# Patient Record
Sex: FEMALE | Race: WHITE | NOT HISPANIC OR LATINO | Employment: OTHER | URBAN - METROPOLITAN AREA
[De-identification: names, ages, dates, MRNs, and addresses within clinical notes are randomized per-mention and may not be internally consistent; named-entity substitution may affect disease eponyms.]

---

## 2017-01-03 ENCOUNTER — ALLSCRIPTS OFFICE VISIT (OUTPATIENT)
Dept: OTHER | Facility: OTHER | Age: 67
End: 2017-01-03

## 2017-01-03 ENCOUNTER — APPOINTMENT (OUTPATIENT)
Dept: LAB | Facility: HOSPITAL | Age: 67
End: 2017-01-03
Attending: UROLOGY
Payer: COMMERCIAL

## 2017-01-03 DIAGNOSIS — N39.0 URINARY TRACT INFECTION: ICD-10-CM

## 2017-01-03 LAB
BILIRUB UR QL STRIP: NORMAL
CLARITY UR: NORMAL
COLOR UR: YELLOW
GLUCOSE (HISTORICAL): NORMAL
HGB UR QL STRIP.AUTO: NORMAL
KETONES UR STRIP-MCNC: NORMAL MG/DL
LEUKOCYTE ESTERASE UR QL STRIP: NORMAL
NITRITE UR QL STRIP: NORMAL
PH UR STRIP.AUTO: 6 [PH]
PROT UR STRIP-MCNC: NORMAL MG/DL
SP GR UR STRIP.AUTO: 1.03
UROBILINOGEN UR QL STRIP.AUTO: NORMAL

## 2017-01-03 PROCEDURE — 87186 SC STD MICRODIL/AGAR DIL: CPT

## 2017-01-03 PROCEDURE — 87086 URINE CULTURE/COLONY COUNT: CPT

## 2017-01-03 PROCEDURE — 87147 CULTURE TYPE IMMUNOLOGIC: CPT

## 2017-01-06 LAB — BACTERIA UR CULT: NORMAL

## 2017-01-10 ENCOUNTER — ALLSCRIPTS OFFICE VISIT (OUTPATIENT)
Dept: OTHER | Facility: OTHER | Age: 67
End: 2017-01-10

## 2017-01-10 ENCOUNTER — APPOINTMENT (OUTPATIENT)
Dept: LAB | Facility: HOSPITAL | Age: 67
End: 2017-01-10
Attending: UROLOGY
Payer: COMMERCIAL

## 2017-01-10 DIAGNOSIS — N39.0 URINARY TRACT INFECTION: ICD-10-CM

## 2017-01-10 LAB
BACTERIA UR QL AUTO: ABNORMAL /HPF
BILIRUB UR QL STRIP: NEGATIVE
BILIRUB UR QL STRIP: NORMAL
CLARITY UR: ABNORMAL
CLARITY UR: NORMAL
COLOR UR: ABNORMAL
COLOR UR: YELLOW
GLUCOSE (HISTORICAL): NORMAL
GLUCOSE UR STRIP-MCNC: NEGATIVE MG/DL
HGB UR QL STRIP.AUTO: ABNORMAL
HGB UR QL STRIP.AUTO: NORMAL
HYALINE CASTS #/AREA URNS LPF: ABNORMAL /LPF
KETONES UR STRIP-MCNC: NEGATIVE MG/DL
KETONES UR STRIP-MCNC: NORMAL MG/DL
LEUKOCYTE ESTERASE UR QL STRIP: ABNORMAL
LEUKOCYTE ESTERASE UR QL STRIP: NORMAL
NITRITE UR QL STRIP: NORMAL
NITRITE UR QL STRIP: POSITIVE
NON-SQ EPI CELLS URNS QL MICRO: ABNORMAL /HPF
PH UR STRIP.AUTO: 5 [PH]
PH UR STRIP.AUTO: 6.5 [PH] (ref 4.5–8)
PROT UR STRIP-MCNC: ABNORMAL MG/DL
PROT UR STRIP-MCNC: NORMAL MG/DL
RBC #/AREA URNS AUTO: ABNORMAL /HPF
SP GR UR STRIP.AUTO: 1.02
SP GR UR STRIP.AUTO: 1.02 (ref 1–1.03)
UROBILINOGEN UR QL STRIP.AUTO: 0.2 E.U./DL
WBC #/AREA URNS AUTO: ABNORMAL /HPF

## 2017-01-10 PROCEDURE — 87086 URINE CULTURE/COLONY COUNT: CPT

## 2017-01-10 PROCEDURE — 87186 SC STD MICRODIL/AGAR DIL: CPT

## 2017-01-10 PROCEDURE — 87147 CULTURE TYPE IMMUNOLOGIC: CPT

## 2017-01-10 PROCEDURE — 81001 URINALYSIS AUTO W/SCOPE: CPT

## 2017-01-12 LAB — BACTERIA UR CULT: NORMAL

## 2017-01-24 ENCOUNTER — ALLSCRIPTS OFFICE VISIT (OUTPATIENT)
Dept: OTHER | Facility: OTHER | Age: 67
End: 2017-01-24

## 2017-01-24 LAB
BILIRUB UR QL STRIP: NORMAL
CLARITY UR: NORMAL
COLOR UR: YELLOW
GLUCOSE (HISTORICAL): NORMAL
HGB UR QL STRIP.AUTO: NORMAL
KETONES UR STRIP-MCNC: NORMAL MG/DL
LEUKOCYTE ESTERASE UR QL STRIP: NORMAL
NITRITE UR QL STRIP: NORMAL
PH UR STRIP.AUTO: 5 [PH]
PROT UR STRIP-MCNC: NORMAL MG/DL
SP GR UR STRIP.AUTO: 1.03

## 2017-01-31 ENCOUNTER — ALLSCRIPTS OFFICE VISIT (OUTPATIENT)
Dept: OTHER | Facility: OTHER | Age: 67
End: 2017-01-31

## 2017-01-31 LAB
BILIRUB UR QL STRIP: NORMAL
CLARITY UR: NORMAL
COLOR UR: YELLOW
GLUCOSE (HISTORICAL): NORMAL
HGB UR QL STRIP.AUTO: NORMAL
KETONES UR STRIP-MCNC: NORMAL MG/DL
LEUKOCYTE ESTERASE UR QL STRIP: NORMAL
NITRITE UR QL STRIP: NORMAL
PH UR STRIP.AUTO: 6 [PH]
PROT UR STRIP-MCNC: NORMAL MG/DL
SP GR UR STRIP.AUTO: 1.03

## 2017-02-07 ENCOUNTER — ALLSCRIPTS OFFICE VISIT (OUTPATIENT)
Dept: OTHER | Facility: OTHER | Age: 67
End: 2017-02-07

## 2017-02-10 ENCOUNTER — ALLSCRIPTS OFFICE VISIT (OUTPATIENT)
Dept: OTHER | Facility: OTHER | Age: 67
End: 2017-02-10

## 2017-02-14 ENCOUNTER — APPOINTMENT (OUTPATIENT)
Dept: LAB | Facility: HOSPITAL | Age: 67
End: 2017-02-14
Attending: UROLOGY
Payer: COMMERCIAL

## 2017-02-14 DIAGNOSIS — R30.0 DYSURIA: ICD-10-CM

## 2017-02-14 DIAGNOSIS — R69 ILLNESS: ICD-10-CM

## 2017-02-14 DIAGNOSIS — R31.9 HEMATURIA: ICD-10-CM

## 2017-02-14 DIAGNOSIS — N39.0 URINARY TRACT INFECTION: ICD-10-CM

## 2017-02-14 PROCEDURE — 87086 URINE CULTURE/COLONY COUNT: CPT

## 2017-02-15 LAB — BACTERIA UR CULT: NORMAL

## 2017-02-16 ENCOUNTER — OFFICE VISIT (OUTPATIENT)
Dept: URGENT CARE | Facility: MEDICAL CENTER | Age: 67
End: 2017-02-16
Payer: COMMERCIAL

## 2017-02-16 ENCOUNTER — APPOINTMENT (OUTPATIENT)
Dept: LAB | Facility: MEDICAL CENTER | Age: 67
End: 2017-02-16
Attending: PHYSICIAN ASSISTANT
Payer: COMMERCIAL

## 2017-02-16 ENCOUNTER — TRANSCRIBE ORDERS (OUTPATIENT)
Dept: URGENT CARE | Facility: MEDICAL CENTER | Age: 67
End: 2017-02-16

## 2017-02-16 DIAGNOSIS — R69 ILLNESS: ICD-10-CM

## 2017-02-16 DIAGNOSIS — R30.0 DYSURIA: ICD-10-CM

## 2017-02-16 PROCEDURE — 87086 URINE CULTURE/COLONY COUNT: CPT

## 2017-02-16 PROCEDURE — 87798 DETECT AGENT NOS DNA AMP: CPT

## 2017-02-16 PROCEDURE — 99213 OFFICE O/P EST LOW 20 MIN: CPT

## 2017-02-17 LAB
FLUAV AG SPEC QL: NORMAL
FLUBV AG SPEC QL: NORMAL
RSV B RNA SPEC QL NAA+PROBE: NORMAL

## 2017-02-18 ENCOUNTER — GENERIC CONVERSION - ENCOUNTER (OUTPATIENT)
Dept: OTHER | Facility: OTHER | Age: 67
End: 2017-02-18

## 2017-02-18 LAB — BACTERIA UR CULT: NORMAL

## 2017-02-24 ENCOUNTER — ALLSCRIPTS OFFICE VISIT (OUTPATIENT)
Dept: OTHER | Facility: OTHER | Age: 67
End: 2017-02-24

## 2017-02-24 LAB
BILIRUB UR QL STRIP: NORMAL
CLARITY UR: NORMAL
COLOR UR: YELLOW
GLUCOSE (HISTORICAL): NORMAL
HGB UR QL STRIP.AUTO: NORMAL
KETONES UR STRIP-MCNC: NORMAL MG/DL
LEUKOCYTE ESTERASE UR QL STRIP: NORMAL
NITRITE UR QL STRIP: NORMAL
PH UR STRIP.AUTO: 6 [PH]
PROT UR STRIP-MCNC: NORMAL MG/DL
SP GR UR STRIP.AUTO: 1.01

## 2017-03-06 ENCOUNTER — APPOINTMENT (OUTPATIENT)
Dept: LAB | Facility: MEDICAL CENTER | Age: 67
End: 2017-03-06
Payer: COMMERCIAL

## 2017-03-06 DIAGNOSIS — N39.0 URINARY TRACT INFECTION: ICD-10-CM

## 2017-03-06 LAB
BACTERIA UR QL AUTO: ABNORMAL /HPF
BILIRUB UR QL STRIP: NEGATIVE
CLARITY UR: ABNORMAL
COLOR UR: ABNORMAL
GLUCOSE UR STRIP-MCNC: NEGATIVE MG/DL
HGB UR QL STRIP.AUTO: ABNORMAL
KETONES UR STRIP-MCNC: ABNORMAL MG/DL
LEUKOCYTE ESTERASE UR QL STRIP: ABNORMAL
NITRITE UR QL STRIP: NEGATIVE
NON-SQ EPI CELLS URNS QL MICRO: ABNORMAL /HPF
PH UR STRIP.AUTO: 6 [PH] (ref 4.5–8)
PROT UR STRIP-MCNC: ABNORMAL MG/DL
RBC #/AREA URNS AUTO: ABNORMAL /HPF
SP GR UR STRIP.AUTO: 1.03 (ref 1–1.03)
UROBILINOGEN UR QL STRIP.AUTO: 0.2 E.U./DL
WBC #/AREA URNS AUTO: ABNORMAL /HPF

## 2017-03-06 PROCEDURE — 87086 URINE CULTURE/COLONY COUNT: CPT

## 2017-03-06 PROCEDURE — 81001 URINALYSIS AUTO W/SCOPE: CPT

## 2017-03-07 ENCOUNTER — ALLSCRIPTS OFFICE VISIT (OUTPATIENT)
Dept: OTHER | Facility: OTHER | Age: 67
End: 2017-03-07

## 2017-03-07 LAB — BACTERIA UR CULT: NORMAL

## 2017-03-09 ENCOUNTER — ALLSCRIPTS OFFICE VISIT (OUTPATIENT)
Dept: OTHER | Facility: OTHER | Age: 67
End: 2017-03-09

## 2017-03-13 ENCOUNTER — GENERIC CONVERSION - ENCOUNTER (OUTPATIENT)
Dept: OTHER | Facility: OTHER | Age: 67
End: 2017-03-13

## 2017-03-23 ENCOUNTER — APPOINTMENT (OUTPATIENT)
Dept: LAB | Facility: MEDICAL CENTER | Age: 67
End: 2017-03-23
Payer: COMMERCIAL

## 2017-03-23 ENCOUNTER — TRANSCRIBE ORDERS (OUTPATIENT)
Dept: ADMINISTRATIVE | Facility: HOSPITAL | Age: 67
End: 2017-03-23

## 2017-03-23 DIAGNOSIS — R35.0 FREQUENCY OF MICTURITION: ICD-10-CM

## 2017-03-23 DIAGNOSIS — N39.0 URINARY TRACT INFECTION: ICD-10-CM

## 2017-03-23 LAB
BACTERIA UR QL AUTO: ABNORMAL /HPF
BILIRUB UR QL STRIP: NEGATIVE
CLARITY UR: ABNORMAL
COLOR UR: YELLOW
GLUCOSE UR STRIP-MCNC: NEGATIVE MG/DL
HGB UR QL STRIP.AUTO: ABNORMAL
KETONES UR STRIP-MCNC: ABNORMAL MG/DL
LEUKOCYTE ESTERASE UR QL STRIP: ABNORMAL
NITRITE UR QL STRIP: POSITIVE
NON-SQ EPI CELLS URNS QL MICRO: ABNORMAL /HPF
PH UR STRIP.AUTO: 6 [PH] (ref 4.5–8)
PROT UR STRIP-MCNC: ABNORMAL MG/DL
RBC #/AREA URNS AUTO: ABNORMAL /HPF
SP GR UR STRIP.AUTO: 1.02 (ref 1–1.03)
UROBILINOGEN UR QL STRIP.AUTO: 0.2 E.U./DL
WBC #/AREA URNS AUTO: ABNORMAL /HPF

## 2017-03-23 PROCEDURE — 81001 URINALYSIS AUTO W/SCOPE: CPT

## 2017-03-23 PROCEDURE — 87086 URINE CULTURE/COLONY COUNT: CPT

## 2017-03-23 PROCEDURE — 87186 SC STD MICRODIL/AGAR DIL: CPT

## 2017-03-25 LAB — BACTERIA UR CULT: NORMAL

## 2017-04-07 ENCOUNTER — APPOINTMENT (OUTPATIENT)
Dept: LAB | Facility: MEDICAL CENTER | Age: 67
End: 2017-04-07
Payer: COMMERCIAL

## 2017-04-07 DIAGNOSIS — N39.0 URINARY TRACT INFECTION: ICD-10-CM

## 2017-04-07 LAB

## 2017-04-07 PROCEDURE — 81001 URINALYSIS AUTO W/SCOPE: CPT

## 2017-04-07 PROCEDURE — 87086 URINE CULTURE/COLONY COUNT: CPT

## 2017-04-09 LAB — BACTERIA UR CULT: NORMAL

## 2017-04-14 ENCOUNTER — OFFICE VISIT (OUTPATIENT)
Dept: URGENT CARE | Facility: MEDICAL CENTER | Age: 67
End: 2017-04-14
Payer: COMMERCIAL

## 2017-04-14 ENCOUNTER — APPOINTMENT (OUTPATIENT)
Dept: LAB | Facility: HOSPITAL | Age: 67
End: 2017-04-14
Payer: COMMERCIAL

## 2017-04-14 DIAGNOSIS — R35.0 FREQUENCY OF MICTURITION: ICD-10-CM

## 2017-04-14 PROCEDURE — 81002 URINALYSIS NONAUTO W/O SCOPE: CPT

## 2017-04-14 PROCEDURE — S9083 URGENT CARE CENTER GLOBAL: HCPCS

## 2017-04-14 PROCEDURE — 87086 URINE CULTURE/COLONY COUNT: CPT

## 2017-04-14 PROCEDURE — 87147 CULTURE TYPE IMMUNOLOGIC: CPT

## 2017-04-14 PROCEDURE — G0382 LEV 3 HOSP TYPE B ED VISIT: HCPCS

## 2017-04-17 LAB — BACTERIA UR CULT: NORMAL

## 2017-04-18 ENCOUNTER — ALLSCRIPTS OFFICE VISIT (OUTPATIENT)
Dept: OTHER | Facility: OTHER | Age: 67
End: 2017-04-18

## 2017-04-25 ENCOUNTER — ALLSCRIPTS OFFICE VISIT (OUTPATIENT)
Dept: OTHER | Facility: OTHER | Age: 67
End: 2017-04-25

## 2017-04-25 ENCOUNTER — APPOINTMENT (OUTPATIENT)
Dept: LAB | Facility: MEDICAL CENTER | Age: 67
End: 2017-04-25
Payer: COMMERCIAL

## 2017-04-25 DIAGNOSIS — N39.0 URINARY TRACT INFECTION: ICD-10-CM

## 2017-04-25 LAB
BACTERIA UR QL AUTO: ABNORMAL /HPF
BILIRUB UR QL STRIP: NEGATIVE
CLARITY UR: ABNORMAL
COLOR UR: YELLOW
GLUCOSE UR STRIP-MCNC: NEGATIVE MG/DL
HGB UR QL STRIP.AUTO: ABNORMAL
HYALINE CASTS #/AREA URNS LPF: ABNORMAL /LPF
KETONES UR STRIP-MCNC: NEGATIVE MG/DL
LEUKOCYTE ESTERASE UR QL STRIP: ABNORMAL
NITRITE UR QL STRIP: POSITIVE
NON-SQ EPI CELLS URNS QL MICRO: ABNORMAL /HPF
PH UR STRIP.AUTO: 6 [PH] (ref 4.5–8)
PROT UR STRIP-MCNC: ABNORMAL MG/DL
RBC #/AREA URNS AUTO: ABNORMAL /HPF
SP GR UR STRIP.AUTO: 1.02 (ref 1–1.03)
UROBILINOGEN UR QL STRIP.AUTO: 0.2 E.U./DL
WBC #/AREA URNS AUTO: ABNORMAL /HPF

## 2017-04-25 PROCEDURE — 87086 URINE CULTURE/COLONY COUNT: CPT

## 2017-04-25 PROCEDURE — 81001 URINALYSIS AUTO W/SCOPE: CPT

## 2017-04-27 LAB — BACTERIA UR CULT: NORMAL

## 2017-05-09 ENCOUNTER — APPOINTMENT (OUTPATIENT)
Dept: RADIOLOGY | Facility: HOSPITAL | Age: 67
End: 2017-05-09
Payer: COMMERCIAL

## 2017-05-09 ENCOUNTER — HOSPITAL ENCOUNTER (OUTPATIENT)
Facility: HOSPITAL | Age: 67
Setting detail: OUTPATIENT SURGERY
Discharge: HOME | End: 2017-05-09
Attending: UROLOGY | Admitting: UROLOGY
Payer: COMMERCIAL

## 2017-05-09 ENCOUNTER — ANESTHESIA (OUTPATIENT)
Dept: PERIOP | Facility: HOSPITAL | Age: 67
End: 2017-05-09
Payer: COMMERCIAL

## 2017-05-09 ENCOUNTER — ANESTHESIA EVENT (OUTPATIENT)
Dept: PERIOP | Facility: HOSPITAL | Age: 67
End: 2017-05-09
Payer: COMMERCIAL

## 2017-05-09 VITALS
TEMPERATURE: 97.8 F | WEIGHT: 165 LBS | BODY MASS INDEX: 29.23 KG/M2 | DIASTOLIC BLOOD PRESSURE: 86 MMHG | HEIGHT: 63 IN | RESPIRATION RATE: 16 BRPM | SYSTOLIC BLOOD PRESSURE: 170 MMHG | HEART RATE: 73 BPM | OXYGEN SATURATION: 98 %

## 2017-05-09 DIAGNOSIS — C67.2 MALIGNANT NEOPLASM OF LATERAL WALL OF BLADDER (HCC): ICD-10-CM

## 2017-05-09 LAB
BACTERIA UR QL AUTO: ABNORMAL /HPF
BILIRUB UR QL STRIP: NEGATIVE
CLARITY UR: ABNORMAL
COLOR UR: YELLOW
GLUCOSE SERPL-MCNC: 119 MG/DL (ref 65–140)
GLUCOSE SERPL-MCNC: 146 MG/DL (ref 65–140)
GLUCOSE UR STRIP-MCNC: NEGATIVE MG/DL
HGB UR QL STRIP.AUTO: ABNORMAL
KETONES UR STRIP-MCNC: NEGATIVE MG/DL
LEUKOCYTE ESTERASE UR QL STRIP: ABNORMAL
NITRITE UR QL STRIP: NEGATIVE
NON-SQ EPI CELLS URNS QL MICRO: ABNORMAL /HPF
PH UR STRIP.AUTO: 6 [PH] (ref 4.5–8)
PROT UR STRIP-MCNC: ABNORMAL MG/DL
RBC #/AREA URNS AUTO: ABNORMAL /HPF
SP GR UR STRIP.AUTO: 1.01 (ref 1–1.03)
UROBILINOGEN UR QL STRIP.AUTO: 0.2 E.U./DL
WBC #/AREA URNS AUTO: ABNORMAL /HPF

## 2017-05-09 PROCEDURE — 87147 CULTURE TYPE IMMUNOLOGIC: CPT | Performed by: UROLOGY

## 2017-05-09 PROCEDURE — 87077 CULTURE AEROBIC IDENTIFY: CPT | Performed by: UROLOGY

## 2017-05-09 PROCEDURE — 74420 UROGRAPHY RTRGR +-KUB: CPT

## 2017-05-09 PROCEDURE — 88173 CYTOPATH EVAL FNA REPORT: CPT | Performed by: UROLOGY

## 2017-05-09 PROCEDURE — C1769 GUIDE WIRE: HCPCS | Performed by: UROLOGY

## 2017-05-09 PROCEDURE — 87186 SC STD MICRODIL/AGAR DIL: CPT | Performed by: UROLOGY

## 2017-05-09 PROCEDURE — 88112 CYTOPATH CELL ENHANCE TECH: CPT | Performed by: UROLOGY

## 2017-05-09 PROCEDURE — 81001 URINALYSIS AUTO W/SCOPE: CPT | Performed by: UROLOGY

## 2017-05-09 PROCEDURE — 88172 CYTP DX EVAL FNA 1ST EA SITE: CPT | Performed by: UROLOGY

## 2017-05-09 PROCEDURE — C2617 STENT, NON-COR, TEM W/O DEL: HCPCS | Performed by: UROLOGY

## 2017-05-09 PROCEDURE — 82948 REAGENT STRIP/BLOOD GLUCOSE: CPT

## 2017-05-09 PROCEDURE — 87086 URINE CULTURE/COLONY COUNT: CPT | Performed by: UROLOGY

## 2017-05-09 DEVICE — STENT URETERAL 6FR 22CM INLAY OPTIMA W/NITINOL GDWR: Type: IMPLANTABLE DEVICE | Site: KIDNEY | Status: FUNCTIONAL

## 2017-05-09 RX ORDER — SODIUM CHLORIDE, SODIUM LACTATE, POTASSIUM CHLORIDE, CALCIUM CHLORIDE 600; 310; 30; 20 MG/100ML; MG/100ML; MG/100ML; MG/100ML
INJECTION, SOLUTION INTRAVENOUS CONTINUOUS PRN
Status: DISCONTINUED | OUTPATIENT
Start: 2017-05-09 | End: 2017-05-09 | Stop reason: SURG

## 2017-05-09 RX ORDER — LIDOCAINE HYDROCHLORIDE 10 MG/ML
INJECTION, SOLUTION INFILTRATION; PERINEURAL AS NEEDED
Status: DISCONTINUED | OUTPATIENT
Start: 2017-05-09 | End: 2017-05-09 | Stop reason: SURG

## 2017-05-09 RX ORDER — ONDANSETRON 2 MG/ML
INJECTION INTRAMUSCULAR; INTRAVENOUS AS NEEDED
Status: DISCONTINUED | OUTPATIENT
Start: 2017-05-09 | End: 2017-05-09 | Stop reason: SURG

## 2017-05-09 RX ORDER — FENTANYL CITRATE/PF 50 MCG/ML
25 SYRINGE (ML) INJECTION
Status: DISCONTINUED | OUTPATIENT
Start: 2017-05-09 | End: 2017-05-09 | Stop reason: HOSPADM

## 2017-05-09 RX ORDER — PROPOFOL 10 MG/ML
INJECTION, EMULSION INTRAVENOUS AS NEEDED
Status: DISCONTINUED | OUTPATIENT
Start: 2017-05-09 | End: 2017-05-09 | Stop reason: SURG

## 2017-05-09 RX ORDER — FENTANYL CITRATE 50 UG/ML
INJECTION, SOLUTION INTRAMUSCULAR; INTRAVENOUS AS NEEDED
Status: DISCONTINUED | OUTPATIENT
Start: 2017-05-09 | End: 2017-05-09 | Stop reason: SURG

## 2017-05-09 RX ORDER — HYDROCODONE BITARTRATE AND ACETAMINOPHEN 5; 325 MG/1; MG/1
2 TABLET ORAL EVERY 4 HOURS PRN
Status: DISCONTINUED | OUTPATIENT
Start: 2017-05-09 | End: 2017-05-09 | Stop reason: HOSPADM

## 2017-05-09 RX ORDER — SODIUM CHLORIDE, SODIUM LACTATE, POTASSIUM CHLORIDE, CALCIUM CHLORIDE 600; 310; 30; 20 MG/100ML; MG/100ML; MG/100ML; MG/100ML
50 INJECTION, SOLUTION INTRAVENOUS CONTINUOUS
Status: DISCONTINUED | OUTPATIENT
Start: 2017-05-09 | End: 2017-05-09 | Stop reason: HOSPADM

## 2017-05-09 RX ORDER — MAGNESIUM HYDROXIDE 1200 MG/15ML
LIQUID ORAL AS NEEDED
Status: DISCONTINUED | OUTPATIENT
Start: 2017-05-09 | End: 2017-05-09 | Stop reason: HOSPADM

## 2017-05-09 RX ORDER — IBUPROFEN 400 MG/1
400 TABLET ORAL EVERY 6 HOURS PRN
Status: DISCONTINUED | OUTPATIENT
Start: 2017-05-09 | End: 2017-05-09 | Stop reason: HOSPADM

## 2017-05-09 RX ORDER — METOCLOPRAMIDE HYDROCHLORIDE 5 MG/ML
10 INJECTION INTRAMUSCULAR; INTRAVENOUS ONCE
Status: DISCONTINUED | OUTPATIENT
Start: 2017-05-09 | End: 2017-05-09 | Stop reason: HOSPADM

## 2017-05-09 RX ORDER — CIPROFLOXACIN 500 MG/1
500 TABLET, FILM COATED ORAL 2 TIMES DAILY
Qty: 10 TABLET | Refills: 0 | Status: SHIPPED | OUTPATIENT
Start: 2017-05-09 | End: 2017-05-14

## 2017-05-09 RX ORDER — DIPHENHYDRAMINE HYDROCHLORIDE 50 MG/ML
INJECTION INTRAMUSCULAR; INTRAVENOUS AS NEEDED
Status: DISCONTINUED | OUTPATIENT
Start: 2017-05-09 | End: 2017-05-09 | Stop reason: SURG

## 2017-05-09 RX ORDER — HYDROCODONE BITARTRATE AND ACETAMINOPHEN 5; 325 MG/1; MG/1
2 TABLET ORAL EVERY 6 HOURS PRN
Qty: 12 TABLET | Refills: 0 | Status: SHIPPED | OUTPATIENT
Start: 2017-05-09 | End: 2017-05-19

## 2017-05-09 RX ADMIN — FENTANYL CITRATE 25 MCG: 50 INJECTION, SOLUTION INTRAMUSCULAR; INTRAVENOUS at 07:53

## 2017-05-09 RX ADMIN — IBUPROFEN 400 MG: 400 TABLET ORAL at 10:44

## 2017-05-09 RX ADMIN — LIDOCAINE HYDROCHLORIDE 50 MG: 10 INJECTION, SOLUTION INFILTRATION; PERINEURAL at 07:39

## 2017-05-09 RX ADMIN — ONDANSETRON 4 MG: 2 INJECTION INTRAMUSCULAR; INTRAVENOUS at 07:44

## 2017-05-09 RX ADMIN — FENTANYL CITRATE 25 MCG: 50 INJECTION, SOLUTION INTRAMUSCULAR; INTRAVENOUS at 07:48

## 2017-05-09 RX ADMIN — CEFAZOLIN SODIUM 1000 MG: 1 SOLUTION INTRAVENOUS at 07:41

## 2017-05-09 RX ADMIN — SODIUM CHLORIDE, SODIUM LACTATE, POTASSIUM CHLORIDE, AND CALCIUM CHLORIDE: .6; .31; .03; .02 INJECTION, SOLUTION INTRAVENOUS at 07:30

## 2017-05-09 RX ADMIN — FENTANYL CITRATE 25 MCG: 50 INJECTION, SOLUTION INTRAMUSCULAR; INTRAVENOUS at 08:27

## 2017-05-09 RX ADMIN — PROPOFOL 150 MG: 10 INJECTION, EMULSION INTRAVENOUS at 07:39

## 2017-05-09 RX ADMIN — FENTANYL CITRATE 25 MCG: 50 INJECTION, SOLUTION INTRAMUSCULAR; INTRAVENOUS at 08:26

## 2017-05-09 RX ADMIN — DIPHENHYDRAMINE HYDROCHLORIDE 12.5 MG: 50 INJECTION, SOLUTION INTRAMUSCULAR; INTRAVENOUS at 07:45

## 2017-05-12 ENCOUNTER — ALLSCRIPTS OFFICE VISIT (OUTPATIENT)
Dept: OTHER | Facility: OTHER | Age: 67
End: 2017-05-12

## 2017-05-12 LAB
BACTERIA UR CULT: NORMAL
BACTERIA UR CULT: NORMAL

## 2017-06-20 ENCOUNTER — ALLSCRIPTS OFFICE VISIT (OUTPATIENT)
Dept: OTHER | Facility: OTHER | Age: 67
End: 2017-06-20

## 2017-06-20 LAB
BILIRUB UR QL STRIP: NORMAL
CLARITY UR: NORMAL
COLOR UR: YELLOW
GLUCOSE (HISTORICAL): NORMAL
HGB UR QL STRIP.AUTO: NORMAL
KETONES UR STRIP-MCNC: NORMAL MG/DL
LEUKOCYTE ESTERASE UR QL STRIP: NORMAL
NITRITE UR QL STRIP: NORMAL
PH UR STRIP.AUTO: 5 [PH]
PROT UR STRIP-MCNC: NORMAL MG/DL
SP GR UR STRIP.AUTO: 1.02

## 2017-06-27 ENCOUNTER — ALLSCRIPTS OFFICE VISIT (OUTPATIENT)
Dept: OTHER | Facility: OTHER | Age: 67
End: 2017-06-27

## 2017-07-05 ENCOUNTER — ALLSCRIPTS OFFICE VISIT (OUTPATIENT)
Dept: OTHER | Facility: OTHER | Age: 67
End: 2017-07-05

## 2017-07-05 DIAGNOSIS — C67.2 MALIGNANT NEOPLASM OF LATERAL WALL OF BLADDER (HCC): ICD-10-CM

## 2017-08-05 ENCOUNTER — APPOINTMENT (OUTPATIENT)
Dept: LAB | Facility: MEDICAL CENTER | Age: 67
End: 2017-08-05
Payer: COMMERCIAL

## 2017-08-05 DIAGNOSIS — C67.2 MALIGNANT NEOPLASM OF LATERAL WALL OF BLADDER (HCC): ICD-10-CM

## 2017-08-05 PROCEDURE — 88112 CYTOPATH CELL ENHANCE TECH: CPT

## 2017-08-07 ENCOUNTER — APPOINTMENT (OUTPATIENT)
Dept: LAB | Facility: MEDICAL CENTER | Age: 67
End: 2017-08-07
Payer: COMMERCIAL

## 2017-08-07 DIAGNOSIS — N39.0 URINARY TRACT INFECTION: ICD-10-CM

## 2017-08-07 LAB
BACTERIA UR QL AUTO: NORMAL /HPF
BILIRUB UR QL STRIP: NEGATIVE
CLARITY UR: CLEAR
COLOR UR: YELLOW
GLUCOSE UR STRIP-MCNC: NEGATIVE MG/DL
HGB UR QL STRIP.AUTO: ABNORMAL
HYALINE CASTS #/AREA URNS LPF: NORMAL /LPF
KETONES UR STRIP-MCNC: NEGATIVE MG/DL
LEUKOCYTE ESTERASE UR QL STRIP: NEGATIVE
NITRITE UR QL STRIP: NEGATIVE
NON-SQ EPI CELLS URNS QL MICRO: NORMAL /HPF
PH UR STRIP.AUTO: 6.5 [PH] (ref 4.5–8)
PROT UR STRIP-MCNC: NEGATIVE MG/DL
RBC #/AREA URNS AUTO: NORMAL /HPF
SP GR UR STRIP.AUTO: 1.01 (ref 1–1.03)
UROBILINOGEN UR QL STRIP.AUTO: 0.2 E.U./DL
WBC #/AREA URNS AUTO: NORMAL /HPF

## 2017-08-07 PROCEDURE — 87086 URINE CULTURE/COLONY COUNT: CPT

## 2017-08-07 PROCEDURE — 81001 URINALYSIS AUTO W/SCOPE: CPT

## 2017-08-08 ENCOUNTER — ALLSCRIPTS OFFICE VISIT (OUTPATIENT)
Dept: OTHER | Facility: OTHER | Age: 67
End: 2017-08-08

## 2017-08-08 LAB — BACTERIA UR CULT: NORMAL

## 2017-08-15 ENCOUNTER — ALLSCRIPTS OFFICE VISIT (OUTPATIENT)
Dept: OTHER | Facility: OTHER | Age: 67
End: 2017-08-15

## 2017-08-15 LAB
CLARITY UR: NORMAL
COLOR UR: YELLOW
GLUCOSE (HISTORICAL): NORMAL
HGB UR QL STRIP.AUTO: NORMAL
KETONES UR STRIP-MCNC: NORMAL MG/DL
LEUKOCYTE ESTERASE UR QL STRIP: NORMAL
NITRITE UR QL STRIP: NORMAL
PH UR STRIP.AUTO: 6 [PH]
PROT UR STRIP-MCNC: NORMAL MG/DL
SP GR UR STRIP.AUTO: 1.03

## 2017-09-12 DIAGNOSIS — Z00.00 ENCOUNTER FOR GENERAL ADULT MEDICAL EXAMINATION WITHOUT ABNORMAL FINDINGS: ICD-10-CM

## 2017-09-12 DIAGNOSIS — Z78.0 ASYMPTOMATIC MENOPAUSAL STATE: ICD-10-CM

## 2017-09-12 DIAGNOSIS — E11.9 TYPE 2 DIABETES MELLITUS WITHOUT COMPLICATIONS (HCC): ICD-10-CM

## 2017-09-12 DIAGNOSIS — E04.1 NONTOXIC SINGLE THYROID NODULE: ICD-10-CM

## 2017-09-12 DIAGNOSIS — R31.9 HEMATURIA: ICD-10-CM

## 2017-09-12 DIAGNOSIS — M25.562 PAIN IN LEFT KNEE: ICD-10-CM

## 2017-09-12 DIAGNOSIS — R05.9 COUGH: ICD-10-CM

## 2017-09-27 ENCOUNTER — APPOINTMENT (OUTPATIENT)
Dept: LAB | Facility: HOSPITAL | Age: 67
End: 2017-09-27
Payer: COMMERCIAL

## 2017-09-27 ENCOUNTER — ALLSCRIPTS OFFICE VISIT (OUTPATIENT)
Dept: OTHER | Facility: OTHER | Age: 67
End: 2017-09-27

## 2017-09-27 DIAGNOSIS — R31.9 HEMATURIA: ICD-10-CM

## 2017-09-27 LAB
BILIRUB UR QL STRIP: NORMAL
CLARITY UR: NORMAL
COLOR UR: YELLOW
GLUCOSE (HISTORICAL): 250
GLUCOSE SERPL-MCNC: 291 MG/DL
HGB UR QL STRIP.AUTO: NORMAL
KETONES UR STRIP-MCNC: NORMAL MG/DL
LEUKOCYTE ESTERASE UR QL STRIP: NORMAL
NITRITE UR QL STRIP: NORMAL
PH UR STRIP.AUTO: 5 [PH]
PROT UR STRIP-MCNC: NORMAL MG/DL
SP GR UR STRIP.AUTO: 1.03
UROBILINOGEN UR QL STRIP.AUTO: 0.2

## 2017-09-27 PROCEDURE — 87086 URINE CULTURE/COLONY COUNT: CPT

## 2017-09-28 LAB — BACTERIA UR CULT: NORMAL

## 2017-09-29 ENCOUNTER — GENERIC CONVERSION - ENCOUNTER (OUTPATIENT)
Dept: OTHER | Facility: OTHER | Age: 67
End: 2017-09-29

## 2017-10-04 ENCOUNTER — APPOINTMENT (OUTPATIENT)
Dept: LAB | Facility: MEDICAL CENTER | Age: 67
End: 2017-10-04
Payer: COMMERCIAL

## 2017-10-04 DIAGNOSIS — C67.2 MALIGNANT NEOPLASM OF LATERAL WALL OF BLADDER (HCC): ICD-10-CM

## 2017-10-04 PROCEDURE — 88112 CYTOPATH CELL ENHANCE TECH: CPT

## 2017-10-10 ENCOUNTER — ALLSCRIPTS OFFICE VISIT (OUTPATIENT)
Dept: OTHER | Facility: OTHER | Age: 67
End: 2017-10-10

## 2017-10-11 NOTE — PROCEDURES
Assessment  1  Bladder mass (596 89) (N32 89)   2  Malignant neoplasm of lateral wall of urinary bladder (188 2) (C67 2)    Discussion/Summary  Discussion Summary:   My impression is history of urothelial carcinoma the bladder without recurrence  the patient and her  with reassurance that cystoscopy and cytology are within normal limits  I recommend continuing BCG which is scheduled for late November 2017  Cystoscopy will be in 6 months time  Chief Complaint  Chief Complaint Free Text Note Form: Patient presents for cystoscopy-f/u bladder cancer      History of Present Illness  HPI: Alesia Cuadra is a 50-year-old female with recurrent urothelial carcinoma of the bladder and a small frondular tumor adjacent to the right ureteral orifice  She recently completed her first 3 cycles of BCG maintenance in early July 2017  She now returns for surveillance cystoscopy  Her last tumor was in November 2016  This revealed low-grade noninvasive urothelial carcinoma the bladder  She continues to receive BCG  Her last BCG was in July 2017  Her last cystoscopy was in August 2017  The patient wanted to move upper cystoscopy in BCG so that she can travel with her family  Recent cytology is negative for malignant cells  Review of Systems  Complete-Female Urology:   Genitourinary: Patient states that she feels like she empties most of the time-and-stream quality fair, but-no urinary hesitancy,-no feelings of urinary urgency,-no incontinence-and-no hematuria-   The patient presents with complaints of dysuria (c/o vaginal burning with urination)  The patient presents with complaints of 4 episodes of nocturia  Active Problems  1  Abnormal EKG (794 31) (R94 31)   2  Acute knee pain, left (719 46) (M25 562)   3  Acute lower UTI (599 0) (N39 0)   4  Anxiety (300 00) (F41 9)   5  Asthma (493 90) (J45 909)   6  Bladder mass (596 89) (N32 89)   7  Bladder pain (788 99) (R39 89)   8  Bursitis of shoulder (726 10) (M75 50)   9  Chronic cough (786 2) (R05)   10  Depression (311) (F32 9)   11  Fatigue (780 79) (R53 83)   12  Fibromyositis (729 1) (M79 7)   13  Ganglion and cyst of synovium, tendon and bursa (727 49,727 40,727 42)    (M67 49,M67 89,M71 39)   14  Hematuria (599 70) (R31 9)   15  Hematuria (599 70) (R31 9)   16  Hypercholesterolemia (272 0) (E78 00)   17  Hypertension (401 9) (I10)   18  Hyponatremia (276 1) (E87 1)   19  Influenza-like illness (799 89) (R69)   20  Insomnia (780 52) (G47 00)   21  Malignant neoplasm of lateral wall of urinary bladder (188 2) (C67 2)   22  Neoplasm of bladder (239 4) (D49 4)   23  Non-insulin dependent type 2 diabetes mellitus (250 00) (E11 9)   24  Nontoxic single thyroid nodule (241 0) (E04 1)   25  Obesity (278 00) (E66 9)   26  Osteoporosis (733 00) (M81 0)   27  Postmenopausal (V49 81) (Z78 0)   28  Primary localized osteoarthritis of left knee (715 16) (M17 12)   29  Screening mammogram, encounter for (V76 12) (Z12 31)   30  Snoring (786 09) (R06 83)   31  Uncontrolled diabetes mellitus type 2 without complications, unspecified long term    insulin use status (250 02) (E11 65)   32  Urinary frequency (788 41) (R35 0)   33  Urinary hesitancy (788 64) (R39 11)   34  Yeast UTI (112 2) (B37 49)    Past Medical History  1  History of Acute frontal sinusitis (461 1) (J01 10)   2  History of High triglycerides (272 1) (E78 1)   3  History of abnormal electrocardiography (V12 59) (Z86 79)   4  History of acute bronchitis (V12 69) (Z87 09)   5  History of cough   6  History of dysuria (V13 00) (Z87 898)   7  History of gastroesophageal reflux (GERD) (V12 79) (Z87 19)   8  History of heartburn (V12 79) (Z87 898)   9  History of hypertension (V12 59) (Z86 79)   10  History of osteoporosis (V13 59) (Z87 39)   11  History of thyroid disorder (V12 29) (Z86 39)   12  History of urinary frequency (V13 09) (Z87 898)   13  History of urinary frequency (V13 09) (Z87 898)   14   History of Hypercholesterolemia (272 0) (E78 00)   15  History of Tear of medial meniscus of left knee (836 0) (F03 754J)    Surgical History  1  History of  Section   2  History of Cholecystectomy   3  History of Cystoscopy With Removal Of Object   4  History of Cystoscopy With Resection Of Tumor   5  History of Diagnostic Cystoscopy   6  History of Knee Surgery   7  History of Neuroplasty Decompression Median Nerve At Carpal Tunnel   8  History of Tonsillectomy    Family History  Mother    1  Family history of myocardial infarction (V17 3) (Z82 49)  Father    2  Family history of amyotrophic lateral sclerosis (V17 2) (Z82 0)  Paternal Grandmother    3  Family history of myocardial infarction (V17 3) (Z82 49)  Maternal Grandfather    4  Family history of diabetes mellitus (V18 0) (Z83 3)  Paternal Grandfather    5  Family history of lung cancer (V16 1) (Z80 1)    Social History   · Former smoker (V15 82) (I15 375)   · No alcohol use   · No drug use    Current Meds   1  ClonazePAM 0 5 MG Oral Tablet; TAKE 1 TABLET AT BEDTIME; Therapy: 73YLO8181 to (Evaluate:2017); Last Rx:2017 Ordered   2  Edluar 10 MG Sublingual Tablet Sublingual; DISSOLVE 0 5 MG Bedtime; Therapy: 05IDE8092 to Recorded   3  Famotidine 20 MG Oral Tablet; TAKE 1 TABLET EVERY 12 HOURS DAILY; Therapy: 98EUE4874 to (Evaluate:22Qay0904) Recorded   4  Glimepiride 2 MG Oral Tablet; Take 1 tablet twice daily; Last Rx:44Tzk1542 Ordered   5  Glimepiride 4 MG Oral Tablet; TAKE 2 TABLETS DAILY; Therapy: 01JXR5910 to (Evaluate:03Olp4302); Last Rx:07Zkj1086 Ordered   6  Losartan Potassium-HCTZ 50-12 5 MG Oral Tablet; TAKE 1 TABLET DAILY; Therapy: (Recorded:02Sko5560) to Recorded   7  MetFORMIN HCl - 500 MG Oral Tablet; TAKE 2 TABLETS TWICE DAILY; Last   Rx:27Mgb1988 Ordered   8  OneTouch Verio In [employer]; test twice daily; Therapy: (Kell Raymundo) to Recorded   9  Probiotic Oral Capsule; take 1 capsule daily;    Therapy: (Recorded:81Scu1327) to Recorded 10  Simvastatin 40 MG Oral Tablet; TAKE 1 TABLET DAILY; Therapy: (Recorded:49Ova8783) to Recorded   11  Sulfamethoxazole-Trimethoprim 800-160 MG Oral Tablet; TAKE 1 TABLET TWICE DAILY    WITH FOOD; Therapy: 04ISS6017 to (01 585 135)  Requested for: 18UVH0189; Last    Rx:45Ywr9629 Ordered   12  Anselmo BCG 50 MG Intravesical Suspension Reconstituted; Therapy: (Recorded:89Ybb4860) to Recorded   13  Tricor 145 MG Oral Tablet; TAKE 1 TABLET DAILY; Therapy: (Recorded:81Civ5573) to Recorded    Allergies  1  No Known Drug Allergies  2  No Known Environmental Allergies   3  No Known Food Allergies    Vitals  Vital Signs    Recorded: 65QMI0070 01:04PM   Heart Rate 76   Systolic 434   Diastolic 92   Height 5 ft 1 in   Weight 168 lb 2 oz   BMI Calculated 31 77   BSA Calculated 1 75     Procedure    Cystoscopy Procedure note  and benefits of flexible cystoscopy were discussed  Informed consent was obtained  A urine dip is adequate for cystoscopy  The patient was placed into the modified supine position  Her genitalia was prepped and draped in a sterile fashion  Viscous lidocaine was instilled into the urethra  Flexible cystoscopy was then performed  The bladder was thoroughly inspected  Both ureteral orifices were visualized with clear efflux of urine  The bladder mucosa was thoroughly inspected  Prior biopsy scars were identified without evidence of recurrence  Both ureteral orifices were visualized  The right appeared slightly ectopic closer to the bladder neck  Overall there was no evidence of recurrent urothelial carcinoma the bladder  Future Appointments    Date/Time Provider Specialty Site   10/17/2017 08:50 AM DANIELE Ross  Orthopedic Surgery [facility]   10/24/2017 08:50 AM DANIELE Ross   Orthopedic Surgery [place] [address]   11/21/2017 08:00 AM Nurse, Urology Diagnostic  [facility] FOR UROLOGY [place]   11/28/2017 08:00 AM Nurse, Urology Diagnostic   [facility] FOR UROLOGY [place]   12/05/2017 08:00 AM Nurse, Urology Diagnostic  [facility] FOR UROLOGY [place]   04/10/2018 02:00 PM Justin Marinelli MD Urology [address]     Signatures   Electronically signed by : Flaco Beard MD; Oct 10 2017  4:04PM EST                       (Author)

## 2017-10-17 ENCOUNTER — GENERIC CONVERSION - ENCOUNTER (OUTPATIENT)
Dept: OTHER | Facility: OTHER | Age: 67
End: 2017-10-17

## 2017-10-23 ENCOUNTER — GENERIC CONVERSION - ENCOUNTER (OUTPATIENT)
Dept: OTHER | Facility: OTHER | Age: 67
End: 2017-10-23

## 2017-10-23 LAB — LDLC SERPL CALC-MCNC: 96 MG/DL

## 2017-10-24 ENCOUNTER — GENERIC CONVERSION - ENCOUNTER (OUTPATIENT)
Dept: OTHER | Facility: OTHER | Age: 67
End: 2017-10-24

## 2017-10-28 NOTE — PROGRESS NOTES
Assessment  1  Primary localized osteoarthritis of left knee (715 16) (M17 12)    Plan  Primary localized osteoarthritis of left knee    · Euflexxa 20 MG/2ML Intra-articular Solution Prefilled Syringe    Discussion/Summary    Follow-up in 1 week for second injection  Chief Complaint  1  Knee Pain    History of Present Illness  HPI: Follow-up for Euflexxa injection #1 for left knee  Active Problems    1  Abnormal EKG (794 31) (R94 31)   2  Acute knee pain, left (719 46) (M25 562)   3  Acute lower UTI (599 0) (N39 0)   4  Anxiety (300 00) (F41 9)   5  Bladder mass (596 89) (N32 89)   6  Bladder pain (788 99) (R39 89)   7  Chronic cough (786 2) (R05)   8  Depression (311) (F32 9)   9  Fatigue (780 79) (R53 83)   10  Hematuria (599 70) (R31 9)   11  Hematuria (599 70) (R31 9)   12  Hypercholesterolemia (272 0) (E78 00)   13  Hypertension (401 9) (I10)   14  Influenza-like illness (799 89) (R69)   15  Malignant neoplasm of lateral wall of urinary bladder (188 2) (C67 2)   16  Non-insulin dependent type 2 diabetes mellitus (250 00) (E11 9)   17  Nontoxic single thyroid nodule (241 0) (E04 1)   18  Postmenopausal (V49 81) (Z78 0)   19  Primary localized osteoarthritis of left knee (715 16) (M17 12)   20  Snoring (786 09) (R06 83)   21  Uncontrolled diabetes mellitus type 2 without complications, unspecified long term  insulin use status (250 02) (E11 65)   22  Urinary frequency (788 41) (R35 0)   23  Urinary hesitancy (788 64) (R39 11)   24   Yeast UTI (112 2) (B37 49)    Past Medical History   · History of Acute frontal sinusitis (461 1) (J01 10)   · History of High triglycerides (272 1) (E78 1)   · History of abnormal electrocardiography (V12 59) (Z86 79)   · History of acute bronchitis (V12 69) (Z87 09)   · History of cough   · History of dysuria (V13 00) (L54 214)   · History of gastroesophageal reflux (GERD) (V12 79) (Z87 19)   · History of heartburn (V12 79) (L93 932)   · History of thyroid disorder (V12 29) (Z86 39)   · History of urinary frequency (V13 09) (Z87 898)   · History of urinary frequency (V13 09) (Z87 898)   · History of Hypercholesterolemia (272 0) (E78 00)   · History of Tear of medial meniscus of left knee (836 0) (V17 059A)    Surgical History   · History of  Section   · History of Cholecystectomy   · History of Cystoscopy With Removal Of Object   · History of Cystoscopy With Resection Of Tumor   · History of Diagnostic Cystoscopy   · History of Knee Surgery   · History of Neuroplasty Decompression Median Nerve At Carpal Tunnel   · History of Tonsillectomy    Family History  Mother    · Family history of myocardial infarction (V17 3) (Z80 55)  Father    · Family history of amyotrophic lateral sclerosis (V17 2) (Z82 0)  Paternal Grandmother    · Family history of myocardial infarction (V17 3) (Z82 49)  Maternal Grandfather    · Family history of diabetes mellitus (V18 0) (Z83 3)  Paternal Grandfather    · Family history of lung cancer (V16 1) (Z80 1)    Social History     · Former smoker (V15 82) (K54 980)   · No alcohol use   · No drug use    Current Meds   1  ClonazePAM 0 5 MG Oral Tablet; TAKE 1 TABLET AT BEDTIME; Therapy: 15QYO7819 to (Evaluate:2017); Last Rx:2017 Ordered   2  ClonazePAM 0 5 MG Oral Tablet; TAKE 1 TABLET AT BEDTIME; Therapy: 00WVK4580 to (Evaluate:2017); Last Rx:31Grd5986 Ordered   3  Edluar 10 MG Sublingual Tablet Sublingual; DISSOLVE 0 5 MG Bedtime; Therapy: 06HWI8444 to Recorded   4  Famotidine 20 MG Oral Tablet; TAKE 1 TABLET EVERY 12 HOURS DAILY; Therapy: 51GJF0246 to (Evaluate:70Jct6928) Recorded   5  Glimepiride 2 MG Oral Tablet; Take 1 tablet twice daily; Last Rx:84Exk8392 Ordered   6  Glimepiride 4 MG Oral Tablet; TAKE 2 TABLETS DAILY; Therapy: 69GHK2719 to (Evaluate:33Sno1815); Last Rx:93Vds0027 Ordered   7  Losartan Potassium-HCTZ 50-12 5 MG Oral Tablet; TAKE 1 TABLET DAILY; Therapy: (Recorded:2017) to Recorded   8   MetFORMIN HCl - 500 MG Oral Tablet; TAKE 2 TABLETS TWICE DAILY; Last Rx:16Qne3158 Ordered   9  OneTouch Verio In [employer]; test twice daily; Therapy: (Green Crape) to Recorded   10  Probiotic Oral Capsule; take 1 capsule daily; Therapy: (Recorded:07Vhc2175) to Recorded   11  Simvastatin 40 MG Oral Tablet; TAKE 1 TABLET DAILY; Therapy: (Recorded:10Zep5857) to Recorded   12  Sulfamethoxazole-Trimethoprim 800-160 MG Oral Tablet (Bactrim DS); TAKE 1 TABLET  TWICE DAILY WITH FOOD; Therapy: 29JBU4861 to (Prudence Fair)  Requested for: 78WGE5094; Last  Rx:86Rob5733 Ordered   13  Jake BCG 50 MG Intravesical Suspension Reconstituted; Therapy: (Recorded:43Gcc3802) to Recorded   14  Tricor 145 MG Oral Tablet (Fenofibrate); TAKE 1 TABLET DAILY; Therapy: (Recorded:06Auf0659) to Recorded    Allergies  1  No Known Drug Allergies  2  No Known Environmental Allergies   3  No Known Food Allergies    Vitals  Signs     Heart Rate: 78  Systolic: 327  Diastolic: 84  Height: 5 ft 1 8 in  Weight: 168 lb   BMI Calculated: 30 93  BSA Calculated: 1 78    Procedure    Procedure: Injection of the left knee joint  Verbal consent was obtained prior to the procedure  Alcohol was used to prep the area  ethyl chloride spray was used as a topical anesthetic  Was used to inject 2mL Euflexxa   A bandage was applied  the patient tolerated the procedure well  Complications: none  Future Appointments    Date/Time Provider Specialty Site   10/17/2017 08:50 AM DANIELE Turner  Orthopedic Surgery [facility]   10/24/2017 08:50 AM DANIELE Turner   Orthopedic Surgery [facility]   11/21/2017 08:00 AM Nurse, Urology Diagnostic  [facility]OLOG[facility] [place]   11/28/2017 08:00 AM Nurse, Urology Diagnostic  [facility] [place]   12/05/2017 08:00 AM Nurse, Urology Diagnostic  [facility]   10/10/2017 01:00 PM Jose Luis Lawler MD Urology [facility] [address] Signatures   Electronically signed by : DANIELE Medina ; Oct 10 2017  9:12AM EST                       (Author)

## 2017-11-01 DIAGNOSIS — C67.2 MALIGNANT NEOPLASM OF LATERAL WALL OF BLADDER (HCC): ICD-10-CM

## 2017-11-01 DIAGNOSIS — M17.12 PRIMARY OSTEOARTHRITIS OF LEFT KNEE: ICD-10-CM

## 2017-11-15 ENCOUNTER — OFFICE VISIT (OUTPATIENT)
Dept: URGENT CARE | Facility: MEDICAL CENTER | Age: 67
End: 2017-11-15
Payer: COMMERCIAL

## 2017-11-15 PROCEDURE — S9083 URGENT CARE CENTER GLOBAL: HCPCS

## 2017-11-15 PROCEDURE — G0382 LEV 3 HOSP TYPE B ED VISIT: HCPCS

## 2017-11-17 ENCOUNTER — OFFICE VISIT (OUTPATIENT)
Dept: URGENT CARE | Facility: MEDICAL CENTER | Age: 67
End: 2017-11-17
Payer: COMMERCIAL

## 2017-11-17 PROCEDURE — G0382 LEV 3 HOSP TYPE B ED VISIT: HCPCS

## 2017-11-17 PROCEDURE — S9083 URGENT CARE CENTER GLOBAL: HCPCS

## 2017-11-17 NOTE — PROGRESS NOTES
Assessment    1  Lymphadenopathy (785 6) (R59 1)    Plan  This appears to be a reactive lymph node  It is tender small rubbery and mobile  Should resolve in 2-4 weeks  If the node continues to grow, becomes rock hard then follow up with PCP  If overlying skin changes see family doctor  Chief Complaint    1  Ear Pain  Chief Complaint Free Text Note Form: Right ear ache for 1 week ,  was using OTC meds  History of Present Illness  HPI: 74-year-old female complains of right-sided âearâ and neck pain  She has some sore throat  No cough runny nose  No fever or chills  No change in hearing  Hospital Based Practices Required Assessment:  Pain Assessment  the patient states they have pain  The pain is located in the ear  (on a scale of 0 to 10, the patient rates the pain at 5 )  Abuse And Domestic Violence Screen   Yes, the patient is safe at home  -- The patient states no one is hurting them  Depression And Suicide Screen  No, the patient has not had thoughts of hurting themself  No, the patient has not felt depressed in the past 7 days  Primary Language is  English  Readiness To Learn: Receptive  Barriers To Learning: none  Preferred Learning: verbal  Education Completed: disease/condition-- and-- treatment/procedure  Teaching Method: verbal  Person Taught: patient  Evaluation Of Learning: verbalized/demonstrated understanding      Active Problems    1  Abnormal EKG (794 31) (R94 31)   2  Acute knee pain, left (719 46) (M25 562)   3  Acute lower UTI (599 0) (N39 0)   4  Anxiety (300 00) (F41 9)   5  Asthma (493 90) (J45 909)   6  Bladder mass (596 89) (N32 89)   7  Bladder pain (788 99) (R39 89)   8  BMI 29 0-29 9,adult (V85 25) (Z68 29)   9  Bursitis of shoulder (726 10) (M75 50)   10  Chronic cough (786 2) (R05)   11  Depression (311) (F32 9)   12  Fatigue (780 79) (R53 83)   13  Fibromyositis (729 1) (M79 7)   14   Ganglion and cyst of synovium, tendon and bursa (727 49,727 40,727 42) (M67 49,M67 89,M71 39)   15  Hematuria (599 70) (R31 9)   16  Hematuria (599 70) (R31 9)   17  Hypercholesterolemia (272 0) (E78 00)   18  Hypertension (401 9) (I10)   19  Hyponatremia (276 1) (E87 1)   20  Influenza-like illness (799 89) (R69)   21  Insomnia (780 52) (G47 00)   22  Malignant neoplasm of lateral wall of urinary bladder (188 2) (C67 2)   23  Neoplasm of bladder (239 4) (D49 4)   24  Non-insulin dependent type 2 diabetes mellitus (250 00) (E11 9)   25  Nontoxic single thyroid nodule (241 0) (E04 1)   26  Obesity (278 00) (E66 9)   27  Osteoporosis (733 00) (M81 0)   28  Postmenopausal (V49 81) (Z78 0)   29  Primary localized osteoarthritis of left knee (715 16) (M17 12)   30  Screening mammogram, encounter for (V76 12) (Z12 31)   31  Snoring (786 09) (R06 83)   32  Uncontrolled diabetes mellitus type 2 without complications, unspecified long term  insulin use status (250 02) (E11 65)   33  Urinary frequency (788 41) (R35 0)   34  Urinary hesitancy (788 64) (R39 11)   35  Yeast UTI (112 2) (B37 49)    Past Medical History  1  History of Acute frontal sinusitis (461 1) (J01 10)   2  History of High triglycerides (272 1) (E78 1)   3  History of abnormal electrocardiography (V12 59) (Z86 79)   4  History of acute bronchitis (V12 69) (Z87 09)   5  History of cough   6  History of dysuria (V13 00) (Z87 898)   7  History of gastroesophageal reflux (GERD) (V12 79) (Z87 19)   8  History of heartburn (V12 79) (Z87 898)   9  History of hypertension (V12 59) (Z86 79)   10  History of osteoporosis (V13 59) (Z87 39)   11  History of thyroid disorder (V12 29) (Z86 39)   12  History of urinary frequency (V13 09) (Z87 898)   13  History of urinary frequency (V13 09) (Z87 898)   14  History of Hypercholesterolemia (272 0) (E78 00)   15  History of Tear of medial meniscus of left knee (836 0) (I99 894T)    Family History  Mother    1  Family history of myocardial infarction (V17 3) (Z82 49)  Father    2   Family history of amyotrophic lateral sclerosis (V17 2) (Z82 0)  Paternal Grandmother    3  Family history of myocardial infarction (V17 3) (Z82 49)  Maternal Grandfather    4  Family history of diabetes mellitus (V18 0) (Z83 3)  Paternal Grandfather    5  Family history of lung cancer (V16 1) (Z80 1)    Social History   · Former smoker (V15 82) (M58 982)   · No alcohol use   · No drug use    Surgical History    1  History of  Section   2  History of Cholecystectomy   3  History of Cystoscopy With Removal Of Object   4  History of Cystoscopy With Resection Of Tumor   5  History of Diagnostic Cystoscopy   6  History of Knee Surgery   7  History of Neuroplasty Decompression Median Nerve At Carpal Tunnel   8  History of Tonsillectomy    Current Meds   1  ClonazePAM 0 5 MG Oral Tablet; TAKE 1 TABLET AT BEDTIME; Therapy: 34RMG5473 to (Evaluate:2017); Last Rx:84Amp9325 Ordered   2  Edluar 10 MG Sublingual Tablet Sublingual; DISSOLVE 0 5 MG Bedtime; Therapy: 57OQC0014 to Recorded   3  Famotidine 20 MG Oral Tablet; TAKE 1 TABLET EVERY 12 HOURS DAILY; Therapy: 36BBB2986 to (Evaluate:66Kgt6890) Recorded   4  Glimepiride 2 MG Oral Tablet; Take 1 tablet twice daily; Last Rx:15Ita7638 Ordered   5  Glimepiride 4 MG Oral Tablet; TAKE 2 TABLETS DAILY; Therapy: 75TKE7571 to (Evaluate:65Dpp1982); Last Rx:17Bpl3368 Ordered   6  Losartan Potassium-HCTZ 50-12 5 MG Oral Tablet; TAKE 1 TABLET DAILY; Therapy: (Recorded:85Wee4799) to Recorded   7  MetFORMIN HCl - 500 MG Oral Tablet; TAKE 2 TABLETS TWICE DAILY; Last Rx:42Vqt2438 Ordered   8  OneTouch Verio In [employer]; test twice daily; Therapy: (Meli Fox) to Recorded   9  Probiotic Oral Capsule; take 1 capsule daily; Therapy: (Recorded:07Srk1831) to Recorded   10  Simvastatin 40 MG Oral Tablet; TAKE 1 TABLET DAILY; Therapy: (Recorded:91Ooq3908) to Recorded   11  Hissop BCG 50 MG Intravesical Suspension Reconstituted; Therapy: (Recorded:69Ipf4794) to Recorded   12   Tricor 145 MG Oral Tablet; TAKE 1 TABLET DAILY; Therapy: (Recorded:72Ivf7254) to Recorded    Allergies  1  No Known Drug Allergies    2  No Known Environmental Allergies   3  No Known Food Allergies    Vitals  Signs   Recorded: 17HIU8282 12:43PM   Temperature: 97 5 F  Heart Rate: 100  Respiration: 20  Systolic: 632  Diastolic: 80  Height: 5 ft 1 in  Weight: 174 lb 8 oz  BMI Calculated: 32 97  BSA Calculated: 1 78    Physical Exam   Constitutional  General appearance: No acute distress, well appearing and well nourished  Eyes  Conjunctiva and lids: No swelling, erythema or discharge  Pupils and irises: Equal, round and reactive to light  Ears, Nose, Mouth, and Throat  External inspection of ears and nose: Normal    Otoscopic examination: Tympanic membranes translucent with normal light reflex  Canals patent without erythema  Nasal mucosa, septum, and turbinates: Normal without edema or erythema  Oropharynx: Normal with no erythema, edema, exudate or lesions  Pulmonary  Respiratory effort: No increased work of breathing or signs of respiratory distress  Auscultation of lungs: Clear to auscultation  Cardiovascular  Auscultation of heart: Normal rate and rhythm, normal S1 and S2, without murmurs  Lymphatic  Palpation of lymph nodes in neck: Abnormal  -- Right anterior cervical node small 1 cm mobile rubbery tender  No overlying skin changes  Future Appointments    Date/Time Provider Specialty Site   12/08/2017 08:10 AM DANIELE Bustamante   Orthopedic Surgery [facility] [address]   12/19/2017 08:30 AM Nurse, Urology Diagnostic  [facility]   12/26/2017 08:30 AM Nurse, Urology Diagnostic  [facility]   01/02/2018 08:30 AM Nurse, Urology Diagnostic  [facility] [place]   04/10/2018 02:00 PM Karie Blandon MD Urology [address]       Signatures   Electronically signed by : Kaykay Cintron [facility]; Nov 15 2017  1:15PM EST (Author)    Electronically signed by : BERNADETTE Robles ; Nov 16 2017 11:45AM EST                       (Co-author)

## 2017-11-23 NOTE — PROGRESS NOTES
Assessment    1  Dental abscess (522 5) (K04 7)   2  Dental infection (522 4) (K04 7)    Plan  Dental infection    · Clindamycin HCl - 300 MG Oral Capsule; TAKE 1 CAPSULE 3 times daily    Discussion/Summary  Discussion Summary:   Take antibiotic as directed: eat yogurt to avoid GI upsetcompress as directedup with dentist    Medication Side Effects Reviewed: Possible side effects of new medications were reviewed with the patient/guardian today  Understands and agrees with treatment plan: The treatment plan was reviewed with the patient/guardian  The patient/guardian understands and agrees with the treatment plan   Counseling Documentation With Imm: The patient was counseled regarding diagnostic results,-- instructions for management,-- risk factor reductions,-- prognosis,-- patient and family education,-- impressions,-- risks and benefits of treatment options,-- importance of compliance with treatment  Follow Up Instructions: Follow Up with your Primary Care Provider in 1-2 days  If your symptoms worsen, go to the nearest [facility] Emergency Department  Chief Complaint    1  Dental Pain  Chief Complaint Free Text Note Form: Pt presents with R sided dental pain and swelling with radiation to R ear  History of Present Illness  HPI: 75 yof presents with dental pain on R side x1 week  Pt reports she initially believed this was ear pain and was seen a week ago  She reports the pain has worsened and she can localize it to her gums, with radiating pain to her ear  She admits to aggressive flossing resulting in bleeding over a week ago and believes this is the cause of her symptoms  She denies any ear drainage or pain in the ear canal and reports the referred pain to the preauricular region  She has tried ibuprofen to relieve her pain, which worked initially, but is having a diminished effect as the gums continue to swell     Hospital Based Practices Required Assessment:  Pain Assessment  the patient states they have pain  The pain is located in the R ear  (on a scale of 0 to 10, the patient rates the pain at 6 )  Abuse And Domestic Violence Screen   No, the patient is not safe at home  -- The patient states no one is hurting them  Depression And Suicide Screen  No, the patient has not had thoughts of hurting themself  No, the patient has not felt depressed in the past 7 days  Prefered Language is  english  Primary Language is  english  Readiness To Learn: Receptive  Barriers To Learning: none  Preferred Learning: verbal      Review of Systems  Focused-Female:  Constitutional: No fever, no chills, feels well, no tiredness, no recent weight gain or loss  ENT: no ear ache, no loss of hearing, no nosebleeds or nasal discharge, no sore throat or hoarseness  Cardiovascular: no complaints of slow or fast heart rate, no chest pain, no palpitations, no leg claudication or lower extremity edema  Respiratory: no complaints of shortness of breath, no wheezing, no dyspnea on exertion, no orthopnea or PND  Breasts: no complaints of breast pain, breast lump or nipple discharge  Gastrointestinal: no complaints of abdominal pain, no constipation, no nausea or diarrhea, no vomiting, no bloody stools  Genitourinary: no complaints of dysuria, no incontinence, no pelvic pain, no dysmenorrhea, no vaginal discharge or abnormal vaginal bleeding  Musculoskeletal: no complaints of arthralgia, no myalgia, no joint swelling or stiffness, no limb pain or swelling  Integumentary: no complaints of skin rash or lesion, no itching or dry skin, no skin wounds  Neurological: no complaints of headache, no confusion, no numbness or tingling, no dizziness or fainting  Active Problems    1  Abnormal EKG (794 31) (R94 31)   2  Acute knee pain, left (719 46) (M25 562)   3  Acute lower UTI (599 0) (N39 0)   4  Anxiety (300 00) (F41 9)   5  Asthma (493 90) (J45 909)   6  Bladder mass (596 89) (N32 89)   7   Bladder pain (788 99) (R39 89) 8  BMI 29 0-29 9,adult (V85 25) (Z68 29)   9  Bursitis of shoulder (726 10) (M75 50)   10  Chronic cough (786 2) (R05)   11  Depression (311) (F32 9)   12  Fatigue (780 79) (R53 83)   13  Fibromyositis (729 1) (M79 7)   14  Ganglion and cyst of synovium, tendon and bursa (727 49,727 40,727 42)  (M67 49,M67 89,M71 39)   15  Hematuria (599 70) (R31 9)   16  Hematuria (599 70) (R31 9)   17  Hypercholesterolemia (272 0) (E78 00)   18  Hypertension (401 9) (I10)   19  Hyponatremia (276 1) (E87 1)   20  Influenza-like illness (799 89) (R69)   21  Insomnia (780 52) (G47 00)   22  Lymphadenopathy (785 6) (R59 1)   23  Malignant neoplasm of lateral wall of urinary bladder (188 2) (C67 2)   24  Neoplasm of bladder (239 4) (D49 4)   25  Non-insulin dependent type 2 diabetes mellitus (250 00) (E11 9)   26  Nontoxic single thyroid nodule (241 0) (E04 1)   27  Obesity (278 00) (E66 9)   28  Osteoporosis (733 00) (M81 0)   29  Postmenopausal (V49 81) (Z78 0)   30  Primary localized osteoarthritis of left knee (715 16) (M17 12)   31  Screening mammogram, encounter for (V76 12) (Z12 31)   32  Snoring (786 09) (R06 83)   33  Uncontrolled diabetes mellitus type 2 without complications, unspecified long term  insulin use status (250 02) (E11 65)   34  Urinary frequency (788 41) (R35 0)   35  Urinary hesitancy (788 64) (R39 11)   36  Yeast UTI (112 2) (B37 49)    Past Medical History  1  History of Acute frontal sinusitis (461 1) (J01 10)   2  History of High triglycerides (272 1) (E78 1)   3  History of abnormal electrocardiography (V12 59) (Z86 79)   4  History of acute bronchitis (V12 69) (Z87 09)   5  History of cough   6  History of dysuria (V13 00) (Z87 898)   7  History of gastroesophageal reflux (GERD) (V12 79) (Z87 19)   8  History of heartburn (V12 79) (Z87 898)   9  History of hypertension (V12 59) (Z86 79)   10  History of osteoporosis (V13 59) (Z87 39)   11  History of thyroid disorder (V12 29) (Z86 39)   12   History of urinary frequency (V13 09) (Z87 898)   13  History of urinary frequency (V13 09) (Z87 898)   14  History of Hypercholesterolemia (272 0) (E78 00)   15  History of Tear of medial meniscus of left knee (836 0) (H28 547U)  Active Problems And Past Medical History Reviewed: The active problems and past medical history were reviewed and updated today  Family History  Mother    1  Family history of myocardial infarction (V17 3) (Z82 49)  Father    2  Family history of amyotrophic lateral sclerosis (V17 2) (Z82 0)  Paternal Grandmother    3  Family history of myocardial infarction (V17 3) (Z82 49)  Maternal Grandfather    4  Family history of diabetes mellitus (V18 0) (Z83 3)  Paternal Grandfather    5  Family history of lung cancer (V16 1) (Z80 1)  Family History Reviewed: The family history was reviewed and updated today  Social History   · Former smoker (K60 46) (T34 747)   · No alcohol use   · No drug use  Social History Reviewed: The social history was reviewed and updated today  Surgical History    1  History of  Section   2  History of Cholecystectomy   3  History of Cystoscopy With Removal Of Object   4  History of Cystoscopy With Resection Of Tumor   5  History of Diagnostic Cystoscopy   6  History of Knee Surgery   7  History of Neuroplasty Decompression Median Nerve At Carpal Tunnel   8  History of Tonsillectomy  Surgical History Reviewed: The surgical history was reviewed and updated today  Current Meds   1  ClonazePAM 0 5 MG Oral Tablet; TAKE 1 TABLET AT BEDTIME; Therapy: 74LCR0410 to (Evaluate:2017); Last Rx:70Ifl7374 Ordered   2  Edluar 10 MG Sublingual Tablet Sublingual; DISSOLVE 0 5 MG Bedtime; Therapy: 78SZC0738 to Recorded   3  Famotidine 20 MG Oral Tablet; TAKE 1 TABLET DAILY AS DIRECTED; Therapy: 72PDC2448 to (Evaluate:97Jzk9156)  Requested for: 34IAP1323; Last Rx:32Jpk1147 Ordered   4  Glimepiride 2 MG Oral Tablet; Take 1 tablet twice daily;  Last FRANZ:67TFQ2318 Ordered   5  Glimepiride 4 MG Oral Tablet; TAKE 2 TABLETS DAILY; Therapy: 74SLS5422 to (Evaluate:23Luv8499); Last Rx:84Fjx3942 Ordered   6  Losartan Potassium-HCTZ 50-12 5 MG Oral Tablet; TAKE 1 TABLET DAILY; Therapy: (Recorded:11Jsz5510) to Recorded   7  MetFORMIN HCl - 500 MG Oral Tablet; TAKE 2 TABLETS TWICE DAILY; Last Rx:58Ios4150 Ordered   8  OneTouch Verio In [employer]; test twice daily; Therapy: (Cori Blazing) to Recorded   9  Probiotic Oral Capsule; take 1 capsule daily; Therapy: (Recorded:65Sin1358) to Recorded   10  Simvastatin 40 MG Oral Tablet; TAKE 1 TABLET DAILY; Therapy: (Recorded:52Afs6585) to Recorded   11  Jake BCG 50 MG Intravesical Suspension Reconstituted; Therapy: (Recorded:44Btx6536) to Recorded   12  Tricor 145 MG Oral Tablet; TAKE 1 TABLET DAILY; Therapy: (Recorded:63Mfk8882) to Recorded  Medication List Reviewed: The medication list was reviewed and updated today  Allergies  1  No Known Drug Allergies  2  No Known Environmental Allergies   3  No Known Food Allergies    Future Appointments    Date/Time Provider Specialty Site   12/08/2017 08:10 AM DANIELE Mckeon   Orthopedic Surgery [facility] [address]   12/19/2017 08:30 AM Nurse, Urology Diagnostic  [facility] [place]   12/26/2017 08:30 AM Nurse, Urology Diagnostic  [facility] [place]   01/02/2018 08:30 AM Nurse, Urology Diagnostic  [facility]   04/10/2018 02:00 PM Anamaria Victoria MD Urology [address] Alexis Leon       Signatures   Electronically signed by : Colt Young [facility]; Nov 17 2017  8:21PM EST                       (Author)    Electronically signed by : BERNADETTE Ricardo ; Nov 22 2017 10:55AM EST                       (Co-author)

## 2017-11-29 ENCOUNTER — GENERIC CONVERSION - ENCOUNTER (OUTPATIENT)
Dept: OTHER | Facility: OTHER | Age: 67
End: 2017-11-29

## 2017-11-29 ENCOUNTER — APPOINTMENT (OUTPATIENT)
Dept: PHYSICAL THERAPY | Facility: MEDICAL CENTER | Age: 67
End: 2017-11-29
Payer: COMMERCIAL

## 2017-11-29 ENCOUNTER — GENERIC CONVERSION - ENCOUNTER (OUTPATIENT)
Dept: OBGYN CLINIC | Facility: CLINIC | Age: 67
End: 2017-11-29

## 2017-11-29 DIAGNOSIS — M17.12 PRIMARY OSTEOARTHRITIS OF LEFT KNEE: ICD-10-CM

## 2017-11-29 PROCEDURE — G8979 MOBILITY GOAL STATUS: HCPCS

## 2017-11-29 PROCEDURE — 97110 THERAPEUTIC EXERCISES: CPT

## 2017-11-29 PROCEDURE — 97162 PT EVAL MOD COMPLEX 30 MIN: CPT

## 2017-11-29 PROCEDURE — G8978 MOBILITY CURRENT STATUS: HCPCS

## 2017-11-30 ENCOUNTER — APPOINTMENT (OUTPATIENT)
Dept: PHYSICAL THERAPY | Facility: MEDICAL CENTER | Age: 67
End: 2017-11-30
Payer: COMMERCIAL

## 2017-11-30 PROCEDURE — 97112 NEUROMUSCULAR REEDUCATION: CPT

## 2017-12-04 ENCOUNTER — APPOINTMENT (OUTPATIENT)
Dept: PHYSICAL THERAPY | Facility: MEDICAL CENTER | Age: 67
End: 2017-12-04
Payer: COMMERCIAL

## 2017-12-04 PROCEDURE — 97112 NEUROMUSCULAR REEDUCATION: CPT

## 2017-12-04 PROCEDURE — 97110 THERAPEUTIC EXERCISES: CPT

## 2017-12-05 ENCOUNTER — GENERIC CONVERSION - ENCOUNTER (OUTPATIENT)
Dept: OBGYN CLINIC | Facility: CLINIC | Age: 67
End: 2017-12-05

## 2017-12-05 DIAGNOSIS — N39.0 URINARY TRACT INFECTION: ICD-10-CM

## 2017-12-05 DIAGNOSIS — M17.12 PRIMARY OSTEOARTHRITIS OF LEFT KNEE: ICD-10-CM

## 2017-12-06 ENCOUNTER — APPOINTMENT (OUTPATIENT)
Dept: PHYSICAL THERAPY | Facility: MEDICAL CENTER | Age: 67
End: 2017-12-06
Payer: COMMERCIAL

## 2017-12-08 ENCOUNTER — APPOINTMENT (OUTPATIENT)
Dept: PHYSICAL THERAPY | Facility: MEDICAL CENTER | Age: 67
End: 2017-12-08
Payer: COMMERCIAL

## 2017-12-08 ENCOUNTER — ALLSCRIPTS OFFICE VISIT (OUTPATIENT)
Dept: OTHER | Facility: OTHER | Age: 67
End: 2017-12-08

## 2017-12-08 ENCOUNTER — APPOINTMENT (OUTPATIENT)
Dept: RADIOLOGY | Facility: CLINIC | Age: 67
End: 2017-12-08
Payer: COMMERCIAL

## 2017-12-08 DIAGNOSIS — M17.12 PRIMARY OSTEOARTHRITIS OF LEFT KNEE: ICD-10-CM

## 2017-12-08 PROCEDURE — 97110 THERAPEUTIC EXERCISES: CPT

## 2017-12-08 PROCEDURE — 97112 NEUROMUSCULAR REEDUCATION: CPT

## 2017-12-08 PROCEDURE — 73562 X-RAY EXAM OF KNEE 3: CPT

## 2017-12-09 NOTE — PROGRESS NOTES
Assessment    1  Primary localized osteoarthritis of left knee (715 16) (M17 12)    Plan  Primary localized osteoarthritis of left knee    · * XR KNEE 3 VW LEFT NON INJURY; Status:Active; Requested for:68Afi1002;     Discussion/Summary    Left knee worsening arthritis  meds such as Tylenol as needed  modification as needed  steroid injection or oral steroid taper  Based on patient's recent difficulty controlling her blood sugar, I advised against any steroid medication  She has also leaving for [place] next week, which could present a problem if she had any adverse reactions to the steroid  total knee arthroplasty briefly  Advised patient to continue nonsurgical treatment as long as possible  Follow-up as needed if pain persists  Chief Complaint  1  Knee Pain    History of Present Illness  HPI: Patient returns today with ongoing pain in her left knee localized to the anteromedial aspect and worse with activity  She reports no significant relief after a series of Euflexxa injections completed on 10/24/17  She states that her blood sugar has been high lately, up to the 200 range  She does not take insulin or check her sugar on a regular basis  Active Problems    1  Abnormal EKG (794 31) (R94 31)   2  Acute knee pain, left (719 46) (M25 562)   3  Acute lower UTI (599 0) (N39 0)   4  Anxiety (300 00) (F41 9)   5  Asthma (493 90) (J45 909)   6  Bladder mass (596 89) (N32 89)   7  Bladder pain (788 99) (R39 89)   8  BMI 29 0-29 9,adult (V85 25) (Z68 29)   9  Bursitis of shoulder (726 10) (M75 50)   10  Chronic cough (786 2) (R05)   11  Dental abscess (522 5) (K04 7)   12  Dental infection (522 4) (K04 7)   13  Depression (311) (F32 9)   14  Fatigue (780 79) (R53 83)   15  Fibromyositis (729 1) (M79 7)   16  Ganglion and cyst of synovium, tendon and bursa (727 49,727 40,727 42)  (M67 49,M67 89,M71 39)   17  Hematuria (599 70) (R31 9)   18  Hematuria (599 70) (R31 9)   19  Hypercholesterolemia (272 0) (E78 00)   20  Hypertension (401 9) (I10)   21  Hyponatremia (276 1) (E87 1)   22  Influenza-like illness (799 89) (R69)   23  Insomnia (780 52) (G47 00)   24  Lymphadenopathy (785 6) (R59 1)   25  Malignant neoplasm of lateral wall of urinary bladder (188 2) (C67 2)   26  Neoplasm of bladder (239 4) (D49 4)   27  Non-insulin dependent type 2 diabetes mellitus (250 00) (E11 9)   28  Nontoxic single thyroid nodule (241 0) (E04 1)   29  Obesity (278 00) (E66 9)   30  Osteoporosis (733 00) (M81 0)   31  Postmenopausal (V49 81) (Z78 0)   32  Primary localized osteoarthritis of left knee (715 16) (M17 12)   33  Screening mammogram, encounter for (V76 12) (Z12 31)   34  Snoring (786 09) (R06 83)   35  Uncontrolled diabetes mellitus type 2 without complications, unspecified long term  insulin use status (250 02) (E11 65)   36  Urinary frequency (788 41) (R35 0)   37  Urinary hesitancy (788 64) (R39 11)   38   Yeast UTI (112 2) (B37 49)    Past Medical History   · History of Acute frontal sinusitis (461 1) (J01 10)   · History of High triglycerides (272 1) (E78 1)   · History of abnormal electrocardiography (V12 59) (Z86 79)   · History of acute bronchitis (V12 69) (Z87 09)   · History of cough   · History of dysuria (V13 00) (D65 519)   · History of gastroesophageal reflux (GERD) (V12 79) (Z87 19)   · History of heartburn (V12 79) (L98 112)   · History of hypertension (V12 59) (Z86 79)   · History of osteoporosis (V13 59) (Z87 39)   · History of thyroid disorder (V12 29) (Z86 39)   · History of urinary frequency (V13 09) (X50 515)   · History of urinary frequency (V13 09) (Y16 714)   · History of Hypercholesterolemia (272 0) (E78 00)   · History of Tear of medial meniscus of left knee (836 0) (J33 653C)    Surgical History   · History of  Section   · History of Cholecystectomy   · History of Cystoscopy With Removal Of Object   · History of Cystoscopy With Resection Of Tumor   · History of Diagnostic Cystoscopy   · History of Knee Surgery   · History of Neuroplasty Decompression Median Nerve At Carpal Tunnel   · History of Tonsillectomy    Family History  Mother    · Family history of myocardial infarction (V17 3) (Z80 55)  Father    · Family history of amyotrophic lateral sclerosis (V17 2) (Z82 0)  Paternal Grandmother    · Family history of myocardial infarction (V17 3) (Z82 49)  Maternal Grandfather    · Family history of diabetes mellitus (V18 0) (Z83 3)  Paternal Grandfather    · Family history of lung cancer (V16 1) (Z80 1)    Social History     · Former smoker (V15 82) (D28 990)   · No alcohol use   · No drug use    Current Meds   1  Clindamycin HCl - 300 MG Oral Capsule; TAKE 1 CAPSULE 3 times daily; Therapy: 44LDZ4946 to (Enma Stage)  Requested for: 07SPU7794; Last Rx:17Nov2017 Ordered   2  ClonazePAM 0 5 MG Oral Tablet; TAKE 1 TABLET AT BEDTIME; Therapy: 79CHO4830 to (Evaluate:26Nov2017); Last Rx:92Nub4738 Ordered   3  Edluar 10 MG Sublingual Tablet Sublingual; DISSOLVE 0 5 MG Bedtime; Therapy: 02QPM1496 to Recorded   4  Famotidine 20 MG Oral Tablet; TAKE 1 TABLET DAILY AS DIRECTED; Therapy: 75LFH1130 to (Evaluate:08Zkk5797)  Requested for: 43FBT2322; Last Rx:22Ajn5950 Ordered   5  Glimepiride 2 MG Oral Tablet; Take 1 tablet twice daily; Last Rx:72Ore0582 Ordered   6  Glimepiride 4 MG Oral Tablet; TAKE 2 TABLETS DAILY; Therapy: 04TWJ1199 to (Evaluate:07Yyd7233); Last Rx:66Asw6773 Ordered   7  Losartan Potassium-HCTZ 50-12 5 MG Oral Tablet; TAKE 1 TABLET DAILY; Therapy: (Recorded:79Bmz2606) to Recorded   8  MetFORMIN HCl - 500 MG Oral Tablet; TAKE 2 TABLETS TWICE DAILY; Last Rx:19Qaa0636 Ordered   9  OneTouch Verio In [employer]; test twice daily; Therapy: (Mayo [place]) to Recorded   10  Probiotic Oral Capsule; take 1 capsule daily; Therapy: (Recorded:63Azv3582) to Recorded   11  Simvastatin 40 MG Oral Tablet; TAKE 1 TABLET DAILY; Therapy: (Recorded:16Tod9342) to Recorded   12   Perkins BCG 50 MG Intravesical Suspension Reconstituted; Therapy: (Recorded:61Suh5643) to Recorded   13  Tricor 145 MG Oral Tablet (Fenofibrate); TAKE 1 TABLET DAILY; Therapy: (Recorded:42Dma6766) to Recorded    Allergies  1  No Known Drug Allergies  2  No Known Environmental Allergies   3  No Known Food Allergies    Vitals  Signs     Heart Rate: 88  Systolic: 003  Diastolic: 89  Height: 5 ft 1 in  Weight: 166 lb   BMI Calculated: 31 37  BSA Calculated: 1 74    Physical Exam  Left knee:  Small effusion  Tenderness to palpation anteromedial aspect  No focal joint line tenderness  Range of motion is extension 0Â° and flexion 125Â°  Stable with varus and valgus stress  John's test is negative  No lower extremity edema  Skin is intact without erythema  Results/Data  I personally reviewed the films/images/results in the office today  My interpretation follows  X-ray Review Left knee 12/8/17: Moderate to severe degenerative changes most pronounced in the medial compartment with significant progression since the previous study on 1/5/16        Future Appointments    Date/Time Provider Specialty Site   12/19/2017 09:30 AM [facility], [facility]rology Diagnostic  [facility]   12/26/2017 08:30 AM Nurse, Urology Diagnostic  [facility]   01/02/2018 08:30 AM Nurse, Urology Diagnostic  [facility]   04/10/2018 02:00 PM Shi Vergara MD Urology [address]       Signatures   Electronically signed by : DANIELE Dailey ; Dec  8 2017  9:21AM EST                       (Author)

## 2017-12-11 ENCOUNTER — APPOINTMENT (OUTPATIENT)
Dept: PHYSICAL THERAPY | Facility: MEDICAL CENTER | Age: 67
End: 2017-12-11
Payer: COMMERCIAL

## 2017-12-11 PROCEDURE — 97110 THERAPEUTIC EXERCISES: CPT

## 2017-12-11 PROCEDURE — 97112 NEUROMUSCULAR REEDUCATION: CPT

## 2017-12-19 ENCOUNTER — ALLSCRIPTS OFFICE VISIT (OUTPATIENT)
Dept: OTHER | Facility: OTHER | Age: 67
End: 2017-12-19

## 2017-12-20 ENCOUNTER — APPOINTMENT (OUTPATIENT)
Dept: PHYSICAL THERAPY | Facility: MEDICAL CENTER | Age: 67
End: 2017-12-20
Payer: COMMERCIAL

## 2017-12-20 PROCEDURE — 97110 THERAPEUTIC EXERCISES: CPT

## 2017-12-26 ENCOUNTER — ALLSCRIPTS OFFICE VISIT (OUTPATIENT)
Dept: OTHER | Facility: OTHER | Age: 67
End: 2017-12-26

## 2017-12-26 LAB
CLARITY UR: NORMAL
COLOR UR: YELLOW
GLUCOSE (HISTORICAL): NORMAL
HGB UR QL STRIP.AUTO: NORMAL
KETONES UR STRIP-MCNC: NORMAL MG/DL
LEUKOCYTE ESTERASE UR QL STRIP: NORMAL
NITRITE UR QL STRIP: NORMAL
PH UR STRIP.AUTO: 6 [PH]
PROT UR STRIP-MCNC: NORMAL MG/DL
SP GR UR STRIP.AUTO: 1.02

## 2017-12-27 ENCOUNTER — APPOINTMENT (OUTPATIENT)
Dept: PHYSICAL THERAPY | Facility: MEDICAL CENTER | Age: 67
End: 2017-12-27
Payer: COMMERCIAL

## 2017-12-29 ENCOUNTER — APPOINTMENT (OUTPATIENT)
Dept: PHYSICAL THERAPY | Facility: MEDICAL CENTER | Age: 67
End: 2017-12-29
Payer: COMMERCIAL

## 2017-12-29 ENCOUNTER — GENERIC CONVERSION - ENCOUNTER (OUTPATIENT)
Dept: OBGYN CLINIC | Facility: CLINIC | Age: 67
End: 2017-12-29

## 2017-12-29 PROCEDURE — 97110 THERAPEUTIC EXERCISES: CPT

## 2017-12-29 PROCEDURE — G8978 MOBILITY CURRENT STATUS: HCPCS

## 2017-12-29 PROCEDURE — 97112 NEUROMUSCULAR REEDUCATION: CPT

## 2017-12-29 PROCEDURE — G8979 MOBILITY GOAL STATUS: HCPCS

## 2018-01-02 ENCOUNTER — ALLSCRIPTS OFFICE VISIT (OUTPATIENT)
Dept: OTHER | Facility: OTHER | Age: 68
End: 2018-01-02

## 2018-01-04 ENCOUNTER — APPOINTMENT (OUTPATIENT)
Dept: PHYSICAL THERAPY | Facility: MEDICAL CENTER | Age: 68
End: 2018-01-04
Payer: COMMERCIAL

## 2018-01-10 ENCOUNTER — APPOINTMENT (OUTPATIENT)
Dept: PHYSICAL THERAPY | Facility: MEDICAL CENTER | Age: 68
End: 2018-01-10
Payer: COMMERCIAL

## 2018-01-10 ENCOUNTER — ALLSCRIPTS OFFICE VISIT (OUTPATIENT)
Dept: OTHER | Facility: OTHER | Age: 68
End: 2018-01-10

## 2018-01-10 DIAGNOSIS — E11.9 TYPE 2 DIABETES MELLITUS WITHOUT COMPLICATIONS (HCC): ICD-10-CM

## 2018-01-10 DIAGNOSIS — I10 ESSENTIAL (PRIMARY) HYPERTENSION: ICD-10-CM

## 2018-01-10 DIAGNOSIS — R53.83 OTHER FATIGUE: ICD-10-CM

## 2018-01-10 DIAGNOSIS — E04.1 NONTOXIC SINGLE THYROID NODULE: ICD-10-CM

## 2018-01-10 DIAGNOSIS — E03.9 HYPOTHYROIDISM: ICD-10-CM

## 2018-01-10 DIAGNOSIS — M25.511 PAIN IN RIGHT SHOULDER: ICD-10-CM

## 2018-01-10 DIAGNOSIS — E87.1 HYPO-OSMOLALITY AND HYPONATREMIA (CODE): ICD-10-CM

## 2018-01-10 PROCEDURE — 97112 NEUROMUSCULAR REEDUCATION: CPT

## 2018-01-10 PROCEDURE — 97110 THERAPEUTIC EXERCISES: CPT

## 2018-01-12 ENCOUNTER — APPOINTMENT (OUTPATIENT)
Dept: PHYSICAL THERAPY | Facility: MEDICAL CENTER | Age: 68
End: 2018-01-12
Payer: COMMERCIAL

## 2018-01-12 VITALS
HEIGHT: 63 IN | WEIGHT: 166.38 LBS | HEART RATE: 89 BPM | SYSTOLIC BLOOD PRESSURE: 146 MMHG | BODY MASS INDEX: 29.48 KG/M2 | DIASTOLIC BLOOD PRESSURE: 85 MMHG

## 2018-01-12 VITALS
HEART RATE: 72 BPM | WEIGHT: 169 LBS | DIASTOLIC BLOOD PRESSURE: 80 MMHG | SYSTOLIC BLOOD PRESSURE: 142 MMHG | HEIGHT: 63 IN | BODY MASS INDEX: 29.95 KG/M2

## 2018-01-12 VITALS
WEIGHT: 168 LBS | DIASTOLIC BLOOD PRESSURE: 75 MMHG | HEIGHT: 63 IN | BODY MASS INDEX: 29.77 KG/M2 | HEART RATE: 86 BPM | SYSTOLIC BLOOD PRESSURE: 126 MMHG

## 2018-01-12 PROCEDURE — 97112 NEUROMUSCULAR REEDUCATION: CPT

## 2018-01-12 PROCEDURE — 97110 THERAPEUTIC EXERCISES: CPT

## 2018-01-12 NOTE — MISCELLANEOUS
Message  Spoke with patient and notified of her urine culture results are greater than 100,000 mixed contaminant  Vies patient to finish the Macrobid 100 mg 1 twice daily for full 7 days  Advised patient to follow-up with urology for further evaluation and treatment        Signatures   Electronically signed by : Brett Ibarra [facility]; Feb 18 2017 12:27PM EST                       (Author)

## 2018-01-12 NOTE — PROGRESS NOTES
Assessment   1  Non-insulin dependent type 2 diabetes mellitus (250 00) (E11 9)  2  Anxiety (300 00) (F41 9)  3  History of thyroid disorder (V12 29) (Z86 39)  4  History of urinary frequency (V13 09) (Z87 898)  5  Nontoxic single thyroid nodule (241 0) (E04 1)  6  Acquired hypothyroidism (244 9) (E03 9)  7  Acute pain of right shoulder (719 41) (M25 511)    Plan   Acquired hypothyroidism    · (1) TSH WITH FT4 REFLEX; Status:Active; Requested EXM:[id number]; Acute pain of right shoulder    · [facility]  *  Status: Active  Requested for:    59VSP2712  () Care Summary provided  : Yes  Anxiety    · Start: ClonazePAM 0 5 MG Oral Tablet; TAKE 1 TABLET TWICE DAILY AT WHEN NEEDED   · Renew: ClonazePAM 0 5 MG Oral Tablet; TAKE 1 TABLET AT BEDTIME  Encounter for special screening examination for genitourinary disorder    · *VB - Urinary Incontinence Screen (Dx Z13 89 Screen for UI); Status:Complete - Retrospective By    Protocol Authorization;   Done: 78SBP1705 11:01AM  Fatigue    · (1) CBC/PLT/DIFF; Status:Active; Requested PBJ:[id number];   Hypertension, Hyponatremia    · (1) COMPREHENSIVE METABOLIC PANEL; Status:Active - Retrospective By Protocol Authorization; Requested ILL:[id number]; Non-insulin dependent type 2 diabetes mellitus    · 2 - [facility], Phan Villarreal  (Endocrinology) Co-Management  *  Status: Hold For - Scheduling     Requested for: 77IBL6891  () Care Summary provided  : Yes   · (1) RENAL FUNCTION PANEL; Status:Active; Requested YWI:[id number];   Nontoxic single thyroid nodule    · US THYROID; Status:Hold For - Scheduling; Requested FPV:[id number]; Discussion/Summary   Discussion Summary:    1  Diabetes  She reports blood sugars which have not dropped below 200 with her current regiment of Metformin 500 mg two tablets twice daily  She will be referred to a diabetic specialist (Dr Raul Phillips) for reevaluation of her diabetes   She was advised to adopt a strict diet to control her diabetes until her meds are readjusted by the diabetic specialist   Bladder cancer  She continues close follow up with [facility] for treatment of her bladder cancer  Thyroid nodules  Her last US thyroid in April of 2017 showed index nodules without significant change  An US of her thyroid was ordered today to reassess her thyroid nodules as she reports enlarged glands in her neck  Right shoulder tendonitis  She was given a script to begin physical therapy for her right shoulder  Flu shot was administered today  Health maintenance  She will have a CBC, CMP, A1c, TSH with T4, and Renal panel  Counseling Documentation With Imm: The patient was counseled regarding diagnostic results  total time of encounter was 30 minutes  Chief Complaint   Chief Complaint Free Text Note Form: Patient is here today to discuss elevated blood sugars and anxiety  She stopped the Glimepiride after her 9/27/17 appointment after researching this medication  She has bladder cancer and does not want to continue this medication  is asking for complete blood work including thyroid testing as she reports a history of thyroid nodules  She also wants a script for physical therapy for her right shoulder  refill needed  Chief Complaint Chronic Condition [facility]: Patient is here today for follow up of chronic conditions described in HPI  History of Present Illness   HPI: Cheryle Mires is a 75year old female who presents today for follow up on her diabetes  She is undergoing BCG treatments for bladder cancer through [facility]  She states that her blood sugars have not dropped below the 200s  She takes Metformin 500 mg four tablets daily  continues to do physical therapy for her knee  She had functional difficulties of her knees with associated pain status post left knee surgery a few years ago which caused her to do physical therapy   Her therapist believes she may have right shoulder tendonitis  notes enlarged glands of her bilateral neck  Review of Systems   Complete-Female:      Constitutional: No fever, no chills, feels well, no tiredness, no recent weight gain or weight loss  Eyes: No complaints of eye pain, no red eyes, no eyesight problems, no discharge, no dry eyes, no itching of eyes  ENT: lump on neck, but-- no complaints of earache, no loss of hearing, no nose bleeds, no nasal discharge, no sore throat, no hoarseness  Cardiovascular: No complaints of slow heart rate, no fast heart rate, no chest pain, no palpitations, no leg claudication, no lower extremity edema  Respiratory: No complaints of shortness of breath, no wheezing, no cough, no SOB on exertion, no orthopnea, no PND  Gastrointestinal: No complaints of abdominal pain, no constipation, no nausea or vomiting, no diarrhea, no bloody stools  Genitourinary: no dysuria,-- no pelvic pain,-- no vaginal discharge-- and-- no incontinence  Musculoskeletal: arthralgias,-- myalgias-- and-- rt  shoulder, left knee  Integumentary: no rashes-- and-- no skin lesions  Neurological: difficulty walking-- and-- because of knee, but-- no headache,-- no numbness,-- no tingling,-- no confusion,-- no dizziness,-- no limb weakness,-- no convulsions-- and-- no fainting  Psychiatric: anxiety  Endocrine: no muscle weakness  Hematologic/Lymphatic: swollen glands, but-- no tendency for easy bleeding-- and-- no tendency for easy bruising  Preventive Quality 65 Older:      Preventive Quality 65 and Older: Falls Risk: The patient fell 0 times in the past 12 months   Symptoms Include: no confusion, no lightheadedness, no vertigo, no dizziness, no syncope, no impaired balance, no visual problems, no leg weakness, no recurring falls and no recent fall Urinary Incontinence Symptoms includes: incomplete bladder emptying, urinary urgency and nocturia, but no urinary incontinence, no urinary frequency, no urinary hesitancy, no dysuria, no straining, no weak stream, no intermittent stream, no post-void dribbling, no vaginal pressure and no vaginal dryness     ROS Reviewed:    ROS reviewed  Active Problems   1  Abnormal EKG (794 31) (R94 31)  2  Acute knee pain, left (719 46) (M25 562)  3  Acute lower UTI (599 0) (N39 0)  4  Anxiety (300 00) (F41 9)  5  Asthma (493 90) (J45 909)  6  Bladder mass (596 89) (N32 89)  7  Bladder pain (788 99) (R39 89)  8  BMI 29 0-29 9,adult (V85 25) (Z68 29)  9  Bursitis of shoulder (726 10) (M75 50)  10  Chronic cough (786 2) (R05)  11  Dental abscess (522 5) (K04 7)  12  Dental infection (522 4) (K04 7)  13  Depression (311) (F32 9)  14  Fatigue (780 79) (R53 83)  15  Fibromyositis (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 1) (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 7)  16  Ganglion and cyst of synovium, tendon and bursa (727 49,727 40,727 42) (M67 49,M67 89,M71 39)  17  Hematuria (599 70) (R31 9)  18  Hematuria (599 70) (R31 9)  19  Hypercholesterolemia (272 0) (E78 00)  20  Hypertension (401 9) (I10)  21  Hyponatremia (276 1) (E87 1)  22  Influenza-like illness (799 89) (R69)  23  Insomnia (780 52) (G47 00)  24  Lymphadenopathy (785 6) (R59 1)  25  Malignant neoplasm of lateral wall of urinary bladder (188 2) (C67 2)  26  Neoplasm of bladder (239 4) (D49 4)  27  Non-insulin dependent type 2 diabetes mellitus (250 00) (E11 9)  28  Nontoxic single thyroid nodule (241 0) (E04 1)  29  Obesity (278 00) (E66 9)  30  Osteoporosis (733 00) (M81 0)  31  Postmenopausal (V49 81) (Z78 0)  32  Primary localized osteoarthritis of left knee (715 16) (M17 12)  33  Screening mammogram, encounter for (V76 12) (Z12 31)  34  Snoring (786 09) (R06 83)  35  Uncontrolled diabetes mellitus type 2 without complications, unspecified long term insulin use status      (250 02) (E11 65)  36  Urinary frequency (788 41) (R35 0)  37  Urinary hesitancy (788 64) (R39 11)  38  Yeast UTI (112 2) (B37 49)    Past Medical History   1   History of Acute frontal sinusitis (461 1) (J01 10)  2  History of High triglycerides (272 1) (E78 1)  3  History of abnormal electrocardiography (V12 59) (Z86 79)  4  History of acute bronchitis (V12 69) (Z87 09)  5  History of cough  6  History of dysuria (V13 00) (Z87 898)  7  History of gastroesophageal reflux (GERD) (V12 79) (Z87 19)  8  History of heartburn (V12 79) (Z87 898)  9  History of hypertension (V12 59) (Z86 79)  10  History of osteoporosis (V13 59) (Z87 39)  11  History of thyroid disorder (V12 29) (Z86 39)  12  History of urinary frequency (V13 09) (Z87 898)  13  History of urinary frequency (V13 09) (Z87 898)  14  History of Hypercholesterolemia (272 0) (E78 00)  15  History of Tear of medial meniscus of left knee (836 0) (U50 517V)  Active Problems And Past Medical History Reviewed: The active problems and past medical history were reviewed and updated today  Surgical History   1  History of  Section  2  History of Cholecystectomy  3  History of Cystoscopy With Removal Of Object  4  History of Cystoscopy With Resection Of Tumor  5  History of Diagnostic Cystoscopy  6  History of Knee Surgery  7  History of Neuroplasty Decompression Median Nerve At Carpal Tunnel  8  History of Tonsillectomy  Surgical History Reviewed: The surgical history was reviewed and updated today  Family History   Mother   1  Family history of myocardial infarction (V17 3) (Z82 49)  Father   2  Family history of amyotrophic lateral sclerosis (V17 2) (Z82 0)  Paternal Grandmother   3  Family history of myocardial infarction (V17 3) (Z82 49)  Maternal Grandfather   4  Family history of diabetes mellitus (V18 0) (Z83 3)  Paternal Grandfather   5  Family history of lung cancer (V16 1) (Z80 1)  Family History Reviewed: The family history was reviewed and updated today  Social History    · Former smoker (T49 42) (S89 431)   · No alcohol use   · No drug use  Social History Reviewed:  The social history was reviewed and updated today  Current Meds   1  ClonazePAM 0 5 MG Oral Tablet; TAKE 1 TABLET AT BEDTIME; Therapy: 95RWN3517 to (Evaluate:26Nov2017); Last Rx:80Sww2758 Ordered  2  Edluar 10 MG Sublingual Tablet Sublingual; DISSOLVE 0 5 MG Bedtime; Therapy: 28MFD8091 to Recorded  3  Famotidine 20 MG Oral Tablet; TAKE 1 TABLET DAILY AS DIRECTED; Therapy: 85XMV3613 to (Evaluate:55Wop6940)  Requested for: 10GDW4128; Last Rx:69Ssg8593     Ordered  4  Losartan Potassium-HCTZ 50-12 5 MG Oral Tablet; TAKE 1 TABLET DAILY  Requested for:     40STX2931; Last Rx:03Jan2018 Ordered  5  MetFORMIN HCl - 500 MG Oral Tablet; TAKE 2 TABLETS TWICE DAILY; Last Rx:78Ybv9050 Ordered  6  OneTouch Verio In [employer]; test twice daily; Therapy: (Maren Hernandez) to Recorded  7  Probiotic Oral Capsule; take 1 capsule daily; Therapy: (Recorded:54Qdp7900) to Recorded  8  Simvastatin 40 MG Oral Tablet; TAKE 1 TABLET DAILY; Therapy: (Recorded:23Gsc5796) to Recorded  9  Jake BCG 50 MG Intravesical Suspension Reconstituted; Therapy: (Recorded:92Cdn7059) to Recorded  10  Tricor 145 MG Oral Tablet; TAKE 1 TABLET DAILY; Therapy: (Recorded:06Fxv2535) to Recorded  Medication List Reviewed: The medication list was reviewed and updated today  Allergies   1  No Known Drug Allergies  2  No Known Environmental Allergies  3  No Known Food Allergies    Vitals   Vital Signs    Recorded: 61VEB9316 10:51AM   Temperature 98 F, Oral   Heart Rate 84, L Radial   Pulse Quality Regular, L Radial   Respiration 16   Systolic 468, LUE, Sitting   Diastolic 76, LUE, Sitting   Height 5 ft 1 77 in   Weight 164 lb 6 4 oz   BMI Calculated 30 29   BSA Calculated 1 75   O2 Saturation 95, RA     Physical Exam   Socks and shoes removed, Right Foot Findings: dryness, but-- no swelling,-- no erythema,-- no tenderness,-- no warmth-- and-- no maceration   The toes were not swollen,-- were not erythematous,-- had full range of motion-- and-- were non-tender  Socks and Shoes removed, Left Foot Findings: dryness, but-- no swelling,-- no erythema,-- no tenderness,-- no warmth-- and-- no maceration  The toes were not swollen,-- were not erythematous,-- had full range of motion-- and-- were non-tender  Monofilament Testing: Tactile sensation in the right foot (see image for location): number 1 was normal,-- number 4 was normal,-- number 5 was normal,-- number 6 was normal,-- number 2 was normal,-- number 3 was normal,-- number 7 was normal,-- number 8 was normal,-- number 9 was normal-- and-- number 10 was normal  Tactile sensation in the left foot (see image for location): number 1 was normal,-- number 4 was normal,-- number 5 was normal,-- number 6 was normal,-- number 2 was normal,-- number 3 was normal,-- number 7 was normal,-- number 8 was normal,-- number 9 was normal-- and-- number 10 was normal     Vascular:         Constitutional      General appearance: Abnormal  -- Overweight  Eyes      Conjunctiva and lids: No swelling, erythema or discharge  Pupils and irises: Equal, round and reactive to light  Ears, Nose, Mouth, and Throat      External inspection of ears and nose: Normal        Pulmonary      Respiratory effort: No increased work of breathing or signs of respiratory distress  Auscultation of lungs: Clear to auscultation  Cardiovascular      Auscultation of heart: Normal rate and rhythm, normal S1 and S2, without murmurs  Examination of extremities for edema and/or varicosities: Normal        Carotid pulses: Normal        Abdomen      Abdomen: Non-tender, no masses  Liver and spleen: No hepatomegaly or splenomegaly  Lymphatic      Palpation of lymph nodes in neck: Abnormal  -- Mobile enlarged lymph node  Musculoskeletal      Gait and station: Normal        Digits and nails: Normal without clubbing or cyanosis         Inspection/palpation of joints, bones, and muscles: Normal        Skin Skin and subcutaneous tissue: Normal without rashes or lesions  Neurologic      Sensation: No sensory loss  Psychiatric      Orientation to person, place, and time: Normal        Mood and affect: Normal           Results/Data   Falls Risk Assessment (Dx Z13 89 Screen for Neurologic Disorder) 32XOL7034 11:02AM User, Ahs      Test Name Result Flag Reference   Falls Risk      No falls in the past year      *VB - Urinary Incontinence Screen (Dx Z13 89 Screen for UI) 98YTB8907 11:01AM Yazmin Bustos      Test Name Result Flag Reference   Urinary Incontinence Assessment 20IJR6566          Attending Note   Scribe Attestation:      Scribe [facility] acting as a scribe in the presence of the attending physician while the attending physician examines the patient  Physician Attestation:      I, dr micaela alexander personally performed the services described in this documentation as scribed in my presence, and it is both accurate and complete        Future Appointments      Date/Time Provider Specialty Site   04/10/2018 02:00 PM Bladimir iJmenez MD Urology [address] [address]     Signatures    Electronically signed by : Juju Cooper MD; Edward 10 2018  1:27PM EST                       (Author)     Electronically signed by : Juju Cooper MD; Edward 10 2018  5:00PM EST                       (Author)

## 2018-01-13 VITALS
BODY MASS INDEX: 29.48 KG/M2 | DIASTOLIC BLOOD PRESSURE: 64 MMHG | HEART RATE: 72 BPM | SYSTOLIC BLOOD PRESSURE: 112 MMHG | WEIGHT: 166.38 LBS | HEIGHT: 63 IN

## 2018-01-13 VITALS
BODY MASS INDEX: 30.95 KG/M2 | TEMPERATURE: 97.9 F | HEIGHT: 62 IN | SYSTOLIC BLOOD PRESSURE: 128 MMHG | OXYGEN SATURATION: 97 % | WEIGHT: 168.2 LBS | DIASTOLIC BLOOD PRESSURE: 80 MMHG | HEART RATE: 94 BPM

## 2018-01-13 VITALS
DIASTOLIC BLOOD PRESSURE: 85 MMHG | HEIGHT: 63 IN | WEIGHT: 173.13 LBS | SYSTOLIC BLOOD PRESSURE: 145 MMHG | HEART RATE: 91 BPM | BODY MASS INDEX: 30.68 KG/M2

## 2018-01-13 VITALS
SYSTOLIC BLOOD PRESSURE: 124 MMHG | WEIGHT: 170 LBS | BODY MASS INDEX: 30.12 KG/M2 | HEIGHT: 63 IN | HEART RATE: 72 BPM | DIASTOLIC BLOOD PRESSURE: 80 MMHG

## 2018-01-13 VITALS
HEIGHT: 63 IN | DIASTOLIC BLOOD PRESSURE: 78 MMHG | WEIGHT: 168.25 LBS | BODY MASS INDEX: 29.81 KG/M2 | HEART RATE: 68 BPM | SYSTOLIC BLOOD PRESSURE: 128 MMHG

## 2018-01-13 VITALS
BODY MASS INDEX: 30.91 KG/M2 | HEIGHT: 62 IN | DIASTOLIC BLOOD PRESSURE: 84 MMHG | WEIGHT: 168 LBS | SYSTOLIC BLOOD PRESSURE: 150 MMHG | HEART RATE: 78 BPM

## 2018-01-13 VITALS
BODY MASS INDEX: 29.97 KG/M2 | HEART RATE: 64 BPM | DIASTOLIC BLOOD PRESSURE: 78 MMHG | SYSTOLIC BLOOD PRESSURE: 132 MMHG | HEIGHT: 63 IN | WEIGHT: 169.13 LBS

## 2018-01-13 NOTE — PROGRESS NOTES
Assessment    1  Malignant neoplasm of lateral wall of urinary bladder (188 2) (C67 2)    Discussion/Summary  Discussion Summary:   My impression is recurrent noninvasive low-grade urothelial carcinoma the bladder  I recommend returning to the operating room to undergo cystoscopy with bilateral ureteral washings, bilateral retrograde pyelograms, bilateral ureteroscopy, possible laser ablation of any ureteral lesions  Risk and benefits of the procedure were discussed and reviewed and informed consent was obtained in the office today  Chief Complaint  Chief Complaint Free Text Note Form: Patient presents for f/u bladder cancer s/p BCG tx      History of Present Illness  HPI: Augustine Riggs is a 70-year-old female with recurrent urothelial carcinoma of the bladder and a small frondular tumor adjacent to the right ureteral orifice  She recently completed 6 cycles of intra-vesicle BCG therapy  She returns to the office today with her   She is concerned about urgency of urination and dysuria  A recent urine culture is negative for growth  Review of Systems  Complete-Female Urology:   Genitourinary: Patient states that stream varies and she does not always feel like she empties completely and feelings of urinary urgency, but no dysuria, no urinary hesitancy, no empty sensation, no incontinence and no hematuria    The patient presents with complaints of nocturia (Patient states that she wakes every hour to urinate)  Active Problems    1  Abnormal EKG (794 31) (R94 31)   2  Acute bronchitis (466 0) (J20 9)   3  Acute frontal sinusitis (461 1) (J01 10)   4  Acute knee pain, left (719 46) (M25 562)   5  Acute lower UTI (599 0) (N39 0)   6  Acute medial meniscal tear, left, initial encounter (836 0) (S83 242A)   7  Anxiety (300 00) (F41 9)   8  Bladder mass (596 89) (N32 89)   9  Bladder pain (788 99) (R39 89)   10  Cough (786 2) (R05)   11  Depression (311) (F32 9)   12  Dysuria (788 1) (R30 0)   13   Fatigue (780 79) (R53 83)   14  Heart burn (787 1) (R12)   15  Hematuria (599 70) (R31 9)   16  Hypercholesterolemia (272 0) (E78 00)   17  Hypertension (401 9) (I10)   18  Influenza-like illness (799 89) (R69)   19  Malignant neoplasm of lateral wall of urinary bladder (188 2) (C67 2)   20  Non-insulin dependent type 2 diabetes mellitus (250 00) (E11 9)   21  Primary localized osteoarthritis of left knee (715 16) (M17 12)   22  Snoring (786 09) (R06 83)   23  Urinary frequency (788 41) (R35 0)   24  Urinary hesitancy (788 64) (R39 11)   25  Yeast UTI (112 2) (B37 49)    Past Medical History    1  History of High triglycerides (272 1) (E78 1)   2  History of Hypercholesterolemia (272 0) (E78 00)    Surgical History    1  History of  Section   2  History of Cholecystectomy   3  History of Cystoscopy With Removal Of Object   4  History of Cystoscopy With Resection Of Tumor   5  History of Neuroplasty Decompression Median Nerve At Carpal Tunnel   6  History of Tonsillectomy    Family History  Mother    1  Family history of cardiac disorder (V17 49) (Z82 49)   2  Family history of hypercholesterolemia (V18 19) (Z83 42)   3  Family history of hypertension (V17 49) (Z82 49)   4  Family history of sudden cardiac death (SCD) (V17 41) (Z82 41)  Grandmother    5  Family history of cerebrovascular accident (CVA) (V17 1) (Z82 3)  Grandfather    6  Family history of malignant neoplasm (V16 9) (Z80 9)    Social History    · Former smoker (V15 82) (R13 613)   · No alcohol use   · No drug use    Current Meds   1  Amaryl 4 MG Oral Tablet; TAKE 0 5 TABLET Twice daily; Therapy: (Recorded:2016) to Recorded   2  ClonazePAM 0 5 MG Oral Tablet; Therapy: 11YJF7880 to Recorded   3  Edluar 5 MG Sublingual Tablet Sublingual;   Therapy: 85HOS4017 to Recorded   4  Famotidine 20 MG Oral Tablet; Therapy: 86MMP4824 to Recorded   5  Hyzaar 50-12 5 MG Oral Tablet; Therapy: (Recorded:2016) to Recorded   6   Losartan Potassium-HCTZ 50-12 5 MG Oral Tablet; Therapy: (Recorded:88Tgv3785) to Recorded   7  MetFORMIN HCl - 500 MG Oral Tablet; TAKE 2 TABLETS TWICE DAILY; Therapy: (Recorded:28Mar2016) to Recorded   8  Simvastatin 40 MG Oral Tablet; Therapy: (Recorded:28Mar2016) to Recorded   9  Jake BCG 50 MG Intravesical Suspension Reconstituted; Therapy: (Recorded:72Zcq2644) to Recorded   10  Tricor 48 MG Oral Tablet; Therapy: (Recorded:28Nov2014) to Recorded    Allergies    1  No Known Drug Allergies    Vitals  Vital Signs    Recorded: 72DMF9520 11:17AM   Heart Rate 92   Systolic 627   Diastolic 84   Height 5 ft 3 in   Weight 167 lb 6 08 oz   BMI Calculated 29 65   BSA Calculated 1 8     Physical Exam    Additional Exam:  Lungs CTA, Car RRR, abdomen soft nontender and nondistended  Anxious        Results/Data  (1) URINALYSIS (will reflex a microscopy if leukocytes, occult blood, protein or nitrites are not within normal limits) 16AKV8206 09:38AM Karie L8 [employer]   [facility] Order Number: [id number]     Test Name Result Flag Reference   COLOR Dk Yellow     CLARITY Turbid     SPECIFIC GRAVITY UA 1 029  1 003-1 030   PH UA 6 0  4 5-8 0   LEUKOCYTE ESTERASE UA Large A Negative   NITRITE UA Negative  Negative   PROTEIN  (2+) mg/dl A Negative   GLUCOSE UA Negative mg/dl  Negative   KETONES UA Trace mg/dl A Negative   UROBILINOGEN UA 0 2 E U /dl  0 2, 1 0 E U /dl   BILIRUBIN UA Negative  Negative   BLOOD UA Moderate A Negative   BACTERIA  A None Seen, Occasional   Field obscured, unable to enumerate /hpf   EPITHELIAL CELLS  A None Seen, Occasional   Field obscured, unable to enumerate /hpf   RBC UA  A None Seen   Field obscured, unable to enumerate /hpf   WBC UA Innumerable /hpf A None Seen     (1) URINE CULTURE 46MOR1859 09:38AM Phantommark[facility]   [facility] Order Number: [id number]     Test Name Result Flag Reference   CLINICAL REPORT (Report)     Test:        Urine culture  Specimen Type:   Urine  Specimen Date:   3/6/2017 9:38 AM  Result Date:    3/7/2017 9:33 AM  Result Status:   Final result  Resulting Lab:   [facility] [address] [address]            Tel: [phone]      CULTURE                                       ------------------                                   40,000-49,000 cfu/ml Mixed Contaminants X3     Future Appointments    Date/Time Provider Specialty Site   04/18/2017 08:10 AM DANIELE Luong   Orthopedic [address] [place] [address]     Signatures   Electronically signed by : Solis Gonzalez MD; Mar  9 2017  1:50PM EST                       (Author)

## 2018-01-13 NOTE — MISCELLANEOUS
Message  Called and spoke with patient  She complains of medial knee pain and stiffness  She doesn't take narcotics  She wants a brace  I educated her that anything more than a sleeve for her knee is not indicated as she is a month out from surgery and this could cause her to become more stiff  Her arthritis is not severe enough to require a medial  brace  I suggested PT and put in an order  COntinue with OTC Ibuprofen, informed her she could add Tylenol  Plan  Acute medial meniscal tear, left, initial encounter    · *1 - [facility]  S/p left partial medial menisectomy on  4/4/16  Evaluate and treat  Set up with home exercise program      2 times per week for 2-4 weeks    Status: Hold For - Scheduling  Requested for:  [id number]  Care Summary provided  : Yes    Signatures   Electronically signed by : Karthikeyan Soria [facility]; May  3 2016 10:10AM EST                       (Author)

## 2018-01-13 NOTE — PROGRESS NOTES
Chief Complaint  Patient presents for a stent pull      Active Problems    1  Abnormal EKG (794 31) (R94 31)   2  Acute bronchitis (466 0) (J20 9)   3  Acute frontal sinusitis (461 1) (J01 10)   4  Acute knee pain, left (719 46) (M25 562)   5  Acute lower UTI (599 0) (N39 0)   6  Acute medial meniscal tear, left, initial encounter (836 0) (S83 242A)   7  Anxiety (300 00) (F41 9)   8  Bladder mass (596 89) (N32 89)   9  Bladder pain (788 99) (R39 89)   10  Cough (786 2) (R05)   11  Depression (311) (F32 9)   12  Dysuria (788 1) (R30 0)   13  Fatigue (780 79) (R53 83)   14  Heart burn (787 1) (R12)   15  Hematuria (599 70) (R31 9)   16  Hypercholesterolemia (272 0) (E78 00)   17  Hypertension (401 9) (I10)   18  Influenza-like illness (799 89) (R69)   19  Malignant neoplasm of lateral wall of urinary bladder (188 2) (C67 2)   20  Non-insulin dependent type 2 diabetes mellitus (250 00) (E11 9)   21  Primary localized osteoarthritis of left knee (715 16) (M17 12)   22  Snoring (786 09) (R06 83)   23  Urinary frequency (788 41) (R35 0)   24  Urinary hesitancy (788 64) (R39 11)   25  Yeast UTI (112 2) (B37 49)    Current Meds   1  Amaryl 4 MG Oral Tablet (Glimepiride); TAKE 0 5 TABLET Twice daily; Therapy: (Recorded:28Mar2016) to Recorded   2  ClonazePAM 0 5 MG Oral Tablet; Therapy: 64TOI5179 to Recorded   3  Edluar 5 MG Sublingual Tablet Sublingual;   Therapy: 67BHK3448 to Recorded   4  Famotidine 20 MG Oral Tablet; Therapy: 01ZKV2177 to Recorded   5  Hyzaar 50-12 5 MG Oral Tablet (Losartan Potassium-HCTZ); Therapy: (Recorded:28Mar2016) to Recorded   6  Losartan Potassium-HCTZ 50-12 5 MG Oral Tablet; Therapy: (Recorded:81Ahs1272) to Recorded   7  MetFORMIN HCl - 500 MG Oral Tablet; TAKE 2 TABLETS TWICE DAILY; Therapy: (Recorded:28Mar2016) to Recorded   8  Probiotic Oral Capsule Recorded   9  Simvastatin 40 MG Oral Tablet; Therapy: (Recorded:28Mar2016) to Recorded   10   Tamsulosin HCl - 0 4 MG Oral Capsule; TAKE 1 CAPSULE Bedtime; Therapy: 89RQC9717 to (Complete:02Jun2017)  Requested for: 60YMR9576; Last    Rx:78Xvm1589 Ordered   11  Jake BCG 50 MG Intravesical Suspension Reconstituted; Therapy: (Recorded:03Fma6679) to Recorded   12  Tricor 48 MG Oral Tablet (Fenofibrate); Therapy: (Recorded:28Nov2014) to Recorded    Allergies    1  No Known Drug Allergies    Vitals  Signs    Heart Rate: 72  Systolic: 297  Diastolic: 80  Height: 5 ft 3 in  Weight: 166 lb 4 00 oz  BMI Calculated: 29 45  BSA Calculated: 1 79    Procedure    Procedure: Stent Removal   Stent removed intact  Patient instructed to drink plenty of water and pain meds, heating pad prn      Assessment    1  Malignant neoplasm of lateral wall of urinary bladder (188 2) (C67 2)    Plan  Malignant neoplasm of lateral wall of urinary bladder    · Follow-up as previously scheduled Evaluation and Treatment  Follow-up  Status:  Complete - Retrospective By Protocol Authorization  Done: 35UKD3454 11:00AM   Ordered; For: Malignant neoplasm of lateral wall of urinary bladder; Ordered By: Delphina Canavan Performed:  Due: 53HBD2656    Future Appointments    Date/Time Provider Specialty Site   05/24/2017 04:00 PM Delphina Canavan, MD Urology [address]     Signatures   Electronically signed by :  Khai Solorio, ; May 12 2017 11:00AM EST                       (Author)    Electronically signed by : Alex Lagos MD; May 15 2017  8:28AM EST

## 2018-01-14 VITALS
HEART RATE: 92 BPM | DIASTOLIC BLOOD PRESSURE: 84 MMHG | SYSTOLIC BLOOD PRESSURE: 142 MMHG | BODY MASS INDEX: 29.66 KG/M2 | HEIGHT: 63 IN | WEIGHT: 167.38 LBS

## 2018-01-14 VITALS
SYSTOLIC BLOOD PRESSURE: 126 MMHG | HEART RATE: 84 BPM | WEIGHT: 168.13 LBS | BODY MASS INDEX: 29.79 KG/M2 | HEIGHT: 63 IN | DIASTOLIC BLOOD PRESSURE: 84 MMHG

## 2018-01-14 VITALS
HEART RATE: 72 BPM | BODY MASS INDEX: 29.46 KG/M2 | SYSTOLIC BLOOD PRESSURE: 128 MMHG | WEIGHT: 166.25 LBS | HEIGHT: 63 IN | DIASTOLIC BLOOD PRESSURE: 80 MMHG

## 2018-01-14 VITALS
BODY MASS INDEX: 29.97 KG/M2 | HEART RATE: 84 BPM | SYSTOLIC BLOOD PRESSURE: 122 MMHG | WEIGHT: 169.13 LBS | HEIGHT: 63 IN | DIASTOLIC BLOOD PRESSURE: 80 MMHG

## 2018-01-14 VITALS
DIASTOLIC BLOOD PRESSURE: 71 MMHG | SYSTOLIC BLOOD PRESSURE: 150 MMHG | HEART RATE: 88 BPM | BODY MASS INDEX: 30.12 KG/M2 | HEIGHT: 63 IN | WEIGHT: 170 LBS

## 2018-01-14 VITALS
HEART RATE: 76 BPM | DIASTOLIC BLOOD PRESSURE: 84 MMHG | BODY MASS INDEX: 30.49 KG/M2 | WEIGHT: 172.13 LBS | SYSTOLIC BLOOD PRESSURE: 130 MMHG

## 2018-01-14 VITALS
SYSTOLIC BLOOD PRESSURE: 126 MMHG | HEART RATE: 68 BPM | BODY MASS INDEX: 30.03 KG/M2 | DIASTOLIC BLOOD PRESSURE: 80 MMHG | HEIGHT: 63 IN | WEIGHT: 169.5 LBS

## 2018-01-14 VITALS
SYSTOLIC BLOOD PRESSURE: 132 MMHG | DIASTOLIC BLOOD PRESSURE: 80 MMHG | HEIGHT: 63 IN | HEART RATE: 72 BPM | WEIGHT: 168 LBS | BODY MASS INDEX: 29.77 KG/M2

## 2018-01-14 VITALS
BODY MASS INDEX: 29.81 KG/M2 | WEIGHT: 168.25 LBS | DIASTOLIC BLOOD PRESSURE: 78 MMHG | SYSTOLIC BLOOD PRESSURE: 124 MMHG | HEIGHT: 63 IN | HEART RATE: 68 BPM

## 2018-01-14 VITALS
BODY MASS INDEX: 31.74 KG/M2 | SYSTOLIC BLOOD PRESSURE: 160 MMHG | HEART RATE: 76 BPM | DIASTOLIC BLOOD PRESSURE: 92 MMHG | WEIGHT: 168.13 LBS | HEIGHT: 61 IN

## 2018-01-16 NOTE — RESULT NOTES
Verified Results  (1) URINE CULTURE 90GGS7076 05:27PM Vel Worthington Order Number: [id number]     Test Name Result Flag Reference   CLINICAL REPORT (Report)     Test:        Urine culture  Specimen Type:   Urine  Specimen Date:   9/27/2017 5:27 PM  Result Date:    9/28/2017 6:49 PM  Result Status:   Final result  Resulting Lab:   [place] [address]            Tel: [phone]      CULTURE                                       ------------------                                   7581-8562 cfu/ml Gram Negative Roge       Plan  Acute lower UTI    · Sulfamethoxazole-Trimethoprim 800-160 MG Oral Tablet (Bactrim DS); TAKE 1  TABLET TWICE DAILY WITH FOOD

## 2018-01-17 ENCOUNTER — APPOINTMENT (OUTPATIENT)
Dept: PHYSICAL THERAPY | Facility: MEDICAL CENTER | Age: 68
End: 2018-01-17
Payer: COMMERCIAL

## 2018-01-18 NOTE — PROGRESS NOTES
Preliminary Nursing Report                Patient Information    Initial Encounter Entry Date:   3/13/2017 3:27 PM EST (Automated Transmission Automated Transmission)       Last Modified:   {Bella Agustin}              Legal Name: Johnna Wood Place Number:        YOB: 1950        Age (years): 78        Gender: F        Body Mass Index (BMI): 30 kg/m2        Height: 63 in  Weight: 167 lbs (76 kgs)           Address:   [address] [place] [address]              Phone: [phone]   (consent to leave messages)        Email:        Ethnicity: Decline to State        Mormonism:        Marital Status:        Preferred Language: English        Race: Other Race                    Patient Insurance Information        Primary Insurance Information Carrier Name: {Primary  CarrierName}           Carrier Address:   {Primary  CarrierAddress}              Carrier Phone: {Primary  CarrierPhone}          Group Number: {Primary  GroupNumber}          Policy Number: {Primary  PolicyNumber}          Insured Name: {Primary  InsuredName}          Insured : {Primary  InsuredDOB}          Relationship to Insured: {Primary  RelationshiptoInsured}           Secondary Insurance Information Carrier Name: {Secondary  CarrierName}           Carrier Address:   {Secondary  CarrierAddress}              Carrier Phone: {Secondary  CarrierPhone}          Group Number: {Secondary  GroupNumber}          Policy Number: {Secondary  PolicyNumber}          Insured Name: {Secondary  InsuredName}          Insured : {Secondary  InsuredDOB}          Relationship to Insured: {Secondary  RelationshiptoInsured}                       Health Profile   Booking #:   Antonio La #: [phone]               DOS: 2017    Surgery : CYSTOURETHROSCOPY, WITH FULGURATION (INCLUDING CRYOSURGERY OR LASER SURGERY) OR TREATMENT OF MINOR (LESS THAN 0 5 CM) LESION(S) WITH OR WITHOUT BIOPSY    Add'l Procedures/Notes:     Surgery Risk: Minor          Precautions     Non-insulin dependent type 2 diabetes mellitus                   Allergies    No Known Drug Allergies             Medications    Amaryl 4 MG Oral Tablet       ClonazePAM 0 5 MG Oral Tablet       Edluar 5 MG Sublingual Tablet Sublingual       Famotidine 20 MG Oral Tablet       Hyzaar 50-12 5 MG Oral Tablet       Losartan Potassium-HCTZ 50-12 5 MG Oral Tablet       MetFORMIN HCl - 500 MG Oral Tablet       Simvastatin 40 MG Oral Tablet       Jaek BCG 50 MG Intravesical Suspension Reconstituted       Tricor 48 MG Oral Tablet               Conditions    Abnormal EKG       Acute bronchitis       Acute frontal sinusitis       Acute knee pain, left       Acute lower UTI       Acute medial meniscal tear, left, initial encounter       Anxiety       Bladder mass       Bladder pain       Cough       Depression       Dysuria       Fatigue       Heart burn       Hematuria       Hypercholesterolemia       Hypertension       Influenza-like illness       Malignant neoplasm of lateral wall of urinary bladder       Non-insulin dependent type 2 diabetes mellitus       Primary localized osteoarthritis of left knee       Snoring       Urinary frequency       Urinary hesitancy       Yeast UTI               Family History    None             Surgical History    None             Social History    Former smoker       No alcohol use       No drug use                               Patient Instructions       Medical Procedure Risk  NPO Instructions   The day before surgery it is recommended to have a light dinner at your usual time and you are allowed a light snack early in the evening  Do not eat anything heavy or eat a big meal after 7pm  Do not eat or drink anything after midnight prior to your surgery  If you are supposed to take any of your medications, do so with a sip of water   Failure to follow these instructions can lead to an increased risk of lung complications and may result in a delay or cancellation of your procedure  If you have any questions, contact your institution for further instructions  No candy, no gum, no mints, no chewing tobacco          Losartan Potassium-HCTZ 50-12 5 MG Oral Tablet, , Hyzaar 50-12 5 MG Oral Tablet  Medication Instruction (ACE/ARB - Blood Pressure Medication) 1  Please continue the following medications up to the evening before surgery, but do not take it on the day of surgery  Please restart your medications as soon as clinically feasible  ClonazePAM 0 5 MG Oral Tablet  Medication Instruction (Benzodiazepine) 15  Please continue the following medications, if needed, up to and including the day of surgery (with a sip of water)  MetFORMIN HCl - 500 MG Oral Tablet, , Amaryl 4 MG Oral Tablet  Medication Instruction (Diabetic Medication)   Please decrease your morning insulin dose to one-half of normal dose  If you are taking oral diabetes medications, the morning dose should be omitted  If taking metformin (Glucophage), discontinue the medication for 24 hours prior to surgery  If you have an insulin pump, continue at a basal rate only  Famotidine 20 MG Oral Tablet  Medication Instruction (Antacids) 34, 35  Please continue to take this medication on your normal schedule  If this is an oral medication and you take in the morning, you may do so with a sip of water  Simvastatin 40 MG Oral Tablet  Medication Instruction (Cholesterol Medication) 81, 82  Please continue to take this medication on your normal schedule  If this is an oral medication and you take in the morning, you may do so with a sip of water  Testing Considerations       ? Blood Glucose on Day of Surgery t  Please check the blood sugar on the morning of surgery  Triggered by: Non-insulin dependent type 2 diabetes mellitus         ?  Urinalysis t  Urinalysis may be appropriate if recent urinary symptoms or implants are being placed in surgical procedure  Triggered by: Medical Procedure               Consultations       No recommendations for this classification  Miscellaneous Questions         Question: Are you able to walk up a flight of stairs, walk up a hill or do heavy housework WITHOUT having chest pain or shortness of breath? Answer: YES                   Allergies/Conditions/Medications Not Found        The following were not recognized by our system when generating the recommendations  Please consider if this would impact any preoperative protocols  ? No alcohol use       ? No drug use                  Appointment Information         Date:    04/25/2017        Location:    [place]        Address:           Directions:                      Footnotes revision 14      ?? Denotes a free-text entry  Legal Disclaimer: Any and all recommendations and services provided herein are designed to assist in the preoperative care of the patient  Nothing contained herein is designed to replace, eliminate or alleviate the responsibility of the attending physician to supervise and determine the patient?s preoperative care and course of treatment  Failure to provide complete, accurate information may negatively impact the system?s ability to recommend the proper preoperative protocol  THE ATTENDING PHYSICIAN IS RESPONSIBLE TO REVIEW THE SUGGESTED PREOPERATIVE PROTOCOLS/COURSE OF TREATMENT AND PRESCRIBE THE FINAL COURSE OF PREOPERATIVE TREATMENT IN CONSULTATION WITH THE PATIENT  THE ePREOP SYSTEM AND ITS MATERIALS ARE PROVIDED ? AS IS? WITHOUT WARRANTY OF ANY KIND, EXPRESS OR IMPLIED, INCLUDING, BUT NOT LIMITED TO, WARRANTIES OF PERFORMANCE OR MERCHANTABILITY OR FITNESS FOR A PARTICULAR PURPOSE  PATIENT AND PHYSICIANS HEREBY AGREE THAT THEIR USE OF THE MATERIALS AND RESOURCES ACT AS A CONSENT TO RELEASE AND WAIVE ePREOP FROM ANY AND ALL CLAIMS OF WARRANTY, TORT OR CONTRACT LAW OF ANY KIND             Electronically signed by:Lane Lorraine Raymond MD  Mar 14 2017 10:33PM EST

## 2018-01-19 ENCOUNTER — APPOINTMENT (OUTPATIENT)
Dept: PHYSICAL THERAPY | Facility: MEDICAL CENTER | Age: 68
End: 2018-01-19
Payer: COMMERCIAL

## 2018-01-19 PROCEDURE — 97164 PT RE-EVAL EST PLAN CARE: CPT

## 2018-01-19 PROCEDURE — 97112 NEUROMUSCULAR REEDUCATION: CPT

## 2018-01-19 PROCEDURE — G8978 MOBILITY CURRENT STATUS: HCPCS

## 2018-01-19 PROCEDURE — G8979 MOBILITY GOAL STATUS: HCPCS

## 2018-01-22 ENCOUNTER — APPOINTMENT (OUTPATIENT)
Dept: LAB | Facility: MEDICAL CENTER | Age: 68
End: 2018-01-22
Payer: COMMERCIAL

## 2018-01-22 ENCOUNTER — HOSPITAL ENCOUNTER (OUTPATIENT)
Dept: RADIOLOGY | Facility: MEDICAL CENTER | Age: 68
Discharge: HOME | End: 2018-01-22
Payer: COMMERCIAL

## 2018-01-22 ENCOUNTER — TRANSCRIBE ORDERS (OUTPATIENT)
Dept: ADMINISTRATIVE | Facility: HOSPITAL | Age: 68
End: 2018-01-22

## 2018-01-22 VITALS
HEART RATE: 88 BPM | SYSTOLIC BLOOD PRESSURE: 140 MMHG | BODY MASS INDEX: 29.61 KG/M2 | WEIGHT: 167.13 LBS | DIASTOLIC BLOOD PRESSURE: 88 MMHG | HEIGHT: 63 IN

## 2018-01-22 VITALS
BODY MASS INDEX: 31.72 KG/M2 | DIASTOLIC BLOOD PRESSURE: 75 MMHG | SYSTOLIC BLOOD PRESSURE: 137 MMHG | WEIGHT: 168 LBS | HEART RATE: 89 BPM | HEIGHT: 61 IN

## 2018-01-22 VITALS
HEIGHT: 63 IN | BODY MASS INDEX: 29.44 KG/M2 | HEART RATE: 80 BPM | DIASTOLIC BLOOD PRESSURE: 88 MMHG | SYSTOLIC BLOOD PRESSURE: 144 MMHG | WEIGHT: 166.13 LBS

## 2018-01-22 VITALS
BODY MASS INDEX: 29.95 KG/M2 | DIASTOLIC BLOOD PRESSURE: 80 MMHG | HEIGHT: 63 IN | WEIGHT: 169 LBS | HEART RATE: 72 BPM | SYSTOLIC BLOOD PRESSURE: 110 MMHG

## 2018-01-22 VITALS
SYSTOLIC BLOOD PRESSURE: 126 MMHG | HEART RATE: 84 BPM | WEIGHT: 164.4 LBS | TEMPERATURE: 98 F | HEIGHT: 62 IN | BODY MASS INDEX: 30.25 KG/M2 | DIASTOLIC BLOOD PRESSURE: 76 MMHG | OXYGEN SATURATION: 95 % | RESPIRATION RATE: 16 BRPM

## 2018-01-22 VITALS
DIASTOLIC BLOOD PRESSURE: 74 MMHG | WEIGHT: 168 LBS | SYSTOLIC BLOOD PRESSURE: 130 MMHG | BODY MASS INDEX: 31.72 KG/M2 | HEIGHT: 61 IN | HEART RATE: 88 BPM

## 2018-01-22 DIAGNOSIS — I10 ESSENTIAL (PRIMARY) HYPERTENSION: ICD-10-CM

## 2018-01-22 DIAGNOSIS — R53.83 OTHER FATIGUE: ICD-10-CM

## 2018-01-22 DIAGNOSIS — E11.9 TYPE 2 DIABETES MELLITUS WITHOUT COMPLICATIONS (HCC): ICD-10-CM

## 2018-01-22 DIAGNOSIS — E87.1 HYPO-OSMOLALITY AND HYPONATREMIA (CODE): ICD-10-CM

## 2018-01-22 DIAGNOSIS — E03.9 HYPOTHYROIDISM: ICD-10-CM

## 2018-01-22 DIAGNOSIS — E04.1 NONTOXIC SINGLE THYROID NODULE: ICD-10-CM

## 2018-01-22 LAB
ALBUMIN SERPL BCP-MCNC: 4.3 G/DL (ref 3.5–5)
ALP SERPL-CCNC: 58 U/L (ref 46–116)
ALT SERPL W P-5'-P-CCNC: 41 U/L (ref 12–78)
ANION GAP SERPL CALCULATED.3IONS-SCNC: 6 MMOL/L (ref 4–13)
AST SERPL W P-5'-P-CCNC: 23 U/L (ref 5–45)
BASOPHILS # BLD AUTO: 0.05 THOUSANDS/ΜL (ref 0–0.1)
BASOPHILS NFR BLD AUTO: 1 % (ref 0–1)
BILIRUB SERPL-MCNC: 0.46 MG/DL (ref 0.2–1)
BUN SERPL-MCNC: 18 MG/DL (ref 5–25)
CALCIUM SERPL-MCNC: 9.6 MG/DL (ref 8.3–10.1)
CHLORIDE SERPL-SCNC: 101 MMOL/L (ref 100–108)
CO2 SERPL-SCNC: 28 MMOL/L (ref 21–32)
CREAT SERPL-MCNC: 0.88 MG/DL (ref 0.6–1.3)
EOSINOPHIL # BLD AUTO: 0.26 THOUSAND/ΜL (ref 0–0.61)
EOSINOPHIL NFR BLD AUTO: 4 % (ref 0–6)
ERYTHROCYTE [DISTWIDTH] IN BLOOD BY AUTOMATED COUNT: 13.2 % (ref 11.6–15.1)
GFR SERPL CREATININE-BSD FRML MDRD: 68 ML/MIN/1.73SQ M
GLUCOSE P FAST SERPL-MCNC: 203 MG/DL (ref 65–99)
HCT VFR BLD AUTO: 41.3 % (ref 34.8–46.1)
HGB BLD-MCNC: 13.8 G/DL (ref 11.5–15.4)
LYMPHOCYTES # BLD AUTO: 1.47 THOUSANDS/ΜL (ref 0.6–4.47)
LYMPHOCYTES NFR BLD AUTO: 23 % (ref 14–44)
MCH RBC QN AUTO: 30.5 PG (ref 26.8–34.3)
MCHC RBC AUTO-ENTMCNC: 33.4 G/DL (ref 31.4–37.4)
MCV RBC AUTO: 91 FL (ref 82–98)
MONOCYTES # BLD AUTO: 0.58 THOUSAND/ΜL (ref 0.17–1.22)
MONOCYTES NFR BLD AUTO: 9 % (ref 4–12)
NEUTROPHILS # BLD AUTO: 4.14 THOUSANDS/ΜL (ref 1.85–7.62)
NEUTS SEG NFR BLD AUTO: 63 % (ref 43–75)
NRBC BLD AUTO-RTO: 0 /100 WBCS
PHOSPHATE SERPL-MCNC: 2.9 MG/DL (ref 2.3–4.1)
PLATELET # BLD AUTO: 218 THOUSANDS/UL (ref 149–390)
PMV BLD AUTO: 10.2 FL (ref 8.9–12.7)
POTASSIUM SERPL-SCNC: 4.5 MMOL/L (ref 3.5–5.3)
PROT SERPL-MCNC: 7.8 G/DL (ref 6.4–8.2)
RBC # BLD AUTO: 4.52 MILLION/UL (ref 3.81–5.12)
SODIUM SERPL-SCNC: 135 MMOL/L (ref 136–145)
T4 FREE SERPL-MCNC: 0.89 NG/DL (ref 0.76–1.46)
TSH SERPL DL<=0.05 MIU/L-ACNC: 4.41 UIU/ML (ref 0.36–3.74)
WBC # BLD AUTO: 6.52 THOUSAND/UL (ref 4.31–10.16)

## 2018-01-22 PROCEDURE — 36415 COLL VENOUS BLD VENIPUNCTURE: CPT

## 2018-01-22 PROCEDURE — 80053 COMPREHEN METABOLIC PANEL: CPT

## 2018-01-22 PROCEDURE — 84439 ASSAY OF FREE THYROXINE: CPT

## 2018-01-22 PROCEDURE — 76536 US EXAM OF HEAD AND NECK: CPT

## 2018-01-22 PROCEDURE — 84443 ASSAY THYROID STIM HORMONE: CPT

## 2018-01-22 PROCEDURE — 85025 COMPLETE CBC W/AUTO DIFF WBC: CPT

## 2018-01-22 PROCEDURE — 84100 ASSAY OF PHOSPHORUS: CPT

## 2018-01-23 VITALS
DIASTOLIC BLOOD PRESSURE: 89 MMHG | HEIGHT: 61 IN | BODY MASS INDEX: 31.34 KG/M2 | HEART RATE: 88 BPM | WEIGHT: 166 LBS | SYSTOLIC BLOOD PRESSURE: 148 MMHG

## 2018-01-24 ENCOUNTER — OFFICE VISIT (OUTPATIENT)
Dept: PHYSICAL THERAPY | Facility: MEDICAL CENTER | Age: 68
End: 2018-01-24
Payer: COMMERCIAL

## 2018-01-24 DIAGNOSIS — M25.511 CHRONIC RIGHT SHOULDER PAIN: ICD-10-CM

## 2018-01-24 DIAGNOSIS — M17.12 PRIMARY OSTEOARTHRITIS OF LEFT KNEE: ICD-10-CM

## 2018-01-24 DIAGNOSIS — G89.29 CHRONIC RIGHT SHOULDER PAIN: ICD-10-CM

## 2018-01-24 DIAGNOSIS — S83.242D ACUTE MEDIAL MENISCUS TEAR OF LEFT KNEE, SUBSEQUENT ENCOUNTER: Primary | ICD-10-CM

## 2018-01-24 PROCEDURE — 97112 NEUROMUSCULAR REEDUCATION: CPT | Performed by: PHYSICAL THERAPIST

## 2018-01-24 PROCEDURE — 97110 THERAPEUTIC EXERCISES: CPT | Performed by: PHYSICAL THERAPIST

## 2018-01-24 NOTE — PROGRESS NOTES
Daily Note     Today's date: 2018  Patient name: Cheryle Mires  : 1950  MRN: [id number]  Referring provider: Martha Marquez PA-C  Dx:   Encounter Diagnoses   Name Primary?  Acute medial meniscus tear of left knee, subsequent encounter Yes    Chronic right shoulder pain     Primary osteoarthritis of left knee                   Subjective: Pt states that she ran her  the other day and by the end needed to ice her knee  She also tried to iron and required assistance from her L UE during  Objective: See treatment diary below      Assessment: Tolerated treatment well  Patient demonstrated fatigue post treatment, could benefit from continued PT and  continues to remain limited with strengthening activities  Due to patient time constraints, held standing TE/NRE  Will resume NV as able  Plan: Continue per plan of care  and Progress treatment as tolerated         Precautions: Bladder cancer, depression    Daily Treatment Diary     Manual                                                                                   Exercise Diary              bike 5 mins            Hamstring curls x15            LAQ's  5" x15            Total gym squats  lvl 18 20 (held)            Step ups (fwd/lat) X10; 6" (held)            Foam balance 30" x3 (held)            SLS 30" x3            Heel slides 10" x15            bridges 5" x10 (held)            HS stretch 20" x4            clamshells RTB 5" x15            SLR x15            S/L shld ER NV            shld ER isometrics NV            Shoulder rows NV            Shoulder ext NV                                                                    Modalities

## 2018-01-26 ENCOUNTER — OFFICE VISIT (OUTPATIENT)
Dept: PHYSICAL THERAPY | Facility: MEDICAL CENTER | Age: 68
End: 2018-01-26
Payer: COMMERCIAL

## 2018-01-26 ENCOUNTER — TELEPHONE (OUTPATIENT)
Dept: INTERNAL MEDICINE CLINIC | Age: 68
End: 2018-01-26

## 2018-01-26 DIAGNOSIS — M25.511 CHRONIC RIGHT SHOULDER PAIN: ICD-10-CM

## 2018-01-26 DIAGNOSIS — S83.242D ACUTE MEDIAL MENISCUS TEAR OF LEFT KNEE, SUBSEQUENT ENCOUNTER: Primary | ICD-10-CM

## 2018-01-26 DIAGNOSIS — G89.29 CHRONIC RIGHT SHOULDER PAIN: ICD-10-CM

## 2018-01-26 DIAGNOSIS — M17.12 PRIMARY OSTEOARTHRITIS OF LEFT KNEE: ICD-10-CM

## 2018-01-26 DIAGNOSIS — F51.01 PRIMARY INSOMNIA: Primary | ICD-10-CM

## 2018-01-26 PROCEDURE — 97110 THERAPEUTIC EXERCISES: CPT | Performed by: PHYSICAL THERAPIST

## 2018-01-26 PROCEDURE — 97112 NEUROMUSCULAR REEDUCATION: CPT | Performed by: PHYSICAL THERAPIST

## 2018-01-26 RX ORDER — ZOLPIDEM TARTRATE 10 MG/1
10 TABLET ORAL
Qty: 30 TABLET | Refills: 0 | Status: SHIPPED | OUTPATIENT
Start: 2018-01-26 | End: 2018-02-27 | Stop reason: SDUPTHER

## 2018-01-26 NOTE — TELEPHONE ENCOUNTER
Pt needs refill of ambien bc she was on edular in past and would like to continue - will order today

## 2018-01-26 NOTE — PROGRESS NOTES
Daily Note     Today's date: 2018  Patient name: Yarely Hinds  : 1950  MRN: [id number]  Referring provider: Vicki Love PA-C  Dx:   Encounter Diagnoses   Name Primary?  Acute medial meniscus tear of left knee, subsequent encounter Yes    Chronic right shoulder pain     Primary osteoarthritis of left knee                   Subjective: Pt reports that she is having a lot more pain in her shoulder  She went to lift her crock pot and noted a significant increase in pain  She notes that her shoulder is limiting her more than her knee is  Objective: See treatment diary below  Held on some knee TE today to focus on shoulder  Assessment: Tolerated treatment well  Patient demonstrated fatigue post treatment, could benefit from continued PT and added more shoulder mobility exercises focusing on AAROM  Initiated treatment for shoulder due to receiving a signed POC  Plan: Continue per plan of care  and Progress treatment as tolerated         Precautions: Bladder cancer, depression    Daily Treatment Diary     Manual              shld PROM  NV                                                                   Exercise Diary  18           bike 5 mins 5 min           Hamstring curls x15            LAQ's  5" x15 5" x15           Total gym squats  lvl 18 20 (held) lvl 18 20x           Step ups (fwd/lat) X10; 6" (held) 6" x10 each           Foam balance 30" x3 (held) held           SLS 30" x3 30" x3           Heel slides 10" x15 10" x15           bridges 5" x10 (held) 5" x10           HS stretch 20" x4 held           clamshells RTB 5" x15 5" x15 RTB           SLR x15 x15           S/L shld ER NV inst           shld ER isometrics NV inst           Shoulder rows NV 15x RTB           Shoulder ext NV 15x RTB           Supine AAROM flexion  10" X 10           Wall slides  10x           pulley's  3'                            Modalities

## 2018-01-31 ENCOUNTER — OFFICE VISIT (OUTPATIENT)
Dept: PHYSICAL THERAPY | Facility: MEDICAL CENTER | Age: 68
End: 2018-01-31
Payer: COMMERCIAL

## 2018-01-31 DIAGNOSIS — G89.29 CHRONIC RIGHT SHOULDER PAIN: ICD-10-CM

## 2018-01-31 DIAGNOSIS — S83.242D ACUTE MEDIAL MENISCUS TEAR OF LEFT KNEE, SUBSEQUENT ENCOUNTER: Primary | ICD-10-CM

## 2018-01-31 DIAGNOSIS — M25.511 CHRONIC RIGHT SHOULDER PAIN: ICD-10-CM

## 2018-01-31 DIAGNOSIS — M17.12 PRIMARY OSTEOARTHRITIS OF LEFT KNEE: ICD-10-CM

## 2018-01-31 PROCEDURE — 97112 NEUROMUSCULAR REEDUCATION: CPT | Performed by: PHYSICAL THERAPIST

## 2018-01-31 PROCEDURE — 97110 THERAPEUTIC EXERCISES: CPT | Performed by: PHYSICAL THERAPIST

## 2018-01-31 NOTE — PROGRESS NOTES
Daily Note     Today's date: 2018  Patient name: Carley Scott  : 1950  MRN: [id number]  Referring provider: Saul Vo PA-C  Dx:   Encounter Diagnoses   Name Primary?  Acute medial meniscus tear of left knee, subsequent encounter Yes    Chronic right shoulder pain     Primary osteoarthritis of left knee                   Subjective: Pt states her shoulder is still really bothering her today  Pt states she went to grocery store and carried groceries in both hands, and by last night she had to ice her shoulder several times  Objective: See treatment diary below  Cont hold on some knee TE today to focus on shoulder  Assessment: Tolerated treatment well  Patient demonstrated fatigue post treatment, could benefit from continued PT and added more shoulder mobility exercises focusing on AAROM  Pt states she always has knee pain, but does feel like her L knee is stronger with sit-stand transfers and stairs  Plan: Continue per plan of care  and Progress treatment as tolerated         Precautions: Bladder cancer, depression    Daily Treatment Diary     Manual              shld PROM  NV                                                                   Exercise Diary  18          bike 5 mins 5 min 6min          Hamstring curls x15  x15          LAQ's  5" x15 5" x15 5"x20          Total gym squats  lvl 18 20 (held) lvl 18 20x lvl 18  20x          Step ups (fwd/lat) X10; 6" (held) 6" x10 each 6"x10ea          Foam balance 30" x3 (held) held held          SLS 30" x3 30" x3 30"x3          Heel slides 10" x15 10" x15 10"x15          bridges 5" x10 (held) 5" x10 5"x20          HS stretch 20" x4 held held          clamshells RTB 5" x15 5" x15 RTB 5"x15 RTB           SLR x15 x15 x15          S/L shld ER NV inst           shld ER isometrics NV inst           Shoulder rows NV 15x RTB 15x RTB          Shoulder ext NV 15x RTB 15x RTB          Supine AAROM flexion  10" X 10 Unable P!          Wall slides  10x 10x          pulley's  3' 3'          Wand bench press and abd   x10ea              Modalities

## 2018-02-16 ENCOUNTER — APPOINTMENT (EMERGENCY)
Dept: CT IMAGING | Facility: HOSPITAL | Age: 68
End: 2018-02-16
Payer: COMMERCIAL

## 2018-02-16 ENCOUNTER — HOSPITAL ENCOUNTER (EMERGENCY)
Facility: HOSPITAL | Age: 68
Discharge: HOME | End: 2018-02-16
Attending: EMERGENCY MEDICINE | Admitting: EMERGENCY MEDICINE
Payer: COMMERCIAL

## 2018-02-16 VITALS
TEMPERATURE: 98.4 F | HEART RATE: 81 BPM | DIASTOLIC BLOOD PRESSURE: 69 MMHG | OXYGEN SATURATION: 96 % | BODY MASS INDEX: 29.88 KG/M2 | SYSTOLIC BLOOD PRESSURE: 139 MMHG | HEIGHT: 62 IN | RESPIRATION RATE: 16 BRPM | WEIGHT: 162.4 LBS

## 2018-02-16 DIAGNOSIS — R10.9 ABDOMINAL PAIN: Primary | ICD-10-CM

## 2018-02-16 LAB
ALBUMIN SERPL BCP-MCNC: 4.1 G/DL (ref 3.5–5)
ALP SERPL-CCNC: 75 U/L (ref 46–116)
ALT SERPL W P-5'-P-CCNC: 138 U/L (ref 12–78)
ANION GAP SERPL CALCULATED.3IONS-SCNC: 11 MMOL/L (ref 4–13)
AST SERPL W P-5'-P-CCNC: 212 U/L (ref 5–45)
ATRIAL RATE: 80 BPM
BASOPHILS # BLD AUTO: 0.04 THOUSANDS/ΜL (ref 0–0.1)
BASOPHILS NFR BLD AUTO: 1 % (ref 0–1)
BILIRUB SERPL-MCNC: 0.7 MG/DL (ref 0.2–1)
BUN SERPL-MCNC: 14 MG/DL (ref 5–25)
CALCIUM SERPL-MCNC: 9.6 MG/DL (ref 8.3–10.1)
CHLORIDE SERPL-SCNC: 98 MMOL/L (ref 100–108)
CO2 SERPL-SCNC: 26 MMOL/L (ref 21–32)
CREAT SERPL-MCNC: 0.87 MG/DL (ref 0.6–1.3)
EOSINOPHIL # BLD AUTO: 0.36 THOUSAND/ΜL (ref 0–0.61)
EOSINOPHIL NFR BLD AUTO: 5 % (ref 0–6)
ERYTHROCYTE [DISTWIDTH] IN BLOOD BY AUTOMATED COUNT: 13.1 % (ref 11.6–15.1)
GFR SERPL CREATININE-BSD FRML MDRD: 69 ML/MIN/1.73SQ M
GLUCOSE SERPL-MCNC: 162 MG/DL (ref 65–140)
HCT VFR BLD AUTO: 39.7 % (ref 34.8–46.1)
HGB BLD-MCNC: 13.8 G/DL (ref 11.5–15.4)
HOLD SPECIMEN: NORMAL
LIPASE SERPL-CCNC: 337 U/L (ref 73–393)
LYMPHOCYTES # BLD AUTO: 1.41 THOUSANDS/ΜL (ref 0.6–4.47)
LYMPHOCYTES NFR BLD AUTO: 18 % (ref 14–44)
MCH RBC QN AUTO: 30.7 PG (ref 26.8–34.3)
MCHC RBC AUTO-ENTMCNC: 34.8 G/DL (ref 31.4–37.4)
MCV RBC AUTO: 88 FL (ref 82–98)
MONOCYTES # BLD AUTO: 0.68 THOUSAND/ΜL (ref 0.17–1.22)
MONOCYTES NFR BLD AUTO: 9 % (ref 4–12)
NEUTROPHILS # BLD AUTO: 5.48 THOUSANDS/ΜL (ref 1.85–7.62)
NEUTS SEG NFR BLD AUTO: 67 % (ref 43–75)
P AXIS: 57 DEGREES
PLATELET # BLD AUTO: 220 THOUSANDS/UL (ref 149–390)
PMV BLD AUTO: 9.6 FL (ref 8.9–12.7)
POTASSIUM SERPL-SCNC: 4.1 MMOL/L (ref 3.5–5.3)
PR INTERVAL: 186 MS
PROT SERPL-MCNC: 7.8 G/DL (ref 6.4–8.2)
QRS AXIS: -47 DEGREES
QRSD INTERVAL: 94 MS
QT INTERVAL: 350 MS
QTC INTERVAL: 403 MS
RBC # BLD AUTO: 4.5 MILLION/UL (ref 3.81–5.12)
SODIUM SERPL-SCNC: 135 MMOL/L (ref 136–145)
T WAVE AXIS: 38 DEGREES
VENTRICULAR RATE: 80 BPM
WBC # BLD AUTO: 7.97 THOUSAND/UL (ref 4.31–10.16)

## 2018-02-16 PROCEDURE — 83690 ASSAY OF LIPASE: CPT | Performed by: EMERGENCY MEDICINE

## 2018-02-16 PROCEDURE — 93010 ELECTROCARDIOGRAM REPORT: CPT | Performed by: INTERNAL MEDICINE

## 2018-02-16 PROCEDURE — 96361 HYDRATE IV INFUSION ADD-ON: CPT

## 2018-02-16 PROCEDURE — 74177 CT ABD & PELVIS W/CONTRAST: CPT

## 2018-02-16 PROCEDURE — 99284 EMERGENCY DEPT VISIT MOD MDM: CPT

## 2018-02-16 PROCEDURE — 93005 ELECTROCARDIOGRAM TRACING: CPT

## 2018-02-16 PROCEDURE — 85025 COMPLETE CBC W/AUTO DIFF WBC: CPT | Performed by: EMERGENCY MEDICINE

## 2018-02-16 PROCEDURE — 36415 COLL VENOUS BLD VENIPUNCTURE: CPT

## 2018-02-16 PROCEDURE — 80053 COMPREHEN METABOLIC PANEL: CPT | Performed by: EMERGENCY MEDICINE

## 2018-02-16 PROCEDURE — 96360 HYDRATION IV INFUSION INIT: CPT

## 2018-02-16 RX ORDER — ONDANSETRON 2 MG/ML
4 INJECTION INTRAMUSCULAR; INTRAVENOUS ONCE
Status: DISCONTINUED | OUTPATIENT
Start: 2018-02-16 | End: 2018-02-16 | Stop reason: HOSPADM

## 2018-02-16 RX ADMIN — SODIUM CHLORIDE 1000 ML: 0.9 INJECTION, SOLUTION INTRAVENOUS at 14:01

## 2018-02-16 RX ADMIN — IOHEXOL 100 ML: 350 INJECTION, SOLUTION INTRAVENOUS at 15:18

## 2018-02-16 NOTE — ED PROVIDER NOTES
History  Chief Complaint   Patient presents with    Abdominal Pain     patient reports having epigastric to lower pelvic abdominal discomfort since monday and woke up today with increased nausea  no vomiting, constipation, or diarrhea noted  unsure if she starined herself lifing heavy pallets last week with sand bags  took maalox yesterday  Patient presents to the emergency department with generalized intermittent abdominal discomfort for the past 1 week  She denies trauma fall or injury although she was lifting some heavy bags last week but states her abdominal pain is not positional   She denies nausea vomiting diarrhea  She denies fever chills  She denies chest pain sob  She denies jaw arm or neck pain  She denies back pain  She denies urinary symptoms  She does have a history of a cholecystectomy but denies other abdominal surgeries  Prior to Admission Medications   Prescriptions Last Dose Informant Patient Reported? Taking?   acetaminophen (TYLENOL) 325 mg tablet   Yes No   Sig: Take 650 mg by mouth every 6 (six) hours as needed for mild pain   clonazePAM (KlonoPIN) 0 5 mg tablet   Yes No   Sig: Take 0 25 mg by mouth daily at bedtime as needed for seizures  famotidine (PEPCID) 20 mg tablet   Yes No   Sig: Take 20 mg by mouth daily  fenofibrate (TRICOR) 48 mg tablet   Yes No   Sig: Take 145 mg by mouth daily  glimepiride (AMARYL) 4 mg tablet   Yes No   Sig: Take 2 mg by mouth 2 (two) times a day  Amaryl 4 MG Oral Tablet  Refills: 0   , M D ; Active    losartan-hydrochlorothiazide (HYZAAR) 50-12 5 mg per tablet   Yes No   Sig: Take 1 tablet by mouth daily  metFORMIN (GLUCOPHAGE) 500 mg tablet   Yes No   Sig: Take 1,000 mg by mouth 2 (two) times a day with meals MetFORMIN HCl - 500 MG Oral Tablet  Refills: 0   , M D ; Active    simvastatin (ZOCOR) 40 mg tablet   Yes No   Sig: Take 40 mg by mouth daily at bedtime     zolpidem (AMBIEN) 10 mg tablet   No No   Sig: Take 1 tablet (10 mg total) by mouth daily at bedtime as needed for sleep      Facility-Administered Medications: None       Past Medical History:   Diagnosis Date    Anxiety     Arthritis     [facility] [address] [facility]     Bladder mass     Depression     Diabetes mellitus [facility] [address] [facility]     Disease of thyroid gland     thyroid nodules-not treating at this time    HLD (hyperlipidemia)     HTN (hypertension)     Hypertension     Knee pain     PONV (postoperative nausea and vomiting)        Past Surgical History:   Procedure Laterality Date    CARPAL TUNNEL RELEASE Right      SECTION      x2    CHOLECYSTECTOMY      CYSTOSCOPY N/A 2016    Procedure: CYSTOSCOPY WITH BIOPSIES;  Surgeon: Violet Beckwith MD;  [facility];  Service:    Ginger Fresh N/A 2016    Procedure: CYSTOSCOPY;  Surgeon: Violet Beckwith MD;  [facility];  Service:     HERNIA REPAIR      CO CYSTO/URETERO W/LITHOTRIPSY &INDWELL STENT INSRT  2016    Procedure: CYSTOSCOPY URETEROSCOPY WITH LITHOTRIPSY HOLMIUM LASER RIGHT, RETROGRADE PYELOGRAM BILATERAL: AND INSERTION STENT URETERAL Right ;  Surgeon: Violet Beckwith MD;  [facility];  Service: Urology    CO CYSTO/URETERO/PYELOSCOPY, DX Right 2017    Procedure: URETEROSCOPY;  Surgeon: Violet Beckwith MD;  [facility];  Service: Urology    CO CYSTOSCOPY,INSERT URETERAL STENT Left 2016    Procedure: URETERAL STENTS;  Surgeon: Violet Beckwith MD;  [facility];  Service: Urology    CO CYSTOURETHROSCOPY,FULGUR <0 5 CM LESN N/A 2016    Procedure: TRANSURETHRAL RESECTION OF BLADDER TUMOR (TURBT);   Surgeon: Violet Beckwith MD;  [facility];  Service: Urology    CO CYSTOURETHROSCOPY,FULGUR <0 5 CM LESN N/A 2016    Procedure: Victor Hugo Buckner; TUR BLADDER TUMOR ;  Surgeon: Violet Beckwith MD;  [facility];  Service: Urology    CO CYSTOURETHROSCOPY,FULGUR <0 5 CM LESN N/A 2016    Procedure: TUR BLADDER TUMOR ;  Surgeon: Beck Slade Darline Camacho MD;  [facility];  Service: Urology    MA CYSTOURETHROSCOPY,FULGUR <0 5 CM LESN Bilateral 5/9/2017    Procedure: CYSTOSCOPY, RIGHT URETERAL WASHINGS, ;  Surgeon: Alex Lagos MD;  [facility];  Service: Urology    MA CYSTOURETHROSCOPY,URETER CATHETER Bilateral 9/29/2016    Procedure: RETROGRADE PYELOGRAM ;  Surgeon: Alex Lagos MD;  [facility];  Service: Urology    MA CYSTOURETHROSCOPY,URETER CATHETER Bilateral 5/9/2017    Procedure: RETROGRADE PYELOGRAM WITH STENT EXCHANGE, RIGHT;  Surgeon: Alex Lagos MD;  [facility];  Service: Urology    MA INSTILL ANTICANCER AGENT IN BLADDER N/A 11/29/2016    Procedure: Sallee Goldmann;  Surgeon: Alex Lagos MD;  [facility];  Service: Urology    MA KNEE SCOPE,MED/LAT MENISECTOMY Left 4/4/2016    Procedure: KNEE ARTHROSCOPIC PARTIAL MEDIAL MENISCECTOMY ;  Surgeon: Cody Philippe MD;  [facility];  Service: Orthopedics    REMOVAL URETERAL STENT Left 11/29/2016    Procedure: REMOVAL STENT URETERAL;  Surgeon: Alex Lagos MD;  [facility];  Service:     TONSILLECTOMY         History reviewed  No pertinent family history  I have reviewed and agree with the history as documented  Social History   Substance Use Topics    Smoking status: Former Smoker     Packs/day: 1 00     Years: 20 00     Quit date: 1990    Smokeless tobacco: Never Used    Alcohol use No        Review of Systems   Constitutional: Negative  Negative for activity change, appetite change, chills, diaphoresis, fatigue and fever  HENT: Negative  Negative for congestion, rhinorrhea, sneezing, sore throat, trouble swallowing and voice change  Eyes: Negative  Negative for photophobia and visual disturbance  Respiratory: Negative  Negative for cough, choking, chest tightness, shortness of breath, wheezing and stridor  Cardiovascular: Negative  Negative for chest pain, palpitations and leg swelling  Gastrointestinal: Positive for abdominal pain  Negative for abdominal distention, anal bleeding, blood in stool, constipation, diarrhea, nausea, rectal pain and vomiting  Endocrine: Negative  Genitourinary: Negative  Negative for difficulty urinating, dyspareunia, dysuria, frequency, hematuria, urgency, vaginal bleeding, vaginal discharge and vaginal pain  Musculoskeletal: Negative  Negative for back pain, neck pain and neck stiffness  Skin: Negative  Negative for rash and wound  Allergic/Immunologic: Negative  Neurological: Negative  Negative for dizziness, tremors, seizures, syncope, facial asymmetry, speech difficulty, weakness, light-headedness, numbness and headaches  Hematological: Negative  Does not bruise/bleed easily  Psychiatric/Behavioral: Negative  Physical Exam  ED Triage Vitals [02/16/18 1220]   Temperature Pulse Respirations Blood Pressure SpO2   98 4 °F (36 9 °C) 86 16 160/76 96 %      Temp Source Heart Rate Source Patient Position - Orthostatic VS BP Location FiO2 (%)   Oral Monitor Sitting Left arm --      Pain Score       4           Orthostatic Vital Signs  Vitals:    02/16/18 1220 02/16/18 1415 02/16/18 1544   BP: 160/76 125/64 139/69   Pulse: 86 78 81   Patient Position - Orthostatic VS: Sitting  Lying       Physical Exam   Constitutional: She is oriented to person, place, and time  She appears well-developed and well-nourished  Comfortable appearance  Nontoxic in appearance  No respiratory distress  Patient looks comfortable sitting upright in the stretcher  HENT:   Head: Normocephalic and atraumatic  Right Ear: External ear normal    Left Ear: External ear normal    Mouth/Throat: Oropharynx is clear and moist    Eyes: Conjunctivae and EOM are normal  Pupils are equal, round, and reactive to light  Neck: Normal range of motion  Neck supple  Cardiovascular: Normal rate, regular rhythm, normal heart sounds and intact distal pulses      Pulmonary/Chest: Effort normal and breath sounds normal  No respiratory distress  She has no wheezes  She has no rales  She exhibits no tenderness  Abdominal: Soft  Bowel sounds are normal  She exhibits no distension and no mass  There is no tenderness  There is no rebound and no guarding  No hernia  No peritoneal signs   Musculoskeletal: Normal range of motion  She exhibits no edema, tenderness or deformity  Neurological: She is alert and oriented to person, place, and time  She has normal reflexes  She displays normal reflexes  No cranial nerve deficit or sensory deficit  She exhibits normal muscle tone  Coordination normal    Skin: Skin is warm and dry  Psychiatric: She has a normal mood and affect  Her behavior is normal  Judgment and thought content normal    Nursing note and vitals reviewed        ED Medications  Medications   ondansetron (ZOFRAN) injection 4 mg (4 mg Intravenous Not Given 2/16/18 1417)   sodium chloride 0 9 % bolus 1,000 mL (1,000 mL Intravenous New Bag 2/16/18 1401)   iohexol (OMNIPAQUE) 350 MG/ML injection (MULTI-DOSE) 100 mL (100 mL Intravenous Given 2/16/18 1518)       Diagnostic Studies  Results Reviewed     Procedure Component Value Units Date/Time    Comprehensive metabolic panel [[id number]]  (Abnormal) Collected:  02/16/18 1400    Lab Status:  Final result Specimen:  Blood from Arm, Right Updated:  02/16/18 1423     Sodium 135 (L) mmol/L      Potassium 4 1 mmol/L      Chloride 98 (L) mmol/L      CO2 26 mmol/L      Anion Gap 11 mmol/L      BUN 14 mg/dL      Creatinine 0 87 mg/dL      Glucose 162 (H) mg/dL      Calcium 9 6 mg/dL       (H) U/L       (H) U/L      Alkaline Phosphatase 75 U/L      Total Protein 7 8 g/dL      Albumin 4 1 g/dL      Total Bilirubin 0 70 mg/dL      eGFR 69 ml/min/1 73sq m     Narrative:         National Kidney Disease Education Program recommendations are as follows:  GFR calculation is accurate only with a steady state creatinine  Chronic Kidney disease less than 60 ml/min/1 73 sq  meters  Kidney failure less than 15 ml/min/1 73 sq  meters  Lipase [[id number]]  (Normal) Collected:  02/16/18 1400    Lab Status:  Final result Specimen:  Blood from Arm, Right Updated:  02/16/18 1423     Lipase 337 u/L     CBC and differential [[id number]]  (Normal) Collected:  02/16/18 1400    Lab Status:  Final result Specimen:  Blood from Arm, Right Updated:  02/16/18 1411     WBC 7 97 Thousand/uL      RBC 4 50 Million/uL      Hemoglobin 13 8 g/dL      Hematocrit 39 7 %      MCV 88 fL      MCH 30 7 pg      MCHC 34 8 g/dL      RDW 13 1 %      MPV 9 6 fL      Platelets 854 Thousands/uL      Neutrophils Relative 67 %      Lymphocytes Relative 18 %      Monocytes Relative 9 %      Eosinophils Relative 5 %      Basophils Relative 1 %      Neutrophils Absolute 5 48 Thousands/µL      Lymphocytes Absolute 1 41 Thousands/µL      Monocytes Absolute 0 68 Thousand/µL      Eosinophils Absolute 0 36 Thousand/µL      Basophils Absolute 0 04 Thousands/µL                  CT abdomen pelvis with contrast   Final Result by Miriam Sharp MD (02/16 0103)      Mild thickening of the wall the large bowel from the transverse colon to the rectum which is a nonspecific finding that could indicate mild infectious colitis  Colonic diverticulosis with diverticula most numerous in the sigmoid but no convincing evidence of acute diverticulitis  Hepatomegaly and hepatic steatosis  Deformity of the urinary bladder probably related to treatment for bladder wall mass seen on CT of August 10, 2016  Workstation performed: [id number]                    Procedures  Procedures       Phone Contacts  ED Phone Contact    ED Course  ED Course as of Feb 16 1604 Fri Feb 16, 2018   1600 Stable for d/c  Uncertain etiology to presentation  Nl VS and labs and NL ABD CT  Discussed signs and symptoms that would require return to the emergency department  Will refer patient to GI  MDM  CritCare Time    Disposition  Final diagnoses:   Abdominal pain     Time reflects when diagnosis was documented in both MDM as applicable and the Disposition within this note     Time User Action Codes Description Comment    2/16/2018  4:02 PM Rena Browning Add [R10 9] Abdominal pain       ED Disposition     ED Disposition Condition Comment    Discharge  Daja Rivas discharge to home/self care  Condition at discharge: Stable        Follow-up Information     Follow up With Specialties Details Why Reji Tellez MD Gastroenterology Schedule an appointment as soon as possible for a visit  [address] [place] [phone]          Patient's Medications   Discharge Prescriptions    No medications on file     No discharge procedures on file      ED Provider  Electronically Signed by           Garrick Mejia MD  02/16/18 0265

## 2018-02-16 NOTE — DISCHARGE INSTRUCTIONS
Abdominal Pain, Ambulatory Care   GENERAL INFORMATION:   Abdominal pain  can be dull, achy, or sharp  You may have pain in one area of your abdomen, or in your entire abdomen  Your pain may be caused by constipation, food sensitivity or poisoning, infection, or a blockage  Abdominal pain can also be caused by a hernia, appendicitis, or an ulcer  The cause of your abdominal pain may be unknown  Seek immediate care for the following symptoms:   · New chest pain or shortness of breath    · Pulsing pain in your upper abdomen or lower back that suddenly becomes constant    · Pain in the right lower abdominal area that worsens with movement    · Fever over 100 4°F (38°C) or shaking chills    · Vomiting and you cannot keep food or fluids down    · Pain does not improve or gets worse over the next 8 to 12 hours    · Blood in your vomit or bowel movements, or they look black and tarry    · Skin or the whites of your eyes turn yellow    · Large amount of vaginal bleeding that is not your monthly period  Treatment for abdominal pain  may include medicine to calm your stomach, prevent vomiting, or decrease pain  Follow up with your healthcare provider as directed:  Write down your questions so you remember to ask them during your visits  CARE AGREEMENT:   You have the right to help plan your care  Learn about your health condition and how it may be treated  Discuss treatment options with your caregivers to decide what care you want to receive  You always have the right to refuse treatment  The above information is an  only  It is not intended as medical advice for individual conditions or treatments  Talk to your doctor, nurse or pharmacist before following any medical regimen to see if it is safe and effective for you  © 2014 [address] is for End User's use only and may not be sold, redistributed or otherwise used for commercial purposes   All illustrations and images included in [facility] are the copyrighted property of [employer]  or [place]  Abdominal Pain   WHAT YOU NEED TO KNOW:   Abdominal pain can be dull, achy, or sharp  You may have pain in one area of your abdomen, or in your entire abdomen  Your pain may be caused by a condition such as constipation, food sensitivity or poisoning, infection, or a blockage  Abdominal pain can also be from a hernia, appendicitis, or an ulcer  Liver, gallbladder, or kidney conditions can also cause abdominal pain  The cause of your abdominal pain may be unknown  DISCHARGE INSTRUCTIONS:   Return to the emergency department if:   · You have new chest pain or shortness of breath  · You have pulsing pain in your upper abdomen or lower back that suddenly becomes constant  · Your pain is in the right lower abdominal area and worsens with movement  · You have a fever over 100 4°F (38°C) or shaking chills  · You are vomiting and cannot keep food or liquids down  · Your pain does not improve or gets worse over the next 8 to 12 hours  · You see blood in your vomit or bowel movements, or they look black and tarry  · Your skin or the whites of your eyes turn yellow  · You are a woman and have a large amount of vaginal bleeding that is not your monthly period  Contact your healthcare provider if:   · You have pain in your lower back  · You are a man and have pain in your testicles  · You have pain when you urinate  · You have questions or concerns about your condition or care  Follow up with your healthcare provider within 24 hours or as directed:  Write down your questions so you remember to ask them during your visits  Medicines:   · Medicines  may be given to calm your stomach and prevent vomiting or to decrease pain  Ask how to take pain medicine safely  · Take your medicine as directed    Contact your healthcare provider if you think your medicine is not helping or if you have side effects  Tell him of her if you are allergic to any medicine  Keep a list of the medicines, vitamins, and herbs you take  Include the amounts, and when and why you take them  Bring the list or the pill bottles to follow-up visits  Carry your medicine list with you in case of an emergency  © 2017 [address] Information is for End User's use only and may not be sold, redistributed or otherwise used for commercial purposes  All illustrations and images included in CareNotes® are the copyrighted property of [employer]  or [address]  The above information is an  only  It is not intended as medical advice for individual conditions or treatments  Talk to your doctor, nurse or pharmacist before following any medical regimen to see if it is safe and effective for you

## 2018-02-20 ENCOUNTER — OFFICE VISIT (OUTPATIENT)
Dept: INTERNAL MEDICINE CLINIC | Facility: CLINIC | Age: 68
End: 2018-02-20
Payer: COMMERCIAL

## 2018-02-20 VITALS
TEMPERATURE: 99.1 F | DIASTOLIC BLOOD PRESSURE: 84 MMHG | BODY MASS INDEX: 29.74 KG/M2 | HEIGHT: 62 IN | HEART RATE: 89 BPM | SYSTOLIC BLOOD PRESSURE: 130 MMHG | WEIGHT: 161.6 LBS | OXYGEN SATURATION: 97 %

## 2018-02-20 DIAGNOSIS — Z12.11 SCREENING FOR COLON CANCER: ICD-10-CM

## 2018-02-20 DIAGNOSIS — R79.89 ELEVATED LFTS: Primary | ICD-10-CM

## 2018-02-20 DIAGNOSIS — R10.32 LEFT LOWER QUADRANT PAIN: ICD-10-CM

## 2018-02-20 PROBLEM — R39.89 BLADDER PAIN: Status: ACTIVE | Noted: 2017-01-16

## 2018-02-20 PROBLEM — M25.511 ACUTE PAIN OF RIGHT SHOULDER: Status: ACTIVE | Noted: 2018-01-10

## 2018-02-20 PROBLEM — K04.7 DENTAL ABSCESS: Status: ACTIVE | Noted: 2017-11-17

## 2018-02-20 PROBLEM — J45.909 ASTHMA: Status: ACTIVE | Noted: 2017-10-10

## 2018-02-20 PROBLEM — E03.9 ACQUIRED HYPOTHYROIDISM: Status: ACTIVE | Noted: 2018-01-10

## 2018-02-20 PROBLEM — R05.3 CHRONIC COUGH: Status: ACTIVE | Noted: 2017-09-27

## 2018-02-20 PROBLEM — R10.9 ABDOMINAL PAIN: Status: ACTIVE | Noted: 2017-01-16

## 2018-02-20 PROBLEM — M75.50 BURSITIS OF SHOULDER: Status: ACTIVE | Noted: 2017-10-10

## 2018-02-20 PROCEDURE — 99214 OFFICE O/P EST MOD 30 MIN: CPT | Performed by: INTERNAL MEDICINE

## 2018-02-20 RX ORDER — METRONIDAZOLE 500 MG/1
500 TABLET ORAL EVERY 8 HOURS SCHEDULED
Qty: 21 TABLET | Refills: 0 | Status: SHIPPED | OUTPATIENT
Start: 2018-02-20 | End: 2018-02-27

## 2018-02-20 RX ORDER — CIPROFLOXACIN 500 MG/1
500 TABLET, FILM COATED ORAL EVERY 12 HOURS SCHEDULED
Qty: 14 TABLET | Refills: 1 | Status: SHIPPED | OUTPATIENT
Start: 2018-02-20 | End: 2018-02-27

## 2018-02-20 NOTE — PROGRESS NOTES
Assessment/Plan:    1  CT Scan results of the abdomen performed on 2/16/18 shows mild thickening of the wall of the large bowel from the transverse colon to the rectum which is a nonspecific finding that could indicate mild infectious colitis  Additionally, Hepatomegaly and hepatic steatosis was noted  2  Inflammation of colon consistent with colitis  The etiology is to be determined  She reports intense pain in her abdomen which is worst in her LUQ  We spoke to GI specialist who recommended using Flagyl and Ciprofloxacin  She will be referred to GI at [facility] for a colonoscopy and her abdominal pain  She will also collect a stool test for C Diff and white cells  She was advised that if she experiences intense pain again, she should go to the ER  3  Hyponatremia  Her Na and Cl levels were decreased on 2/16/18  She was advised to rehydrate herself with Pedialyte  4  Diet  She was advised to decrease fat content in her diet and go on a BRAT diet without milk due to her loose stools  She can still use yogurt in her diet  5  Health Maintenance  CBC and differential and liver profile will be completed prior to next appointment  A Hepatitis C screening was ordered because of elevated ALTs and age appropriate  She will follow up with a GI specialist in the next few days and will follow up with us at her next scheduled appointment  Diagnoses and all orders for this visit:    Elevated LFTs  -     Hepatitis C antibody; Future    Left lower quadrant pain  -     CBC and differential; Future  -     Hepatic function panel; Future  -     Electrolyte panel; Future  -     White Blood Cells, Stool by Gram Stain; Future  -     Clostridium difficile toxin by PCR; Future  -     White Blood Cells, Stool by Gram Stain; Future  -     Ambulatory referral to Gastroenterology; Future  -     metroNIDAZOLE (FLAGYL) 500 mg tablet;  Take 1 tablet (500 mg total) by mouth every 8 (eight) hours for 7 days  -     ciprofloxacin (CIPRO) 500 mg tablet; Take 1 tablet (500 mg total) by mouth every 12 (twelve) hours for 7 days  -     Basic metabolic panel; Future  -     CBC and differential; Future  -     Hepatic function panel; Future  -     Occult blood 1-3, stool; Future    Screening for colon cancer  -     Ambulatory referral to Gastroenterology; Future          Subjective:      Patient ID: Rosalee Franklin is a 75 y o  female  Rosalee Franklin is a 75year old female who presents today for evaluation of results of a CT scan of the abdomen  She reports intense pain in her upper abdomen for the past two weeks  The pain is intermittent, but when present, it is very intense  She reports a decreased appetite, fatigue, and weight loss  She also states she has had "loose, stringy, and creamy" stools, which she attributes to taking Maalox for a few days  She reports diffuse pain in the LUQ  She complains of burning in urination and is scheduled for a repeat cystoscopy in March  She continues to go to PT for her knee and shoulder pain  The following portions of the patient's history were reviewed and updated as appropriate: allergies, current medications, past family history, past medical history, past social history, past surgical history and problem list     Review of Systems   Constitutional: Positive for appetite change, chills and unexpected weight change  Negative for diaphoresis, fatigue and fever (99 1)  HENT: Negative for congestion, ear discharge, ear pain and facial swelling  Eyes: Negative for pain, discharge, redness (slightly jaundiced), itching and visual disturbance  Respiratory: Negative for cough, shortness of breath, wheezing and stridor  Cardiovascular: Negative for chest pain, palpitations and leg swelling  Gastrointestinal: Positive for abdominal pain, diarrhea and nausea  Negative for abdominal distention, anal bleeding, blood in stool and rectal pain     Endocrine: Negative for polydipsia, polyphagia and polyuria  Genitourinary: Positive for dysuria and frequency  Negative for decreased urine volume, difficulty urinating, urgency, vaginal bleeding, vaginal discharge and vaginal pain  Musculoskeletal: Positive for gait problem  Negative for arthralgias, back pain, joint swelling and neck pain  Skin: Negative for color change, pallor, rash and wound  Neurological: Negative for dizziness, syncope, facial asymmetry, speech difficulty, light-headedness and headaches  Hematological: Negative for adenopathy  Psychiatric/Behavioral: Negative  Objective:      /84 (BP Location: Left arm, Patient Position: Sitting, Cuff Size: Standard)   Pulse 89   Temp 99 1 °F (37 3 °C) (Oral)   Ht 5' 2" (1 575 m)   Wt 73 3 kg (161 lb 9 6 oz)   SpO2 97%   BMI 29 56 kg/m²          Physical Exam   Constitutional: She is oriented to person, place, and time  She appears well-developed and well-nourished  She appears distressed (llq pain)  HENT:   Head: Normocephalic and atraumatic  Eyes: Pupils are equal, round, and reactive to light  Scleral icterus is present  Neck: Normal range of motion  Neck supple  Cardiovascular: Normal rate, regular rhythm and normal heart sounds  Pulmonary/Chest: Effort normal and breath sounds normal    Abdominal: Soft  Bowel sounds are normal  She exhibits distension  She exhibits no mass  There is tenderness  There is no rebound and no guarding  Musculoskeletal: Normal range of motion  She exhibits edema  Neurological: She is alert and oriented to person, place, and time  Skin: Skin is warm and dry  Psychiatric: She has a normal mood and affect  Her behavior is normal  Thought content normal          Scribe Signature and Attestaion:  By signing my name below, Pauly Cleaning attest that this documentation has been prepared under the direction and in the presence of Dr Jessica Jo MD  Electronically signed: Giulia Pablo  2/20/18      Provider Attestation:  I, Dr Gato Mendenhall, personally performed the services described in this documentation  All medical record entries made by the scribe were at my direction and in my presence  I have reviewed the chart and discharge instructions (if applicable) and agree that the record reflects my personal performance and is accurate and complete   Dr Gato Mendenhall MD  2/20/18

## 2018-02-21 ENCOUNTER — APPOINTMENT (OUTPATIENT)
Dept: LAB | Facility: MEDICAL CENTER | Age: 68
End: 2018-02-21
Payer: COMMERCIAL

## 2018-02-21 ENCOUNTER — TELEPHONE (OUTPATIENT)
Dept: INTERNAL MEDICINE CLINIC | Age: 68
End: 2018-02-21

## 2018-02-21 DIAGNOSIS — R10.32 LEFT LOWER QUADRANT PAIN: ICD-10-CM

## 2018-02-21 LAB
ALBUMIN SERPL BCP-MCNC: 3.8 G/DL (ref 3.5–5)
ALP SERPL-CCNC: 173 U/L (ref 46–116)
ALT SERPL W P-5'-P-CCNC: 407 U/L (ref 12–78)
ANION GAP SERPL CALCULATED.3IONS-SCNC: 11 MMOL/L (ref 4–13)
AST SERPL W P-5'-P-CCNC: 158 U/L (ref 5–45)
BASOPHILS # BLD AUTO: 0.05 THOUSANDS/ΜL (ref 0–0.1)
BASOPHILS NFR BLD AUTO: 1 % (ref 0–1)
BILIRUB DIRECT SERPL-MCNC: 1.06 MG/DL (ref 0–0.2)
BILIRUB SERPL-MCNC: 1.56 MG/DL (ref 0.2–1)
CHLORIDE SERPL-SCNC: 93 MMOL/L (ref 100–108)
CO2 SERPL-SCNC: 26 MMOL/L (ref 21–32)
EOSINOPHIL # BLD AUTO: 0.24 THOUSAND/ΜL (ref 0–0.61)
EOSINOPHIL NFR BLD AUTO: 4 % (ref 0–6)
ERYTHROCYTE [DISTWIDTH] IN BLOOD BY AUTOMATED COUNT: 13.6 % (ref 11.6–15.1)
HCT VFR BLD AUTO: 39.5 % (ref 34.8–46.1)
HGB BLD-MCNC: 13.4 G/DL (ref 11.5–15.4)
LYMPHOCYTES # BLD AUTO: 1.19 THOUSANDS/ΜL (ref 0.6–4.47)
LYMPHOCYTES NFR BLD AUTO: 19 % (ref 14–44)
MCH RBC QN AUTO: 30.8 PG (ref 26.8–34.3)
MCHC RBC AUTO-ENTMCNC: 33.9 G/DL (ref 31.4–37.4)
MCV RBC AUTO: 91 FL (ref 82–98)
MONOCYTES # BLD AUTO: 0.6 THOUSAND/ΜL (ref 0.17–1.22)
MONOCYTES NFR BLD AUTO: 10 % (ref 4–12)
NEUTROPHILS # BLD AUTO: 4.1 THOUSANDS/ΜL (ref 1.85–7.62)
NEUTS SEG NFR BLD AUTO: 66 % (ref 43–75)
NRBC BLD AUTO-RTO: 0 /100 WBCS
PLATELET # BLD AUTO: 227 THOUSANDS/UL (ref 149–390)
PMV BLD AUTO: 10.5 FL (ref 8.9–12.7)
POTASSIUM SERPL-SCNC: 4.1 MMOL/L (ref 3.5–5.3)
PROT SERPL-MCNC: 7.8 G/DL (ref 6.4–8.2)
RBC # BLD AUTO: 4.35 MILLION/UL (ref 3.81–5.12)
SODIUM SERPL-SCNC: 130 MMOL/L (ref 136–145)
WBC # BLD AUTO: 6.2 THOUSAND/UL (ref 4.31–10.16)
WBC STL QL MICRO: NORMAL

## 2018-02-21 PROCEDURE — 85025 COMPLETE CBC W/AUTO DIFF WBC: CPT

## 2018-02-21 PROCEDURE — 36415 COLL VENOUS BLD VENIPUNCTURE: CPT

## 2018-02-21 PROCEDURE — 80076 HEPATIC FUNCTION PANEL: CPT

## 2018-02-21 PROCEDURE — 80051 ELECTROLYTE PANEL: CPT

## 2018-02-21 PROCEDURE — 87205 SMEAR GRAM STAIN: CPT

## 2018-02-21 PROCEDURE — 87493 C DIFF AMPLIFIED PROBE: CPT

## 2018-02-22 ENCOUNTER — OFFICE VISIT (OUTPATIENT)
Dept: INTERNAL MEDICINE CLINIC | Age: 68
End: 2018-02-22
Payer: COMMERCIAL

## 2018-02-22 ENCOUNTER — TRANSCRIBE ORDERS (OUTPATIENT)
Dept: LAB | Age: 68
End: 2018-02-22

## 2018-02-22 ENCOUNTER — APPOINTMENT (OUTPATIENT)
Dept: LAB | Age: 68
End: 2018-02-22
Payer: COMMERCIAL

## 2018-02-22 ENCOUNTER — HOSPITAL ENCOUNTER (OUTPATIENT)
Dept: RADIOLOGY | Age: 68
Discharge: HOME | End: 2018-02-22
Payer: COMMERCIAL

## 2018-02-22 VITALS
OXYGEN SATURATION: 98 % | SYSTOLIC BLOOD PRESSURE: 136 MMHG | HEIGHT: 62 IN | WEIGHT: 160.2 LBS | DIASTOLIC BLOOD PRESSURE: 84 MMHG | HEART RATE: 80 BPM | TEMPERATURE: 97.9 F | BODY MASS INDEX: 29.48 KG/M2

## 2018-02-22 DIAGNOSIS — R74.8 ELEVATED ALKALINE PHOSPHATASE LEVEL: ICD-10-CM

## 2018-02-22 DIAGNOSIS — R79.89 ABNORMAL LFTS: ICD-10-CM

## 2018-02-22 DIAGNOSIS — Z12.31 ENCOUNTER FOR SCREENING MAMMOGRAM FOR MALIGNANT NEOPLASM OF BREAST: ICD-10-CM

## 2018-02-22 DIAGNOSIS — R10.13 EPIGASTRIC PAIN: ICD-10-CM

## 2018-02-22 DIAGNOSIS — E11.8 TYPE 2 DIABETES MELLITUS WITH COMPLICATION, WITHOUT LONG-TERM CURRENT USE OF INSULIN (HCC): Primary | ICD-10-CM

## 2018-02-22 DIAGNOSIS — Z12.11 SCREENING FOR COLON CANCER: ICD-10-CM

## 2018-02-22 LAB
ALBUMIN SERPL BCP-MCNC: 4 G/DL (ref 3.5–5)
ALP SERPL-CCNC: 173 U/L (ref 46–116)
ALT SERPL W P-5'-P-CCNC: 293 U/L (ref 12–78)
AST SERPL W P-5'-P-CCNC: 73 U/L (ref 5–45)
BILIRUB DIRECT SERPL-MCNC: 0.67 MG/DL (ref 0–0.2)
BILIRUB SERPL-MCNC: 0.98 MG/DL (ref 0.2–1)
C DIFF TOX GENS STL QL NAA+PROBE: NORMAL
HAV AB SER QL IA: NORMAL
HBV SURFACE AG SER QL: NORMAL
HCV AB SER QL: NORMAL
PROT SERPL-MCNC: 8.3 G/DL (ref 6.4–8.2)

## 2018-02-22 PROCEDURE — 87340 HEPATITIS B SURFACE AG IA: CPT

## 2018-02-22 PROCEDURE — 86803 HEPATITIS C AB TEST: CPT

## 2018-02-22 PROCEDURE — 86708 HEPATITIS A ANTIBODY: CPT

## 2018-02-22 PROCEDURE — 99213 OFFICE O/P EST LOW 20 MIN: CPT | Performed by: INTERNAL MEDICINE

## 2018-02-22 PROCEDURE — 36415 COLL VENOUS BLD VENIPUNCTURE: CPT

## 2018-02-22 PROCEDURE — 76700 US EXAM ABDOM COMPLETE: CPT

## 2018-02-22 PROCEDURE — 80076 HEPATIC FUNCTION PANEL: CPT

## 2018-02-22 RX ORDER — BLOOD SUGAR DIAGNOSTIC
1 STRIP MISCELLANEOUS DAILY
COMMUNITY
Start: 2018-01-16 | End: 2018-04-10 | Stop reason: SDUPTHER

## 2018-02-22 RX ORDER — LORATADINE 10 MG/1
1 CAPSULE, LIQUID FILLED ORAL AS NEEDED
COMMUNITY
End: 2020-01-14

## 2018-02-22 NOTE — PROGRESS NOTES
Assessment/Plan:    1  High Liver Enzymes and Abdominal Pain  Her bilirubin was high at 1 56, her AST was high at 158, and her ALT was high at 407 on 2/21/18  Her Sodium level was low at 130 and Chloride level was low at 93 on 2/21/18  She reports discomfort in her abdomen  Physical exam revealed abdominal tenderness in the epigastrium  to palpation  She will have an US of the abdomen performed today and a Hepatitis A and B screening to assess her inflamed bowel and liver  She will also schedule a GI consultation for today and then see Dr Gus Braxton for a surgical consultation later today to evaluate her condition  I explaned to the patient about the diagnostic possibilities and the need to have this diagnosed today  Her lab tests were abnormal and worse than 6 days ago  I am concerned that she may have a common bile duct stone or inflamed liver consistent with hepatitis  The inflamed bowel that was present on CT scan is being treated with Flagyl and Cipro  The patient has developed some loose stools and is unable to eat much  She has limited her diet to mostly liquids and she had half a benign at today  The patient is uncomfortable but not in the extreme  Diagnoses and all orders for this visit:    Type 2 diabetes mellitus with complication, without long-term current use of insulin (HCC)  -     metFORMIN (GLUCOPHAGE) 500 mg tablet; Take 2 tablets (1,000 mg total) by mouth 2 (two) times a day with meals MetFORMIN HCl - 500 MG Oral Tablet  Refills: 0   , M D ; Active    Encounter for screening mammogram for malignant neoplasm of breast  -     Mammo screening bilateral w cad; Future    Screening for colon cancer  -     Ambulatory referral to Gastroenterology; Future    Epigastric pain  -     US abdomen complete; Future    Abnormal LFTs  -     Hepatitis C antibody; Future  -     Hepatic function panel; Future  -     Hepatitis A antibody, total; Future  -     Hepatitis B surface antigen;  Future    Other orders  -     ONETOUCH VERIO test strip; 1 strip daily  -     Loratadine 10 MG CAPS; Take 1 tablet by mouth daily  -     Probiotic Product (PROBIOTIC-10) CAPS; Take 1 capsule by mouth daily          Subjective:      Patient ID: Geovanna Read is a 75 y o  female  Geovanna Read is a 75year old female who presents today for a 2 day follow up regarding her elevated liver enzymes and abdominal pain  She reports she had a cholecystectomy performed nearly 15 years ago  Since taking the antibiotics for the past two days, she feels discomfort in her upper abdomen, but attributes that to the antibiotics and her decreased appetite  She reports general discomfort in her abdomen, but denies abdominal pain  She notes she has diarrhea  The following portions of the patient's history were reviewed and updated as appropriate: allergies, current medications, past family history, past medical history, past social history, past surgical history and problem list     Review of Systems   Constitutional: Positive for appetite change (Decreased)  Negative for activity change, chills, diaphoresis and fever  HENT: Negative for congestion, ear discharge, ear pain, hearing loss, postnasal drip and rhinorrhea  Respiratory: Negative for cough, choking, chest tightness and shortness of breath  Cardiovascular: Negative for chest pain  Gastrointestinal: Positive for abdominal pain (Discomfort in upper abdomen ) and diarrhea  Genitourinary: Negative for difficulty urinating, dysuria, flank pain and frequency  Musculoskeletal: Negative for arthralgias, back pain and gait problem  Neurological: Negative for dizziness, seizures, light-headedness, numbness and headaches  Psychiatric/Behavioral: Negative            Objective:      /84 (BP Location: Left arm, Patient Position: Sitting, Cuff Size: Standard)   Pulse 80   Temp 97 9 °F (36 6 °C) (Tympanic)   Ht 5' 1 81" (1 57 m)   Wt 72 7 kg (160 lb 3 2 oz)   SpO2 98% BMI 29 48 kg/m²          Physical Exam   Constitutional: She is oriented to person, place, and time  She appears well-developed and well-nourished  She appears distressed (Abdominal pain)  HENT:   Head: Normocephalic and atraumatic  Eyes: Conjunctivae and EOM are normal  Pupils are equal, round, and reactive to light  Patient is not jaundice   Neck: Normal range of motion  Cardiovascular: Normal rate and regular rhythm  No murmur heard  Pulmonary/Chest: Effort normal  No respiratory distress  She has no wheezes  She has no rales  Abdominal: She exhibits no distension and no mass  There is tenderness (Mostly right upper quadrant)  There is no rebound and no guarding  Musculoskeletal: She exhibits no edema  Neurological: She is alert and oriented to person, place, and time  She has normal reflexes  She displays normal reflexes  No cranial nerve deficit  She exhibits normal muscle tone  Skin: Skin is warm and dry  No rash noted  No erythema  No pallor  Psychiatric: She has a normal mood and affect  Her behavior is normal  Judgment and thought content normal          Scribe Signature and Attestaion:  By signing my name below, Elie Hughes attest that this documentation has been prepared under the direction and in the presence of Dr Chelsie Benito MD  Electronically signed: Giulia Shaw  2/22/18    Provider Attestation:  I, Dr Chelsie Benito, personally performed the services described in this documentation  All medical record entries made by the elsieibbrianna were at my direction and in my presence  I have reviewed the chart and discharge instructions (if applicable) and agree that the record reflects my personal performance and is accurate and complete  Dr Chelsie Benito MD  2/22/18

## 2018-02-22 NOTE — PATIENT INSTRUCTIONS
1  She will have an US of the abdomen performed today and a Hepatitis A and B screening to assess her inflamed bowel and liver  She was told that her alkaline phos, liver enzymes, and bili room where elevated this indicated hepatic or  Common bile duct disease    2   She will also schedule a GI consultation for today and then see Dr Zana Hamman for a surgical consultation later today

## 2018-02-24 ENCOUNTER — APPOINTMENT (OUTPATIENT)
Dept: LAB | Facility: MEDICAL CENTER | Age: 68
End: 2018-02-24
Payer: COMMERCIAL

## 2018-02-24 ENCOUNTER — TRANSCRIBE ORDERS (OUTPATIENT)
Dept: ADMINISTRATIVE | Facility: HOSPITAL | Age: 68
End: 2018-02-24

## 2018-02-24 DIAGNOSIS — K76.0 FATTY LIVER: Primary | ICD-10-CM

## 2018-02-24 DIAGNOSIS — K76.0 FATTY LIVER: ICD-10-CM

## 2018-02-24 DIAGNOSIS — R94.5 NONSPECIFIC ABNORMAL RESULTS OF LIVER FUNCTION STUDY: ICD-10-CM

## 2018-02-24 LAB
ALBUMIN SERPL BCP-MCNC: 4 G/DL (ref 3.5–5)
ALP SERPL-CCNC: 161 U/L (ref 46–116)
ALT SERPL W P-5'-P-CCNC: 172 U/L (ref 12–78)
ANION GAP SERPL CALCULATED.3IONS-SCNC: 9 MMOL/L (ref 4–13)
AST SERPL W P-5'-P-CCNC: 69 U/L (ref 5–45)
BILIRUB DIRECT SERPL-MCNC: 0.47 MG/DL (ref 0–0.2)
BILIRUB SERPL-MCNC: 0.96 MG/DL (ref 0.2–1)
BUN SERPL-MCNC: 12 MG/DL (ref 5–25)
CALCIUM SERPL-MCNC: 9.7 MG/DL (ref 8.3–10.1)
CHLORIDE SERPL-SCNC: 95 MMOL/L (ref 100–108)
CO2 SERPL-SCNC: 24 MMOL/L (ref 21–32)
CREAT SERPL-MCNC: 0.94 MG/DL (ref 0.6–1.3)
ERYTHROCYTE [DISTWIDTH] IN BLOOD BY AUTOMATED COUNT: 13.3 % (ref 11.6–15.1)
GFR SERPL CREATININE-BSD FRML MDRD: 63 ML/MIN/1.73SQ M
GLUCOSE P FAST SERPL-MCNC: 162 MG/DL (ref 65–99)
HCT VFR BLD AUTO: 40.1 % (ref 34.8–46.1)
HGB BLD-MCNC: 13.7 G/DL (ref 11.5–15.4)
MCH RBC QN AUTO: 30.6 PG (ref 26.8–34.3)
MCHC RBC AUTO-ENTMCNC: 34.2 G/DL (ref 31.4–37.4)
MCV RBC AUTO: 90 FL (ref 82–98)
PLATELET # BLD AUTO: 275 THOUSANDS/UL (ref 149–390)
PMV BLD AUTO: 10.2 FL (ref 8.9–12.7)
POTASSIUM SERPL-SCNC: 4.1 MMOL/L (ref 3.5–5.3)
PROT SERPL-MCNC: 8 G/DL (ref 6.4–8.2)
RBC # BLD AUTO: 4.47 MILLION/UL (ref 3.81–5.12)
SODIUM SERPL-SCNC: 128 MMOL/L (ref 136–145)
WBC # BLD AUTO: 7.36 THOUSAND/UL (ref 4.31–10.16)

## 2018-02-24 PROCEDURE — 80076 HEPATIC FUNCTION PANEL: CPT

## 2018-02-24 PROCEDURE — 85027 COMPLETE CBC AUTOMATED: CPT

## 2018-02-24 PROCEDURE — 36415 COLL VENOUS BLD VENIPUNCTURE: CPT

## 2018-02-24 PROCEDURE — 80048 BASIC METABOLIC PNL TOTAL CA: CPT

## 2018-02-27 DIAGNOSIS — F51.01 PRIMARY INSOMNIA: ICD-10-CM

## 2018-02-27 RX ORDER — ZOLPIDEM TARTRATE 10 MG/1
10 TABLET ORAL
Qty: 30 TABLET | Refills: 0 | Status: SHIPPED | OUTPATIENT
Start: 2018-02-27 | End: 2018-03-29 | Stop reason: SDUPTHER

## 2018-02-27 NOTE — PROGRESS NOTES
Daily Note     Today's date: 2018  Patient name: Maribel Leonard  : 1950  MRN: [id number]  Referring provider: Felipe Hines PA-C  Dx:   Encounter Diagnoses   Name Primary?  Acute medial meniscus tear of left knee, subsequent encounter Yes    Chronic right shoulder pain     Primary osteoarthritis of left knee        Start Time: 815  Stop Time: 905  Total time in clinic (min): 50 minutes    Subjective: Pt states her shoulder is still really bothering her today  Pt states she went to grocery store and carried groceries in both hands, and by last night she had to ice her shoulder several times  Objective: See treatment diary below  Cont hold on some knee TE today to focus on shoulder  Assessment: Tolerated treatment well  Patient demonstrated fatigue post treatment, could benefit from continued PT and added more shoulder mobility exercises focusing on AAROM  Pt states she always has knee pain, but does feel like her L knee is stronger with sit-stand transfers and stairs  Plan: Continue per plan of care  and Progress treatment as tolerated         Precautions: Bladder cancer, depression    Daily Treatment Diary     Manual              shld PROM  NV                                                                   Exercise Diary  18          bike 5 mins 5 min 6min          Hamstring curls x15  x15          LAQ's  5" x15 5" x15 5"x20          Total gym squats  lvl 18 20 (held) lvl 18 20x lvl 18  20x          Step ups (fwd/lat) X10; 6" (held) 6" x10 each 6"x10ea          Foam balance 30" x3 (held) held held          SLS 30" x3 30" x3 30"x3          Heel slides 10" x15 10" x15 10"x15          bridges 5" x10 (held) 5" x10 5"x20          HS stretch 20" x4 held held          clamshells RTB 5" x15 5" x15 RTB 5"x15 RTB           SLR x15 x15 x15          S/L shld ER NV inst           shld ER isometrics NV inst           Shoulder rows NV 15x RTB 15x RTB          Shoulder ext NV 15x RTB 15x RTB          Supine AAROM flexion  10" X 10 Unable P! Wall slides  10x 10x          pulley's  3' 3'          Wand bench press and abd   x10ea              Modalities                                                       Perez Borrero attended a total of 13 visits since initiating skilled physical therapy  She was demonstrating progress and improvement but due to other medical issues, she will be placing skilled care on hold  At this time, she will be discharged from skilled care  Objective measurements and goals were not updated from her last re-assessment

## 2018-02-27 NOTE — TELEPHONE ENCOUNTER
Message left on home phone voicemail that prescription is in the [facility] office and ready for

## 2018-03-06 ENCOUNTER — APPOINTMENT (OUTPATIENT)
Dept: LAB | Facility: MEDICAL CENTER | Age: 68
End: 2018-03-06
Payer: COMMERCIAL

## 2018-03-06 ENCOUNTER — TRANSCRIBE ORDERS (OUTPATIENT)
Dept: ADMINISTRATIVE | Facility: HOSPITAL | Age: 68
End: 2018-03-06

## 2018-03-06 DIAGNOSIS — R94.5 ABNORMAL RESULTS OF LIVER FUNCTION STUDIES: Primary | ICD-10-CM

## 2018-03-06 LAB
ALBUMIN SERPL BCP-MCNC: 3.9 G/DL (ref 3.5–5)
ALP SERPL-CCNC: 69 U/L (ref 46–116)
ALT SERPL W P-5'-P-CCNC: 43 U/L (ref 12–78)
AST SERPL W P-5'-P-CCNC: 26 U/L (ref 5–45)
BILIRUB DIRECT SERPL-MCNC: 0.29 MG/DL (ref 0–0.2)
BILIRUB SERPL-MCNC: 0.5 MG/DL (ref 0.2–1)
PROT SERPL-MCNC: 7.3 G/DL (ref 6.4–8.2)

## 2018-03-06 PROCEDURE — 36415 COLL VENOUS BLD VENIPUNCTURE: CPT | Performed by: INTERNAL MEDICINE

## 2018-03-06 PROCEDURE — 80076 HEPATIC FUNCTION PANEL: CPT | Performed by: INTERNAL MEDICINE

## 2018-03-29 ENCOUNTER — OFFICE VISIT (OUTPATIENT)
Dept: INTERNAL MEDICINE CLINIC | Age: 68
End: 2018-03-29
Payer: COMMERCIAL

## 2018-03-29 VITALS
HEIGHT: 62 IN | HEART RATE: 82 BPM | WEIGHT: 151.6 LBS | OXYGEN SATURATION: 98 % | BODY MASS INDEX: 27.9 KG/M2 | RESPIRATION RATE: 14 BRPM | SYSTOLIC BLOOD PRESSURE: 124 MMHG | DIASTOLIC BLOOD PRESSURE: 76 MMHG | TEMPERATURE: 97.7 F

## 2018-03-29 DIAGNOSIS — C67.9 MALIGNANT NEOPLASM OF URINARY BLADDER, UNSPECIFIED SITE (HCC): Primary | ICD-10-CM

## 2018-03-29 DIAGNOSIS — F51.01 PRIMARY INSOMNIA: ICD-10-CM

## 2018-03-29 DIAGNOSIS — R94.31 ABNORMAL EKG: Primary | ICD-10-CM

## 2018-03-29 PROCEDURE — 99214 OFFICE O/P EST MOD 30 MIN: CPT | Performed by: INTERNAL MEDICINE

## 2018-03-29 RX ORDER — ZOLPIDEM TARTRATE 10 MG/1
10 TABLET ORAL
Qty: 30 TABLET | Refills: 0 | Status: SHIPPED | OUTPATIENT
Start: 2018-03-29 | End: 2018-03-29 | Stop reason: SDUPTHER

## 2018-03-29 RX ORDER — ZOLPIDEM TARTRATE 10 MG/1
10 TABLET ORAL
Qty: 30 TABLET | Refills: 0 | Status: SHIPPED | OUTPATIENT
Start: 2018-03-29 | End: 2018-05-01 | Stop reason: SDUPTHER

## 2018-03-29 NOTE — PROGRESS NOTES
Assessment/Plan:    1  History of Abnormal EKG  She was concerned about an old abnormal EKG completed in 2016  Due to her strong family history of CAD and her past history of abnormal EKG, we referred her to see a cardiologist regularly  2  Anxiety  She reports she has depression and anxiety and requested Edluar 10 mg (zolpidem), which was effective for her in the past  We prescribed the equivalent medication, Ambien 10 mg to take daily at bedtime for her anxiety  3  DM  Her blood Glucose level was elevated at 162 on 2/24/18  She monitors her glucose regularly at home and reports that yesterday her blood glucose level was 98  She was advised to decrease processed sugars from her diet and continue to follow her diabetic diet  4  Health Maintenance  Her blood work results have improved over the last month  She will follow up with us in 1 month  5   Abdominal  Pain has resolved  She is to see gi     Diagnoses and all orders for this visit:    Primary insomnia  -     zolpidem (AMBIEN) 10 mg tablet; Take 1 tablet (10 mg total) by mouth daily at bedtime as needed for sleep  -     zolpidem (AMBIEN) 10 mg tablet; Take 1 tablet (10 mg total) by mouth daily at bedtime as needed for sleep    Other orders  -     Ambulatory referral to Cardiology; Future          Subjective:      Patient ID: Leidy Mejia is a 75 y o  female  Leidy Mejia is a 75year old female who presents today for a 1 month follow up regarding her insomnia and DM  She was contacted by her GI doctor who told her that polyps were present and to decrease dairy products and raw vegetables from diet  She was concerned about this as it was opposite of her diabetic diet  She denies any diarrhea, constipation, nausea, and vomiting  She reports she has lost weight and her appetite has decreased  She notes that stress levels in her house have been very high    She reports she will be having a cystoscopy on April 10th, but she denies any problems with urination  She notes 2 episodes of nocturia nightly  She follows up with her dermatologist who recommended local chemotherapy for her actinic keratosis  She has elected to not follow through with this chemotherapy currently  She reports she has depression and takes   5 tablet of Clonazepam when she feels worse  She requested Edluar 10 mg, an anxiety medicine, as it was effective for her in the past  She reports she has "raspiness" in her throat  She reports she monitors her blood glucose levels and notes that yesterday it was at 98  She notes that she follows with her ophthalmologist regularly  The following portions of the patient's history were reviewed and updated as appropriate: allergies, current medications, past family history, past medical history, past social history, past surgical history and problem list     Review of Systems   Constitutional: Positive for appetite change (decreased), fatigue and unexpected weight change (loss of weight)  HENT: Negative for congestion, ear discharge, ear pain, facial swelling, postnasal drip, sneezing, sore throat and tinnitus  Eyes: Positive for visual disturbance (sees eye doctor)  Respiratory: Negative for cough, chest tightness and shortness of breath  Gastrointestinal: Negative for abdominal pain, anal bleeding, blood in stool, constipation, diarrhea, nausea, rectal pain and vomiting  Genitourinary: Negative for difficulty urinating (for cysto), frequency and urgency  Skin: Positive for rash (actinic kerotosis)  Neurological: Negative  Psychiatric/Behavioral: Positive for dysphoric mood (on clonazepam 1/2)           Objective:      /76 (BP Location: Left arm, Patient Position: Sitting, Cuff Size: Adult)   Pulse 82   Temp 97 7 °F (36 5 °C) (Oral)   Resp 14   Ht 5' 1 85" (1 571 m)   Wt 68 8 kg (151 lb 9 6 oz)   SpO2 98% Comment: Room Air  BMI 27 86 kg/m²          Physical Exam   Constitutional: She is oriented to person, place, and time  She appears well-developed and well-nourished  HENT:   Head: Normocephalic and atraumatic  Nose: Nose normal    Eyes: Conjunctivae and EOM are normal    Neck: Normal range of motion  Neck supple  No thyromegaly present  Cardiovascular: Normal rate, regular rhythm, normal heart sounds and intact distal pulses  No murmur heard  Pulmonary/Chest: Effort normal and breath sounds normal  No respiratory distress  She has no wheezes  She has no rales  She exhibits no tenderness  Abdominal: Soft  Bowel sounds are normal  She exhibits no distension  There is no tenderness  There is no rebound  Musculoskeletal: Normal range of motion  Neurological: She is oriented to person, place, and time  She has normal reflexes  Skin: Rash noted  Scribe Signature and Attestation:  By signing my name below, Hanh Torres attest that this documentation has been prepared under the direction and in the presence of Dr Maged Moore MD  Electronically signed: Giulia Veloz  3/29/18    Provider Attestation:  I, Dr Maged Moore, personally performed the services described in this documentation  All medical record entries made by the elsieibbrianna were at my direction and in my presence  I have reviewed the chart and discharge instructions (if applicable) and agree that the record reflects my personal performance and is accurate and complete  Dr Maged Moore MD  3/29/18

## 2018-03-29 NOTE — PATIENT INSTRUCTIONS
1  We referred her to see a cardiologist    2  She was instructed to take Ambien 10 mg to daily at bedtime for her anxiety  3  She was advised to decrease processed sugars from her diet and continue to follow her diabetic diet  4  She will follow up with us in 1 month

## 2018-04-03 ENCOUNTER — APPOINTMENT (OUTPATIENT)
Dept: LAB | Facility: MEDICAL CENTER | Age: 68
End: 2018-04-03
Payer: COMMERCIAL

## 2018-04-03 DIAGNOSIS — C67.9 MALIGNANT NEOPLASM OF URINARY BLADDER, UNSPECIFIED SITE (HCC): ICD-10-CM

## 2018-04-03 PROCEDURE — 88112 CYTOPATH CELL ENHANCE TECH: CPT | Performed by: PATHOLOGY

## 2018-04-10 ENCOUNTER — PROCEDURE VISIT (OUTPATIENT)
Dept: UROLOGY | Facility: AMBULATORY SURGERY CENTER | Age: 68
End: 2018-04-10
Payer: COMMERCIAL

## 2018-04-10 ENCOUNTER — TELEPHONE (OUTPATIENT)
Dept: UROLOGY | Facility: AMBULATORY SURGERY CENTER | Age: 68
End: 2018-04-10

## 2018-04-10 VITALS
WEIGHT: 149.38 LBS | DIASTOLIC BLOOD PRESSURE: 82 MMHG | BODY MASS INDEX: 27.49 KG/M2 | SYSTOLIC BLOOD PRESSURE: 122 MMHG | HEART RATE: 92 BPM | HEIGHT: 62 IN

## 2018-04-10 DIAGNOSIS — E11.9 TYPE 2 DIABETES MELLITUS WITHOUT COMPLICATION, WITHOUT LONG-TERM CURRENT USE OF INSULIN (HCC): Primary | ICD-10-CM

## 2018-04-10 DIAGNOSIS — C67.9 MALIGNANT NEOPLASM OF URINARY BLADDER, UNSPECIFIED SITE (HCC): Primary | ICD-10-CM

## 2018-04-10 LAB
SL AMB  POCT GLUCOSE, UA: NORMAL
SL AMB LEUKOCYTE ESTERASE,UA: NORMAL
SL AMB POCT BILIRUBIN,UA: NORMAL
SL AMB POCT BLOOD,UA: NORMAL
SL AMB POCT CLARITY,UA: NORMAL
SL AMB POCT COLOR,UA: YELLOW
SL AMB POCT KETONES,UA: NORMAL
SL AMB POCT NITRITE,UA: NORMAL
SL AMB POCT PH,UA: 5
SL AMB POCT SPECIFIC GRAVITY,UA: 1.02
SL AMB POCT URINE PROTEIN: NORMAL
SL AMB POCT UROBILINOGEN: NORMAL

## 2018-04-10 PROCEDURE — 81002 URINALYSIS NONAUTO W/O SCOPE: CPT | Performed by: UROLOGY

## 2018-04-10 PROCEDURE — 52000 CYSTOURETHROSCOPY: CPT | Performed by: UROLOGY

## 2018-04-10 RX ORDER — BLOOD SUGAR DIAGNOSTIC
1 STRIP MISCELLANEOUS DAILY
Qty: 100 EACH | Refills: 1 | Status: SHIPPED | OUTPATIENT
Start: 2018-04-10 | End: 2018-11-09 | Stop reason: SDUPTHER

## 2018-04-10 NOTE — PROGRESS NOTES
Ricky Meyer is a 78-year-old female with a history of urothelial carcinoma the bladder initially diagnosed in November 2016  Pathology revealed low-grade noninvasive urothelial carcinoma  She had BCG induction in early 2017  She had BCG maintenance in July 2017 as well as in December 2017  She presents to the office today to undergo flexible cystoscopy surveillance  Recent urine cytology shows no evidence of malignant cells  Cystoscopy Procedure note    Risk and benefits of flexible cystoscopy were discussed  Informed consent was obtained  A urine dip is adequate for cystoscopy  The patient was placed into the modified supine position  Her genitalia was prepped and draped in a sterile fashion  Viscous lidocaine was instilled into the urethra  Flexible cystoscopy was then performed  The bladder was thoroughly inspected  Both ureteral orifices were visualized with clear efflux of urine  The bladder mucosa was thoroughly inspected  Multiple prior bladder biopsy scars were appreciated without evidence of laine recurrence of urothelial carcinoma the bladder  Overall the cystoscopy was negative for recurrent urothelial carcinoma  My impression is history of urothelial carcinoma the bladder without evidence of recurrence  I recommend her next BCG maintenance in June/July 2018  Her next surveillance cystoscopy and cytology will be in 6 months time in October 2018  The patient was instructed to return sooner if she were to develop gross hematuria

## 2018-04-11 NOTE — TELEPHONE ENCOUNTER
Patient will be due for a set of 3 BCG doses in early July 2018  According to last OV note next cystoscopy will be due in October

## 2018-04-23 ENCOUNTER — OFFICE VISIT (OUTPATIENT)
Dept: ENDOCRINOLOGY | Facility: CLINIC | Age: 68
End: 2018-04-23
Payer: COMMERCIAL

## 2018-04-23 VITALS
DIASTOLIC BLOOD PRESSURE: 78 MMHG | BODY MASS INDEX: 27.82 KG/M2 | HEART RATE: 78 BPM | WEIGHT: 151.2 LBS | HEIGHT: 62 IN | SYSTOLIC BLOOD PRESSURE: 126 MMHG

## 2018-04-23 DIAGNOSIS — E28.39 ESTROGEN DEFICIENCY: ICD-10-CM

## 2018-04-23 DIAGNOSIS — E03.9 ACQUIRED HYPOTHYROIDISM: Primary | ICD-10-CM

## 2018-04-23 DIAGNOSIS — E11.65 TYPE 2 DIABETES MELLITUS WITH HYPERGLYCEMIA, WITHOUT LONG-TERM CURRENT USE OF INSULIN (HCC): ICD-10-CM

## 2018-04-23 LAB
ALBUMIN SERPL BCP-MCNC: 4.4 G/DL (ref 3.5–5)
ALP SERPL-CCNC: 58 U/L (ref 46–116)
ALT SERPL W P-5'-P-CCNC: 33 U/L (ref 12–78)
ANION GAP SERPL CALCULATED.3IONS-SCNC: 9 MMOL/L (ref 4–13)
AST SERPL W P-5'-P-CCNC: 28 U/L (ref 5–45)
BILIRUB SERPL-MCNC: 0.52 MG/DL (ref 0.2–1)
BUN SERPL-MCNC: 19 MG/DL (ref 5–25)
CALCIUM SERPL-MCNC: 9.8 MG/DL (ref 8.3–10.1)
CHLORIDE SERPL-SCNC: 98 MMOL/L (ref 100–108)
CO2 SERPL-SCNC: 26 MMOL/L (ref 21–32)
CREAT SERPL-MCNC: 0.89 MG/DL (ref 0.6–1.3)
CREAT UR-MCNC: 178 MG/DL
EST. AVERAGE GLUCOSE BLD GHB EST-MCNC: 160 MG/DL
GFR SERPL CREATININE-BSD FRML MDRD: 67 ML/MIN/1.73SQ M
GLUCOSE SERPL-MCNC: 119 MG/DL (ref 65–140)
HBA1C MFR BLD: 7.2 % (ref 4.2–6.3)
MICROALBUMIN UR-MCNC: 42.1 MG/L (ref 0–20)
MICROALBUMIN/CREAT 24H UR: 24 MG/G CREATININE (ref 0–30)
POTASSIUM SERPL-SCNC: 4.1 MMOL/L (ref 3.5–5.3)
PROT SERPL-MCNC: 7.5 G/DL (ref 6.4–8.2)
SODIUM SERPL-SCNC: 133 MMOL/L (ref 136–145)

## 2018-04-23 PROCEDURE — 36415 COLL VENOUS BLD VENIPUNCTURE: CPT | Performed by: INTERNAL MEDICINE

## 2018-04-23 PROCEDURE — 82570 ASSAY OF URINE CREATININE: CPT | Performed by: INTERNAL MEDICINE

## 2018-04-23 PROCEDURE — 80053 COMPREHEN METABOLIC PANEL: CPT | Performed by: INTERNAL MEDICINE

## 2018-04-23 PROCEDURE — 99244 OFF/OP CNSLTJ NEW/EST MOD 40: CPT | Performed by: INTERNAL MEDICINE

## 2018-04-23 PROCEDURE — 86376 MICROSOMAL ANTIBODY EACH: CPT | Performed by: INTERNAL MEDICINE

## 2018-04-23 PROCEDURE — 83036 HEMOGLOBIN GLYCOSYLATED A1C: CPT | Performed by: INTERNAL MEDICINE

## 2018-04-23 PROCEDURE — 86800 THYROGLOBULIN ANTIBODY: CPT | Performed by: INTERNAL MEDICINE

## 2018-04-23 PROCEDURE — 3061F NEG MICROALBUMINURIA REV: CPT | Performed by: INTERNAL MEDICINE

## 2018-04-23 PROCEDURE — 82043 UR ALBUMIN QUANTITATIVE: CPT | Performed by: INTERNAL MEDICINE

## 2018-04-23 RX ORDER — LEVOTHYROXINE SODIUM 0.05 MG/1
50 TABLET ORAL DAILY
Qty: 30 TABLET | Refills: 5 | Status: SHIPPED | OUTPATIENT
Start: 2018-04-23 | End: 2018-11-09 | Stop reason: SDUPTHER

## 2018-04-23 NOTE — LETTER
April 23, 2018     MD Radha Lockett [phone] [address]    Patient: Shine Gifford   YOB: 1950   Date of Visit: 4/23/2018       Dear Dr India Duncan: Thank you for referring Shine Gifford to me for evaluation  Below are my notes for this consultation  If you have questions, please do not hesitate to call me  I look forward to following your patient along with you  Sincerely,        Duarte Crews MD        CC: No Recipients  Vasu Knapp DO  4/23/2018 10:05 AM  Attested   Shine Gifford 75 y o  female MRN: [id number]    Encounter: [id number]      Assessment/Plan     Assessment: This is a 75y o -year-old female with diabetes with hyperglycemia and hyperlipidemia  Plan:    DM-II  -no HbA1c on file, patient checks blood glu intermittently 1-3 times daily  -check HbA1c, check blood glu two times daily at alternating times  -reports UTD annual diabetic eye exam and foot exam  -check microalbumin:cr    Hypothyroidism  -TSH 4 410, FT4 0 89 (1/22/18) with symptoms of fatigue, dry skin and hair  -check thyroid antibodies and start levothyroxine 50mcg daily, recheck TSH/FT4 in 6 weeks  -return to office 3mo    Thyroid Nodules  -several tiny colloid cysts noted on US thyroid 1/22/18, no solid nodules identified    CC: Diabetes    History of Present Illness     HPI: Andriy Chow is a 75 Y/OF with hisotyr of HTN, depression, DM-II for >10 years and bladder cancer now undergoing treatment with BCG  She is currently on Metformin BID and is concerned that her blood sugars are running lower than usual since she has lost about 15lbs following a GI illness a few months ago  She has been checking them intermittently 1-3 times daily and varies between before and after meals and they range from 90s-130s  She denies symptoms of hypoglycemia   She reports that she is up to date with her diabetic eye exam as well as podiatry exam She also complains of feeling more fatigued than she expects for someone of her age and activity level as well as dry skin, with dry and thinning hair and depression  She admits to some forgetfulness  She denies other complaints  Review of Systems   Constitutional: Positive for activity change and unexpected weight change  Negative for chills and fever  HENT: Positive for voice change  Negative for sore throat and trouble swallowing  Respiratory: Negative  Cardiovascular: Negative  Gastrointestinal: Negative  Endocrine:        Fatigue   Genitourinary: Negative  Musculoskeletal: Negative  Allergic/Immunologic: Negative  Neurological: Negative  Hematological: Negative  Psychiatric/Behavioral: Positive for dysphoric mood  All other systems reviewed and are negative        Historical Information   Past Medical History:   Diagnosis Date    Anxiety     Arthritis     [facility]     Bladder mass     Depression     [facility]     Disease of thyroid gland     thyroid nodules-not treating at this time    Dysuria     Last assessed 17    GERD (gastroesophageal reflux disease)     HLD (hyperlipidemia)     HTN (hypertension)     Hypercholesterolemia     Hypertension     Knee pain     PONV (postoperative nausea and vomiting)      Past Surgical History:   Procedure Laterality Date    CARPAL TUNNEL RELEASE Right      SECTION      x2    CHOLECYSTECTOMY      CYSTOSCOPY N/A 2016    Procedure: CYSTOSCOPY WITH BIOPSIES;  Surgeon: Js Rivera MD;  [facility];  Service:    Zonia Wynn N/A 2016    Procedure: CYSTOSCOPY;  Surgeon: Js Rivera MD;  [facility];  Service:     HERNIA REPAIR      OR CYSTO/URETERO W/LITHOTRIPSY &INDWELL STENT INSRT  2016    Procedure: CYSTOSCOPY URETEROSCOPY WITH LITHOTRIPSY HOLMIUM LASER RIGHT, RETROGRADE PYELOGRAM BILATERAL: AND INSERTION STENT URETERAL Right ;  Surgeon: Js Rivera MD;  [facility];  Service: Urology   [facility] [place] CYSTO/URETERO/PYELOSCOPY, DX Right 5/9/2017    Procedure: URETEROSCOPY;  Surgeon: Fernando Chavez MD;  [facility];  Service: Urology    [place] CYSTOSCOPY,INSERT URETERAL STENT Left 9/29/2016    Procedure: URETERAL STENTS;  Surgeon: Fernando Chavez MD;  [facility];  Service: Urology    AZ CYSTOURETHROSCOPY,FULGUR <0 5 CM LESN N/A 11/29/2016    Procedure: TRANSURETHRAL RESECTION OF BLADDER TUMOR (TURBT);   Surgeon: Fernando Chavez MD;  [facility];  Service: Urology    [place] CYSTOURETHROSCOPY,FULGUR <0 5 CM LESN N/A 9/29/2016    Procedure: Willaim Alu; TUR BLADDER TUMOR ;  Surgeon: Fernando Chavez MD;  [facility];  Service: Urology    AZ CYSTOURETHROSCOPY,FULGUR <0 5 CM LESN N/A 9/1/2016    Procedure: TUR BLADDER TUMOR ;  Surgeon: Fernando Chavez MD;  [facility];  Service: Urology    AZ CYSTOURETHROSCOPY,FULGUR <0 5 CM LESN Bilateral 5/9/2017    Procedure: CYSTOSCOPY, RIGHT URETERAL WASHINGS, ;  Surgeon: Fernando Chavez MD;  [facility];  Service: Urology    AZ CYSTOURETHROSCOPY,URETER CATHETER Bilateral 9/29/2016    Procedure: RETROGRADE PYELOGRAM ;  Surgeon: Fernando Chavez MD;  [facility];  Service: Urology    AZ CYSTOURETHROSCOPY,URETER CATHETER Bilateral 5/9/2017    Procedure: RETROGRADE PYELOGRAM WITH STENT EXCHANGE, RIGHT;  Surgeon: Fernando Chavez MD;  [facility];  Service: Urology    [place] INSTILL ANTICANCER AGENT IN BLADDER N/A 11/29/2016    Procedure: Hugo Kanchan;  Surgeon: Fernando Chavez MD;  [facility];  Service: Urology    [place] KNEE SCOPE,MED/LAT MENISECTOMY Left 4/4/2016    Procedure: KNEE ARTHROSCOPIC PARTIAL MEDIAL MENISCECTOMY ;  Surgeon: Shara Goodwin MD;  [facility];  Service: Orthopedics    REMOVAL URETERAL STENT Left 11/29/2016    Procedure: REMOVAL STENT URETERAL;  Surgeon: Fernando Chavez MD;  [facility];  Service:    [facility] TONSILLECTOMY       Social History   History   Alcohol Use No     History   Drug Use No     History   Smoking Status    Former Smoker    Packs/day: 1 00    Years: 20 00    Quit date: 1990   Smokeless Tobacco    Never Used     Family History:   Family History   Problem Relation Age of Onset    Heart attack Mother     ALS Father     Diabetes Maternal Grandfather     Lung cancer Paternal Grandfather        Meds/Allergies   Current Outpatient Prescriptions   Medication Sig Dispense Refill    acetaminophen (TYLENOL) 325 mg tablet Take 650 mg by mouth every 6 (six) hours as needed for mild pain      clonazePAM (KlonoPIN) 0 5 mg tablet Take 0 25 mg by mouth daily at bedtime as needed for seizures   famotidine (PEPCID) 20 mg tablet Take 20 mg by mouth daily   fenofibrate (TRICOR) 145 mg tablet Take 145 mg by mouth daily   Loratadine 10 MG CAPS Take 1 tablet by mouth daily      losartan-hydrochlorothiazide (HYZAAR) 50-12 5 mg per tablet Take 1 tablet by mouth daily   metFORMIN (GLUCOPHAGE) 500 mg tablet Take 2 tablets (1,000 mg total) by mouth 2 (two) times a day with meals MetFORMIN HCl - 500 MG Oral Tablet  Refills: 0   , M D ; Active 120 tablet 5    ONETOUCH VERIO test strip 1 each by Other route daily 100 each 1    Probiotic Product (PROBIOTIC-10) CAPS Take 1 capsule by mouth daily      simvastatin (ZOCOR) 40 mg tablet Take 40 mg by mouth daily at bedtime   zolpidem (AMBIEN) 10 mg tablet Take 1 tablet (10 mg total) by mouth daily at bedtime as needed for sleep 30 tablet 0     No current facility-administered medications for this visit  No Known Allergies    Objective   Vitals: Blood pressure 126/78, pulse 78, height 5' 1 85" (1 571 m), weight 68 6 kg (151 lb 3 2 oz), not currently breastfeeding  Physical Exam   Constitutional: She is oriented to person, place, and time  She appears well-developed and well-nourished  No distress  HENT:   Head: Normocephalic and atraumatic     Eyes: Conjunctivae and EOM are normal  Pupils are equal, round, and reactive to light  Right eye exhibits no discharge  Left eye exhibits no discharge  No scleral icterus  Neck: Normal range of motion  Neck supple  No thyromegaly present  Cardiovascular: Normal rate, regular rhythm, normal heart sounds and intact distal pulses  Exam reveals no gallop and no friction rub  No murmur heard  Pulmonary/Chest: Effort normal and breath sounds normal    Abdominal: Soft  Bowel sounds are normal    Musculoskeletal: Normal range of motion  She exhibits no edema  Neurological: She is alert and oriented to person, place, and time  Skin: Skin is warm and dry  She is not diaphoretic  Psychiatric: She has a normal mood and affect  Nursing note and vitals reviewed  The history was obtained from the review of the chart, patient      Lab Results:   Lab Results   Component Value Date/Time    WBC 7 36 02/24/2018 10:28 AM    WBC 6 20 02/21/2018 11:01 AM    WBC 7 97 02/16/2018 02:00 PM    Hemoglobin 13 7 02/24/2018 10:28 AM    Hemoglobin 13 4 02/21/2018 11:01 AM    Hemoglobin 13 8 02/16/2018 02:00 PM    Hematocrit 40 1 02/24/2018 10:28 AM    Hematocrit 39 5 02/21/2018 11:01 AM    Hematocrit 39 7 02/16/2018 02:00 PM    MCV 90 02/24/2018 10:28 AM    MCV 91 02/21/2018 11:01 AM    MCV 88 02/16/2018 02:00 PM    Platelets 117 06/56/1607 10:28 AM    Platelets 319 37/24/4486 11:01 AM    Platelets 289 95/06/0435 02:00 PM    BUN 12 02/24/2018 10:28 AM    BUN 14 02/16/2018 02:00 PM    BUN 18 01/22/2018 08:43 AM    Sodium 128 (L) 02/24/2018 10:28 AM    Sodium 130 (L) 02/21/2018 11:01 AM    Sodium 135 (L) 02/16/2018 02:00 PM    Potassium 4 1 02/24/2018 10:28 AM    Potassium 4 1 02/21/2018 11:01 AM    Potassium 4 1 02/16/2018 02:00 PM    Chloride 95 (L) 02/24/2018 10:28 AM    Chloride 93 (L) 02/21/2018 11:01 AM    Chloride 98 (L) 02/16/2018 02:00 PM    CO2 24 02/24/2018 10:28 AM    CO2 26 02/21/2018 11:01 AM    CO2 26 02/16/2018 02:00 PM    Creatinine 0 94 02/24/2018 10:28 AM Creatinine 0 87 02/16/2018 02:00 PM    Creatinine 0 88 01/22/2018 08:43 AM    AST 26 03/06/2018 09:47 AM    AST 69 (H) 02/24/2018 10:28 AM    AST 73 (H) 02/22/2018 10:37 AM    ALT 43 03/06/2018 09:47 AM     (H) 02/24/2018 10:28 AM     (H) 02/22/2018 10:37 AM    Albumin 3 9 03/06/2018 09:47 AM    Albumin 4 0 02/24/2018 10:28 AM    Albumin 4 0 02/22/2018 10:37 AM    GLUCOSE, UA - 04/10/2018 01:46 PM     Imaging Studies: I have personally reviewed pertinent reports  Portions of the record may have been created with voice recognition software  Occasional wrong word or "sound a like" substitutions may have occurred due to the inherent limitations of voice recognition software  Read the chart carefully and recognize, using context, where substitutions have occurred  Attestation signed by Rehan Mccartney MD at 4/23/2018 10:05 AM:  I have reviewed the notes, assessments, and/or procedures performed by Dr Heike Oliva, I concur with her/his documentation of Torrey Angeles  69-year-old woman with type 2 diabetes and elevated TSH who presents for evaluation  Today, she denies any symptoms of hypo or hyperthyroidism except for fatigue, dry skin and hair  She has had diabetes for about 10 years  She was recently noted to have an elevated TSH with low normal free T4  The thyroid is normal in size  She is awake and oriented x3  The affect intense panel are normal     Most recent TSH is 4 41 with a free T4 is 0 89     1   Type 2 diabetes-check routine laboratory testing  2   Hypothyroidism-start levothyroxine 50 mcg per day  Check TSH and free T4 in six weeks

## 2018-04-23 NOTE — PROGRESS NOTES
Vignesh Channel 75 y o  female MRN: [id number]    Encounter: [id number]      Assessment/Plan     Assessment: This is a 75y o -year-old female with diabetes with hyperglycemia and hyperlipidemia  Plan:    DM-II  -no HbA1c on file, patient checks blood glu intermittently 1-3 times daily  -check HbA1c, check blood glu two times daily at alternating times  -reports UTD annual diabetic eye exam and foot exam  -check microalbumin:cr    Hypothyroidism  -TSH 4 410, FT4 0 89 (1/22/18) with symptoms of fatigue, dry skin and hair  -check thyroid antibodies and start levothyroxine 50mcg daily, recheck TSH/FT4 in 6 weeks  -return to office 3mo    Thyroid Nodules  -several tiny colloid cysts noted on US thyroid 1/22/18, no solid nodules identified    CC: Diabetes    History of Present Illness     HPI: Mary Pelayo is a 75 Y/OF with hisotyr of HTN, depression, DM-II for >10 years and bladder cancer now undergoing treatment with BCG  She is currently on Metformin BID and is concerned that her blood sugars are running lower than usual since she has lost about 15lbs following a GI illness a few months ago  She has been checking them intermittently 1-3 times daily and varies between before and after meals and they range from 90s-130s  She denies symptoms of hypoglycemia  She reports that she is up to date with her diabetic eye exam as well as podiatry exam She also complains of feeling more fatigued than she expects for someone of her age and activity level as well as dry skin, with dry and thinning hair and depression  She admits to some forgetfulness  She denies other complaints  Review of Systems   Constitutional: Positive for activity change and unexpected weight change  Negative for chills and fever  HENT: Positive for voice change  Negative for sore throat and trouble swallowing  Respiratory: Negative  Cardiovascular: Negative  Gastrointestinal: Negative  Endocrine:        Fatigue   Genitourinary: Negative  Musculoskeletal: Negative  Allergic/Immunologic: Negative  Neurological: Negative  Hematological: Negative  Psychiatric/Behavioral: Positive for dysphoric mood  All other systems reviewed and are negative  Historical Information   Past Medical History:   Diagnosis Date    Anxiety     Arthritis     [facility]     Bladder mass     Depression     [facility]     Disease of thyroid gland     thyroid nodules-not treating at this time    Dysuria     Last assessed 17    GERD (gastroesophageal reflux disease)     HLD (hyperlipidemia)     HTN (hypertension)     Hypercholesterolemia     Hypertension     Knee pain     PONV (postoperative nausea and vomiting)      Past Surgical History:   Procedure Laterality Date    CARPAL TUNNEL RELEASE Right      SECTION      x2    CHOLECYSTECTOMY      CYSTOSCOPY N/A 2016    Procedure: CYSTOSCOPY WITH BIOPSIES;  Surgeon: Jacobo Malhotra MD;  [facility];  Service:    [facility] CYSTOSCOPY N/A 2016    Procedure: CYSTOSCOPY;  Surgeon: Jacobo Malhotra MD;  [facility];  Service:     HERNIA REPAIR      [place] CYSTO/URETERO W/LITHOTRIPSY &INDWELL STENT INSRT  2016    Procedure: CYSTOSCOPY URETEROSCOPY WITH LITHOTRIPSY HOLMIUM LASER RIGHT, RETROGRADE PYELOGRAM BILATERAL: AND INSERTION STENT URETERAL Right ;  Surgeon: Jacobo Malhotra MD;  [facility];  Service: Urology    [place] CYSTO/URETERO/PYELOSCOPY, DX Right 2017    Procedure: URETEROSCOPY;  Surgeon: Jacobo Malhotra MD;  [facility];  Service: Urology    [place] CYSTOSCOPY,INSERT URETERAL STENT Left 2016    Procedure: URETERAL STENTS;  Surgeon: Jacobo Malhotra MD;  [facility];  Service: Urology    [place] CYSTOURETHROSCOPY,FULGUR <0 5 CM LESN N/A 2016    Procedure: TRANSURETHRAL RESECTION OF BLADDER TUMOR (TURBT);   Surgeon: Jacobo Malhotra MD;  [facility];  Service: Urology    [place] CYSTOURETHROSCOPY,FULGUR <0 5 CM LESN N/A 9/29/2016    Procedure: Hampton Limber; TUR BLADDER TUMOR ;  Surgeon: Ron Oscar MD;  [facility];  Service: Urology    [place] CYSTOURETHROSCOPY,FULGUR <0 5 CM LESN N/A 9/1/2016    Procedure: TUR BLADDER TUMOR ;  Surgeon: Ron Oscar MD;  [facility];  Service: Urology    [place] CYSTOURETHROSCOPY,FULGUR <0 5 CM LESN Bilateral 5/9/2017    Procedure: CYSTOSCOPY, RIGHT URETERAL WASHINGS, ;  Surgeon: Ron Oscar MD;  [facility];  Service: Urology    [place] CYSTOURETHROSCOPY,URETER CATHETER Bilateral 9/29/2016    Procedure: RETROGRADE PYELOGRAM ;  Surgeon: Ron Oscar MD;  [facility];  Service: Urology    [place] CYSTOURETHROSCOPY,URETER CATHETER Bilateral 5/9/2017    Procedure: RETROGRADE PYELOGRAM WITH STENT EXCHANGE, RIGHT;  Surgeon: Ron Oscar MD;  [facility];  Service: Urology    [place] INSTILL ANTICANCER AGENT IN BLADDER N/A 11/29/2016    Procedure: José Majestic;  Surgeon: Ron Oscar MD;  [facility];  Service: Urology    [place] KNEE SCOPE,MED/LAT MENISECTOMY Left 4/4/2016    Procedure: KNEE ARTHROSCOPIC PARTIAL MEDIAL MENISCECTOMY ;  Surgeon: Isma Hudson MD;  [facility];  Service: Orthopedics    REMOVAL URETERAL STENT Left 11/29/2016    Procedure: REMOVAL STENT URETERAL;  Surgeon: Ron Oscar MD;  [facility];  Service:     TONSILLECTOMY       Social History   History   Alcohol Use No     History   Drug Use No     History   Smoking Status    Former Smoker    Packs/day: 1 00    Years: 20 00    Quit date: 1990   Smokeless Tobacco    Never Used     Family History:   Family History   Problem Relation Age of Onset    Heart attack Mother     ALS Father     Diabetes Maternal Grandfather     Lung cancer Paternal Grandfather        Meds/Allergies   Current Outpatient Prescriptions   Medication Sig Dispense Refill    acetaminophen (TYLENOL) 325 mg tablet Take 650 mg by mouth every 6 (six) hours as needed for mild pain      clonazePAM (KlonoPIN) 0 5 mg tablet Take 0 25 mg by mouth daily at bedtime as needed for seizures   famotidine (PEPCID) 20 mg tablet Take 20 mg by mouth daily   fenofibrate (TRICOR) 145 mg tablet Take 145 mg by mouth daily   Loratadine 10 MG CAPS Take 1 tablet by mouth daily      losartan-hydrochlorothiazide (HYZAAR) 50-12 5 mg per tablet Take 1 tablet by mouth daily   metFORMIN (GLUCOPHAGE) 500 mg tablet Take 2 tablets (1,000 mg total) by mouth 2 (two) times a day with meals MetFORMIN HCl - 500 MG Oral Tablet  Refills: 0   , M D ; Active 120 tablet 5    ONETOUCH VERIO test strip 1 each by Other route daily 100 each 1    Probiotic Product (PROBIOTIC-10) CAPS Take 1 capsule by mouth daily      simvastatin (ZOCOR) 40 mg tablet Take 40 mg by mouth daily at bedtime   zolpidem (AMBIEN) 10 mg tablet Take 1 tablet (10 mg total) by mouth daily at bedtime as needed for sleep 30 tablet 0     No current facility-administered medications for this visit  No Known Allergies    Objective   Vitals: Blood pressure 126/78, pulse 78, height 5' 1 85" (1 571 m), weight 68 6 kg (151 lb 3 2 oz), not currently breastfeeding  Physical Exam   Constitutional: She is oriented to person, place, and time  She appears well-developed and well-nourished  No distress  HENT:   Head: Normocephalic and atraumatic  Eyes: Conjunctivae and EOM are normal  Pupils are equal, round, and reactive to light  Right eye exhibits no discharge  Left eye exhibits no discharge  No scleral icterus  Neck: Normal range of motion  Neck supple  No thyromegaly present  Cardiovascular: Normal rate, regular rhythm, normal heart sounds and intact distal pulses  Exam reveals no gallop and no friction rub  No murmur heard  Pulmonary/Chest: Effort normal and breath sounds normal    Abdominal: Soft  Bowel sounds are normal    Musculoskeletal: Normal range of motion   She exhibits no edema  Neurological: She is alert and oriented to person, place, and time  Skin: Skin is warm and dry  She is not diaphoretic  Psychiatric: She has a normal mood and affect  Nursing note and vitals reviewed  The history was obtained from the review of the chart, patient  Lab Results:   Lab Results   Component Value Date/Time    WBC 7 36 02/24/2018 10:28 AM    WBC 6 20 02/21/2018 11:01 AM    WBC 7 97 02/16/2018 02:00 PM    Hemoglobin 13 7 02/24/2018 10:28 AM    Hemoglobin 13 4 02/21/2018 11:01 AM    Hemoglobin 13 8 02/16/2018 02:00 PM    Hematocrit 40 1 02/24/2018 10:28 AM    Hematocrit 39 5 02/21/2018 11:01 AM    Hematocrit 39 7 02/16/2018 02:00 PM    MCV 90 02/24/2018 10:28 AM    MCV 91 02/21/2018 11:01 AM    MCV 88 02/16/2018 02:00 PM    Platelets 573 89/93/8385 10:28 AM    Platelets 707 63/83/5128 11:01 AM    Platelets 820 76/64/1182 02:00 PM    BUN 12 02/24/2018 10:28 AM    BUN 14 02/16/2018 02:00 PM    BUN 18 01/22/2018 08:43 AM    Sodium 128 (L) 02/24/2018 10:28 AM    Sodium 130 (L) 02/21/2018 11:01 AM    Sodium 135 (L) 02/16/2018 02:00 PM    Potassium 4 1 02/24/2018 10:28 AM    Potassium 4 1 02/21/2018 11:01 AM    Potassium 4 1 02/16/2018 02:00 PM    Chloride 95 (L) 02/24/2018 10:28 AM    Chloride 93 (L) 02/21/2018 11:01 AM    Chloride 98 (L) 02/16/2018 02:00 PM    CO2 24 02/24/2018 10:28 AM    CO2 26 02/21/2018 11:01 AM    CO2 26 02/16/2018 02:00 PM    Creatinine 0 94 02/24/2018 10:28 AM    Creatinine 0 87 02/16/2018 02:00 PM    Creatinine 0 88 01/22/2018 08:43 AM    AST 26 03/06/2018 09:47 AM    AST 69 (H) 02/24/2018 10:28 AM    AST 73 (H) 02/22/2018 10:37 AM    ALT 43 03/06/2018 09:47 AM     (H) 02/24/2018 10:28 AM     (H) 02/22/2018 10:37 AM    Albumin 3 9 03/06/2018 09:47 AM    Albumin 4 0 02/24/2018 10:28 AM    Albumin 4 0 02/22/2018 10:37 AM    GLUCOSE, UA - 04/10/2018 01:46 PM     Imaging Studies: I have personally reviewed pertinent reports        Portions of the record may have been created with voice recognition software  Occasional wrong word or "sound a like" substitutions may have occurred due to the inherent limitations of voice recognition software  Read the chart carefully and recognize, using context, where substitutions have occurred

## 2018-04-23 NOTE — PATIENT INSTRUCTIONS
Hypothyroidism   WHAT YOU NEED TO KNOW:   What is hypothyroidism? Hypothyroidism is a condition that develops when the thyroid gland does not make enough thyroid hormone  Thyroid hormones help control body temperature, heart rate, growth, and weight  What causes hypothyroidism? If you have a family member with hypothyroidism, your risk is increased  Any of the following can cause hypothyroidism:  · Autoimmune disease, such as inflammation of your thyroid, or Hashimoto disease    · Surgery, radiation therapy, or medicines such as lithium, sedatives, or narcotics    · Thyroid cancer or viral infection    · Low iodine levels  What are the signs and symptoms of hypothyroidism? The signs and symptoms may develop slowly, sometimes over several years  · Exhaustion    · Sensitivity to cold    · Headaches or decreased concentration    · Muscle aches or weakness    · Constipation     · Dry, flaky skin or brittle nails    · Thinning hair    · Heavy or irregular monthly periods    · Depression or irritability  How is hypothyroidism diagnosed? Your healthcare provider will ask about your symptoms and what medicines you take  He will ask about your medical history and if anyone in your family has hypothyroidism  A blood test will show your thyroid hormone level  How is hypothyroidism treated? Thyroid hormone replacement medicine may bring your thyroid hormone level back to normal  Ask your healthcare provider for more information on other medicines you may need  Call 911 for any of the following:   · You have sudden chest pain or shortness of breath  · You have a seizure  · You feel like you are going to faint  When should I seek immediate care? · You have diarrhea, tremors, or trouble sleeping  · Your legs, ankles, or feet are swollen  When should I contact my healthcare provider? · You have a fever  · You have chills, a cough, or feel weak and achy      · You have pain and swelling in your muscles and joints  · Your skin is itchy, swollen, or you have a rash  · Your signs and symptoms return or get worse, even after treatment  · You have questions or concerns about your condition or care  CARE AGREEMENT:   You have the right to help plan your care  Learn about your health condition and how it may be treated  Discuss treatment options with your caregivers to decide what care you want to receive  You always have the right to refuse treatment  The above information is an  only  It is not intended as medical advice for individual conditions or treatments  Talk to your doctor, nurse or pharmacist before following any medical regimen to see if it is safe and effective for you  © 2017 [facility] Information is for End User's use only and may not be sold, redistributed or otherwise used for commercial purposes  All illustrations and images included in CareNotes® are the copyrighted property of [employer]  or [employer]

## 2018-04-24 ENCOUNTER — TELEPHONE (OUTPATIENT)
Dept: ENDOCRINOLOGY | Facility: CLINIC | Age: 68
End: 2018-04-24

## 2018-04-24 LAB
THYROGLOB AB SERPL-ACNC: <1 IU/ML (ref 0–0.9)
THYROPEROXIDASE AB SERPL-ACNC: 10 IU/ML (ref 0–34)

## 2018-04-24 NOTE — PROGRESS NOTES
Please call the patient regarding her abnormal result  Thyroid antibodies level is normal  The hemoglobin A1c is slightly elevated  Please send blood sugar log  There is no kidney damage

## 2018-04-24 NOTE — TELEPHONE ENCOUNTER
----- Message from King Suni MD sent at 4/24/2018 10:53 AM EDT -----  Please call the patient regarding her abnormal result  Thyroid antibodies level is normal  The hemoglobin A1c is slightly elevated  Please send blood sugar log  There is no kidney damage

## 2018-04-26 DIAGNOSIS — R56.9 SEIZURES (HCC): Primary | ICD-10-CM

## 2018-04-26 DIAGNOSIS — F41.9 ANXIETY: ICD-10-CM

## 2018-04-26 RX ORDER — CLONAZEPAM 0.5 MG/1
0.5 TABLET ORAL 2 TIMES DAILY
Qty: 60 TABLET | Refills: 0 | Status: SHIPPED | OUTPATIENT
Start: 2018-04-26 | End: 2018-08-28 | Stop reason: SDUPTHER

## 2018-05-01 DIAGNOSIS — F51.01 PRIMARY INSOMNIA: ICD-10-CM

## 2018-05-01 NOTE — TELEPHONE ENCOUNTER
[facility] office stock ok to use no auth needed spoke with Vanna PORTER ref# Lois C ID# [id number] pm 5/1/18  Thanks  Joni Brand

## 2018-05-03 RX ORDER — ZOLPIDEM TARTRATE 10 MG/1
10 TABLET ORAL
Qty: 30 TABLET | Refills: 0 | Status: SHIPPED | OUTPATIENT
Start: 2018-05-03 | End: 2018-05-30 | Stop reason: SDUPTHER

## 2018-05-09 DIAGNOSIS — Z12.39 SCREENING FOR BREAST CANCER: Primary | ICD-10-CM

## 2018-05-30 ENCOUNTER — OFFICE VISIT (OUTPATIENT)
Dept: INTERNAL MEDICINE CLINIC | Age: 68
End: 2018-05-30
Payer: COMMERCIAL

## 2018-05-30 VITALS
TEMPERATURE: 97.7 F | OXYGEN SATURATION: 98 % | DIASTOLIC BLOOD PRESSURE: 74 MMHG | HEIGHT: 62 IN | BODY MASS INDEX: 27.16 KG/M2 | SYSTOLIC BLOOD PRESSURE: 122 MMHG | HEART RATE: 72 BPM | WEIGHT: 147.6 LBS

## 2018-05-30 DIAGNOSIS — R10.13 DYSPEPSIA: ICD-10-CM

## 2018-05-30 DIAGNOSIS — M25.562 CHRONIC PAIN OF LEFT KNEE: ICD-10-CM

## 2018-05-30 DIAGNOSIS — R06.02 SOB (SHORTNESS OF BREATH) ON EXERTION: ICD-10-CM

## 2018-05-30 DIAGNOSIS — R80.9 TYPE 2 DIABETES MELLITUS WITH MICROALBUMINURIA, WITHOUT LONG-TERM CURRENT USE OF INSULIN (HCC): ICD-10-CM

## 2018-05-30 DIAGNOSIS — F51.01 PRIMARY INSOMNIA: ICD-10-CM

## 2018-05-30 DIAGNOSIS — H60.511 ACUTE ACTINIC OTITIS EXTERNA OF RIGHT EAR: ICD-10-CM

## 2018-05-30 DIAGNOSIS — G89.29 CHRONIC PAIN OF LEFT KNEE: ICD-10-CM

## 2018-05-30 DIAGNOSIS — E87.1 HYPONATREMIA: ICD-10-CM

## 2018-05-30 DIAGNOSIS — I10 ESSENTIAL HYPERTENSION: Primary | ICD-10-CM

## 2018-05-30 DIAGNOSIS — K21.9 GASTROESOPHAGEAL REFLUX DISEASE, ESOPHAGITIS PRESENCE NOT SPECIFIED: ICD-10-CM

## 2018-05-30 DIAGNOSIS — E11.29 TYPE 2 DIABETES MELLITUS WITH MICROALBUMINURIA, WITHOUT LONG-TERM CURRENT USE OF INSULIN (HCC): ICD-10-CM

## 2018-05-30 PROCEDURE — 99214 OFFICE O/P EST MOD 30 MIN: CPT | Performed by: INTERNAL MEDICINE

## 2018-05-30 RX ORDER — FAMOTIDINE 20 MG/1
20 TABLET, FILM COATED ORAL DAILY
Qty: 90 TABLET | Refills: 0 | Status: SHIPPED | OUTPATIENT
Start: 2018-05-30

## 2018-05-30 RX ORDER — LOSARTAN POTASSIUM 50 MG/1
50 TABLET ORAL DAILY
Qty: 90 TABLET | Refills: 0 | Status: SHIPPED | OUTPATIENT
Start: 2018-05-30 | End: 2018-08-20 | Stop reason: SDUPTHER

## 2018-05-30 RX ORDER — ZOLPIDEM TARTRATE 10 MG/1
10 TABLET ORAL
Qty: 30 TABLET | Refills: 0 | Status: SHIPPED | OUTPATIENT
Start: 2018-06-01 | End: 2018-06-26 | Stop reason: SDUPTHER

## 2018-05-30 NOTE — PROGRESS NOTES
Assessment/Plan:    1  DM  Her HgbA1C was increased at 7 2 on 4/23/18  She will have a B12 level, an electrolyte panel, and a urine electrolyte panel drawn prior to her next appointment  She will also have a sodium urinary level  2  Hyponatremia  Her sodium level was decreased at 133 on 4/23/18  We instructed her to discontinue losartan-HCTZ 50-12 5 mg and instead switch to Losartan 50 mg  We also instructed her to add minimal amount of salt to her diet  3  Joint and Muscle pain  She reports left knee pain on which she had menisucus surgery several years ago  She notes that the pain has returned  She reports right shoulder pain which was previously alleviated with PT  We referred her to PT for evaluation and treatment of both areas  4  Right Ear Pain  She reports she occasionally feels a sore gland behind her right ear  She reports 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 and is unsure whether this is related to this sore gland  She will use Cortisporin ear drops TID  5  Weight loss  She reports she lost considerable weight due to a decreased appetite after her recent previous abdominal sickness  She notes she went to her GI specialist who performed a colonoscopy, but not an EGD  She was instructed to complete an EGD with Dr Carmela Young at her request    6  Proteinuria  Her microalbumin level was increased at 42 1 on 4/23/18  We think that discontinuing the HCTZ will offer protection for this as well  If this does not improve, we will refer her to a nephrologist  She will have an ADH level drawn prior to her next appointment  7  SOB on exertion  She reports SOB on exertion  We will have a CXR to assess this  8  Health Maintenance  She will follow up with us in 1 month  Diagnoses and all orders for this visit:    Essential hypertension  -     losartan (COZAAR) 50 mg tablet;  Take 1 tablet (50 mg total) by mouth daily    Primary insomnia  -     zolpidem (AMBIEN) 10 mg tablet; Take 1 tablet (10 mg total) by mouth daily at bedtime as needed for sleep    Gastroesophageal reflux disease, esophagitis presence not specified  -     famotidine (PEPCID) 20 mg tablet; Take 1 tablet (20 mg total) by mouth daily    SOB (shortness of breath) on exertion  -     XR chest pa & lateral; Future    Chronic pain of left knee  -     Ambulatory referral to Physical Therapy; Future    Hyponatremia  -     Electrolyte panel; Future  -     Sodium, urine, random  -     Arginine vasopressin hormone; Future    Type 2 diabetes mellitus with microalbuminuria, without long-term current use of insulin (HCC)  -     Vitamin B12; Future    Acute actinic otitis externa of right ear  -     neomycin-colistin-hydrocortisone-thonzonium (CORTISPORIN TC) 0 33-0 3-1-0 05 % otic suspension; Administer 3 drops to the right ear 3 (three) times a day    Dyspepsia  -     Ambulatory referral to Gastroenterology; Future          Subjective:      Patient ID: Mable Red is a 75 y o  female  Mable Red is a 75year old female who presents today for a 2 month follow up regarding her diabetes and anxiety  She reports she has lost considerable weight due to her previous recent abdominal sickness  She had completed a colonoscopy, but did not have an EGD to assess this problem  She reports she occasionally feels a "sore gland" behind her right ear  She reports 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 and is unsure whether this is related to this sore gland  She reports left knee pain on which she had meniscus surgery several years ago  She notes that this left knee pain has returned  She reports right shoulder pain which she was being treated for in PT           The following portions of the patient's history were reviewed and updated as appropriate: allergies, current medications, past family history, past medical history, past social history, past surgical history and problem list     Review of Systems   Constitutional: Positive for unexpected weight change (weight loss)  Negative for fatigue  HENT: Positive for ear pain (behind rt  ear), postnasal drip and voice change  Negative for congestion, facial swelling, hearing loss, nosebleeds, rhinorrhea, sinus pain, sinus pressure, sore throat, tinnitus and trouble swallowing  Eyes: Negative  Respiratory: Positive for shortness of breath (on exertion)  Negative for cough, choking, chest tightness and wheezing  Cardiovascular: Negative  Musculoskeletal: Positive for arthralgias (left knee s/p meniscus surgery also  shoulder pain rt  )  Negative for back pain, gait problem, joint swelling, myalgias, neck pain and neck stiffness  Skin: Negative  Neurological: Negative for dizziness, syncope, facial asymmetry, speech difficulty, light-headedness, numbness and headaches  Hematological: Does not bruise/bleed easily  Psychiatric/Behavioral: Positive for dysphoric mood  The patient is nervous/anxious  Objective:      /74 (BP Location: Left arm, Patient Position: Sitting, Cuff Size: Adult)   Pulse 72   Temp 97 7 °F (36 5 °C) (Oral)   Ht 5' 2" (1 575 m)   Wt 67 kg (147 lb 9 6 oz)   SpO2 98%   BMI 27 00 kg/m²          Physical Exam   Constitutional: She is oriented to person, place, and time  She appears well-developed and well-nourished  No distress  HENT:   Head: Normocephalic and atraumatic  Mouth/Throat: No oropharyngeal exudate  Rt  Ear canal is red  Eyes: Conjunctivae and EOM are normal  Right eye exhibits no discharge  Left eye exhibits no discharge  Neck: Normal range of motion  Neck supple  No tracheal deviation present  No thyromegaly present  Cardiovascular: Normal rate, regular rhythm and normal heart sounds  Exam reveals no friction rub  No murmur heard  Pulmonary/Chest: No respiratory distress  She has no wheezes  She has no rales  Abdominal: She exhibits no distension   There is no tenderness  There is no rebound  Musculoskeletal: She exhibits no edema or deformity  Neurological: She is oriented to person, place, and time  She has normal reflexes  No cranial nerve deficit  She exhibits normal muscle tone  Coordination normal    Skin: Skin is warm and dry  No rash noted  No erythema  Psychiatric: She has a normal mood and affect  Her behavior is normal  Judgment and thought content normal        Scribe Signature and Attestation:  By signing my name below, Rosary Moritz attest that this documentation has been prepared under the direction and in the presence of Dr Scott Flores MD  Electronically signed: Giulia Hawkins  5/30/18    Provider Attestation:  I, Dr Scott Flores, personally performed the services described in this documentation  All medical record entries made by the elsieibbrianna were at my direction and in my presence  I have reviewed the chart and discharge instructions (if applicable) and agree that the record reflects my personal performance and is accurate and complete  Dr Scott Flores MD  5/30/18

## 2018-05-30 NOTE — PATIENT INSTRUCTIONS
1  She will have a B12 level, an electrolyte panel, and sodium level in urine drawn prior to her next appointment  2  She was instructed to discontinue losartan-HCTZ 50-12 5 mg and instead switch to Losartan 50 mg  She was also instructed to add minimal amount of salt to her diet  3  She will go to PT for evaluation and treatment of her right shoulder and left knee  4  She will use prescribed ear drops  5  She was instructed to complete an EGD with Dr Hair Canela  6  She will have an ADH level drawn prior to her next appointment  7  She will have a CXR to assess this Shortness on breath on exertion  8  She will follow up with us in 1 month

## 2018-05-31 ENCOUNTER — TELEPHONE (OUTPATIENT)
Dept: INTERNAL MEDICINE CLINIC | Facility: CLINIC | Age: 68
End: 2018-05-31

## 2018-05-31 DIAGNOSIS — M25.511 RIGHT SHOULDER PAIN, UNSPECIFIED CHRONICITY: Primary | ICD-10-CM

## 2018-05-31 NOTE — TELEPHONE ENCOUNTER
Sadie Hernandez from [facility] called stating that she needs an order for PT on Shellie's shoulder  Jaz Vasquez was seen by dr alexander for this issue before as well but there is only a referral for her knee  If someone can please look into this and put it in the chart, if there is an issue please give abraham a call back at [phone]  Thank you

## 2018-06-12 ENCOUNTER — EVALUATION (OUTPATIENT)
Dept: PHYSICAL THERAPY | Facility: MEDICAL CENTER | Age: 68
End: 2018-06-12
Payer: COMMERCIAL

## 2018-06-12 DIAGNOSIS — G89.29 CHRONIC PAIN OF LEFT KNEE: Primary | ICD-10-CM

## 2018-06-12 DIAGNOSIS — M25.511 CHRONIC RIGHT SHOULDER PAIN: ICD-10-CM

## 2018-06-12 DIAGNOSIS — G89.29 CHRONIC RIGHT SHOULDER PAIN: ICD-10-CM

## 2018-06-12 DIAGNOSIS — M25.562 CHRONIC PAIN OF LEFT KNEE: Primary | ICD-10-CM

## 2018-06-12 PROCEDURE — G8990 OTHER PT/OT CURRENT STATUS: HCPCS | Performed by: PHYSICAL THERAPIST

## 2018-06-12 PROCEDURE — 97110 THERAPEUTIC EXERCISES: CPT | Performed by: PHYSICAL THERAPIST

## 2018-06-12 PROCEDURE — G8991 OTHER PT/OT GOAL STATUS: HCPCS | Performed by: PHYSICAL THERAPIST

## 2018-06-12 PROCEDURE — 97162 PT EVAL MOD COMPLEX 30 MIN: CPT | Performed by: PHYSICAL THERAPIST

## 2018-06-12 NOTE — PROGRESS NOTES
PT Evaluation     Today's date: 2018  Patient name: Myranda Horta  : 1950  MRN: [id number]  Referring provider: Fransico Tom MD  Dx:   Encounter Diagnosis     ICD-10-CM    1  Chronic pain of left knee M25 562 Ambulatory referral to Physical Therapy    G89 29    2  Chronic right shoulder pain M25 511     G89 29                   Assessment  Impairments: abnormal coordination, abnormal muscle firing, abnormal or restricted ROM, activity intolerance, impaired physical strength, lacks appropriate home exercise program, pain with function and scapular dyskinesis    Assessment details: Myranda Horta is a 75 y o  female who presents with pain, decreased strength, decreased ROM, ambulatory dysfunction and postural  dysfunction  Due to these impairments, Patient has difficulty performing a/iadls, recreational activities and engaging in social activities  Patient's clinical presentation is consistent with their referring diagnosis of Chronic pain of left knee and Chronic right shoulder pain  Patient would benefit from skilled physical therapy to address their aforementioned impairments, improve their level of function and to improve their overall quality of life  Understanding of Dx/Px/POC: good   Prognosis: good    Goals  Short Term Goals: to be achieved by 4 weeks  1) Patient to be independent with basic HEP  2) Decrease pain to 5/10 at it's worst   3) Increase UE strength by 1/2 MMT grade in all deficient planes  4) Increase LE strength by 1/2 MMT grade in all deficient planes  5) Increase UE ROM by > 5 deg in all deficient planes  6) Patient to report decreased sleep interruption secondary to pain  Long Term Goals: to be achieved by discharge  1) FOTO equal to or greater than 61   2) Patient to be independent with comprehensive HEP  3) Abolish pain for improved quality of life  4) Increase UE strength to 5/5 MMT grade in all deficient planes to improve a/iadls    5) Increase LE strength to 5/5 MMT grade in all deficient planes to improve a/iadls  5) Increase UE ROM to within 5 deg of contralateral UE to improve a/iadls  6) Patient to report no sleep interruption secondary to pain  Plan  Patient would benefit from: skilled PT  Referral necessary: No  Planned modality interventions: biofeedback, cryotherapy, hydrotherapy, unattended electrical stimulation and thermotherapy: hydrocollator packs  Planned therapy interventions: activity modification, ADL retraining, behavior modification, body mechanics training, functional ROM exercises, home exercise program, IADL retraining, joint mobilization, manual therapy, massage, neuromuscular re-education, patient education, postural training, strengthening, stretching, therapeutic activities and therapeutic exercise  Frequency: 2x week (1-2x/ week)  Duration in weeks: 8  Treatment plan discussed with: patient        Subjective Evaluation    History of Present Illness  Onset date: both are longstanding problems  Mechanism of injury: Her shoulder pain has been present for a very long time  She had a menisectomy in the knee which most likely led to this arthritic pain  They are both worse with activity  Her shoulder is an old injury which she claims to have re-injured  She states that she knows her shoulder is weak and she can feel it is  She also states that her knee gets stiff when she is sitting for a while and it takes a few minutes for it to wake up  Pain sometimes wakes her at night  She experiences some catching and locking in the shoulder  Her pain is higher today because he just cut the grass and that aggravates her symptoms  Recurrent probem    Quality of life: good    Pain  Pain scale: shoulder: 8/10 knee: 6/10  At best pain rating: 3  At worst pain ratin  Location: ususally local to the shoulder but sometimes down the arm  knee symtoms are local to the knee  Quality: burning in knee  ache in the shoulder     Relieving factors: ice and medications  Aggravating factors: lifting  Progression: worsening    Hand dominance: right      Diagnostic Tests  Abnormal x-ray: imaging on knee, not shoulder  Treatments  Previous treatment: physical therapy  Current treatment: physical therapy  Patient Goals  Patient goal: She wants to avoid surgery  Objective     Tenderness   Left Knee   Tenderness in the medial joint line, medial patella and medial retinaculum       Active Range of Motion   Left Shoulder   Normal active range of motion  Internal rotation BTB: L3     Right Shoulder   Flexion: 150 degrees with pain  Abduction: 165 degrees   Internal rotation BTB: L3   Left Knee   Flexion: 120 degrees with pain  Extension: 0 degrees     Right Knee   Flexion: 130 degrees   Extension: 0 degrees     Passive Range of Motion   Left Shoulder   Flexion: 155 degrees   Abduction: 145 degrees   External rotation 90°: 70 degrees   Internal rotation 90°: 80 degrees     Right Shoulder   Flexion: 155 degrees   Abduction: 155 degrees   External rotation 90°: 65 degrees with pain  Internal rotation 90°: 75 degrees with pain  Left Knee   Flexion: 125 degrees with pain  Extension: 0 degrees     Right Knee   Flexion: 130 degrees   Extension: 0 degrees     Mobility   Patellar Mobility:   Left Knee   Hypermobile: left medial, left lateral, left superior and left inferior      Right Knee   WFL: medial, lateral, superior and inferior    Strength/Myotome Testing     Left Shoulder   Normal muscle strength    Right Shoulder     Planes of Motion   Flexion: 4+   Abduction: 4+   External rotation at 0°: 5   Internal rotation at 0°: 5     Left Hip   Planes of Motion   Flexion: 4+    Right Hip   Planes of Motion   Flexion: 5    Left Knee   Normal strength  Quadriceps contraction: fair (minimal activation, required slight cueing)    Right Knee   Normal strength  Quadriceps contraction: good    Tests     Left Shoulder   Negative drop arm, empty can, full can, Hawkin's, Neer's and painful arc      Right Shoulder   Positive empty can, full can, Hawkin's, Neer's and painful arc  Negative drop arm  Left Knee   Negative lateral John and medial John  Right Knee   Negative lateral John and medial John       Swelling     Left Knee Girth Measurement (cm)   Joint line: 40 (mid patellar) cm    Right Knee Girth Measurement (cm)   Joint line: 38 (mid patellar) cm      Flowsheet Rows      Most Recent Value   PT/OT G-Codes   Current Score  36/50   Projected Score  50/61   FOTO information reviewed  Yes   Assessment Type  Evaluation   G code set  Other PT/OT Primary   Other PT Primary Current Status ()  CL   Other PT Primary Goal Status ()  CJ          Precautions: Depression, hx of cancer, anxiety, DM    Daily Treatment Diary     Manual  6/12            shld PROM NV                                                                    Exercise Diary  6/12            pulleys NV            Supine wand flexion 10"x10            Supine wand abd NV            s/l shoulder ER NV            shld isometrics NV            scap retraction 10x            Quad set 10x            SLR 10x            bridges NV            LAQ NV            bike NV                                                                                                                                     Modalities              CP PRN

## 2018-06-19 ENCOUNTER — OFFICE VISIT (OUTPATIENT)
Dept: PHYSICAL THERAPY | Facility: MEDICAL CENTER | Age: 68
End: 2018-06-19
Payer: COMMERCIAL

## 2018-06-19 DIAGNOSIS — M25.511 CHRONIC RIGHT SHOULDER PAIN: ICD-10-CM

## 2018-06-19 DIAGNOSIS — G89.29 CHRONIC RIGHT SHOULDER PAIN: ICD-10-CM

## 2018-06-19 DIAGNOSIS — G89.29 CHRONIC PAIN OF LEFT KNEE: Primary | ICD-10-CM

## 2018-06-19 DIAGNOSIS — M25.562 CHRONIC PAIN OF LEFT KNEE: Primary | ICD-10-CM

## 2018-06-19 PROCEDURE — 97112 NEUROMUSCULAR REEDUCATION: CPT | Performed by: PHYSICAL THERAPIST

## 2018-06-19 PROCEDURE — 97110 THERAPEUTIC EXERCISES: CPT | Performed by: PHYSICAL THERAPIST

## 2018-06-19 NOTE — PROGRESS NOTES
Daily Note     Today's date: 2018  Patient name: Torrey Angeles  : 1950  MRN: [id number]  Referring provider: Lata Miller MD  Dx:   Encounter Diagnosis     ICD-10-CM    1  Chronic pain of left knee M25 562     G89 29    2  Chronic right shoulder pain M25 511     G89 29                   Subjective: Pt reports her shoulder and knee are both feeling sore after doing some yard work which involved a lot of bending, lifting, etc        Objective: See treatment diary below      Assessment: Tolerated treatment well  Patient demonstrated fatigue post treatment, exhibited good technique with therapeutic exercises, would benefit from continued PT and is performing the exercises with minimal cueing  She does not complain of discomfort during the exercises  Her ROM is very good  Plan: Continue per plan of care  Progress treatment as tolerated          Precautions: Depression, hx of cancer, anxiety, DM    Daily Treatment Diary     Manual             shld PROM NV 5' D/c                                                                  Exercise Diary             pulleys NV 5'           Supine wand flexion 10"x10 10"x10           Supine wand abd NV 10"x10           s/l shoulder ER NV 10x           shld isometrics NV 10"x10           scap retraction 10x 10x           Quad set 10x 10x ea           SLR 10x 10x ea           bridges NV 10x           LAQ NV 10x ea           bike NV 5'           Low trap lifts             Resisted shld ext              Resisted rows                                                                                               Modalities              CP PRN

## 2018-06-21 ENCOUNTER — APPOINTMENT (OUTPATIENT)
Dept: PHYSICAL THERAPY | Facility: MEDICAL CENTER | Age: 68
End: 2018-06-21
Payer: COMMERCIAL

## 2018-06-26 DIAGNOSIS — F51.01 PRIMARY INSOMNIA: ICD-10-CM

## 2018-06-27 RX ORDER — ZOLPIDEM TARTRATE 10 MG/1
10 TABLET ORAL
Qty: 30 TABLET | Refills: 0 | Status: SHIPPED | OUTPATIENT
Start: 2018-06-27 | End: 2018-07-30 | Stop reason: SDUPTHER

## 2018-06-28 ENCOUNTER — OFFICE VISIT (OUTPATIENT)
Dept: PHYSICAL THERAPY | Facility: MEDICAL CENTER | Age: 68
End: 2018-06-28
Payer: COMMERCIAL

## 2018-06-28 DIAGNOSIS — M25.511 CHRONIC RIGHT SHOULDER PAIN: ICD-10-CM

## 2018-06-28 DIAGNOSIS — G89.29 CHRONIC RIGHT SHOULDER PAIN: ICD-10-CM

## 2018-06-28 DIAGNOSIS — M25.562 CHRONIC PAIN OF LEFT KNEE: Primary | ICD-10-CM

## 2018-06-28 DIAGNOSIS — G89.29 CHRONIC PAIN OF LEFT KNEE: Primary | ICD-10-CM

## 2018-06-28 PROCEDURE — 97110 THERAPEUTIC EXERCISES: CPT | Performed by: PHYSICAL THERAPIST

## 2018-06-28 PROCEDURE — 97112 NEUROMUSCULAR REEDUCATION: CPT | Performed by: PHYSICAL THERAPIST

## 2018-06-28 NOTE — PROGRESS NOTES
Daily Note     Today's date: 2018  Patient name: Brooks Friday  : 1950  MRN: [id number]  Referring provider: Brenda Pérez MD  Dx:   Encounter Diagnosis     ICD-10-CM    1  Chronic pain of left knee M25 562     G89 29    2  Chronic right shoulder pain M25 511     G89 29                   Subjective: Pt reports soreness today from being very busy driving the past few days  Objective: See treatment diary below      Assessment: Tolerated treatment well  Patient demonstrated fatigue post treatment, exhibited good technique with therapeutic exercises and would benefit from continued PT  Instructed in calf stretches in order to help with cramping complaint and promote knee extension  Plan: Continue per plan of care  Progress treatment as tolerated          Precautions: Depression, hx of cancer, anxiety, DM    Daily Treatment Diary     Manual             shld PROM NV 5' D/c                                                                  Exercise Diary            pulleys NV 5' 5'          Supine wand flexion 10"x10 10"x10 10" 10x          Supine wand abd NV 10"x10 10" 10x          s/l shoulder ER NV 10x 10x          shld isometrics NV 10"x10 10" 10x          scap retraction 10x 10x 5" 10x          Quad set 10x 10x ea 5" 10x          SLR 10x 10x ea 10x ea          bridges NV 10x 10x          LAQ NV 10x ea 10x ea          bike NV 5' 5'          Low trap lifts             Resisted shld ext              Resisted rows                                                                                               Modalities              CP PRN

## 2018-07-02 ENCOUNTER — APPOINTMENT (OUTPATIENT)
Dept: RADIOLOGY | Facility: CLINIC | Age: 68
End: 2018-07-02
Payer: COMMERCIAL

## 2018-07-02 ENCOUNTER — TRANSCRIBE ORDERS (OUTPATIENT)
Dept: LAB | Facility: CLINIC | Age: 68
End: 2018-07-02

## 2018-07-02 ENCOUNTER — APPOINTMENT (OUTPATIENT)
Dept: LAB | Facility: CLINIC | Age: 68
End: 2018-07-02
Payer: COMMERCIAL

## 2018-07-02 DIAGNOSIS — E87.1 HYPONATREMIA: ICD-10-CM

## 2018-07-02 DIAGNOSIS — R06.02 SOB (SHORTNESS OF BREATH) ON EXERTION: ICD-10-CM

## 2018-07-02 DIAGNOSIS — R80.9 TYPE 2 DIABETES MELLITUS WITH MICROALBUMINURIA, WITHOUT LONG-TERM CURRENT USE OF INSULIN (HCC): ICD-10-CM

## 2018-07-02 DIAGNOSIS — E11.29 TYPE 2 DIABETES MELLITUS WITH MICROALBUMINURIA, WITHOUT LONG-TERM CURRENT USE OF INSULIN (HCC): ICD-10-CM

## 2018-07-02 LAB
ANION GAP SERPL CALCULATED.3IONS-SCNC: 9 MMOL/L (ref 4–13)
CHLORIDE SERPL-SCNC: 98 MMOL/L (ref 100–108)
CO2 SERPL-SCNC: 27 MMOL/L (ref 21–32)
POTASSIUM SERPL-SCNC: 4.8 MMOL/L (ref 3.5–5.3)
SODIUM SERPL-SCNC: 134 MMOL/L (ref 136–145)
VIT B12 SERPL-MCNC: 105 PG/ML (ref 100–900)

## 2018-07-02 PROCEDURE — 80051 ELECTROLYTE PANEL: CPT

## 2018-07-02 PROCEDURE — 84588 ASSAY OF VASOPRESSIN: CPT

## 2018-07-02 PROCEDURE — 36415 COLL VENOUS BLD VENIPUNCTURE: CPT

## 2018-07-02 PROCEDURE — 83930 ASSAY OF BLOOD OSMOLALITY: CPT

## 2018-07-02 PROCEDURE — 71046 X-RAY EXAM CHEST 2 VIEWS: CPT

## 2018-07-02 PROCEDURE — 82607 VITAMIN B-12: CPT

## 2018-07-03 ENCOUNTER — OFFICE VISIT (OUTPATIENT)
Dept: PHYSICAL THERAPY | Facility: MEDICAL CENTER | Age: 68
End: 2018-07-03
Payer: COMMERCIAL

## 2018-07-03 DIAGNOSIS — G89.29 CHRONIC RIGHT SHOULDER PAIN: ICD-10-CM

## 2018-07-03 DIAGNOSIS — M25.562 CHRONIC PAIN OF LEFT KNEE: Primary | ICD-10-CM

## 2018-07-03 DIAGNOSIS — M25.511 CHRONIC RIGHT SHOULDER PAIN: ICD-10-CM

## 2018-07-03 DIAGNOSIS — G89.29 CHRONIC PAIN OF LEFT KNEE: Primary | ICD-10-CM

## 2018-07-03 PROCEDURE — 97112 NEUROMUSCULAR REEDUCATION: CPT | Performed by: PHYSICAL THERAPIST

## 2018-07-03 PROCEDURE — 97110 THERAPEUTIC EXERCISES: CPT | Performed by: PHYSICAL THERAPIST

## 2018-07-03 NOTE — PROGRESS NOTES
Daily Note     Today's date: 7/3/2018  Patient name: Betzaida Jolly  : 1950  MRN: [id number]  Referring provider: Scot Canas MD  Dx: No diagnosis found  Subjective: Knees a bit tired, painful today after mowing her lawn this morning      Objective: See treatment diary below      Assessment: Tolerated treatment well  Patient would benefit from continued PT      Plan: Continue per plan of care         Precautions: Depression, hx of cancer, anxiety, DM    Daily Treatment Diary     Manual             shld PROM NV 5' D/c                                                                  Exercise Diary           pulleys NV 5' 5' 5'         Supine wand flexion 10"x10 10"x10 10" 10x :10/10x         Supine wand abd NV 10"x10 10" 10x :10/10x         s/l shoulder ER NV 10x 10x 10x         shld isometrics NV 10"x10 10" 10x :10/10x         scap retraction 10x 10x 5" 10x :05/20x         Quad set 10x 10x ea 5" 10x :05/20x         SLR 10x 10x ea 10x ea 20x         bridges NV 10x 10x 20x         LAQ NV 10x ea 10x ea 20x         bike NV 5' 5' 5'         Low trap lifts             Resisted shld ext     RTB :03/20x         Resisted rows    RTB :03/20x                                                                                           Modalities              CP PRN

## 2018-07-05 ENCOUNTER — OFFICE VISIT (OUTPATIENT)
Dept: INTERNAL MEDICINE CLINIC | Age: 68
End: 2018-07-05
Payer: COMMERCIAL

## 2018-07-05 ENCOUNTER — APPOINTMENT (OUTPATIENT)
Dept: PHYSICAL THERAPY | Facility: MEDICAL CENTER | Age: 68
End: 2018-07-05
Payer: COMMERCIAL

## 2018-07-05 VITALS
TEMPERATURE: 98.2 F | SYSTOLIC BLOOD PRESSURE: 122 MMHG | OXYGEN SATURATION: 99 % | DIASTOLIC BLOOD PRESSURE: 66 MMHG | HEART RATE: 84 BPM | BODY MASS INDEX: 28.05 KG/M2 | HEIGHT: 62 IN | WEIGHT: 152.4 LBS

## 2018-07-05 DIAGNOSIS — E11.8 TYPE 2 DIABETES MELLITUS WITH COMPLICATION, UNSPECIFIED WHETHER LONG TERM INSULIN USE: Primary | ICD-10-CM

## 2018-07-05 DIAGNOSIS — E11.9 NON-INSULIN DEPENDENT TYPE 2 DIABETES MELLITUS (HCC): ICD-10-CM

## 2018-07-05 DIAGNOSIS — E03.9 ACQUIRED HYPOTHYROIDISM: ICD-10-CM

## 2018-07-05 DIAGNOSIS — N28.1 RENAL CYST: ICD-10-CM

## 2018-07-05 DIAGNOSIS — I10 ESSENTIAL HYPERTENSION: ICD-10-CM

## 2018-07-05 DIAGNOSIS — E87.1 HYPONATREMIA: Primary | ICD-10-CM

## 2018-07-05 PROBLEM — M67.49 GANGLION AND CYST OF SYNOVIUM, TENDON AND BURSA: Status: ACTIVE | Noted: 2017-10-10

## 2018-07-05 PROBLEM — M79.7 FIBROMYOSITIS: Status: ACTIVE | Noted: 2017-10-10

## 2018-07-05 PROBLEM — M81.0 OSTEOPOROSIS: Status: ACTIVE | Noted: 2017-10-10

## 2018-07-05 PROBLEM — M67.89 GANGLION AND CYST OF SYNOVIUM, TENDON AND BURSA: Status: ACTIVE | Noted: 2017-10-10

## 2018-07-05 PROBLEM — E04.1 NONTOXIC SINGLE THYROID NODULE: Status: ACTIVE | Noted: 2017-09-27

## 2018-07-05 PROBLEM — E66.9 OBESITY: Status: ACTIVE | Noted: 2017-10-10

## 2018-07-05 PROBLEM — M17.12 PRIMARY LOCALIZED OSTEOARTHRITIS OF LEFT KNEE: Status: ACTIVE | Noted: 2017-02-10

## 2018-07-05 PROBLEM — M71.39 GANGLION AND CYST OF SYNOVIUM, TENDON AND BURSA: Status: ACTIVE | Noted: 2017-10-10

## 2018-07-05 PROCEDURE — 99214 OFFICE O/P EST MOD 30 MIN: CPT | Performed by: PHYSICIAN ASSISTANT

## 2018-07-05 PROCEDURE — 3074F SYST BP LT 130 MM HG: CPT | Performed by: PHYSICIAN ASSISTANT

## 2018-07-05 PROCEDURE — 3078F DIAST BP <80 MM HG: CPT | Performed by: PHYSICIAN ASSISTANT

## 2018-07-05 NOTE — PROGRESS NOTES
Diabetic Foot Exam    Patient's shoes and socks removed  Right Foot/Ankle   Right Foot Inspection  Skin Exam: skin normal, skin intact and dry skin no warmth, no callus, no erythema, no maceration, no abnormal color, no pre-ulcer, no ulcer and no callus                          Toe Exam: ROM and strength within normal limits  Sensory     Proprioception: intact   Monofilament testing: intact  Vascular  Capillary refills: < 3 seconds  The right DP pulse is 2+  The right PT pulse is 2+  Left Foot/Ankle  Left Foot Inspection  Skin Exam: skin normal, skin intact and dry skinno warmth, no erythema, no maceration, normal color, no pre-ulcer, no ulcer and no callus                         Toe Exam: ROM and strength within normal limits                   Sensory     Proprioception: intact  Monofilament: intact  Vascular  Capillary refills: < 3 seconds  The left DP pulse is 2+  The left PT pulse is 2+  Assign Risk Category:  No deformity present;  No loss of protective sensation;        Risk: 0

## 2018-07-05 NOTE — PROGRESS NOTES
Assessment/Plan:         Diagnoses and all orders for this visit:    Hyponatremia  Comments:  improved to 134    Orders:  -     Comprehensive metabolic panel; Future  -     CBC and differential; Future    Acquired hypothyroidism  Comments:  well controlled    Essential hypertension  -     CBC and differential; Future    Non-insulin dependent type 2 diabetes mellitus ([facility] [address] )  Comments:  pt has been monitoring BBG at home and FBS has been around 130-140s    Renal cyst  Comments:  needs 1 yr f/u u/s           Subjective:      Patient ID: Angy Mcfadden is a 75 y o  female  F/u for hyponatremia  - pt has been liberalizing salt in diet (eating salty snack daily) and na+ is now 134, last check was 133  add'l labs pending      b12 low normal - pt does have complaints of fatigue, will start oral supplementation     Pt with history bladder ca - starting maintenance  bcg tx next week   Denies urinary symptoms, no dysuria or hematuria visible     Pt had episode of colitis (nonspecific) in feburary  lfts were elevated at that time as well, now normalized  No further ab pain or diarrhea  Pt was seen by GI and had colonoscopy at that time, due for egd which she will be scheduling this summer           The following portions of the patient's history were reviewed and updated as appropriate: allergies, current medications, past family history, past medical history, past social history, past surgical history and problem list     Review of Systems   Constitutional: Positive for fatigue  Negative for activity change, appetite change, diaphoresis and fever  HENT: Negative for congestion and postnasal drip  Eyes: Negative for visual disturbance  Respiratory: Negative for cough, shortness of breath and wheezing  Cardiovascular: Negative for chest pain and palpitations  Gastrointestinal: Negative for abdominal pain, constipation, diarrhea and nausea  Genitourinary: Negative for difficulty urinating, hematuria and urgency  Musculoskeletal: Positive for arthralgias (L knee pain )  Skin: Negative for rash  Neurological: Negative for dizziness and light-headedness  Psychiatric/Behavioral: Negative for sleep disturbance  The patient is not nervous/anxious  Past Medical History:   Diagnosis Date    Anxiety     Arthritis     Bladder carcinoma (HCC)     Bladder mass     Depression     Diabetes mellitus ([address] )     Disease of thyroid gland     thyroid nodules-not treating at this time    Dysuria     Last assessed 02/16/17    GERD (gastroesophageal reflux disease)     HLD (hyperlipidemia)     HTN (hypertension)     Hypercholesterolemia     Hypertension     Knee pain     PONV (postoperative nausea and vomiting)          Current Outpatient Prescriptions:     acetaminophen (TYLENOL) 325 mg tablet, Take 650 mg by mouth every 6 (six) hours as needed for mild pain, Disp: , Rfl:     clonazePAM (KlonoPIN) 0 5 mg tablet, Take 1 tablet (0 5 mg total) by mouth 2 (two) times a day (Patient taking differently: Take 0 25 mg by mouth daily  ), Disp: 60 tablet, Rfl: 0    famotidine (PEPCID) 20 mg tablet, Take 1 tablet (20 mg total) by mouth daily, Disp: 90 tablet, Rfl: 0    fenofibrate (TRICOR) 145 mg tablet, Take 145 mg by mouth daily    , Disp: , Rfl:     levothyroxine 50 mcg tablet, Take 1 tablet (50 mcg total) by mouth daily, Disp: 30 tablet, Rfl: 5    Loratadine 10 MG CAPS, Take 1 tablet by mouth daily, Disp: , Rfl:     losartan (COZAAR) 50 mg tablet, Take 1 tablet (50 mg total) by mouth daily, Disp: 90 tablet, Rfl: 0    metFORMIN (GLUCOPHAGE) 500 mg tablet, Take 2 tablets (1,000 mg total) by mouth 2 (two) times a day with meals MetFORMIN HCl - 500 MG Oral Tablet  Refills: 0   , M D ; Active, Disp: 120 tablet, Rfl: 5    ONETOUCH VERIO test strip, 1 each by Other route daily, Disp: 100 each, Rfl: 1    Probiotic Product (PROBIOTIC-10) CAPS, Take 1 capsule by mouth daily, Disp: , Rfl:     simvastatin (ZOCOR) 40 mg tablet, Take 40 mg by mouth daily at bedtime  , Disp: , Rfl:     zolpidem (AMBIEN) 10 mg tablet, Take 1 tablet (10 mg total) by mouth daily at bedtime as needed for sleep, Disp: 30 tablet, Rfl: 0    No Known Allergies    Social History   Past Surgical History:   Procedure Laterality Date    CARPAL TUNNEL RELEASE Right      SECTION      x2    CHOLECYSTECTOMY      CYSTOSCOPY N/A 2016    Procedure: CYSTOSCOPY WITH BIOPSIES;  Surgeon: Danay Gracia MD;  [facility];  Service:    [place] N/A 2016    Procedure: Dalton Barry;  Surgeon: Danay Gracia MD;  [facility];  Service:     HERNIA REPAIR      CO CYSTO/URETERO W/LITHOTRIPSY &INDWELL STENT INSRT  2016    Procedure: CYSTOSCOPY URETEROSCOPY WITH LITHOTRIPSY HOLMIUM LASER RIGHT, RETROGRADE PYELOGRAM BILATERAL: AND INSERTION STENT URETERAL Right ;  Surgeon: Danay Gracia MD;  [facility];  Service: Urology    CO CYSTO/URETERO/PYELOSCOPY, DX Right 2017    Procedure: URETEROSCOPY;  Surgeon: Danay Gracia MD;  [facility];  Service: Urology    CO CYSTOSCOPY,INSERT URETERAL STENT Left 2016    Procedure: URETERAL STENTS;  Surgeon: Danay Gracia MD;  [facility];  Service: Urology    CO CYSTOURETHROSCOPY,FULGUR <0 5 CM LESN N/A 2016    Procedure: TRANSURETHRAL RESECTION OF BLADDER TUMOR (TURBT);   Surgeon: Danay Gracia MD;  [facility];  Service: Urology    CO CYSTOURETHROSCOPY,FULGUR <0 5 CM LESN N/A 2016    Procedure: Dalton Barry; TUR BLADDER TUMOR ;  Surgeon: Danay Gracia MD;  [facility];  Service: Urology    CO CYSTOURETHROSCOPY,FULGUR <0 5 CM LESN N/A 2016    Procedure: TUR BLADDER TUMOR ;  Surgeon: Danay Gracia MD;  [facility];  Service: Urology    CO CYSTOURETHROSCOPY,FULGUR <0 5 CM LESN Bilateral 2017    Procedure: Kumar Pastel, ;  Surgeon: Danay Gracia MD;  [facility];  Service: Urology    WA CYSTOURETHROSCOPY,URETER CATHETER Bilateral 9/29/2016    Procedure: RETROGRADE PYELOGRAM ;  Surgeon: Lindell Pallas, MD;  [facility];  Service: Urology    WA CYSTOURETHROSCOPY,URETER CATHETER Bilateral 5/9/2017    Procedure: RETROGRADE PYELOGRAM WITH STENT EXCHANGE, RIGHT;  Surgeon: Lindell Pallas, MD;  [facility];  Service: Urology    WA INSTILL ANTICANCER AGENT IN BLADDER N/A 11/29/2016    Procedure: Annelle Factor;  Surgeon: Lindell Pallas, MD;  [facility];  Service: Urology    WA KNEE SCOPE,MED/LAT MENISECTOMY Left 4/4/2016    Procedure: KNEE ARTHROSCOPIC PARTIAL MEDIAL MENISCECTOMY ;  Surgeon: Sisi Awan MD;  [facility];  Service: Orthopedics    REMOVAL URETERAL STENT Left 11/29/2016    Procedure: REMOVAL STENT URETERAL;  Surgeon: Lindell Pallas, MD;  [facility];  Service:     TONSILLECTOMY       Family History   Problem Relation Age of Onset    Heart attack Mother     ALS Father     Diabetes Maternal Grandfather     Lung cancer Paternal Grandfather        Objective:  /66 (BP Location: Left arm, Patient Position: Sitting, Cuff Size: Standard)   Pulse 84   Temp 98 2 °F (36 8 °C) (Tympanic)   Ht 5' 1 81" (1 57 m)   Wt 69 1 kg (152 lb 6 4 oz)   SpO2 99%   BMI 28 05 kg/m²        Physical Exam   Constitutional: She is oriented to person, place, and time  She appears well-developed and well-nourished  HENT:   Head: Normocephalic and atraumatic  Right Ear: External ear normal    Left Ear: External ear normal    Mouth/Throat: Oropharynx is clear and moist    Eyes: EOM are normal  Pupils are equal, round, and reactive to light  Neck: Normal range of motion  Neck supple  Cardiovascular: Normal rate, regular rhythm and normal heart sounds  Pulmonary/Chest: Effort normal and breath sounds normal  No respiratory distress  She has no wheezes  She has no rales  Abdominal: Soft   Bowel sounds are normal  There is no tenderness  Musculoskeletal: Normal range of motion  She exhibits no edema or deformity  Lymphadenopathy:     She has no cervical adenopathy  Neurological: She is alert and oriented to person, place, and time  No cranial nerve deficit  Skin: Skin is warm and dry  No rash noted  She is not diaphoretic  No erythema  Psychiatric: She has a normal mood and affect  Her behavior is normal  Thought content normal    Vitals reviewed

## 2018-07-10 ENCOUNTER — PROCEDURE VISIT (OUTPATIENT)
Dept: UROLOGY | Facility: AMBULATORY SURGERY CENTER | Age: 68
End: 2018-07-10
Payer: COMMERCIAL

## 2018-07-10 VITALS
WEIGHT: 149.8 LBS | BODY MASS INDEX: 27.57 KG/M2 | SYSTOLIC BLOOD PRESSURE: 102 MMHG | HEIGHT: 62 IN | DIASTOLIC BLOOD PRESSURE: 68 MMHG | HEART RATE: 80 BPM

## 2018-07-10 DIAGNOSIS — C67.9 MALIGNANT NEOPLASM OF URINARY BLADDER, UNSPECIFIED SITE (HCC): Primary | ICD-10-CM

## 2018-07-10 DIAGNOSIS — E78.00 HYPERCHOLESTEROLEMIA: Primary | ICD-10-CM

## 2018-07-10 LAB
SL AMB  POCT GLUCOSE, UA: NORMAL
SL AMB LEUKOCYTE ESTERASE,UA: NORMAL
SL AMB POCT BILIRUBIN,UA: NORMAL
SL AMB POCT BLOOD,UA: NORMAL
SL AMB POCT CLARITY,UA: CLEAR
SL AMB POCT COLOR,UA: YELLOW
SL AMB POCT KETONES,UA: NORMAL
SL AMB POCT NITRITE,UA: NORMAL
SL AMB POCT PH,UA: 6
SL AMB POCT SPECIFIC GRAVITY,UA: 1.02
SL AMB POCT URINE PROTEIN: NORMAL
SL AMB POCT UROBILINOGEN: NORMAL

## 2018-07-10 PROCEDURE — 51720 TREATMENT OF BLADDER LESION: CPT | Performed by: UROLOGY

## 2018-07-10 PROCEDURE — 81002 URINALYSIS NONAUTO W/O SCOPE: CPT | Performed by: NURSE PRACTITIONER

## 2018-07-10 RX ORDER — FENOFIBRATE 145 MG/1
145 TABLET, COATED ORAL DAILY
Qty: 90 TABLET | Refills: 1 | Status: SHIPPED | OUTPATIENT
Start: 2018-07-10 | End: 2018-12-10 | Stop reason: SDUPTHER

## 2018-07-10 RX ORDER — IBUPROFEN 200 MG
200 TABLET ORAL EVERY 6 HOURS PRN
COMMUNITY
End: 2022-05-09 | Stop reason: ALTCHOICE

## 2018-07-10 NOTE — PROGRESS NOTES
7/10/2018    Angel Ferris  1950  [id number]    Diagnosis  Chief Complaint     Bladder Cancer          Patient presents for BCG 1 of 3 managed by Dr Angela Gandhi This Encounter   Procedures    POCT urine dip     Patient will follow up in one week for next treatment  Patient instructed to call with persistent hematuria, fever, or other concerns  Procedure BCG treatment 1 of 3  Vitals:    07/10/18 0800   BP: 102/68   Pulse: 80   Weight: 67 9 kg (149 lb 12 8 oz)   Height: 5' 2" (1 575 m)       Recent Results (from the past 1 hour(s))   POCT urine dip    Collection Time: 07/10/18  9:49 AM   Result Value Ref Range    LEUKOCYTE ESTERASE,UA -      NITRITE,UA -     SL AMB POCT UROBILINOGEN -     SL AMB POCT URINE PROTEIN -      PH,UA 6 0      BLOOD,UA trace      SPECIFIC GRAVITY,UA 1 025      KETONES,UA -      BILIRUBIN,UA -     GLUCOSE, UA -      COLOR,UA yellow      CLARITY,UA clear    ]  Urinary Incontinence Screening      Most Recent Value   Urinary Incontinence   Urinary Incontinence? No   Incomplete emptying? No   Urinary frequency? Yes   Urinary urgency? Yes [sometimes]   Urinary hesitancy? No   Dysuria (painful difficult urination)? No   Nocturia (waking up to use the bathroom)? Yes [3-4x]   Straining (having to push to go)? No   Weak stream?  No   Intermittent stream?  No   Post void dribbling? Yes [sometimes]               Bladder instillation     Date/Time 7/10/2018 9:50 AM     Performed by  Cristopher Ruff     Authorized by Annmarie Carranza              Sterile technique employed  Patient prepped and identified  16F Straight catheter placed  Bladder drained, residual approximately <30mL  The following solution was instilled directly into the bladder via catheter: 1 Vial BCG  Catheter removed  Patient discharged  Patient tolerated procedure       Administrations This Visit     BCG (LEXI BCG) intravesical suspension 50 mg     Admin Date  07/10/2018 Action  Given Dose  50 mg Route  Bladder Instillation Administered By  INDIANA Spaulding RN  RYANN, BSN

## 2018-07-11 LAB
OSMOLALITY SERPL: 280 MOSMOL/KG (ref 280–301)
VASOPRESSIN SERPL-MCNC: 1.4 PG/ML (ref 0–4.7)

## 2018-07-13 ENCOUNTER — TRANSCRIBE ORDERS (OUTPATIENT)
Dept: PHYSICAL THERAPY | Facility: MEDICAL CENTER | Age: 68
End: 2018-07-13

## 2018-07-13 ENCOUNTER — OFFICE VISIT (OUTPATIENT)
Dept: PHYSICAL THERAPY | Facility: MEDICAL CENTER | Age: 68
End: 2018-07-13
Payer: COMMERCIAL

## 2018-07-13 DIAGNOSIS — M25.511 CHRONIC RIGHT SHOULDER PAIN: ICD-10-CM

## 2018-07-13 DIAGNOSIS — G89.29 CHRONIC PAIN OF LEFT KNEE: Primary | ICD-10-CM

## 2018-07-13 DIAGNOSIS — M25.562 CHRONIC PAIN OF LEFT KNEE: Primary | ICD-10-CM

## 2018-07-13 DIAGNOSIS — G89.29 CHRONIC RIGHT SHOULDER PAIN: ICD-10-CM

## 2018-07-13 PROCEDURE — 97112 NEUROMUSCULAR REEDUCATION: CPT | Performed by: PHYSICAL THERAPIST

## 2018-07-13 PROCEDURE — 97110 THERAPEUTIC EXERCISES: CPT | Performed by: PHYSICAL THERAPIST

## 2018-07-13 PROCEDURE — G8991 OTHER PT/OT GOAL STATUS: HCPCS | Performed by: PHYSICAL THERAPIST

## 2018-07-13 PROCEDURE — G8990 OTHER PT/OT CURRENT STATUS: HCPCS | Performed by: PHYSICAL THERAPIST

## 2018-07-13 NOTE — PROGRESS NOTES
PT Evaluation     Today's date: 2018  Patient name: Jeanne Gibbons  : 1950  MRN: [id number]  Referring provider: Olivia Mak MD  Dx:   Encounter Diagnosis     ICD-10-CM    1  Chronic pain of left knee M25 562     G89 29    2  Chronic right shoulder pain M25 511     G89 29                   Assessment  Impairments: abnormal coordination, abnormal muscle firing, abnormal or restricted ROM, activity intolerance, impaired physical strength, lacks appropriate home exercise program, pain with function and scapular dyskinesis    Assessment details: Patient has improved shoulder ROM and function as well as knee ROM  She has continued knee pain and limited functional mobility as ewll as with lifting tasks  While she would benefit from continued therapy she wishes to continue only 1 more visit, receive an updated HEP and transiiton ot HEP  Thank you for this pleasant referral     Understanding of Dx/Px/POC: good   Prognosis: good    Goals  Short Term Goals: to be achieved by 4 weeks  1) Patient to be independent with basic HEP  -Met  2) Decrease pain to 5/10 at it's worst -Not Met  3) Increase UE strength by 1/2 MMT grade in all deficient planes  -Met  4) Increase LE strength by 1/2 MMT grade in all deficient planes  -Met  5) Increase UE ROM by > 5 deg in all deficient planes  -Met  6) Patient to report decreased sleep interruption secondary to pain  - Met    Long Term Goals: to be achieved by discharge  1) FOTO equal to or greater than 61   2) Patient to be independent with comprehensive HEP  -Partially met  3) Abolish pain for improved quality of life  - Not Met  4) Increase UE strength to 5/5 MMT grade in all deficient planes to improve a/iadls  -Met  5) Increase LE strength to 5/5 MMT grade in all deficient planes to improve a/iadls  -met  5) Increase UE ROM to within 5 deg of contralateral UE to improve a/iadls  - Met  6) Patient to report no sleep interruption secondary to pain -met    Plan  Patient would benefit from: skilled PT  Referral necessary: No  Planned modality interventions: biofeedback, cryotherapy, hydrotherapy, unattended electrical stimulation and thermotherapy: hydrocollator packs  Planned therapy interventions: activity modification, ADL retraining, behavior modification, body mechanics training, functional ROM exercises, home exercise program, IADL retraining, joint mobilization, manual therapy, massage, neuromuscular re-education, patient education, postural training, strengthening, stretching, therapeutic activities and therapeutic exercise  Frequency: 2x week (1-2x/ week)  Duration in weeks: 8  Treatment plan discussed with: patient        Subjective Evaluation    History of Present Illness  Onset date: both are longstanding problems  Mechanism of injury: Pt notes her knee pain is more troublesome than her shoulder pain with pain experienced with standing, walking and especially stairs, which she avoids and mowing the lawn  Active movement of her shoulder does not cause pain but lifitng something with weight does  She wishes to avoid knee surgery but injection has provided little relief   She wishes to continue 1 more visit then transition to updated [facility] as 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    Recurrent probem    Quality of life: good    Pain  Pain scale: shoulder: 8/10 knee: 6/10  At best pain rating: 3  At worst pain ratin  Location: ususally local to the shoulder but sometimes down the arm  knee symtoms are local to the knee  Quality: burning in knee  ache in the shoulder  Relieving factors: ice and medications  Aggravating factors: lifting  Progression: worsening    Hand dominance: right      Diagnostic Tests  Abnormal x-ray: imaging on knee, not shoulder  Treatments  Previous treatment: physical therapy  Current treatment: physical therapy  Patient Goals  Patient goal: She wants to avoid surgery           Objective     Tenderness   Left Knee Tenderness in the medial joint line, medial patella and medial retinaculum  Active Range of Motion   Left Shoulder   Normal active range of motion  Flexion: WFL  Abduction: WFL  External rotation 0°: [facility]  Internal rotation BTB: L3     Right Shoulder   Flexion: WFL and with pain  Abduction: 165 degrees WFL  Internal rotation BTB: L3   Left Knee   Flexion: WFL  Extension: WFL    Right Knee   Flexion: 130 degrees   Extension: 0 degrees     Passive Range of Motion   Left Shoulder   Flexion: WFL  Abduction: WFL  External rotation 90°: WFL  Internal rotation 90°: WFL    Right Shoulder   Flexion: 155 degrees   Abduction: 155 degrees   External rotation 90°: 65 degrees with pain  Internal rotation 90°: 75 degrees with pain  Left Knee   Flexion: WFL  Extension: WFL    Right Knee   Flexion: 130 degrees   Extension: 0 degrees     Mobility   Patellar Mobility:   Left Knee   Hypermobile: left medial, left lateral, left superior and left inferior      Right Knee   WFL: medial, lateral, superior and inferior    Strength/Myotome Testing     Left Shoulder   Normal muscle strength    Right Shoulder     Planes of Motion   Flexion: 4+   Abduction: 4+   External rotation at 0°: 5   Internal rotation at 0°: 5     Left Hip   Planes of Motion   Flexion: 4+    Right Hip   Planes of Motion   Flexion: 5    Left Knee   Normal strength  Quadriceps contraction: fair (minimal activation, required slight cueing)    Right Knee   Normal strength  Quadriceps contraction: good    Tests     Left Shoulder   Negative drop arm, empty can, full can, Hawkin's, Neer's and painful arc  Right Shoulder   Positive empty can and Hawkin's  Negative drop arm, full can, Neer's and painful arc  Left Knee   Negative lateral John and medial John  Right Knee   Negative lateral John and medial John       Additional Tests Details  Mild TTP SubAcromial Space  Pain with DL Squat  Fair control on steps    Swelling     Left Knee Girth Measurement (cm)   Joint line: 40 (mid patellar) cm    Right Knee Girth Measurement (cm)   Joint line: 38 (mid patellar) cm          Precautions: Depression, hx of cancer, anxiety, DM    Daily Treatment Diary     Precautions: Depression, hx of cancer, anxiety, DM    Daily Treatment Diary     Manual  6/12 6/19           shld PROM NV 5' D/c                                                                  Exercise Diary  6/12 6/19 6/28 7/5 7/13        pulleys NV 5' 5' 5' 5'        Supine wand flexion 10"x10 10"x10 10" 10x :10/10x :10/10x        Supine wand abd NV 10"x10 10" 10x :10/10x :10/10x        s/l shoulder ER NV 10x 10x 10x 10x        shld isometrics NV 10"x10 10" 10x :10/10x :10/10x        scap retraction 10x 10x 5" 10x :05/20x :05/20x        Quad set 10x 10x ea 5" 10x :05/20x :05/20x        SLR 10x 10x ea 10x ea 20x 20x        bridges NV 10x 10x 20x 20x        LAQ NV 10x ea 10x ea 20x 20x        bike NV 5' 5' 5' 5'        Low trap lifts             Resisted shld ext     RTB :03/20x RTB :03/20x        Resisted rows    RTB :03/20x RTB :03/20x                                                                                          Modalities              CP PRN

## 2018-07-13 NOTE — LETTER
2018    MD Radha Sanchez 258  [address] [facility]  [facility]    Patient: Meliza Natarajan   YOB: 1950   Date of Visit: 2018     Encounter Diagnosis     ICD-10-CM    1  Chronic pain of left knee M25 562     G89 29    2  Chronic right shoulder pain M25 511     G89 29        Dear Dr Heaven Woodward:    Please review the attached Plan of Care from Flavia Lee's recent visit  Please verify that you agree therapy should continue by signing the attached document and sending it back to our office  If you have any questions or concerns, please don't hesitate to call  Sincerely,    Santiago Broderick, PT      Referring Provider:      I certify that I have read the below Plan of Care and certify the need for these services furnished under this plan of treatment while under my care  MD Radha Sanchez 258  [address] [facility] [address]: [phone]          PT Evaluation     Today's date: 2018  Patient name: Meliza Natarajan  : 1950  MRN: [id number]  Referring provider: Krishan Huffman MD  Dx:   Encounter Diagnosis     ICD-10-CM    1  Chronic pain of left knee M25 562     G89 29    2  Chronic right shoulder pain M25 511     G89 29                   Assessment  Impairments: abnormal coordination, abnormal muscle firing, abnormal or restricted ROM, activity intolerance, impaired physical strength, lacks appropriate home exercise program, pain with function and scapular dyskinesis    Assessment details: Patient has improved shoulder ROM and function as well as knee ROM  She has continued knee pain and limited functional mobility as ewll as with lifting tasks  While she would benefit from continued therapy she wishes to continue only 1 more visit, receive an updated HEP and transiiton ot HEP    Thank you for this pleasant referral     Understanding of Dx/Px/POC: good   Prognosis: good    Goals  Short Term Goals: to be achieved by 4 weeks  1) Patient to be independent with basic HEP  -Met  2) Decrease pain to 5/10 at it's worst -Not Met  3) Increase UE strength by 1/2 MMT grade in all deficient planes  -Met  4) Increase LE strength by 1/2 MMT grade in all deficient planes  -Met  5) Increase UE ROM by > 5 deg in all deficient planes  -Met  6) Patient to report decreased sleep interruption secondary to pain  - Met    Long Term Goals: to be achieved by discharge  1) FOTO equal to or greater than 61   2) Patient to be independent with comprehensive HEP  -Partially met  3) Abolish pain for improved quality of life  - Not Met  4) Increase UE strength to 5/5 MMT grade in all deficient planes to improve a/iadls  -Met  5) Increase LE strength to 5/5 MMT grade in all deficient planes to improve a/iadls  -met  5) Increase UE ROM to within 5 deg of contralateral UE to improve a/iadls  - Met  6) Patient to report no sleep interruption secondary to pain -met    Plan  Patient would benefit from: skilled PT  Referral necessary: No  Planned modality interventions: biofeedback, cryotherapy, hydrotherapy, unattended electrical stimulation and thermotherapy: hydrocollator packs  Planned therapy interventions: activity modification, ADL retraining, behavior modification, body mechanics training, functional ROM exercises, home exercise program, IADL retraining, joint mobilization, manual therapy, massage, neuromuscular re-education, patient education, postural training, strengthening, stretching, therapeutic activities and therapeutic exercise  Frequency: 2x week (1-2x/ week)  Duration in weeks: 8  Treatment plan discussed with: patient        Subjective Evaluation    History of Present Illness  Onset date: both are longstanding problems  Mechanism of injury: Pt notes her knee pain is more troublesome than her shoulder pain with pain experienced with standing, walking and especially stairs, which she avoids and mowing the lawn    Active movement of her shoulder does not cause pain but lifitng something with weight does  She wishes to avoid knee surgery but injection has provided little relief   She wishes to continue 1 more visit then transition to updated [facility] as 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    Recurrent probem    Quality of life: good    Pain  Pain scale: shoulder: 8/10 knee: 6/10  At best pain rating: 3  At worst pain ratin  Location: ususally local to the shoulder but sometimes down the arm  knee symtoms are local to the knee  Quality: burning in knee  ache in the shoulder  Relieving factors: ice and medications  Aggravating factors: lifting  Progression: worsening    Hand dominance: right      Diagnostic Tests  Abnormal x-ray: imaging on knee, not shoulder  Treatments  Previous treatment: physical therapy  Current treatment: physical therapy  Patient Goals  Patient goal: She wants to avoid surgery  Objective     Tenderness   Left Knee   Tenderness in the medial joint line, medial patella and medial retinaculum       Active Range of Motion   Left Shoulder   Normal active range of motion  Flexion: WFL  Abduction: WFL  External rotation 0°:  [facility]  Internal rotation BTB: L3     Right Shoulder   Flexion: WFL and with pain  Abduction: 165 degrees WFL  Internal rotation BTB: L3   Left Knee   Flexion: WFL  Extension: WFL    Right Knee   Flexion: 130 degrees   Extension: 0 degrees     Passive Range of Motion   Left Shoulder   Flexion: WFL  Abduction: WFL  External rotation 90°:  WFL  Internal rotation 90°:  WFL    Right Shoulder   Flexion: 155 degrees   Abduction: 155 degrees   External rotation 90°:  65 degrees with pain  Internal rotation 90°:  75 degrees with pain  Left Knee   Flexion: WFL  Extension: WFL    Right Knee   Flexion: 130 degrees   Extension: 0 degrees     Mobility   Patellar Mobility:   Left Knee   Hypermobile: left medial, left lateral, left superior and left inferior      Right Knee   WFL: medial, lateral, superior and inferior    Strength/Myotome Testing     Left Shoulder   Normal muscle strength    Right Shoulder     Planes of Motion   Flexion: 4+   Abduction: 4+   External rotation at 0°:  5   Internal rotation at 0°:  5     Left Hip   Planes of Motion   Flexion: 4+    Right Hip   Planes of Motion   Flexion: 5    Left Knee   Normal strength  Quadriceps contraction: fair (minimal activation, required slight cueing)    Right Knee   Normal strength  Quadriceps contraction: good    Tests     Left Shoulder   Negative drop arm, empty can, full can, Hawkin's, Neer's and painful arc  Right Shoulder   Positive empty can and Hawkin's  Negative drop arm, full can, Neer's and painful arc  Left Knee   Negative lateral John and medial John  Right Knee   Negative lateral John and medial John       Additional Tests Details  Mild TTP SubAcromial Space  Pain with DL Squat  Fair control on steps    Swelling     Left Knee Girth Measurement (cm)   Joint line: 40 (mid patellar) cm    Right Knee Girth Measurement (cm)   Joint line: 38 (mid patellar) cm          Precautions: Depression, hx of cancer, anxiety, DM    Daily Treatment Diary     Precautions: Depression, hx of cancer, anxiety, DM    Daily Treatment Diary     Manual  6/12 6/19           shld PROM NV 5' D/c                                                                  Exercise Diary  6/12 6/19 6/28 7/5 7/13        pulleys NV 5' 5' 5' 5'        Supine wand flexion 10"x10 10"x10 10" 10x :10/10x :10/10x        Supine wand abd NV 10"x10 10" 10x :10/10x :10/10x        s/l shoulder ER NV 10x 10x 10x 10x        shld isometrics NV 10"x10 10" 10x :10/10x :10/10x        scap retraction 10x 10x 5" 10x :05/20x :05/20x        Quad set 10x 10x ea 5" 10x :05/20x :05/20x        SLR 10x 10x ea 10x ea 20x 20x        bridges NV 10x 10x 20x 20x        LAQ NV 10x ea 10x ea 20x 20x        bike NV 5' 5' 5' 5'        Low trap lifts             Resisted shld ext     RTB :03/20x RTB :03/20x        Resisted rows    RTB :03/20x RTB :03/20x                                                                                          Modalities              CP PRN

## 2018-07-17 ENCOUNTER — PROCEDURE VISIT (OUTPATIENT)
Dept: UROLOGY | Facility: AMBULATORY SURGERY CENTER | Age: 68
End: 2018-07-17
Payer: COMMERCIAL

## 2018-07-17 VITALS
HEART RATE: 68 BPM | DIASTOLIC BLOOD PRESSURE: 68 MMHG | SYSTOLIC BLOOD PRESSURE: 112 MMHG | BODY MASS INDEX: 27.42 KG/M2 | HEIGHT: 62 IN | WEIGHT: 149 LBS

## 2018-07-17 DIAGNOSIS — C67.9 MALIGNANT NEOPLASM OF URINARY BLADDER, UNSPECIFIED SITE (HCC): Primary | ICD-10-CM

## 2018-07-17 PROCEDURE — 81002 URINALYSIS NONAUTO W/O SCOPE: CPT | Performed by: UROLOGY

## 2018-07-17 PROCEDURE — 51720 TREATMENT OF BLADDER LESION: CPT | Performed by: UROLOGY

## 2018-07-17 NOTE — PROGRESS NOTES
7/17/2018    Shine Fall  1950  [id number]    Diagnosis  Chief Complaint     Bladder Cancer          Patient presents for BCG 2 of 3 managed by Dr Henri Staton This Encounter   Procedures    POCT urine dip     Patient will follow up in one week for next treatment  Patient instructed to call with persistent hematuria, fever, or other concerns  Procedure BCG treatment 2 of 3  Vitals:    07/17/18 0813   BP: 112/68   Pulse: 68   Weight: 67 6 kg (149 lb)   Height: 5' 2" (1 575 m)       Recent Results (from the past 4 hour(s))   POCT urine dip    Collection Time: 07/17/18  8:19 AM   Result Value Ref Range    LEUKOCYTE ESTERASE,UA -      NITRITE,UA -     SL AMB POCT UROBILINOGEN -     SL AMB POCT URINE PROTEIN -      PH,UA 6 0      BLOOD,UA ++      SPECIFIC GRAVITY,UA 1 020      KETONES,UA -      BILIRUBIN,UA -     GLUCOSE, UA -      COLOR,UA yellow      CLARITY,UA clear          Urinary Incontinence Screening      Most Recent Value   Urinary Incontinence   Urinary Incontinence? No   Incomplete emptying? No [not always]   Urinary frequency? Yes [sometimes]   Urinary urgency? No   Urinary hesitancy? No   Dysuria (painful difficult urination)? No   Nocturia (waking up to use the bathroom)? Yes [3x]   Straining (having to push to go)? No   Weak stream?  No   Intermittent stream?  Yes [sometimes]   Post void dribbling? No        Denies heamturia last week  Was not able to hold the BCG the full two hours last week but she made if 1 hr 50 min approx  Bladder instillation     Date/Time 7/17/2018 9:54 AM     Performed by  Mary Kate Cid     Authorized by Kati Magdaleno              Sterile technique employed  Patient prepped with betadine and identified  16F Straight catheter placed  Bladder drained, residual approximately <15mL  The following solution was instilled directly into the bladder via catheter: 1 Vial BCG  Catheter removed  Patient discharged   Patient tolerated procedure       Administrations This Visit     BCG (LEXI BCG) intravesical suspension 50 mg     Admin Date  07/17/2018 Action  Given Dose  50 mg Route  Bladder Instillation Administered By  Surinder Antonio, RN                  Surinder Antonio, RN  DENISSE, BSN

## 2018-07-18 ENCOUNTER — APPOINTMENT (OUTPATIENT)
Dept: LAB | Facility: MEDICAL CENTER | Age: 68
End: 2018-07-18
Payer: COMMERCIAL

## 2018-07-18 ENCOUNTER — TELEPHONE (OUTPATIENT)
Dept: UROLOGY | Facility: AMBULATORY SURGERY CENTER | Age: 68
End: 2018-07-18

## 2018-07-18 DIAGNOSIS — N39.0 URINARY TRACT INFECTION WITHOUT HEMATURIA, SITE UNSPECIFIED: ICD-10-CM

## 2018-07-18 DIAGNOSIS — N39.0 URINARY TRACT INFECTION WITHOUT HEMATURIA, SITE UNSPECIFIED: Primary | ICD-10-CM

## 2018-07-18 PROCEDURE — 87086 URINE CULTURE/COLONY COUNT: CPT

## 2018-07-18 PROCEDURE — 81001 URINALYSIS AUTO W/SCOPE: CPT

## 2018-07-18 NOTE — TELEPHONE ENCOUNTER
Received message from patient stating that she has been having urgency, frequency, and burning since BCG yesterday  She is concerned that she might have a UTI  [place]  Per Dr Jose A Hawk, rx for ua, c/s in [facility]    LK for patient to call regarding above

## 2018-07-19 ENCOUNTER — APPOINTMENT (OUTPATIENT)
Dept: PHYSICAL THERAPY | Facility: MEDICAL CENTER | Age: 68
End: 2018-07-19
Payer: COMMERCIAL

## 2018-07-19 LAB
BACTERIA UR QL AUTO: ABNORMAL /HPF
BILIRUB UR QL STRIP: NEGATIVE
CAOX CRY URNS QL MICRO: ABNORMAL /HPF
CLARITY UR: ABNORMAL
COLOR UR: YELLOW
GLUCOSE UR STRIP-MCNC: NEGATIVE MG/DL
HGB UR QL STRIP.AUTO: ABNORMAL
KETONES UR STRIP-MCNC: NEGATIVE MG/DL
LEUKOCYTE ESTERASE UR QL STRIP: ABNORMAL
MUCOUS THREADS UR QL AUTO: ABNORMAL
NITRITE UR QL STRIP: NEGATIVE
NON-SQ EPI CELLS URNS QL MICRO: ABNORMAL /HPF
PH UR STRIP.AUTO: 6.5 [PH] (ref 4.5–8)
PROT UR STRIP-MCNC: ABNORMAL MG/DL
RBC #/AREA URNS AUTO: ABNORMAL /HPF
SP GR UR STRIP.AUTO: 1.02 (ref 1–1.03)
UROBILINOGEN UR QL STRIP.AUTO: 0.2 E.U./DL
WBC #/AREA URNS AUTO: ABNORMAL /HPF

## 2018-07-19 RX ORDER — SULFAMETHOXAZOLE AND TRIMETHOPRIM 800; 160 MG/1; MG/1
1 TABLET ORAL EVERY 12 HOURS SCHEDULED
Qty: 10 TABLET | Refills: 0 | Status: SHIPPED | OUTPATIENT
Start: 2018-07-19 | End: 2018-07-24

## 2018-07-19 NOTE — TELEPHONE ENCOUNTER
Call to patient and informed her of the instructions above and she verbalized understanding to me  Patient will obtain KUB and US after returning from vacation next week  Also informed her that we would be in touch with her once the culture results are received in regards to her next BCG appt scheduled on Tuesday 7/24/18 as she was inquiring if she should still keep that appt

## 2018-07-19 NOTE — TELEPHONE ENCOUNTER
If symptoms worsening can start Bactrim and follow culture closely, patient should also obtain KUB and US as she has calcium oxalated crystals in urine

## 2018-07-20 LAB — BACTERIA UR CULT: NORMAL

## 2018-07-20 NOTE — TELEPHONE ENCOUNTER
Culture back, just 20,000 -29,000 mixed contaminants x3  Likely not UTI, symptoms likely the result of BCG treatment  Please confirm that it's ok to discontinue antibiotics and resume BCG treatment as scheduled 7/24

## 2018-07-20 NOTE — TELEPHONE ENCOUNTER
Called and spoke with Josefa Erwin  Reviewed the results of her testing and that there is no sign of UTI at this time  Encouraged her to stop the antibiotics  Encouraged her to continue pushing fluids to flush the bladder and help with irritative side effects of the BCG  Ok to proceed with BCG  Patient will still have KUB and renal US done in the next few days to weeks to evaluate crystals in urine  Pt verbalized understanding of all recommendations and agrees to plan

## 2018-07-20 NOTE — TELEPHONE ENCOUNTER
No evidence of infection and no need for antibiotics  Ok to continue with BCG treatments  Patient should still have renal imaging to further evaluate oxalate crystals in urine

## 2018-07-24 ENCOUNTER — PROCEDURE VISIT (OUTPATIENT)
Dept: UROLOGY | Facility: AMBULATORY SURGERY CENTER | Age: 68
End: 2018-07-24
Payer: COMMERCIAL

## 2018-07-24 VITALS
DIASTOLIC BLOOD PRESSURE: 80 MMHG | HEIGHT: 62 IN | SYSTOLIC BLOOD PRESSURE: 142 MMHG | HEART RATE: 88 BPM | BODY MASS INDEX: 27.71 KG/M2 | WEIGHT: 150.6 LBS

## 2018-07-24 DIAGNOSIS — C67.9 MALIGNANT NEOPLASM OF URINARY BLADDER, UNSPECIFIED SITE (HCC): Primary | ICD-10-CM

## 2018-07-24 PROCEDURE — 81002 URINALYSIS NONAUTO W/O SCOPE: CPT | Performed by: UROLOGY

## 2018-07-24 PROCEDURE — 51720 TREATMENT OF BLADDER LESION: CPT | Performed by: UROLOGY

## 2018-07-24 NOTE — PROGRESS NOTES
7/24/2018    Mableleno Red  1950  [id number]    Diagnosis  Chief Complaint     Bladder Cancer          Patient presents for BCG 3 of 3 managed by Dr Josh Tsang This Encounter   Procedures    POCT urine dip     Patient will follow up as scheduled in October for cystoscopy surveillance with urine cytology PTV  Patient instructed to call with persistent hematuria, fever, or other concerns  Procedure BCG treatment 3 of 3  Vitals:    07/24/18 0840   BP: 142/80   Pulse: 88   Weight: 68 3 kg (150 lb 9 6 oz)   Height: 5' 2" (1 575 m)       Recent Results (from the past 1 hour(s))   POCT urine dip    Collection Time: 07/24/18  8:48 AM   Result Value Ref Range    LEUKOCYTE ESTERASE,UA +      NITRITE,UA -     SL AMB POCT UROBILINOGEN -     SL AMB POCT URINE PROTEIN -      PH,UA 6 0      BLOOD,UA ++      SPECIFIC GRAVITY,UA 1 020      KETONES,UA -      BILIRUBIN,UA -     GLUCOSE, UA -      COLOR,UA yellow      CLARITY,UA clear    ]  Urinary Incontinence Screening      Most Recent Value   Urinary Incontinence   Urinary Incontinence? No   Incomplete emptying? No [sometimes]   Urinary frequency? No   Urinary urgency? No   Urinary hesitancy? No   Dysuria (painful difficult urination)? No   Nocturia (waking up to use the bathroom)? Yes [3x]   Straining (having to push to go)? No   Weak stream?  No   Intermittent stream?  Yes [sometimes]   Post void dribbling? No               Bladder instillation     Date/Time 7/24/2018 9:30 AM     Performed by  Brooke Morel     Authorized by Lotus Bianchi            Sterile technique employed  Patient prepped and identified  16F Straight catheter placed  Bladder drained, residual approximately <15mL  The following solution was instilled directly into the bladder via catheter: 1 Vial BCG  Catheter removed  Patient discharged  Patient tolerated procedure       Administrations This Visit     BCG (LEXI BCG) intravesical suspension 50 mg     Admin Date  07/24/2018 Action  Given Dose  50 mg Route  Bladder Instillation Administered By  INDIANA Rondon RN CURN, BSN

## 2018-07-30 DIAGNOSIS — F51.01 PRIMARY INSOMNIA: ICD-10-CM

## 2018-07-30 RX ORDER — ZOLPIDEM TARTRATE 10 MG/1
10 TABLET ORAL
Qty: 30 TABLET | Refills: 0 | Status: SHIPPED | OUTPATIENT
Start: 2018-07-30 | End: 2018-08-28 | Stop reason: SDUPTHER

## 2018-08-03 ENCOUNTER — LAB (OUTPATIENT)
Dept: LAB | Facility: MEDICAL CENTER | Age: 68
End: 2018-08-03
Payer: COMMERCIAL

## 2018-08-03 DIAGNOSIS — E03.9 ACQUIRED HYPOTHYROIDISM: ICD-10-CM

## 2018-08-03 LAB
T4 FREE SERPL-MCNC: 1.04 NG/DL (ref 0.76–1.46)
TSH SERPL DL<=0.05 MIU/L-ACNC: 1.88 UIU/ML (ref 0.36–3.74)

## 2018-08-03 PROCEDURE — 84439 ASSAY OF FREE THYROXINE: CPT

## 2018-08-03 PROCEDURE — 36415 COLL VENOUS BLD VENIPUNCTURE: CPT

## 2018-08-03 PROCEDURE — 84443 ASSAY THYROID STIM HORMONE: CPT

## 2018-08-07 ENCOUNTER — OFFICE VISIT (OUTPATIENT)
Dept: ENDOCRINOLOGY | Facility: CLINIC | Age: 68
End: 2018-08-07
Payer: COMMERCIAL

## 2018-08-07 VITALS
WEIGHT: 151 LBS | SYSTOLIC BLOOD PRESSURE: 132 MMHG | BODY MASS INDEX: 28.51 KG/M2 | HEIGHT: 61 IN | DIASTOLIC BLOOD PRESSURE: 78 MMHG | HEART RATE: 73 BPM

## 2018-08-07 DIAGNOSIS — R53.83 OTHER FATIGUE: ICD-10-CM

## 2018-08-07 DIAGNOSIS — E04.1 NONTOXIC SINGLE THYROID NODULE: ICD-10-CM

## 2018-08-07 DIAGNOSIS — E11.9 NON-INSULIN DEPENDENT TYPE 2 DIABETES MELLITUS (HCC): ICD-10-CM

## 2018-08-07 DIAGNOSIS — E03.9 ACQUIRED HYPOTHYROIDISM: Primary | ICD-10-CM

## 2018-08-07 PROCEDURE — 99213 OFFICE O/P EST LOW 20 MIN: CPT | Performed by: NURSE PRACTITIONER

## 2018-08-07 NOTE — PROGRESS NOTES
Established Patient Progress Note       Chief Complaint   Patient presents with    Hypothyroidism        History of Present Illness:     Michael Bryant is a 70 y o  female with a history of hypothyroidism, thyroid nodules, and type 2 diabetes  For the hypothyroidism she is currently taking levothyroxine 50 mcg daily  She reports she is taking this in the morning with her b12  She reports fatigue since last year and hair loss  Her most recent TSH was 1 880 and T4 was 1 04  For the thyroid nodules she has several tiny colloid cysts  She denies neck pain, dysphonia, dysphagia, or dyspnea  For the type 2 diabetes she was diagnosed 10 years ago  She is currently taking metformin 1,000 mg BID  Her last A1C was 7 2  She denies polyuria, polydipsia, polyphagia, or blurred vision  She denies n/t in bl feet  She sees ophthalmologist yearly with no hx of retinopathy  She checks BG once daily and ranges from 100-130s            Patient Active Problem List   Diagnosis    Tear of medial meniscus of left knee    Abnormal EKG    Acquired hypothyroidism    Acute pain of left knee    Acute pain of right shoulder    Anxiety    Asthma    Neoplasm of bladder    Abdominal pain    Bursitis of shoulder    Chronic cough    Dental abscess    Hyponatremia    Fibromyositis    Ganglion and cyst of synovium, tendon and bursa    Hypercholesterolemia    Hypertension    Malignant neoplasm of lateral wall of urinary bladder (HCC)    Non-insulin dependent type 2 diabetes mellitus (HCC)    Nontoxic single thyroid nodule    Obesity    Osteoporosis    Primary localized osteoarthritis of left knee    Other fatigue      Past Medical History:   Diagnosis Date    Anxiety     Arthritis     Bladder carcinoma (HCC)     Bladder mass     Depression     Diabetes mellitus ([address] )     Disease of thyroid gland     thyroid nodules-not treating at this time    Dysuria     Last assessed 02/16/17    GERD (gastroesophageal reflux disease)  HLD (hyperlipidemia)     HTN (hypertension)     Hypercholesterolemia     Hypertension     Knee pain     PONV (postoperative nausea and vomiting)       Past Surgical History:   Procedure Laterality Date    CARPAL TUNNEL RELEASE Right      SECTION      x2    CHOLECYSTECTOMY      CYSTOSCOPY N/A 2016    Procedure: CYSTOSCOPY WITH BIOPSIES;  Surgeon: Flaco Beard MD;  [facility];  Service:    Rollo Dura N/A 2016    Procedure: CYSTOSCOPY;  Surgeon: Flaco Beard MD;  [facility];  Service:     HERNIA REPAIR      IN CYSTO/URETERO W/LITHOTRIPSY &INDWELL STENT INSRT  2016    Procedure: CYSTOSCOPY URETEROSCOPY WITH LITHOTRIPSY HOLMIUM LASER RIGHT, RETROGRADE PYELOGRAM BILATERAL: AND INSERTION STENT URETERAL Right ;  Surgeon: Flaco Beard MD;  [facility];  Service: Urology    IN CYSTO/URETERO/PYELOSCOPY, DX Right 2017    Procedure: URETEROSCOPY;  Surgeon: Flaco Beard MD;  [facility];  Service: Urology    IN CYSTOSCOPY,INSERT URETERAL STENT Left 2016    Procedure: URETERAL STENTS;  Surgeon: Flaco Beard MD;  [facility];  Service: Urology    IN CYSTOURETHROSCOPY,FULGUR <0 5 CM LESN N/A 2016    Procedure: TRANSURETHRAL RESECTION OF BLADDER TUMOR (TURBT);   Surgeon: Flaco Beard MD;  [facility];  Service: Urology    IN CYSTOURETHROSCOPY,FULGUR <0 5 CM LESN N/A 2016    Procedure: Ranchristianee Hard; TUR BLADDER TUMOR ;  Surgeon: Flaco Beard MD;  [facility];  Service: Urology    IN CYSTOURETHROSCOPY,FULGUR <0 5 CM LESN N/A 2016    Procedure: TUR BLADDER TUMOR ;  Surgeon: Flaco Beard MD;  [facility];  Service: Urology    IN CYSTOURETHROSCOPY,FULGUR <0 5 CM LESN Bilateral 2017    Procedure: Martha Cuadra, ;  Surgeon: Flaco Beard MD;  [facility];  Service: Urology    IN CYSTOURETHROSCOPY,URETER CATHETER Bilateral 2016 Procedure: RETROGRADE PYELOGRAM ;  Surgeon: Radha Washington MD;  [facility];  Service: Urology    NJ CYSTOURETHROSCOPY,URETER CATHETER Bilateral 5/9/2017    Procedure: RETROGRADE PYELOGRAM WITH STENT EXCHANGE, RIGHT;  Surgeon: Radha Washington MD;  [facility];  Service: Urology    NJ INSTILL ANTICANCER AGENT IN BLADDER N/A 11/29/2016    Procedure: Charissa Gallus;  Surgeon: Radha Washington MD;  [facility];  Service: Urology    NJ KNEE SCOPE,MED/LAT MENISECTOMY Left 4/4/2016    Procedure: KNEE ARTHROSCOPIC PARTIAL MEDIAL MENISCECTOMY ;  Surgeon: Opal Ogden MD;  [facility];  Service: Orthopedics    REMOVAL URETERAL STENT Left 11/29/2016    Procedure: REMOVAL STENT URETERAL;  Surgeon: Radha Washington MD;  [facility];  Service:     TONSILLECTOMY        Family History   Problem Relation Age of Onset    Heart attack Mother     ALS Father     Diabetes Maternal Grandfather     Lung cancer Paternal Grandfather      Social History   Substance Use Topics    Smoking status: Former Smoker     Packs/day: 1 00     Years: 20 00     Quit date: 1990    Smokeless tobacco: Never Used    Alcohol use No     No Known Allergies    Current Outpatient Prescriptions:     clonazePAM (KlonoPIN) 0 5 mg tablet, Take 1 tablet (0 5 mg total) by mouth 2 (two) times a day (Patient taking differently: Take 0 25 mg by mouth daily  ), Disp: 60 tablet, Rfl: 0    famotidine (PEPCID) 20 mg tablet, Take 1 tablet (20 mg total) by mouth daily, Disp: 90 tablet, Rfl: 0    fenofibrate (TRICOR) 145 mg tablet, Take 1 tablet (145 mg total) by mouth daily, Disp: 90 tablet, Rfl: 1    ibuprofen (MOTRIN) 200 mg tablet, Take 200 mg by mouth every 6 (six) hours as needed for mild pain, Disp: , Rfl:     levothyroxine 50 mcg tablet, Take 1 tablet (50 mcg total) by mouth daily, Disp: 30 tablet, Rfl: 5    Loratadine 10 MG CAPS, Take 1 tablet by mouth daily, Disp: , Rfl:     losartan (COZAAR) 50 mg tablet, Take 1 tablet (50 mg total) by mouth daily, Disp: 90 tablet, Rfl: 0    metFORMIN (GLUCOPHAGE) 500 mg tablet, Take 2 tablets (1,000 mg total) by mouth 2 (two) times a day with meals MetFORMIN HCl - 500 MG Oral Tablet  Refills: 0   , M D ; Active, Disp: 120 tablet, Rfl: 5    ONETOUCH VERIO test strip, 1 each by Other route daily, Disp: 100 each, Rfl: 1    Probiotic Product (PROBIOTIC-10) CAPS, Take 1 capsule by mouth daily, Disp: , Rfl:     simvastatin (ZOCOR) 40 mg tablet, Take 40 mg by mouth daily at bedtime  , Disp: , Rfl:     zolpidem (AMBIEN) 10 mg tablet, Take 1 tablet (10 mg total) by mouth daily at bedtime as needed for sleep, Disp: 30 tablet, Rfl: 0    acetaminophen (TYLENOL) 325 mg tablet, Take 650 mg by mouth every 6 (six) hours as needed for mild pain, Disp: , Rfl:     Cyanocobalamin 1000 MCG CAPS, Take by mouth, Disp: , Rfl:     Review of Systems   Constitutional: Positive for fatigue  Negative for chills and fever  HENT: Negative for sore throat and trouble swallowing  Eyes: Negative for visual disturbance  Respiratory: Negative for shortness of breath  Cardiovascular: Negative for chest pain and palpitations  Gastrointestinal: Negative for abdominal pain, constipation and diarrhea  Endocrine: Negative for cold intolerance, heat intolerance, polydipsia, polyphagia and polyuria  Genitourinary: Negative for frequency  Musculoskeletal: Negative for arthralgias and myalgias  Skin: Negative for rash  Positive for hair loss   Neurological: Negative for dizziness and tremors  Hematological: Negative for adenopathy  Psychiatric/Behavioral: Negative for sleep disturbance  All other systems reviewed and are negative  Physical Exam:  Body mass index is 28 53 kg/m²    /78   Pulse 73   Ht 5' 1" (1 549 m)   Wt 68 5 kg (151 lb)   BMI 28 53 kg/m²    Wt Readings from Last 3 Encounters:   08/07/18 68 5 kg (151 lb)   07/24/18 68 3 kg (150 lb 9 6 oz)   07/17/18 67 6 kg (149 lb)       Physical Exam   Constitutional: She is oriented to person, place, and time  She appears well-developed and well-nourished  No distress  HENT:   Head: Normocephalic and atraumatic  Eyes: Conjunctivae are normal  Pupils are equal, round, and reactive to light  Neck: Normal range of motion  Neck supple  No thyromegaly present  Cardiovascular: Normal rate, regular rhythm and normal heart sounds  Pulmonary/Chest: Effort normal and breath sounds normal  No respiratory distress  She has no wheezes  She has no rales  Abdominal: Soft  Bowel sounds are normal  She exhibits no distension  There is no tenderness  Musculoskeletal: Normal range of motion  She exhibits no edema  Neurological: She is alert and oriented to person, place, and time  Skin: Skin is warm and dry  Psychiatric: She has a normal mood and affect  Vitals reviewed  Labs:     Lab Results   Component Value Date    HGBA1C 7 2 (H) 04/23/2018     Component      Latest Ref Rng & Units 8/3/2018   Free T4      0 76 - 1 46 ng/dL 1 04   TSH 3RD GENERATON      0 358 - 3 740 uIU/mL 1 880       THYROID ULTRASOUND     INDICATION: E04 1: Nontoxic single thyroid nodule  History taken directly from the electronic ordering system          COMPARISON: None available      TECHNIQUE:   Ultrasound of the thyroid was performed with a high frequency linear transducer in transverse and sagittal planes including volumetric imaging sweeps as well as traditional still imaging technique      FINDINGS:  Normal homogeneous smooth echotexture      Right lobe:  3 9 x 1 1 x 1 3 cm  Left lobe:  3 0 x 0 9 x 0 7 cm  Isthmus:  0 1 cm      No solid nodules identified  Several tiny colloid cysts are noted      IMPRESSION:  Exam within normal limits  Impression & Plan:    Problem List Items Addressed This Visit     Acquired hypothyroidism - Primary     Continue current dose of levothyroxine, check TSH and T4 prior to next visit   Instructed to take levothyroxine first thing in the morning, on empty stomach, wait one hour before eating, and four hours before taking vitamins/minerals  Relevant Orders    T4, free    TSH, 3rd generation    Non-insulin dependent type 2 diabetes mellitus ([address] )     Instructed to check BG once daily at alternating times of day and send BG readings into the office in two weeks  Continue current regimen for now  Check A1C, cmp, and lipid panel prior to next visit  Relevant Orders    Hemoglobin A1C    Comprehensive metabolic panel    Lipid Panel with Direct LDL reflex    Nontoxic single thyroid nodule     Stable, recheck US in one year          Other fatigue    Relevant Orders    T4, free    TSH, 3rd generation    Vitamin D 25 hydroxy    CBC and differential Lab Collect          Orders Placed This Encounter   Procedures    Hemoglobin A1C    Comprehensive metabolic panel     This is a patient instruction: Patient fasting for 8 hours or longer recommended   Lipid Panel with Direct LDL reflex     This is a patient instruction: This test requires patient fasting for 10-12 hours or longer  Drinking of black coffee or black tea is acceptable   T4, free    TSH, 3rd generation     This is a patient instruction: This test is non-fasting  Please drink two glasses of water morning of bloodwork   Vitamin D 25 hydroxy    CBC and differential Lab Collect     This is a patient instruction: This test is non-fasting  Please drink two glasses of water morning of bloodwork  Standing Status:   Future     Standing Expiration Date:   8/7/2019         Discussed with the patient and all questioned fully answered  She will call me if any problems arise  Follow-up appointment in 3 months       Counseled patient on diagnostic results, prognosis, risk and benefit of treatment options, instruction for management, importance of treatment compliance, Risk  factor reduction and impressions      Lupe Bernard, CRNP

## 2018-08-07 NOTE — ASSESSMENT & PLAN NOTE
Instructed to check BG once daily at alternating times of day and send BG readings into the office in two weeks  Continue current regimen for now  Check A1C, cmp, and lipid panel prior to next visit

## 2018-08-07 NOTE — ASSESSMENT & PLAN NOTE
Continue current dose of levothyroxine, check TSH and T4 prior to next visit  Instructed to take levothyroxine first thing in the morning, on empty stomach, wait one hour before eating, and four hours before taking vitamins/minerals

## 2018-08-08 ENCOUNTER — TELEPHONE (OUTPATIENT)
Dept: ENDOCRINOLOGY | Facility: CLINIC | Age: 68
End: 2018-08-08

## 2018-08-13 ENCOUNTER — TELEPHONE (OUTPATIENT)
Dept: UROLOGY | Facility: CLINIC | Age: 68
End: 2018-08-13

## 2018-08-13 NOTE — TELEPHONE ENCOUNTER
Patient should contact us closer to her set date for jury duty as she has complete BCG 3 out of 3 and should be feeling better soon

## 2018-08-13 NOTE — TELEPHONE ENCOUNTER
Received a message from patient requesting to get a letter to excuse her from jury duty due to frequent urination from her bladder cancer and BCG treatments  She is summoned for jury duty for 10/1/18  Please advise  She is normally seen in [place]

## 2018-08-20 DIAGNOSIS — E11.8 TYPE 2 DIABETES MELLITUS WITH COMPLICATION, WITHOUT LONG-TERM CURRENT USE OF INSULIN (HCC): ICD-10-CM

## 2018-08-20 DIAGNOSIS — I10 ESSENTIAL HYPERTENSION: ICD-10-CM

## 2018-08-20 PROCEDURE — 4010F ACE/ARB THERAPY RXD/TAKEN: CPT | Performed by: INTERNAL MEDICINE

## 2018-08-20 RX ORDER — LOSARTAN POTASSIUM 50 MG/1
50 TABLET ORAL DAILY
Qty: 90 TABLET | Refills: 1 | Status: SHIPPED | OUTPATIENT
Start: 2018-08-20 | End: 2019-02-20 | Stop reason: SDUPTHER

## 2018-08-24 ENCOUNTER — TELEPHONE (OUTPATIENT)
Dept: INTERNAL MEDICINE CLINIC | Age: 68
End: 2018-08-24

## 2018-08-24 NOTE — TELEPHONE ENCOUNTER
Patient is calling to get a letter for her to be excused for [employer]  Patient stated that she spoke with you last week on this matter but I don't see the letter in the chart      Please type one up and let me know when it is ready to  for her     She doesn't mind picking it up at the [facility]

## 2018-08-28 DIAGNOSIS — R56.9 SEIZURES (HCC): ICD-10-CM

## 2018-08-28 DIAGNOSIS — F51.01 PRIMARY INSOMNIA: ICD-10-CM

## 2018-08-28 DIAGNOSIS — F41.8 DEPRESSION WITH ANXIETY: Primary | ICD-10-CM

## 2018-08-28 RX ORDER — SIMVASTATIN 40 MG
TABLET ORAL
Qty: 90 TABLET | Refills: 1 | OUTPATIENT
Start: 2018-08-28

## 2018-08-28 NOTE — TELEPHONE ENCOUNTER
lmom to let patient know that the letter for Rosi gruber is ready to pick or if she wants us to mail it to her

## 2018-08-28 NOTE — TELEPHONE ENCOUNTER
Please call patient and let her know to  the jury duty letter tomorrow at the [facility] office  It has printed there and I will sign it tomorrow once I am at the [facility]

## 2018-08-29 RX ORDER — ZOLPIDEM TARTRATE 10 MG/1
10 TABLET ORAL
Qty: 30 TABLET | Refills: 0 | Status: SHIPPED | OUTPATIENT
Start: 2018-08-29 | End: 2018-09-27 | Stop reason: SDUPTHER

## 2018-08-29 RX ORDER — CLONAZEPAM 0.5 MG/1
0.5 TABLET ORAL
Qty: 30 TABLET | Refills: 0 | Status: SHIPPED | OUTPATIENT
Start: 2018-08-29 | End: 2018-11-09 | Stop reason: SDUPTHER

## 2018-09-05 ENCOUNTER — OFFICE VISIT (OUTPATIENT)
Dept: INTERNAL MEDICINE CLINIC | Age: 68
End: 2018-09-05
Payer: COMMERCIAL

## 2018-09-05 VITALS
HEART RATE: 72 BPM | TEMPERATURE: 98.1 F | OXYGEN SATURATION: 98 % | HEIGHT: 62 IN | DIASTOLIC BLOOD PRESSURE: 84 MMHG | BODY MASS INDEX: 27.86 KG/M2 | SYSTOLIC BLOOD PRESSURE: 148 MMHG | WEIGHT: 151.4 LBS

## 2018-09-05 DIAGNOSIS — E03.9 HYPOTHYROIDISM, UNSPECIFIED TYPE: ICD-10-CM

## 2018-09-05 DIAGNOSIS — Z71.85 ENCOUNTER FOR COUNSELING REGARDING IMMUNIZATION: ICD-10-CM

## 2018-09-05 DIAGNOSIS — R53.82 CHRONIC FATIGUE: ICD-10-CM

## 2018-09-05 DIAGNOSIS — E11.49 TYPE 2 DIABETES MELLITUS WITH OTHER NEUROLOGIC COMPLICATION, WITHOUT LONG-TERM CURRENT USE OF INSULIN (HCC): ICD-10-CM

## 2018-09-05 DIAGNOSIS — E78.00 HYPERCHOLESTEROLEMIA: ICD-10-CM

## 2018-09-05 DIAGNOSIS — Z12.39 SCREENING FOR BREAST CANCER: Primary | ICD-10-CM

## 2018-09-05 PROCEDURE — 99214 OFFICE O/P EST MOD 30 MIN: CPT | Performed by: INTERNAL MEDICINE

## 2018-09-05 PROCEDURE — 3008F BODY MASS INDEX DOCD: CPT | Performed by: INTERNAL MEDICINE

## 2018-09-05 RX ORDER — SIMVASTATIN 40 MG
40 TABLET ORAL
Qty: 90 TABLET | Refills: 1 | Status: SHIPPED | OUTPATIENT
Start: 2018-09-05 | End: 2019-02-25 | Stop reason: SDUPTHER

## 2018-09-05 NOTE — PROGRESS NOTES
Assessment/Plan:    1  Dysuria and frequency in urination  She reports she had her BCG treatments in July and the first treatment returned with blood  She called her nurse to tell her that she feels a lot of burning  Her nurse informed her that she has a lot of calcium crystals  She continues to feel burning and frequency in urination  She is unsure if her urologist knows of this burning feeling as her nurse administers her BCG treatment  2  Hypertension  Her BP was elevated at 148/84 today at the office  She states that is because she was anxious to get here early today  She was advised to minimize salt and caffeine from her diet  3  Diabetes Mellitus Type 2  She was advised to establish care with a podiatrist  She will have a fasting blood sugar and an HgbA1C level drawn prior to her next appointment  She was advised to minimize processed foods and increase natural foods in her diet  She was encouraged to minimize her white starches, carbohydrates, and concentrated sugars from her diet  She was advised to use Glucerna and dietetic pudding  4  Chronic Fatigue  She has a history of B12 deficiency and receives B12 injections  Her last Vitamin B12 level was discussed with her which was normal at 105 on 7/2/18 so she is absorbing her Vitamin B12 well  Because she continues to complain of chronic fatigue, she will have a TSH and T4 level drawn prior to her next appointment  5  Hypothyroidism  She follows with Dr Analy Spencer for her hypothyroidism and her diabetes  She requested that we take over the care for her hyporthyroidism as she feels better with this treatment team     6  Constipation  She reports she has significant constipation, but is unsure if that is due to a side effect of a medication  We advised her to use Miralax daily  She will follow up with Dr Mary Colon, her GI specialist, and discuss this problem with him as well  7  Healthcare Maintenance  She had her last pneumonia vaccine, PPV23, in 2015   She has not had the new pneumonia vaccines so she will have her Prevnar 13 vaccine administered when she is finished with her BCG treatment  She was advised about the benefits of the new Shingrix vaccine and was given a prescription  She will have it done at her earliest convenience  She had her last Td vaccine in 2010 so we will consider this again at her next visit  She was informed about the [facility] screening programs  She will follow up with us in 8 weeks or as needed  Diagnoses and all orders for this visit:    Screening for breast cancer  -     Mammo screening bilateral w 3d & cad; Future    Hypercholesterolemia  -     simvastatin (ZOCOR) 40 mg tablet; Take 1 tablet (40 mg total) by mouth daily at bedtime    Chronic fatigue  -     TSH, 3rd generation with Free T4 reflex; Future  -     T4, free; Future    Hypothyroidism, unspecified type  -     TSH, 3rd generation with Free T4 reflex; Future  -     T4, free; Future    Type 2 diabetes mellitus with other neurologic complication, without long-term current use of insulin (HCC)  -     Glucose, fasting; Future  -     HEMOGLOBIN A1C W/ EAG ESTIMATION; Future          Subjective:      Patient ID: Elina Delatorre is a 70 y o  female  Elina Delatorre is a 70year old female who presents today for a 6 week follow up regarding her hypertension and type 2 diabetes mellitus  She reports she had her BCG treatments in July and the first treatment returned with blood  She called her nurse to tell her that she feels a lot of burning  Her nurse informed her that she has a lot of calcium crystals  She continues to feel burning and frequency in urination  She is unsure if her urologist knows of this burning feeling as her nurse administers her BCG treatment  She follows with Dr Nilson Katz, for her endocrinology and sees a NP at that office  She reports she has chronic fatigue as she is B12 deficient and has her B12 shots regularly   She reports she has situational depression as her  has significant health problems and her grandchildren's health problems  She reports she has significant constipation, but is unsure if that is due to a side effect of a medication  She reports she was anxious this morning as she was worried to get here on time  The following portions of the patient's history were reviewed and updated as appropriate: allergies, current medications, past family history, past medical history, past social history, past surgical history and problem list     Review of Systems   Constitutional: Positive for fatigue  Negative for activity change, appetite change, chills, diaphoresis, fever and unexpected weight change  HENT: Negative for congestion, drooling, ear discharge, ear pain, hearing loss, postnasal drip, rhinorrhea, tinnitus, trouble swallowing and voice change  Eyes: Negative  Respiratory: Negative  Cardiovascular: Negative  Gastrointestinal: Positive for constipation  Genitourinary: Positive for difficulty urinating, dysuria (has caium crystals), frequency and urgency  Has bladder cancer has dysuria  Going through bcg treatment  Skin: Negative  Neurological: Negative for dizziness, tremors, syncope, speech difficulty, weakness, light-headedness, numbness and headaches  Hematological: Negative for adenopathy  Psychiatric/Behavioral: Positive for dysphoric mood (situational depression)  Objective:      /84 (BP Location: Left arm, Patient Position: Sitting, Cuff Size: Standard)   Pulse 72   Temp 98 1 °F (36 7 °C) (Tympanic)   Ht 5' 1 89" (1 572 m)   Wt 68 7 kg (151 lb 6 4 oz)   SpO2 98%   BMI 27 79 kg/m²          Physical Exam   Constitutional: She appears well-developed and well-nourished  No distress  HENT:   Head: Normocephalic and atraumatic  Mouth/Throat: No oropharyngeal exudate  Eyes: Conjunctivae and EOM are normal  No scleral icterus  Neck: Normal range of motion  Neck supple   No thyromegaly present  Cardiovascular: Normal rate and regular rhythm  Exam reveals no friction rub  No murmur heard  Pulmonary/Chest: Effort normal and breath sounds normal  No respiratory distress  She has no wheezes  She has no rales  Musculoskeletal: Normal range of motion  She exhibits no edema, tenderness or deformity  Neurological: She displays abnormal reflex (decreased)  No cranial nerve deficit  Skin: Skin is warm and dry  No rash noted  She is not diaphoretic  No erythema  No pallor  Psychiatric:   anxiety         Scribe Signature and Attestation:  By signing my name below, Brad Hidalgo attest that this documentation has been prepared under the direction and in the presence of Dr Daniel Pelayo MD  Electronically signed: Wilmer Thacker  9/5/18    Provider Attestation:  I, Dr Daniel Pelayo, personally performed the services described in this documentation  All medical record entries made by the wilmer were at my direction and in my presence  I have reviewed the chart and discharge instructions (if applicable) and agree that the record reflects my personal performance and is accurate and complete  Dr Daniel Pelayo MD  9/5/18

## 2018-09-05 NOTE — PATIENT INSTRUCTIONS
1  She was advised to minimize salt and caffeine from her diet  2  She was advised to establish care with a podiatrist  She will have a fasting blood sugar and an HgbA1C level drawn prior to her next appointment  3  She was advised to minimize processed foods and increase natural foods in her diet  She was encouraged to minimize her white starches, carbohydrates, and concentrated sugars from her diet  4  She was advised to use Glucerna and dietetic pudding  5  She will have a TSH and T4 level drawn prior to her next appointment  6  We advised her to use Miralax daily for her constipation  7   She was advised about the benefits of the new Shingrix vaccine and was given a prescription  She will have it done at her earliest convenience  She will follow up with us in 8 weeks or as needed     8  Get prevnar 13

## 2018-09-06 ENCOUNTER — TRANSCRIBE ORDERS (OUTPATIENT)
Dept: ADMINISTRATIVE | Facility: HOSPITAL | Age: 68
End: 2018-09-06

## 2018-09-06 ENCOUNTER — APPOINTMENT (OUTPATIENT)
Dept: RADIOLOGY | Facility: MEDICAL CENTER | Age: 68
End: 2018-09-06
Payer: COMMERCIAL

## 2018-09-06 DIAGNOSIS — N39.0 URINARY TRACT INFECTION WITHOUT HEMATURIA, SITE UNSPECIFIED: ICD-10-CM

## 2018-09-06 DIAGNOSIS — N39.0 URINARY TRACT INFECTION WITHOUT HEMATURIA, SITE UNSPECIFIED: Primary | ICD-10-CM

## 2018-09-06 PROCEDURE — 74018 RADEX ABDOMEN 1 VIEW: CPT

## 2018-09-07 ENCOUNTER — HOSPITAL ENCOUNTER (OUTPATIENT)
Dept: RADIOLOGY | Facility: MEDICAL CENTER | Age: 68
Discharge: HOME | End: 2018-09-07
Payer: COMMERCIAL

## 2018-09-07 DIAGNOSIS — N39.0 URINARY TRACT INFECTION WITHOUT HEMATURIA, SITE UNSPECIFIED: ICD-10-CM

## 2018-09-07 PROCEDURE — 76770 US EXAM ABDO BACK WALL COMP: CPT

## 2018-09-27 DIAGNOSIS — F51.01 PRIMARY INSOMNIA: ICD-10-CM

## 2018-09-27 RX ORDER — ZOLPIDEM TARTRATE 10 MG/1
10 TABLET ORAL
Qty: 30 TABLET | Refills: 0 | Status: SHIPPED | OUTPATIENT
Start: 2018-09-27 | End: 2018-10-25 | Stop reason: SDUPTHER

## 2018-10-03 ENCOUNTER — TELEPHONE (OUTPATIENT)
Dept: UROLOGY | Facility: AMBULATORY SURGERY CENTER | Age: 68
End: 2018-10-03

## 2018-10-03 DIAGNOSIS — N39.0 URINARY TRACT INFECTION WITHOUT HEMATURIA, SITE UNSPECIFIED: Primary | ICD-10-CM

## 2018-10-03 DIAGNOSIS — E11.9 TYPE 2 DIABETES MELLITUS WITHOUT COMPLICATION, WITHOUT LONG-TERM CURRENT USE OF INSULIN (HCC): ICD-10-CM

## 2018-10-03 NOTE — PROGRESS NOTES
Dr Arreola Breath patient with history of bladder cancer s/p BCG tx  Received call from patient stating that she is due for cystoscopy next week and that she is having burning, urgency, and pressure  She is going for her urine cytology tomorrow am   Orders also placed for ua, c/s

## 2018-10-03 NOTE — TELEPHONE ENCOUNTER
Dr Frederick Core patient with history of bladder cancer s/p BCG tx  Received call from patient stating that she is due for cystoscopy next week and that she is having burning, urgency, and pressure  She is going for her urine cytology tomorrow am   Orders also placed for ua, c/s

## 2018-10-04 ENCOUNTER — APPOINTMENT (OUTPATIENT)
Dept: LAB | Facility: MEDICAL CENTER | Age: 68
End: 2018-10-04
Payer: COMMERCIAL

## 2018-10-04 DIAGNOSIS — C67.9 MALIGNANT NEOPLASM OF URINARY BLADDER, UNSPECIFIED SITE (HCC): ICD-10-CM

## 2018-10-04 LAB
BACTERIA UR QL AUTO: ABNORMAL /HPF
BILIRUB UR QL STRIP: NEGATIVE
CLARITY UR: CLEAR
COLOR UR: YELLOW
GLUCOSE UR STRIP-MCNC: NEGATIVE MG/DL
HGB UR QL STRIP.AUTO: ABNORMAL
HYALINE CASTS #/AREA URNS LPF: ABNORMAL /LPF
KETONES UR STRIP-MCNC: NEGATIVE MG/DL
LEUKOCYTE ESTERASE UR QL STRIP: ABNORMAL
NITRITE UR QL STRIP: NEGATIVE
NON-SQ EPI CELLS URNS QL MICRO: ABNORMAL /HPF
PH UR STRIP.AUTO: 6 [PH] (ref 4.5–8)
PROT UR STRIP-MCNC: NEGATIVE MG/DL
RBC #/AREA URNS AUTO: ABNORMAL /HPF
SP GR UR STRIP.AUTO: 1.02 (ref 1–1.03)
UROBILINOGEN UR QL STRIP.AUTO: 1 E.U./DL
WBC #/AREA URNS AUTO: ABNORMAL /HPF

## 2018-10-04 PROCEDURE — 88112 CYTOPATH CELL ENHANCE TECH: CPT | Performed by: PATHOLOGY

## 2018-10-04 PROCEDURE — 81001 URINALYSIS AUTO W/SCOPE: CPT

## 2018-10-04 PROCEDURE — 87086 URINE CULTURE/COLONY COUNT: CPT

## 2018-10-05 LAB — BACTERIA UR CULT: NORMAL

## 2018-10-05 NOTE — TELEPHONE ENCOUNTER
Please inform patient that UA shows no evidence of infection, however urine culture remains pending  Someone from office will call with final results

## 2018-10-08 RX ORDER — BLOOD SUGAR DIAGNOSTIC
STRIP MISCELLANEOUS
Refills: 1 | OUTPATIENT
Start: 2018-10-08

## 2018-10-09 ENCOUNTER — PROCEDURE VISIT (OUTPATIENT)
Dept: UROLOGY | Facility: AMBULATORY SURGERY CENTER | Age: 68
End: 2018-10-09
Payer: COMMERCIAL

## 2018-10-09 ENCOUNTER — TELEPHONE (OUTPATIENT)
Dept: UROLOGY | Facility: AMBULATORY SURGERY CENTER | Age: 68
End: 2018-10-09

## 2018-10-09 VITALS
HEIGHT: 63 IN | HEART RATE: 84 BPM | DIASTOLIC BLOOD PRESSURE: 68 MMHG | BODY MASS INDEX: 27.11 KG/M2 | SYSTOLIC BLOOD PRESSURE: 132 MMHG | WEIGHT: 153 LBS

## 2018-10-09 DIAGNOSIS — C67.9 MALIGNANT NEOPLASM OF URINARY BLADDER, UNSPECIFIED SITE (HCC): Primary | ICD-10-CM

## 2018-10-09 LAB
SL AMB  POCT GLUCOSE, UA: NORMAL
SL AMB LEUKOCYTE ESTERASE,UA: NORMAL
SL AMB POCT BILIRUBIN,UA: NORMAL
SL AMB POCT BLOOD,UA: NORMAL
SL AMB POCT CLARITY,UA: NORMAL
SL AMB POCT COLOR,UA: YELLOW
SL AMB POCT KETONES,UA: NORMAL
SL AMB POCT NITRITE,UA: NORMAL
SL AMB POCT PH,UA: 6
SL AMB POCT SPECIFIC GRAVITY,UA: 1.01
SL AMB POCT URINE PROTEIN: NORMAL
SL AMB POCT UROBILINOGEN: NORMAL

## 2018-10-09 PROCEDURE — 81002 URINALYSIS NONAUTO W/O SCOPE: CPT | Performed by: UROLOGY

## 2018-10-09 PROCEDURE — 52000 CYSTOURETHROSCOPY: CPT | Performed by: UROLOGY

## 2018-10-09 NOTE — PROGRESS NOTES
Cystoscopy  Date/Time: 10/9/2018 2:09 PM  Performed by: Wendi Mancia  Authorized by: Wendi Mancia     Procedure details: cystoscopy        Augustine Riggs is a 23-year-old female diagnosed with high-grade noninvasive urothelial carcinoma in November 2016  There were a few areas suspicious for lamina propria invasion  Detrusor muscle was present and uninvolved by tumor  She underwent a repeat resection which removed a small remaining fragment of tumor  She had BCG induction in early 2017  She has been receiving BCG maintenance since that time  She had 1 recurrence which occurred in November 2016  She has not had recurrence since that time  Her last BCG maintenance was completed in July 2018  She returns to the office today for surveillance cystoscopy  Cystoscopy Procedure note    Risk and benefits of flexible cystoscopy were discussed  Informed consent was obtained  A urine dip is adequate for cystoscopy  The patient was placed into the modified supine position  Her genitalia was prepped and draped in a sterile fashion  Viscous lidocaine was instilled into the urethra  Flexible cystoscopy was then performed  The bladder was thoroughly inspected  Both ureteral orifices were visualized with clear efflux of urine  They appeared somewhat ectopic from prior resection at the bladder neck  The bladder mucosa was thoroughly inspected  Two biopsy sites were identified with mild inflammatory changes but without evidence of disease recurrence  The remainder of the bladder was free and clear mucosal abnormalities or lesions  My impression is history of urothelial carcinoma the bladder without recurrence  She will be due for her next 3 intravesical BCG therapies in January 2019  Her next surveillance cystoscopy will be due in April 2019 along with a urine cytology  She was instructed to call in the interim if she were to develop gross hematuria

## 2018-10-25 DIAGNOSIS — F51.01 PRIMARY INSOMNIA: ICD-10-CM

## 2018-10-25 RX ORDER — ZOLPIDEM TARTRATE 10 MG/1
10 TABLET ORAL
Qty: 30 TABLET | Refills: 0 | Status: SHIPPED | OUTPATIENT
Start: 2018-10-25 | End: 2018-11-27 | Stop reason: SDUPTHER

## 2018-11-05 NOTE — TELEPHONE ENCOUNTER
Called and spoke with patient  She wants to be finished with BCG treatments by 1/18/19 as her granddaughter is having surgery  Offered both [address] [place] [address] and [facility] starting the last week of December or the first week of January  Patient ultimately chose to schedule Wed at the 8th e office starting 1/2/19, 1/9/19, 1/16/19  She is not planning an insurance change as of the first of the year

## 2018-11-09 DIAGNOSIS — E11.9 TYPE 2 DIABETES MELLITUS WITHOUT COMPLICATION, WITHOUT LONG-TERM CURRENT USE OF INSULIN (HCC): ICD-10-CM

## 2018-11-09 DIAGNOSIS — E03.9 ACQUIRED HYPOTHYROIDISM: ICD-10-CM

## 2018-11-09 DIAGNOSIS — F41.8 DEPRESSION WITH ANXIETY: ICD-10-CM

## 2018-11-09 RX ORDER — LEVOTHYROXINE SODIUM 0.05 MG/1
50 TABLET ORAL DAILY
Qty: 90 TABLET | Refills: 1 | Status: SHIPPED | OUTPATIENT
Start: 2018-11-09 | End: 2019-06-06 | Stop reason: SDUPTHER

## 2018-11-09 RX ORDER — BLOOD SUGAR DIAGNOSTIC
1 STRIP MISCELLANEOUS DAILY
Qty: 100 EACH | Refills: 1 | Status: SHIPPED | OUTPATIENT
Start: 2018-11-09 | End: 2019-09-16 | Stop reason: SDUPTHER

## 2018-11-09 RX ORDER — CLONAZEPAM 0.5 MG/1
0.5 TABLET ORAL
Qty: 30 TABLET | Refills: 0 | Status: SHIPPED | OUTPATIENT
Start: 2018-11-09 | End: 2018-12-26 | Stop reason: SDUPTHER

## 2018-11-27 ENCOUNTER — OFFICE VISIT (OUTPATIENT)
Dept: INTERNAL MEDICINE CLINIC | Age: 68
End: 2018-11-27
Payer: COMMERCIAL

## 2018-11-27 ENCOUNTER — TELEPHONE (OUTPATIENT)
Dept: INTERNAL MEDICINE CLINIC | Age: 68
End: 2018-11-27

## 2018-11-27 ENCOUNTER — APPOINTMENT (OUTPATIENT)
Dept: LAB | Age: 68
End: 2018-11-27
Payer: COMMERCIAL

## 2018-11-27 VITALS
HEART RATE: 78 BPM | TEMPERATURE: 97.3 F | WEIGHT: 154 LBS | SYSTOLIC BLOOD PRESSURE: 128 MMHG | DIASTOLIC BLOOD PRESSURE: 84 MMHG | OXYGEN SATURATION: 98 % | HEIGHT: 62 IN | BODY MASS INDEX: 28.34 KG/M2

## 2018-11-27 DIAGNOSIS — E11.9 NON-INSULIN DEPENDENT TYPE 2 DIABETES MELLITUS (HCC): ICD-10-CM

## 2018-11-27 DIAGNOSIS — F51.01 PRIMARY INSOMNIA: ICD-10-CM

## 2018-11-27 DIAGNOSIS — R30.0 DYSURIA: ICD-10-CM

## 2018-11-27 DIAGNOSIS — R53.82 CHRONIC FATIGUE: ICD-10-CM

## 2018-11-27 DIAGNOSIS — R35.0 FREQUENCY OF URINATION: ICD-10-CM

## 2018-11-27 DIAGNOSIS — R39.15 URGENCY OF URINATION: ICD-10-CM

## 2018-11-27 DIAGNOSIS — E03.9 HYPOTHYROIDISM, UNSPECIFIED TYPE: ICD-10-CM

## 2018-11-27 DIAGNOSIS — E78.2 MIXED HYPERLIPIDEMIA: ICD-10-CM

## 2018-11-27 DIAGNOSIS — E03.9 HYPOTHYROIDISM, UNSPECIFIED TYPE: Primary | ICD-10-CM

## 2018-11-27 LAB
ALBUMIN SERPL BCP-MCNC: 4.2 G/DL (ref 3.5–5)
ALP SERPL-CCNC: 56 U/L (ref 46–116)
ALT SERPL W P-5'-P-CCNC: 30 U/L (ref 12–78)
ANION GAP SERPL CALCULATED.3IONS-SCNC: 8 MMOL/L (ref 4–13)
AST SERPL W P-5'-P-CCNC: 18 U/L (ref 5–45)
BACTERIA UR QL AUTO: NORMAL /HPF
BASOPHILS # BLD AUTO: 0.06 THOUSANDS/ΜL (ref 0–0.1)
BASOPHILS NFR BLD AUTO: 1 % (ref 0–1)
BILIRUB SERPL-MCNC: 0.29 MG/DL (ref 0.2–1)
BILIRUB UR QL STRIP: NEGATIVE
BUN SERPL-MCNC: 25 MG/DL (ref 5–25)
CALCIUM SERPL-MCNC: 9.5 MG/DL (ref 8.3–10.1)
CHLORIDE SERPL-SCNC: 106 MMOL/L (ref 100–108)
CHOLEST SERPL-MCNC: 205 MG/DL (ref 50–200)
CLARITY UR: CLEAR
CO2 SERPL-SCNC: 23 MMOL/L (ref 21–32)
COLOR UR: YELLOW
CREAT SERPL-MCNC: 0.82 MG/DL (ref 0.6–1.3)
EOSINOPHIL # BLD AUTO: 0.24 THOUSAND/ΜL (ref 0–0.61)
EOSINOPHIL NFR BLD AUTO: 5 % (ref 0–6)
ERYTHROCYTE [DISTWIDTH] IN BLOOD BY AUTOMATED COUNT: 12.9 % (ref 11.6–15.1)
GFR SERPL CREATININE-BSD FRML MDRD: 73 ML/MIN/1.73SQ M
GLUCOSE P FAST SERPL-MCNC: 143 MG/DL (ref 65–99)
GLUCOSE UR STRIP-MCNC: NEGATIVE MG/DL
HCT VFR BLD AUTO: 40.7 % (ref 34.8–46.1)
HDLC SERPL-MCNC: 33 MG/DL (ref 40–60)
HGB BLD-MCNC: 13.3 G/DL (ref 11.5–15.4)
HGB UR QL STRIP.AUTO: ABNORMAL
IMM GRANULOCYTES # BLD AUTO: 0.02 THOUSAND/UL (ref 0–0.2)
IMM GRANULOCYTES NFR BLD AUTO: 0 % (ref 0–2)
KETONES UR STRIP-MCNC: NEGATIVE MG/DL
LDLC SERPL CALC-MCNC: 122 MG/DL (ref 0–100)
LEUKOCYTE ESTERASE UR QL STRIP: NEGATIVE
LYMPHOCYTES # BLD AUTO: 1.27 THOUSANDS/ΜL (ref 0.6–4.47)
LYMPHOCYTES NFR BLD AUTO: 24 % (ref 14–44)
MCH RBC QN AUTO: 30.9 PG (ref 26.8–34.3)
MCHC RBC AUTO-ENTMCNC: 32.7 G/DL (ref 31.4–37.4)
MCV RBC AUTO: 94 FL (ref 82–98)
MONOCYTES # BLD AUTO: 0.39 THOUSAND/ΜL (ref 0.17–1.22)
MONOCYTES NFR BLD AUTO: 7 % (ref 4–12)
NEUTROPHILS # BLD AUTO: 3.31 THOUSANDS/ΜL (ref 1.85–7.62)
NEUTS SEG NFR BLD AUTO: 63 % (ref 43–75)
NITRITE UR QL STRIP: NEGATIVE
NON-SQ EPI CELLS URNS QL MICRO: NORMAL /HPF
NONHDLC SERPL-MCNC: 172 MG/DL
NRBC BLD AUTO-RTO: 0 /100 WBCS
PH UR STRIP.AUTO: 6 [PH] (ref 4.5–8)
PLATELET # BLD AUTO: 235 THOUSANDS/UL (ref 149–390)
PMV BLD AUTO: 9.9 FL (ref 8.9–12.7)
POTASSIUM SERPL-SCNC: 4.6 MMOL/L (ref 3.5–5.3)
PROT SERPL-MCNC: 7.9 G/DL (ref 6.4–8.2)
PROT UR STRIP-MCNC: NEGATIVE MG/DL
RBC # BLD AUTO: 4.31 MILLION/UL (ref 3.81–5.12)
RBC #/AREA URNS AUTO: NORMAL /HPF
SODIUM SERPL-SCNC: 137 MMOL/L (ref 136–145)
SP GR UR STRIP.AUTO: 1.02 (ref 1–1.03)
T4 FREE SERPL-MCNC: 0.93 NG/DL (ref 0.76–1.46)
TRIGL SERPL-MCNC: 249 MG/DL
TSH SERPL DL<=0.05 MIU/L-ACNC: 2.49 UIU/ML (ref 0.36–3.74)
UROBILINOGEN UR QL STRIP.AUTO: 0.2 E.U./DL
WBC # BLD AUTO: 5.29 THOUSAND/UL (ref 4.31–10.16)
WBC #/AREA URNS AUTO: NORMAL /HPF

## 2018-11-27 PROCEDURE — 1036F TOBACCO NON-USER: CPT | Performed by: INTERNAL MEDICINE

## 2018-11-27 PROCEDURE — 84439 ASSAY OF FREE THYROXINE: CPT

## 2018-11-27 PROCEDURE — 84443 ASSAY THYROID STIM HORMONE: CPT

## 2018-11-27 PROCEDURE — 83036 HEMOGLOBIN GLYCOSYLATED A1C: CPT

## 2018-11-27 PROCEDURE — 3008F BODY MASS INDEX DOCD: CPT | Performed by: INTERNAL MEDICINE

## 2018-11-27 PROCEDURE — 80061 LIPID PANEL: CPT

## 2018-11-27 PROCEDURE — 99214 OFFICE O/P EST MOD 30 MIN: CPT | Performed by: INTERNAL MEDICINE

## 2018-11-27 PROCEDURE — 80053 COMPREHEN METABOLIC PANEL: CPT

## 2018-11-27 PROCEDURE — 36415 COLL VENOUS BLD VENIPUNCTURE: CPT

## 2018-11-27 PROCEDURE — 81001 URINALYSIS AUTO W/SCOPE: CPT | Performed by: INTERNAL MEDICINE

## 2018-11-27 PROCEDURE — 85025 COMPLETE CBC W/AUTO DIFF WBC: CPT

## 2018-11-27 RX ORDER — ZOLPIDEM TARTRATE 10 MG/1
10 TABLET ORAL
Qty: 30 TABLET | Refills: 0 | Status: SHIPPED | OUTPATIENT
Start: 2018-11-27 | End: 2018-12-26 | Stop reason: SDUPTHER

## 2018-11-27 NOTE — PROGRESS NOTES
Assessment/Plan:    1  Nasal congestion  She was instructed to take Allegra and long acting Afrin before going on the plane  She should also use Vicks rub when she is flying for when she feels congestion  2  Hypertension  Her BP is stable at 128/84 today at the office  She can continue her current medications as she is tolerating them well without significant side effects  3  Non-insulin dependent Diabetes Mellitus Type 2  She was encouraged to minimize her concentrated sugars and white starches from her diet  She will have a CMP and HgbA1C drawn at her earliest convenience  4  Dysuria  She complains of frequency, urgency, and dysuria  She will have a urinalysis for further evaluation at her earliest convenience  5  Hypothyroidism  She reports chronic fatigue  She will have a CBC, TSH, and T4 level drawn at her earliest convenience  6  Hyperlipidemia  She is taking Zocor 40 mg daily and she will have a lipid panel drawn at her earliest convenience  7  Knee pain  She states she has had meniscus surgery in her knee and now has bone on bone contact in her knee  She notes she has completed physical therapy in the past as well as has had the knee injection treatments  She was advised to apply Lidocaine 4% cream topically or use Capsacin cream topically for pain relief  8  Right Ear Wax  Considerable Ear wax was noted in her right ear  She was advised to use OTC Debrox and an ear bulb syringe to clean her ears  9  Healthcare Maintenance  She will get an influenza vaccine in 2-3 days as she is feeling slight cold-like symptoms today  She is leaving for [facility] on Sunday so she should make sure to have the influenza vaccine completed prior to flying as the risk of exposure will be higher  She will follow up in 2 months or as needed  Diagnoses and all orders for this visit:    Hypothyroidism, unspecified type  -     CBC and differential; Future  -     TSH, 3rd generation with Free T4 reflex;  Future  - T4, free; Future    Primary insomnia  -     zolpidem (AMBIEN) 10 mg tablet; Take 1 tablet (10 mg total) by mouth daily at bedtime  -     CBC and differential; Future    Chronic fatigue  -     TSH, 3rd generation with Free T4 reflex; Future  -     T4, free; Future    Dysuria  -     CBC and differential; Future  -     UA w Reflex to Microscopic w Reflex to Culture    Frequency of urination  -     UA w Reflex to Microscopic w Reflex to Culture    Urgency of urination  -     UA w Reflex to Microscopic w Reflex to Culture    Non-insulin dependent type 2 diabetes mellitus (HCC)  -     Comprehensive metabolic panel; Future  -     HEMOGLOBIN A1C W/ EAG ESTIMATION; Future    Mixed hyperlipidemia  -     CBC and differential; Future  -     Lipid panel; Future    Other orders  -     Cancel: influenza vaccine, [id number], high-dose, PF 0 5 mL, for patients 65 yr+ (FLUZONE HIGH-DOSE)          Subjective:      Patient ID: Tracey Clark is a 70 y o  female  Tracey Clark is a 70year old female who presents today for a 2 month follow up regarding her hypertension, diabetes mellitus, and hypothyroidism  She complains of congestion, cough, PND, rhinorrhea, and sore throat for the past day  She has some right ear discomfort  She reports she has frequency and urgency in urination and is following with her urologist for her bladder cancer  Her treatments will be in January which has been helping her  She states she has had meniscus surgery in her left knee and now has bone on bone contact in her left knee  Despite pain on ambulation, she is active  She complains of some dizziness, but attributes that to her current cold-like symptoms  She reports chronic fatigue  She states she will get an influenza vaccine in 2-3 days as she is feeling slight cold-like symptoms today  She is leaving for [facility] on Sunday             The following portions of the patient's history were reviewed and updated as appropriate: allergies, current medications, past family history, past medical history, past social history, past surgical history and problem list     Review of Systems   Constitutional: Positive for fatigue (no change) and unexpected weight change (gained 4 lbs)  Negative for activity change, appetite change, chills, diaphoresis and fever  HENT: Positive for congestion, ear pain (rt ), postnasal drip, rhinorrhea, sinus pressure and sore throat  Negative for ear discharge, hearing loss, mouth sores, nosebleeds, sinus pain, tinnitus, trouble swallowing and voice change  Eyes: Positive for visual disturbance (needs to see eye doctor)  Respiratory: Positive for cough (raspey  non productive)  Negative for choking, chest tightness, shortness of breath, wheezing and stridor  Cardiovascular: Negative for chest pain, palpitations and leg swelling  Gastrointestinal: Negative for abdominal distention, abdominal pain, anal bleeding, blood in stool, constipation and diarrhea  Endocrine: Negative for cold intolerance and heat intolerance  Genitourinary: Positive for frequency and urgency  Negative for difficulty urinating and hematuria  Bladder  Cancer  Has BCG Rx   Musculoskeletal: Positive for arthralgias (left knee), gait problem and joint swelling (left knee )  Negative for myalgias  Skin: Negative  Neurological: Positive for light-headedness and headaches  Negative for dizziness, seizures, speech difficulty and numbness  Hematological: Negative  Psychiatric/Behavioral: Negative  Objective:      /84 (BP Location: Left arm, Patient Position: Sitting, Cuff Size: Adult)   Pulse 78   Temp (!) 97 3 °F (36 3 °C) (Tympanic)   Ht 5' 1 81" (1 57 m)   Wt 69 9 kg (154 lb)   SpO2 98% Comment: room air  BMI 28 34 kg/m²          Physical Exam   Constitutional: She is oriented to person, place, and time  She appears well-developed and well-nourished  No distress  HENT:   Head: Normocephalic and atraumatic  Mouth/Throat: Oropharyngeal exudate (red) present  Nares full and red, rt  Ear has wax   Eyes: Conjunctivae and EOM are normal    Neck: Normal range of motion  Neck supple  No tracheal deviation present  No thyromegaly present  Cardiovascular: Normal rate, regular rhythm and normal heart sounds  No murmur heard  Pulmonary/Chest: No respiratory distress  She has no wheezes  She has no rales  She exhibits no tenderness  Abdominal: Soft  Bowel sounds are normal  She exhibits no distension  There is no tenderness  There is no rebound and no guarding  Musculoskeletal: She exhibits tenderness (left knee)  Neurological: She is alert and oriented to person, place, and time  Skin: She is not diaphoretic  Scribe Signature and Attestation:  By signing my name below, Yudelka Maddie attest that this documentation has been prepared under the direction and in the presence of Dr Christina Hawkins MD  Electronically signed: Wilmer Johnson  11/27/18    Provider Attestation:  I, Dr Christina Hawkins, personally performed the services described in this documentation  All medical record entries made by the wilmer were at my direction and in my presence  I have reviewed the chart and discharge instructions (if applicable) and agree that the record reflects my personal performance and is accurate and complete  Dr Christina Hawkins MD  11/27/18

## 2018-11-27 NOTE — PATIENT INSTRUCTIONS
1  She was instructed to take Allegra and long acting Afrin before going on the plane  She should also use Vicks rub when she is flying for when she feels congestion  2  She can continue her current medications as she is tolerating them well without significant side effects  3  She will have a CMP and HgbA1C drawn at her earliest convenience  4  She will have a urinalysis for further evaluation at her earliest convenience  5  She will have a CBC, TSH, and T4 level drawn at her earliest convenience  6  She will have a lipid panel drawn at her earliest convenience  7  She was advised to apply Lidocaine 4% cream topically or use Capsacin cream topically for pain relief  8  She was advised to use OTC Debrox and an ear bulb syringe to clean her ears  9  She will get an influenza vaccine in 2-3 days as she is feeling slight cold-like symptoms today  She is leaving for [facility] on Sunday so she should make sure to have the influenza vaccine completed prior to flying as the risk of exposure will be higher  10  She will follow up in 2 months or as needed

## 2018-11-27 NOTE — TELEPHONE ENCOUNTER
Spoke to her  She does have a few red cells in her urine but no hematuria or white cells or bacteria  It is unlikely this represents an infection but will wait for urine culture  Other blood tests were abnormal and will discuss in detail at her next office visit

## 2018-11-28 LAB
EST. AVERAGE GLUCOSE BLD GHB EST-MCNC: 180 MG/DL
HBA1C MFR BLD: 7.9 % (ref 4.2–6.3)

## 2018-11-29 ENCOUNTER — TELEPHONE (OUTPATIENT)
Dept: INTERNAL MEDICINE CLINIC | Age: 68
End: 2018-11-29

## 2018-11-29 NOTE — TELEPHONE ENCOUNTER
Called patient and reviewed recent lab work with her  Let her know her hemoglobin A1C was slightly elevated and cholesterol/triglycerides elevated  Instructed to eat a healthier diet   In regards to her vaginal pain, microscopic urine was normal and she is able to use monostat 3 OTC

## 2018-11-30 ENCOUNTER — OFFICE VISIT (OUTPATIENT)
Dept: URGENT CARE | Facility: MEDICAL CENTER | Age: 68
End: 2018-11-30
Payer: COMMERCIAL

## 2018-11-30 VITALS
WEIGHT: 154 LBS | HEART RATE: 74 BPM | HEIGHT: 61 IN | BODY MASS INDEX: 29.07 KG/M2 | SYSTOLIC BLOOD PRESSURE: 129 MMHG | DIASTOLIC BLOOD PRESSURE: 87 MMHG | RESPIRATION RATE: 16 BRPM | TEMPERATURE: 98 F

## 2018-11-30 DIAGNOSIS — R42 VERTIGO: Primary | ICD-10-CM

## 2018-11-30 PROCEDURE — 99213 OFFICE O/P EST LOW 20 MIN: CPT | Performed by: PHYSICIAN ASSISTANT

## 2018-11-30 RX ORDER — AMOXICILLIN 500 MG/1
500 CAPSULE ORAL EVERY 8 HOURS SCHEDULED
Qty: 21 CAPSULE | Refills: 0 | Status: SHIPPED | OUTPATIENT
Start: 2018-11-30 | End: 2018-12-07

## 2018-11-30 RX ORDER — FLUCONAZOLE 150 MG/1
150 TABLET ORAL ONCE
Qty: 1 TABLET | Refills: 0 | Status: SHIPPED | OUTPATIENT
Start: 2018-11-30 | End: 2018-11-30

## 2018-11-30 RX ORDER — MECLIZINE HYDROCHLORIDE 25 MG/1
25 TABLET ORAL EVERY 8 HOURS PRN
Qty: 30 TABLET | Refills: 0 | Status: SHIPPED | OUTPATIENT
Start: 2018-11-30 | End: 2019-07-24 | Stop reason: ALTCHOICE

## 2018-11-30 NOTE — PATIENT INSTRUCTIONS
Vertigo  antivert 3 times daily as needed  Amoxicillin as directed  Follow up with PCP in 3-5 days  Proceed to  ER if symptoms worsen  Benign Paroxysmal Positional Vertigo   WHAT YOU NEED TO KNOW:   BPPV is an inner ear condition that causes you to suddenly feel dizzy  Benign means it is not serious or life-threatening  BPPV is caused by a problem with the nerves and structure of your inner ear  BPPV happens when small pieces of calcium break loose and lump together in one of your inner ear canals  DISCHARGE INSTRUCTIONS:   Return to the emergency department if:   · You fall during a BPPV episode and are injured  · You have a severe headache that does not go away  · You have new changes in your vision or feel weak or confused  · You have problems hearing, or you have ringing or buzzing in your ears  Contact your healthcare provider if:   · Your BPPV symptoms do not go away or they return  · You have problems with your balance, or you are falling often  · You have new or increased nausea or vomiting with vertigo  · You feel anxious or depressed and do not want to leave your home  · You have questions or concerns about your condition or care  Medicines:   · Medicines  may be recommended or prescribed to treat dizziness or nausea  · Take your medicine as directed  Contact your healthcare provider if you think your medicine is not helping or if you have side effects  Tell him of her if you are allergic to any medicine  Keep a list of the medicines, vitamins, and herbs you take  Include the amounts, and when and why you take them  Bring the list or the pill bottles to follow-up visits  Carry your medicine list with you in case of an emergency  Prevent your symptoms:   · Try to avoid sudden head movements  Stand up and lie down slowly  · Raise and support your head when you lie down  Place pillows under your upper back and head or rest in a recliner       · Change your position often when you are lying down  Try not to lie with your head on the same side for long periods of time  Roll over slowly  · Wear protective gear  when you ride a bike or play sports  A helmet helps protect your head from injury  Follow up with your healthcare provider as directed: You may need to return in 1 month to check the progress of your treatment  Write down your questions so you remember to ask them during your visits  © 2017 [facility] Information is for End User's use only and may not be sold, redistributed or otherwise used for commercial purposes  All illustrations and images included in CareNotes® are the copyrighted property of A D A M , Inc  or [employer]  The above information is an  only  It is not intended as medical advice for individual conditions or treatments  Talk to your doctor, nurse or pharmacist before following any medical regimen to see if it is safe and effective for you

## 2018-11-30 NOTE — PROGRESS NOTES
[facility]        NAME: Mathew Buenrostro is a 70 y o  female  : 1950    MRN: [id number]  DATE: 2018  TIME: 6:58 PM    Assessment and Plan   Vertigo [R42]  1  Vertigo  meclizine (ANTIVERT) 25 mg tablet    amoxicillin (AMOXIL) 500 mg capsule    fluconazole (DIFLUCAN) 150 mg tablet         Patient Instructions     Vertigo  antivert 3 times daily as needed  Amoxicillin as directed  Follow up with PCP in 3-5 days  Proceed to  ER if symptoms worsen  Chief Complaint     Chief Complaint   Patient presents with   Eileen Patrick     seen by PCP 2 days ago, told her she had wax in R ear and to get OTC  Using Debrox, not helping  History of Present Illness       Patient present c/o pain to right ear and minimal sense of room spinning when she moves her head  Denies chest pain, SOB, n/v, diaphoresis        Review of Systems   Review of Systems   Constitutional: Negative for activity change, appetite change, chills, diaphoresis, fatigue and fever  HENT: Positive for congestion and ear pain  Negative for ear discharge, facial swelling, hearing loss, mouth sores, nosebleeds, postnasal drip, rhinorrhea, sinus pain, sinus pressure, sneezing, sore throat and voice change  Respiratory: Negative for apnea, cough, choking, chest tightness, shortness of breath, wheezing and stridor  Cardiovascular: Negative            Current Medications       Current Outpatient Prescriptions:     clonazePAM (KlonoPIN) 0 5 mg tablet, Take 1 tablet (0 5 mg total) by mouth daily at bedtime, Disp: 30 tablet, Rfl: 0    famotidine (PEPCID) 20 mg tablet, Take 1 tablet (20 mg total) by mouth daily, Disp: 90 tablet, Rfl: 0    fenofibrate (TRICOR) 145 mg tablet, Take 1 tablet (145 mg total) by mouth daily, Disp: 90 tablet, Rfl: 1    ibuprofen (MOTRIN) 200 mg tablet, Take 200 mg by mouth every 6 (six) hours as needed for mild pain, Disp: , Rfl:     levothyroxine 50 mcg tablet, Take 1 tablet (50 mcg total) by mouth daily, Disp: 90 tablet, Rfl: 1    Loratadine 10 MG CAPS, Take 1 tablet by mouth daily, Disp: , Rfl:     losartan (COZAAR) 50 mg tablet, Take 1 tablet (50 mg total) by mouth daily, Disp: 90 tablet, Rfl: 1    metFORMIN (GLUCOPHAGE) 500 mg tablet, Take 2 tablets (1,000 mg total) by mouth 2 (two) times a day with meals, Disp: 360 tablet, Rfl: 1    Miconazole Nitrate (MONISTAT 3 COMBINATION PACK VA), Insert into the vagina, Disp: , Rfl:     ONETOUCH VERIO test strip, 1 each by Other route daily, Disp: 100 each, Rfl: 1    Probiotic Product (PROBIOTIC-10) CAPS, Take 1 capsule by mouth daily, Disp: , Rfl:     simvastatin (ZOCOR) 40 mg tablet, Take 1 tablet (40 mg total) by mouth daily at bedtime, Disp: 90 tablet, Rfl: 1    zolpidem (AMBIEN) 10 mg tablet, Take 1 tablet (10 mg total) by mouth daily at bedtime, Disp: 30 tablet, Rfl: 0    amoxicillin (AMOXIL) 500 mg capsule, Take 1 capsule (500 mg total) by mouth every 8 (eight) hours for 7 days, Disp: 21 capsule, Rfl: 0    Cyanocobalamin 1000 MCG CAPS, Take 1 capsule by mouth daily  , Disp: , Rfl:     fluconazole (DIFLUCAN) 150 mg tablet, Take 1 tablet (150 mg total) by mouth once for 1 dose, Disp: 1 tablet, Rfl: 0    meclizine (ANTIVERT) 25 mg tablet, Take 1 tablet (25 mg total) by mouth every 8 (eight) hours as needed for dizziness, Disp: 30 tablet, Rfl: 0    Current Facility-Administered Medications:     [START ON 1/9/2019] BCG (LEXI BCG) intravesical suspension 50 mg, 50 mg, Bladder Instillation, Weekly, Alesia Alvares MD    Current Allergies     Allergies as of 11/30/2018    (No Known Allergies)            The following portions of the patient's history were reviewed and updated as appropriate: allergies, current medications, past family history, past medical history, past social history, past surgical history and problem list      Past Medical History:   Diagnosis Date    Anxiety     Arthritis     Bladder carcinoma ([facility] [address] )     Bladder mass     Depression     Diabetes mellitus ([facility] [address] [facility]     Disease of thyroid gland     thyroid nodules-not treating at this time    Dysuria     Last assessed 17    GERD (gastroesophageal reflux disease)     HLD (hyperlipidemia)     HTN (hypertension)     Hypercholesterolemia     Hypertension     Knee pain     PONV (postoperative nausea and vomiting)        Past Surgical History:   Procedure Laterality Date    CARPAL TUNNEL RELEASE Right      SECTION      x2    CHOLECYSTECTOMY      CYSTOSCOPY N/A 2016    Procedure: CYSTOSCOPY WITH BIOPSIES;  Surgeon: Delores Zheng MD;  [facility];  Service:    Kael Banks N/A 2016    Procedure: CYSTOSCOPY;  Surgeon: Delores Zheng MD;  [facility];  Service:     HERNIA REPAIR      ME CYSTO/URETERO W/LITHOTRIPSY &INDWELL STENT INSRT  2016    Procedure: CYSTOSCOPY URETEROSCOPY WITH LITHOTRIPSY HOLMIUM LASER RIGHT, RETROGRADE PYELOGRAM BILATERAL: AND INSERTION STENT URETERAL Right ;  Surgeon: Delores Zheng MD;  [facility];  Service: Urology    ME CYSTO/URETERO/PYELOSCOPY, DX Right 2017    Procedure: URETEROSCOPY;  Surgeon: Delores Zheng MD;  [facility];  Service: Urology    ME CYSTOSCOPY,INSERT URETERAL STENT Left 2016    Procedure: URETERAL STENTS;  Surgeon: Delores Zheng MD;  [facility];  Service: Urology    ME CYSTOURETHROSCOPY,FULGUR <0 5 CM LESN N/A 2016    Procedure: TRANSURETHRAL RESECTION OF BLADDER TUMOR (TURBT);   Surgeon: Delores Zheng MD;  [facility];  Service: Urology    ME CYSTOURETHROSCOPY,FULGUR <0 5 CM LESN N/A 2016    Procedure: José Flash; TUR BLADDER TUMOR ;  Surgeon: Delores Zheng MD;  [facility];  Service: Urology    ME CYSTOURETHROSCOPY,FULGUR <0 5 CM LESN N/A 2016    Procedure: TUR BLADDER TUMOR ;  Surgeon: Delores Zheng MD;  [facility];  Service: Urology    ME CYSTOURETHROSCOPY,FULGUR <0 5 CM LESN Bilateral 5/9/2017    Procedure: CYSTOSCOPY, RIGHT URETERAL WASHINGS, ;  Surgeon: Syeda Fofana MD;  [facility];  Service: Urology    NV CYSTOURETHROSCOPY,URETER CATHETER Bilateral 9/29/2016    Procedure: RETROGRADE PYELOGRAM ;  Surgeon: Syeda Fofana MD;  [facility];  Service: Urology    NV CYSTOURETHROSCOPY,URETER CATHETER Bilateral 5/9/2017    Procedure: RETROGRADE PYELOGRAM WITH STENT EXCHANGE, RIGHT;  Surgeon: Syeda Fofana MD;  [facility];  Service: Urology    NV INSTILL ANTICANCER AGENT IN BLADDER N/A 11/29/2016    Procedure: Makayla Wayne;  Surgeon: Syeda Fofana MD;  [facility];  Service: Urology    NV KNEE SCOPE,MED/LAT MENISECTOMY Left 4/4/2016    Procedure: KNEE ARTHROSCOPIC PARTIAL MEDIAL MENISCECTOMY ;  Surgeon: Ivory Marshall MD;  [facility];  Service: Orthopedics    REMOVAL URETERAL STENT Left 11/29/2016    Procedure: REMOVAL STENT URETERAL;  Surgeon: Syeda Fofana MD;  [facility];  Service:     TONSILLECTOMY         Family History   Problem Relation Age of Onset    Heart attack Mother     ALS Father     Diabetes Maternal Grandfather     Lung cancer Paternal Grandfather          Medications have been verified  Objective   /87 (Patient Position: Sitting)   Pulse 74   Temp 98 °F (36 7 °C) (Temporal)   Resp 16   Ht 5' 1" (1 549 m)   Wt 69 9 kg (154 lb)   BMI 29 10 kg/m²        Physical Exam     Physical Exam   Constitutional: She is oriented to person, place, and time  She appears well-developed and well-nourished  HENT:   Head: Normocephalic and atraumatic  Right Ear: External ear normal    Left Ear: External ear normal    Nose: Nose normal    Mouth/Throat: Oropharynx is clear and moist  No oropharyngeal exudate  Neck: Normal range of motion  Neck supple  Cardiovascular: Normal rate, regular rhythm, normal heart sounds and intact distal pulses      Pulmonary/Chest: Effort normal and breath sounds normal  No respiratory distress  She has no wheezes  She has no rales  She exhibits no tenderness  Lymphadenopathy:     She has no cervical adenopathy  Neurological: She is alert and oriented to person, place, and time  She has normal strength  No cranial nerve deficit or sensory deficit  She displays a negative Romberg sign  GCS eye subscore is 4  GCS verbal subscore is 5  GCS motor subscore is 6     Reproduced symptoms with head movement

## 2018-12-10 ENCOUNTER — TELEPHONE (OUTPATIENT)
Dept: UROLOGY | Facility: AMBULATORY SURGERY CENTER | Age: 68
End: 2018-12-10

## 2018-12-10 DIAGNOSIS — N95.2 VAGINAL ATROPHY: Primary | ICD-10-CM

## 2018-12-10 DIAGNOSIS — E78.00 HYPERCHOLESTEROLEMIA: ICD-10-CM

## 2018-12-10 RX ORDER — FENOFIBRATE 145 MG/1
145 TABLET, COATED ORAL DAILY
Qty: 90 TABLET | Refills: 0 | Status: SHIPPED | OUTPATIENT
Start: 2018-12-10 | End: 2019-03-25 | Stop reason: SDUPTHER

## 2018-12-10 NOTE — TELEPHONE ENCOUNTER
Patient stated that she did not have breast cancer or gyn-just bladder cancer  She wants to make sure that it won't interfere with BCG treatments that start at the beginning    I told her to discontinue the week prior and then restart the week after treatments are done if you think this will be okay

## 2018-12-10 NOTE — TELEPHONE ENCOUNTER
Can try topical estrogen  However, patient cannot take if she has history of breast or gynecological cancer  Please confirm

## 2018-12-10 NOTE — TELEPHONE ENCOUNTER
Agree with nurse's recommendations  Patient should apply pea size amount of estrogen cream 3x/week  Report of vaginal irritation may also be secondary to BCG treatments

## 2018-12-10 NOTE — TELEPHONE ENCOUNTER
Patient called said she had vagina burning and itching  She had a urine culture done and results showed that everything was fine but still has itching  She tried monistat and said it helped a little  Wanted to know if there is a cream that could prescribed   [phone]

## 2018-12-26 DIAGNOSIS — F51.01 PRIMARY INSOMNIA: ICD-10-CM

## 2018-12-26 DIAGNOSIS — F41.8 DEPRESSION WITH ANXIETY: ICD-10-CM

## 2018-12-26 RX ORDER — CLONAZEPAM 0.5 MG/1
0.5 TABLET ORAL
Qty: 30 TABLET | Refills: 0 | Status: SHIPPED | OUTPATIENT
Start: 2018-12-26 | End: 2019-02-18 | Stop reason: SDUPTHER

## 2018-12-26 RX ORDER — ZOLPIDEM TARTRATE 10 MG/1
10 TABLET ORAL
Qty: 30 TABLET | Refills: 0 | Status: SHIPPED | OUTPATIENT
Start: 2018-12-26 | End: 2019-01-24 | Stop reason: SDUPTHER

## 2019-01-02 ENCOUNTER — PROCEDURE VISIT (OUTPATIENT)
Dept: UROLOGY | Facility: AMBULATORY SURGERY CENTER | Age: 69
End: 2019-01-02
Payer: COMMERCIAL

## 2019-01-02 VITALS
BODY MASS INDEX: 28.57 KG/M2 | SYSTOLIC BLOOD PRESSURE: 126 MMHG | HEART RATE: 72 BPM | WEIGHT: 155.25 LBS | DIASTOLIC BLOOD PRESSURE: 86 MMHG | HEIGHT: 62 IN

## 2019-01-02 DIAGNOSIS — C67.9 MALIGNANT NEOPLASM OF URINARY BLADDER, UNSPECIFIED SITE (HCC): Primary | ICD-10-CM

## 2019-01-02 LAB
SL AMB  POCT GLUCOSE, UA: NORMAL
SL AMB LEUKOCYTE ESTERASE,UA: NORMAL
SL AMB POCT BILIRUBIN,UA: NORMAL
SL AMB POCT BLOOD,UA: NORMAL
SL AMB POCT CLARITY,UA: CLEAR
SL AMB POCT COLOR,UA: YELLOW
SL AMB POCT KETONES,UA: NORMAL
SL AMB POCT NITRITE,UA: NORMAL
SL AMB POCT PH,UA: 5
SL AMB POCT SPECIFIC GRAVITY,UA: 1.02
SL AMB POCT URINE PROTEIN: NORMAL
SL AMB POCT UROBILINOGEN: NORMAL

## 2019-01-02 PROCEDURE — 51720 TREATMENT OF BLADDER LESION: CPT | Performed by: UROLOGY

## 2019-01-02 PROCEDURE — 81002 URINALYSIS NONAUTO W/O SCOPE: CPT | Performed by: UROLOGY

## 2019-01-02 NOTE — PROGRESS NOTES
1/2/2019    Yarely Seats  1950  [id number]    Diagnosis  Chief Complaint     Bladder Cancer          Patient presents for BCG 1 of 3 managed by Dr Gus Weston  F/u 1 week    Patient instructed to call with persistent hematuria, fever, or other concerns  Procedure BCG treatment 1 of 3  Vitals:    01/02/19 0852   BP: 126/86   BP Location: Left arm   Patient Position: Sitting   Cuff Size: Standard   Pulse: 72   Weight: 70 4 kg (155 lb 4 oz)   Height: 5' 2" (1 575 m)       No results found for this or any previous visit (from the past 4 hour(s))  Bladder instillation     Date/Time 1/2/2019 9:41 AM     Performed by  Gregorio De La Rosa by Marty Gabriel       Consent: Verbal consent obtained  Written consent obtained  Consent given by: patient  Patient understanding: patient states understanding of the procedure being performed  Patient consent: the patient's understanding of the procedure matches consent given  Procedure consent: procedure consent matches procedure scheduled  Relevant documents: relevant documents present and verified  Test results: test results available and properly labeled  Site marked: the operative site was marked      Local anesthesia used: no     Anesthesia   Local anesthesia used: no     Sedation   Patient sedated: no      Specimen: yes    Culture: no           Sterile technique employed  Patient prepped and identified  14F Straight catheter placed  Bladder drained, residual approximately 30 mL  The following solution was instilled directly into the bladder via catheter: 1 Vial BCG  Catheter removed  Patient discharged  Patient tolerated procedure             Amaya Rice

## 2019-01-08 ENCOUNTER — TELEPHONE (OUTPATIENT)
Dept: UROLOGY | Facility: AMBULATORY SURGERY CENTER | Age: 69
End: 2019-01-08

## 2019-01-08 ENCOUNTER — OFFICE VISIT (OUTPATIENT)
Dept: INTERNAL MEDICINE CLINIC | Age: 69
End: 2019-01-08
Payer: COMMERCIAL

## 2019-01-08 ENCOUNTER — TELEPHONE (OUTPATIENT)
Dept: INTERNAL MEDICINE CLINIC | Age: 69
End: 2019-01-08

## 2019-01-08 ENCOUNTER — TELEPHONE (OUTPATIENT)
Dept: UROLOGY | Facility: MEDICAL CENTER | Age: 69
End: 2019-01-08

## 2019-01-08 VITALS
SYSTOLIC BLOOD PRESSURE: 160 MMHG | TEMPERATURE: 97.9 F | BODY MASS INDEX: 28.93 KG/M2 | HEIGHT: 62 IN | OXYGEN SATURATION: 98 % | WEIGHT: 157.2 LBS | HEART RATE: 82 BPM | DIASTOLIC BLOOD PRESSURE: 84 MMHG

## 2019-01-08 DIAGNOSIS — J01.10 ACUTE NON-RECURRENT FRONTAL SINUSITIS: Primary | ICD-10-CM

## 2019-01-08 DIAGNOSIS — C67.2 MALIGNANT NEOPLASM OF LATERAL WALL OF URINARY BLADDER (HCC): ICD-10-CM

## 2019-01-08 PROCEDURE — 99213 OFFICE O/P EST LOW 20 MIN: CPT | Performed by: NURSE PRACTITIONER

## 2019-01-08 RX ORDER — AMOXICILLIN AND CLAVULANATE POTASSIUM 875; 125 MG/1; MG/1
1 TABLET, FILM COATED ORAL EVERY 12 HOURS SCHEDULED
Qty: 14 TABLET | Refills: 0 | Status: SHIPPED | OUTPATIENT
Start: 2019-01-08 | End: 2019-01-15

## 2019-01-08 NOTE — TELEPHONE ENCOUNTER
Called and spoke with patient  She reports she started with upset stomach and loose bowels as well now and that on top of the sinus infection she doesn't think she will be able to go through with BCG tomorrow  Appt cancelled and an additional appt added to the end  She will plan to return next week for second treatment of this BCG course  Patient agrees to this plan

## 2019-01-08 NOTE — PROGRESS NOTES
Assessment/Plan:    Sinusitis  Symptoms for two weeks  Will start abx  Rest  Stay well hydrated  warm compress to sinus    Hold on BCG treatment tomorrow - contact your urologist office and inform sinus infection and on abx    Monitor BP @ home  Elevated today, previously well controlled  Goal BP is <140/90     Diagnoses and all orders for this visit:    Acute non-recurrent frontal sinusitis  -     amoxicillin-clavulanate (AUGMENTIN) 875-125 mg per tablet; Take 1 tablet by mouth every 12 (twelve) hours for 7 days    Malignant neoplasm of lateral wall of urinary bladder (HCC)        Subjective:      Patient ID: Sera Kwan is a 70 y o  female  Pt is following with urology and is due to receive BCG treatment #2 tomorrow for bladder CA managed by Dr Lauryn OMSCOSO    This is a new problem  Episode onset: 2 weeks  The problem has been waxing and waning  There has been no fever  Associated symptoms include congestion, coughing, ear pain (R > L), headaches, rhinorrhea, sinus pain and a sore throat  Pertinent negatives include no abdominal pain, chest pain, diarrhea, nausea, vomiting or wheezing  Treatments tried: FERCHO castano  The treatment provided no relief    + ill contacts - grandchildren and   The following portions of the patient's history were reviewed and updated as appropriate: allergies, current medications, past family history, past medical history, past social history, past surgical history and problem list     Review of Systems   Constitutional: Positive for chills and fatigue (baseline)  Negative for fever  HENT: Positive for congestion, ear pain (R > L), postnasal drip, rhinorrhea, sinus pain, sinus pressure and sore throat  Eyes: Negative for visual disturbance  Respiratory: Positive for cough  Negative for shortness of breath and wheezing  Cardiovascular: Negative for chest pain and palpitations     Gastrointestinal: Negative for abdominal pain, constipation, diarrhea, nausea and vomiting  Musculoskeletal: Negative for myalgias  Neurological: Positive for light-headedness (intermittent) and headaches  Negative for dizziness           Past Medical History:   Diagnosis Date    Anxiety     Arthritis     Bladder carcinoma (HCC)     Bladder mass     Depression     Diabetes mellitus ([facility] [address] )     Disease of thyroid gland     thyroid nodules-not treating at this time    Dysuria     Last assessed 02/16/17    GERD (gastroesophageal reflux disease)     HLD (hyperlipidemia)     HTN (hypertension)     Hypercholesterolemia     Hypertension     Knee pain     PONV (postoperative nausea and vomiting)          Current Outpatient Prescriptions:     clonazePAM (KlonoPIN) 0 5 mg tablet, Take 1 tablet (0 5 mg total) by mouth daily at bedtime, Disp: 30 tablet, Rfl: 0    conjugated estrogens (PREMARIN) vaginal cream, Insert 0 5 g into the vagina see administration instructions Apply topically 3x/week, Disp: 42 5 g, Rfl: 2    Cyanocobalamin 1000 MCG CAPS, Take 1 capsule by mouth daily  , Disp: , Rfl:     famotidine (PEPCID) 20 mg tablet, Take 1 tablet (20 mg total) by mouth daily, Disp: 90 tablet, Rfl: 0    fenofibrate (TRICOR) 145 mg tablet, Take 1 tablet (145 mg total) by mouth daily, Disp: 90 tablet, Rfl: 0    ibuprofen (MOTRIN) 200 mg tablet, Take 200 mg by mouth every 6 (six) hours as needed for mild pain, Disp: , Rfl:     levothyroxine 50 mcg tablet, Take 1 tablet (50 mcg total) by mouth daily, Disp: 90 tablet, Rfl: 1    Loratadine 10 MG CAPS, Take 1 tablet by mouth daily, Disp: , Rfl:     losartan (COZAAR) 50 mg tablet, Take 1 tablet (50 mg total) by mouth daily, Disp: 90 tablet, Rfl: 1    metFORMIN (GLUCOPHAGE) 500 mg tablet, Take 2 tablets (1,000 mg total) by mouth 2 (two) times a day with meals, Disp: 360 tablet, Rfl: 1    ONETOUCH VERIO test strip, 1 each by Other route daily, Disp: 100 each, Rfl: 1    Probiotic Product (PROBIOTIC-10) CAPS, Take 1 capsule by mouth daily, Disp: , Rfl:     simvastatin (ZOCOR) 40 mg tablet, Take 1 tablet (40 mg total) by mouth daily at bedtime, Disp: 90 tablet, Rfl: 1    zolpidem (AMBIEN) 10 mg tablet, Take 1 tablet (10 mg total) by mouth daily at bedtime, Disp: 30 tablet, Rfl: 0    amoxicillin-clavulanate (AUGMENTIN) 875-125 mg per tablet, Take 1 tablet by mouth every 12 (twelve) hours for 7 days, Disp: 14 tablet, Rfl: 0    meclizine (ANTIVERT) 25 mg tablet, Take 1 tablet (25 mg total) by mouth every 8 (eight) hours as needed for dizziness (Patient not taking: Reported on 2019 ), Disp: 30 tablet, Rfl: 0    Miconazole Nitrate (MONISTAT 3 COMBINATION PACK VA), Insert into the vagina, Disp: , Rfl:     Current Facility-Administered Medications:     [START ON 2019] BCG (LEXI BCG) intravesical suspension 50 mg, 50 mg, Bladder Instillation, Weekly, Miguel Lal MD, 50 mg at 19 0931    No Known Allergies    Social History   Past Surgical History:   Procedure Laterality Date    CARPAL TUNNEL RELEASE Right      SECTION      x2    CHOLECYSTECTOMY      CYSTOSCOPY N/A 2016    Procedure: CYSTOSCOPY WITH BIOPSIES;  Surgeon: Miguel Lal MD;  [facility];  Service:    Jamal English CYSTOSCOPY N/A 2016    Procedure: Lenore Ramirez;  Surgeon: Miguel Lal MD;  [facility];  Service:     HERNIA REPAIR      KS CYSTO/URETERO W/LITHOTRIPSY &INDWELL STENT INSRT  2016    Procedure: CYSTOSCOPY URETEROSCOPY WITH LITHOTRIPSY HOLMIUM LASER RIGHT, RETROGRADE PYELOGRAM BILATERAL: AND INSERTION STENT URETERAL Right ;  Surgeon: Miguel Lal MD;  [facility];  Service: Urology    KS CYSTO/URETERO/PYELOSCOPY, DX Right 2017    Procedure: URETEROSCOPY;  Surgeon: Miguel Lal MD;  [facility];  Service: Urology    KS CYSTOSCOPY,INSERT URETERAL STENT Left 2016    Procedure: URETERAL STENTS;  Surgeon: Miguel Lal MD;  [facility];  Service: Urology    KS [address] [place] [address] <0 5 CM LESN N/A 11/29/2016    Procedure: TRANSURETHRAL RESECTION OF BLADDER TUMOR (TURBT);   Surgeon: Nkia Rodriguez MD;  [facility];  Service: Urology    NJ CYSTOURETHROSCOPY,FULGUR <0 5 CM LESN N/A 9/29/2016    Procedure: Deena Chawlahal; TUR BLADDER TUMOR ;  Surgeon: Nika Rodriguez MD;  [facility];  Service: Urology    NJ CYSTOURETHROSCOPY,FULGUR <0 5 CM LESN N/A 9/1/2016    Procedure: TUR BLADDER TUMOR ;  Surgeon: Nika Rodriguez MD;  [facility];  Service: Urology    NJ CYSTOURETHROSCOPY,FULGUR <0 5 CM LESN Bilateral 5/9/2017    Procedure: CYSTOSCOPY, RIGHT URETERAL WASHINGS, ;  Surgeon: Nika Rodriguez MD;  [facility];  Service: Urology    NJ CYSTOURETHROSCOPY,URETER CATHETER Bilateral 9/29/2016    Procedure: RETROGRADE PYELOGRAM ;  Surgeon: Nika Rodriguez MD;  [facility];  Service: Urology    NJ CYSTOURETHROSCOPY,URETER CATHETER Bilateral 5/9/2017    Procedure: RETROGRADE PYELOGRAM WITH STENT EXCHANGE, RIGHT;  Surgeon: Nika Rodriguez MD;  [facility];  Service: Urology    NJ INSTILL ANTICANCER AGENT IN BLADDER N/A 11/29/2016    Procedure: Deana Sensor;  Surgeon: Nika Rodriguez MD;  [facility];  Service: Urology    NJ KNEE SCOPE,MED/LAT MENISECTOMY Left 4/4/2016    Procedure: KNEE ARTHROSCOPIC PARTIAL MEDIAL MENISCECTOMY ;  Surgeon: Michael Richards MD;  [facility];  Service: Orthopedics    REMOVAL URETERAL STENT Left 11/29/2016    Procedure: REMOVAL STENT URETERAL;  Surgeon: Nika Rodriguez MD;  [facility];  Service:     TONSILLECTOMY       Family History   Problem Relation Age of Onset    Heart attack Mother     ALS Father     Diabetes Maternal Grandfather     Lung cancer Paternal Grandfather        Objective:  /84 (BP Location: Left arm, Patient Position: Sitting, Cuff Size: Adult)   Pulse 82   Temp 97 9 °F (36 6 °C) (Tympanic)   Ht 5' 1 81" (1 57 m)   Wt 71 3 kg (157 lb 3 2 oz)   SpO2 98% BMI 28 93 kg/m²      Physical Exam   Constitutional: She is oriented to person, place, and time  She appears well-developed and well-nourished  No distress  HENT:   Head: Normocephalic and atraumatic  Right Ear: Hearing, tympanic membrane, external ear and ear canal normal    Left Ear: Hearing, tympanic membrane, external ear and ear canal normal    Nose: Mucosal edema and rhinorrhea present  Right sinus exhibits frontal sinus tenderness  Right sinus exhibits no maxillary sinus tenderness  Left sinus exhibits frontal sinus tenderness  Left sinus exhibits no maxillary sinus tenderness  Mouth/Throat: Uvula is midline, oropharynx is clear and moist and mucous membranes are normal  No oropharyngeal exudate  Neck: Neck supple  Cardiovascular: Normal rate and regular rhythm  Pulmonary/Chest: Effort normal and breath sounds normal  No respiratory distress  She has no wheezes  Lymphadenopathy:     She has no cervical adenopathy  Neurological: She is alert and oriented to person, place, and time  Skin: Skin is warm and dry  Psychiatric: She has a normal mood and affect  Her behavior is normal  Judgment and thought content normal    Nursing note and vitals reviewed

## 2019-01-08 NOTE — TELEPHONE ENCOUNTER
Reviewed with Dr Tod Washburn  Per Dr Tod Washburn, as long as patient feels up to it, ok to continue BCG treatment despite current treatment with Augmentin  Reviewed this recommendation with the patient and she would like to proceed as scheduled tomorrow with BCG 2 of 3

## 2019-01-08 NOTE — TELEPHONE ENCOUNTER
Patient was just put on antibiotics (Augmentin) for a sinus infection  She was told by them that it was up to the Diagnostic Nurse as to whether or not she should still have her BCG treatment with us tomorrow  Please call her to advise

## 2019-01-08 NOTE — TELEPHONE ENCOUNTER
Patient is undergoing BCG maintenance treatment for bladder cancer, managed by Dr Rasta Arango at the [address] [facility] office  She reports she has had a cold for a few weeks that she hasn't been able to "shake" and is seeing her PCP later today  Reviewed that if she is started on steroids or antibiotics for sinus infection we may need to postpone her treatments  She will call back after her appt today to let us know the result

## 2019-01-08 NOTE — TELEPHONE ENCOUNTER
Juan Diego [place]      Patient was just put on antibiotics (Augmentin) for a sinus infection  She was told by them that it was up to the Diagnostic Nurse as to whether or not she should still have her BCG treatment with us tomorrow    Please call her to advise

## 2019-01-08 NOTE — PATIENT INSTRUCTIONS
Will start abx  Rest  Stay well hydrated  warm compress to sinus    May take OTC flonase as well  Continue with tussinex    Hold on BCG treatment tomorrow - contact your urologist office and inform sinus infection and on abx    Monitor BP @ home  Elevated today, previously well controlled    Goal BP is <140/90

## 2019-01-08 NOTE — TELEPHONE ENCOUNTER
Patient with sinus infection wants to know if she should still come in for PTNS treatment on 1/9/2019  Please call back

## 2019-01-15 ENCOUNTER — OFFICE VISIT (OUTPATIENT)
Dept: INTERNAL MEDICINE CLINIC | Age: 69
End: 2019-01-15
Payer: COMMERCIAL

## 2019-01-15 VITALS
WEIGHT: 155 LBS | SYSTOLIC BLOOD PRESSURE: 130 MMHG | DIASTOLIC BLOOD PRESSURE: 80 MMHG | OXYGEN SATURATION: 97 % | TEMPERATURE: 97.8 F | HEIGHT: 62 IN | BODY MASS INDEX: 28.52 KG/M2 | HEART RATE: 80 BPM

## 2019-01-15 DIAGNOSIS — E78.2 MIXED HYPERLIPIDEMIA: ICD-10-CM

## 2019-01-15 DIAGNOSIS — E53.8 VITAMIN B12 DEFICIENCY: Primary | ICD-10-CM

## 2019-01-15 DIAGNOSIS — R25.2 LEG CRAMPS: ICD-10-CM

## 2019-01-15 DIAGNOSIS — R10.9 ABDOMINAL DISCOMFORT: ICD-10-CM

## 2019-01-15 DIAGNOSIS — I10 ESSENTIAL HYPERTENSION: ICD-10-CM

## 2019-01-15 DIAGNOSIS — E03.9 HYPOTHYROIDISM, UNSPECIFIED TYPE: ICD-10-CM

## 2019-01-15 DIAGNOSIS — E11.9 NON-INSULIN DEPENDENT TYPE 2 DIABETES MELLITUS (HCC): ICD-10-CM

## 2019-01-15 DIAGNOSIS — R01.1 CARDIAC MURMUR: ICD-10-CM

## 2019-01-15 PROCEDURE — 3075F SYST BP GE 130 - 139MM HG: CPT | Performed by: INTERNAL MEDICINE

## 2019-01-15 PROCEDURE — 99214 OFFICE O/P EST MOD 30 MIN: CPT | Performed by: INTERNAL MEDICINE

## 2019-01-15 PROCEDURE — 3079F DIAST BP 80-89 MM HG: CPT | Performed by: INTERNAL MEDICINE

## 2019-01-15 NOTE — PATIENT INSTRUCTIONS
1  She was advised to double her dosage of her OTC Pepcid while this abdominal discomfort persists  2  She will have an echo completed at her earliest convenience for further evaluation of this murmur  3  She was given a referral to Dr Fabio Cullen, cardiologist, for further evaluation  4  She was advised to use quinine water near bedtime for relief of leg cramps  5  She will have a Y91 level and folic acid level drawn prior to her next appointment  6  She will have a CMP and an HgbA1C drawn prior to her next appointment  7  She will have a lipid panel drawn prior to her next appointment  8  She will follow up with us in 2 months or as needed

## 2019-01-15 NOTE — PROGRESS NOTES
Assessment/Plan:    1  Sinus infection  She had a sinus infection and she took only 3 days of Augmentin as she had abdominal discomfort and diarrhea as a side effect  She discontinued it and states that this has improved, but not completely resolved  Because this is most likely a viral infection, this should improve further without any other antibiotics  2  Abdominal Discomfort  She had abdominal discomfort and diarrhea due to side effects of the Augmentin  She was advised to double her dosage of her OTC Pepcid while this abdominal discomfort persists  3  Soft murmur  A soft murmur was heard today on the left sternal border without radiation  She will have an echo completed at her earliest convenience for further evaluation of this murmur  She has a strong family history of cardiac disease as her mother had an MI at age 62  She was given a referral to Dr Jessie Ellison, cardiologist, for further evaluation  4  Leg cramps  She reports severe cramps in her leg muscles at night  She was advised to use quinine water near bedtime for relief of leg cramps  She will have a L45 level and folic acid level drawn prior to her next appointment  5  Hypertension  Her BP is stable at 130/80 today at the office  She will continue on her current medications  6  Type 2 Diabetes mellitus  Her hgbA1C was elevated at 7 9 on 11/27/18  She had been advised to monitor her diet  She will have a CMP and an HgbA1C drawn prior to her next appointment  7  Hypothyroidism  Her TSH was normal at 2 49 and her T4 was normal at 0 93 on 11/27/18  She will continue on her current medications as she is doing well on that  8  Hyperlipidemia  Her triglycerides were elevated at 249 on 11/27/18 so she was advised to monitor her diet strictly  Her cholesterol was slightly elevated at 205, her HDL was slightly decreased at 33, and her LDL was slightly elevated at 122 on 11/27/18  She will have a lipid panel drawn prior to her next appointment  9  Healthcare Maintenance  She will follow up with us in 2 months or as needed  Diagnoses and all orders for this visit:    Vitamin B12 deficiency  -     Vitamin B12; Future  -     Folate; Future    Cardiac murmur  -     Echo complete with contrast if indicated; Future  -     Ambulatory referral to Cardiology; Future    [facility] dependent [facility]  -     Comprehensive metabolic panel; Future  -     HEMOGLOBIN A1C W/ EAG ESTIMATION; Future    Mixed hyperlipidemia  -     Lipid panel; Future    Essential hypertension    Hypothyroidism, unspecified type    Leg cramps    Abdominal discomfort          Subjective:      Patient ID: Faith Morrow is a 70 y o  female  Faith Morrow is a 70year old female who presents today for a 2 month follow up regarding her diabetes mellitus, hypertension, hypothyroidism, and hyperlipidemia  She had a sinus infection last week and she took only 3 days of Augmentin as she had abdominal discomfort and diarrhea as a side effect  She delayed her BCG treatment by one week due to this infection  She follows regularly with her dermatologist every 6 months for a complete exam    She complains of knee pain bilaterally  She had injections in the knees without any significant relief  Her level of pain depends on how much activity she has completed that day and she reports severe cramps in her leg muscles at night  She reports she has not had a flu vaccine this year  She is stressed because she feels her  is not taking care of himself  The following portions of the patient's history were reviewed and updated as appropriate: allergies, current medications, past family history, past medical history, past social history, past surgical history and problem list     Review of Systems   Constitutional: Positive for fatigue  Negative for appetite change, chills, diaphoresis, fever and unexpected weight change     HENT: Positive for postnasal drip, rhinorrhea and sinus pressure  Negative for ear discharge, ear pain, facial swelling, hearing loss, mouth sores, sinus pain, sneezing, sore throat, tinnitus, trouble swallowing and voice change  Eyes: Positive for visual disturbance  Negative for pain, discharge and itching  Respiratory: Positive for cough  Negative for chest tightness, shortness of breath, wheezing and stridor  Cardiovascular: Negative for chest pain, palpitations and leg swelling  Gastrointestinal: Negative for abdominal distention, abdominal pain, anal bleeding, blood in stool, constipation, diarrhea, nausea, rectal pain and vomiting  Genitourinary: Negative for difficulty urinating, dysuria, flank pain, frequency, hematuria and urgency  Musculoskeletal: Positive for arthralgias (knees), gait problem (end of day) and myalgias (muscle  cramping at night)  Skin: Negative  Sees derm   Psychiatric/Behavioral: Negative for dysphoric mood  The patient is nervous/anxious  Objective:      /80 (BP Location: Left arm, Patient Position: Sitting, Cuff Size: Adult)   Pulse 80   Temp 97 8 °F (36 6 °C) (Tympanic)   Ht 5' 1 61" (1 565 m)   Wt 70 3 kg (155 lb)   SpO2 97% Comment: ra  BMI 28 71 kg/m²          Physical Exam   Constitutional: She is oriented to person, place, and time  She appears well-developed and well-nourished  No distress  HENT:   Head: Atraumatic  Eyes: Conjunctivae and EOM are normal  Right eye exhibits no discharge  Left eye exhibits no discharge  Neck: Normal range of motion  Neck supple  No thyromegaly present  Cardiovascular: Normal rate and regular rhythm  Exam reveals no friction rub  Murmur heard  Pulmonary/Chest: Effort normal and breath sounds normal  No stridor  No respiratory distress  She has no wheezes  She has no rales  She exhibits no tenderness  Abdominal: She exhibits no distension and no mass  There is tenderness (mild mid epigastric dyscomt)   There is no rebound and no guarding  Musculoskeletal: She exhibits tenderness (knees)  She exhibits no edema or deformity  Neurological: She is alert and oriented to person, place, and time  She has normal reflexes  Skin: Skin is warm and dry  No rash noted  She is not diaphoretic  No erythema  No pallor  Psychiatric: She has a normal mood and affect  Her behavior is normal  Judgment and thought content normal          Scribe Signature and Attestation:  By signing my name below, Edwina Robles attest that this documentation has been prepared under the direction and in the presence of Dr Giovana West MD  Electronically signed: Wilmer Aguilar  1/15/2019    Provider Attestation:  I, Dr Giovana West, personally performed the services described in this documentation  All medical record entries made by the wilmer were at my direction and in my presence  I have reviewed the chart and discharge instructions (if applicable) and agree that the record reflects my personal performance and is accurate and complete  Dr Giovana West MD  1/15/2019

## 2019-01-16 ENCOUNTER — PROCEDURE VISIT (OUTPATIENT)
Dept: UROLOGY | Facility: AMBULATORY SURGERY CENTER | Age: 69
End: 2019-01-16
Payer: COMMERCIAL

## 2019-01-16 VITALS
BODY MASS INDEX: 28.08 KG/M2 | SYSTOLIC BLOOD PRESSURE: 110 MMHG | HEIGHT: 62 IN | DIASTOLIC BLOOD PRESSURE: 70 MMHG | WEIGHT: 152.6 LBS | HEART RATE: 80 BPM

## 2019-01-16 DIAGNOSIS — C67.9 MALIGNANT NEOPLASM OF URINARY BLADDER, UNSPECIFIED SITE (HCC): Primary | ICD-10-CM

## 2019-01-16 LAB
SL AMB  POCT GLUCOSE, UA: NORMAL
SL AMB LEUKOCYTE ESTERASE,UA: NORMAL
SL AMB POCT BILIRUBIN,UA: NORMAL
SL AMB POCT BLOOD,UA: NORMAL
SL AMB POCT CLARITY,UA: CLEAR
SL AMB POCT COLOR,UA: YELLOW
SL AMB POCT KETONES,UA: NORMAL
SL AMB POCT NITRITE,UA: NORMAL
SL AMB POCT PH,UA: 6
SL AMB POCT SPECIFIC GRAVITY,UA: 1.01
SL AMB POCT URINE PROTEIN: NORMAL
SL AMB POCT UROBILINOGEN: NORMAL

## 2019-01-16 PROCEDURE — 81002 URINALYSIS NONAUTO W/O SCOPE: CPT

## 2019-01-16 PROCEDURE — 51720 TREATMENT OF BLADDER LESION: CPT

## 2019-01-16 NOTE — PROGRESS NOTES
1/16/2019    Justine Myrick  1950  [id number]    Diagnosis  Chief Complaint     Bladder Cancer          Patient presents for BCG 2 of 3 managed by Dr Dina Bonilla, delayed for illness    Plan  Orders Placed This Encounter   Procedures    POCT urine dip     Patient will follow up in one week for last treatment  Patient instructed to call with persistent hematuria, fever, or other concerns  Procedure BCG treatment 2 of 3  Vitals:    01/16/19 0900   BP: 110/70   Pulse: 80   Weight: 69 2 kg (152 lb 9 6 oz)   Height: 5' 2" (1 575 m)       Recent Results (from the past 4 hour(s))   POCT urine dip    Collection Time: 01/16/19  9:07 AM   Result Value Ref Range    LEUKOCYTE ESTERASE,UA +     NITRITE,UA -     SL AMB POCT UROBILINOGEN -     POCT URINE PROTEIN -      PH,UA 6 0     BLOOD,UA +++     SPECIFIC GRAVITY,UA 1 015     KETONES,UA -     BILIRUBIN,UA -     GLUCOSE, UA -      COLOR,UA yellow     CLARITY,UA clear        Urinary Incontinence Screening      Most Recent Value   Urinary Incontinence   Urinary Incontinence? No   Incomplete emptying? Yes [occ]   Urinary frequency? No   Urinary urgency? No   Urinary hesitancy? No   Dysuria (painful difficult urination)? No   Nocturia (waking up to use the bathroom)? Yes [3x]   Straining (having to push to go)? No   Weak stream?  No   Intermittent stream?  No   Post void dribbling? Yes [occ]               Bladder instillation     Date/Time 1/16/2019 9:20 AM     Performed by  Yne Room     Authorized by Eliazar Mcgraw              Sterile technique employed  Patient prepped and identified  16F Straight catheter placed  Bladder drained, residual approximately <15mL  The following solution was instilled directly into the bladder via catheter: 1 Vial BCG  Catheter removed  Patient discharged  Patient tolerated procedure       Administrations This Visit     BCG (LEXI BCG) intravesical suspension 50 mg     Admin Date  01/16/2019 Action  Given Dose  50 mg Route  Bladder Instillation Administered By  Loki Nevarez RN                  INDIANA Pedro,BSN

## 2019-01-22 ENCOUNTER — TELEPHONE (OUTPATIENT)
Dept: UROLOGY | Facility: MEDICAL CENTER | Age: 69
End: 2019-01-22

## 2019-01-22 NOTE — TELEPHONE ENCOUNTER
Patient would like to know if she can change her appointment to the [facility] location for Thursday with [facility] instead of tomorrow at [facility]

## 2019-01-24 ENCOUNTER — PROCEDURE VISIT (OUTPATIENT)
Dept: UROLOGY | Facility: CLINIC | Age: 69
End: 2019-01-24
Payer: COMMERCIAL

## 2019-01-24 VITALS
BODY MASS INDEX: 29.07 KG/M2 | WEIGHT: 154 LBS | DIASTOLIC BLOOD PRESSURE: 70 MMHG | SYSTOLIC BLOOD PRESSURE: 128 MMHG | HEIGHT: 61 IN | HEART RATE: 88 BPM

## 2019-01-24 DIAGNOSIS — C67.9 MALIGNANT NEOPLASM OF URINARY BLADDER, UNSPECIFIED SITE (HCC): Primary | ICD-10-CM

## 2019-01-24 DIAGNOSIS — F51.01 PRIMARY INSOMNIA: ICD-10-CM

## 2019-01-24 PROCEDURE — 81002 URINALYSIS NONAUTO W/O SCOPE: CPT

## 2019-01-24 PROCEDURE — 51720 TREATMENT OF BLADDER LESION: CPT

## 2019-01-24 RX ORDER — ZOLPIDEM TARTRATE 10 MG/1
10 TABLET ORAL
Qty: 30 TABLET | Refills: 0 | Status: SHIPPED | OUTPATIENT
Start: 2019-01-24 | End: 2019-02-22 | Stop reason: SDUPTHER

## 2019-01-24 NOTE — PROGRESS NOTES
1/24/2019    Rosalee Franklin  1950  [id number]    Diagnosis  Chief Complaint     Bladder Cancer          Patient presents for BCG 3 of 3 managed by Dr Angella Bueno This Encounter   Procedures    POCT urine dip     Patient will follow up as scheduled for surveillance cystoscopy  She will obtain urine cytology prior to that appointment  This testing was ordered today  Patient instructed to call with persistent hematuria, fever, or other concerns  Procedure BCG treatment 3 of 3  Vitals:    01/24/19 0842   BP: 128/70   Pulse: 88   Weight: 69 9 kg (154 lb)   Height: 5' 1" (1 549 m)       Recent Results (from the past 4 hour(s))   POCT urine dip    Collection Time: 01/24/19  8:47 AM   Result Value Ref Range    LEUKOCYTE ESTERASE,UA ++     NITRITE,UA -     SL AMB POCT UROBILINOGEN -     POCT URINE PROTEIN -      PH,UA 6 0     BLOOD,UA +++     SPECIFIC GRAVITY,UA 1 025     KETONES,UA -     BILIRUBIN,UA -     GLUCOSE, UA -      COLOR,UA yellow     CLARITY,UA clear        Urinary Incontinence Screening      Most Recent Value   Urinary Incontinence   Urinary Incontinence? No   Incomplete emptying? No [double voids]   Urinary frequency? No   Urinary urgency? No   Urinary hesitancy? No   Dysuria (painful difficult urination)? No   Nocturia (waking up to use the bathroom)? Yes [3x]   Straining (having to push to go)? No   Weak stream?  No   Intermittent stream?  No   Post void dribbling? Yes [occ]               Bladder instillation     Date/Time 1/24/2019 9:00 AM     Performed by  Mihai Lopez     Authorized by THUY Marie              Sterile technique employed  Patient prepped and identified  16F Straight catheter placed  Bladder drained, residual approximately <15mL  The following solution was instilled directly into the bladder via catheter: 1 Vial BCG  Catheter removed  Patient discharged  Patient tolerated procedure       Administrations This Visit     BCG (LEXI BCG) intravesical suspension 50 mg     Admin Date  01/24/2019 Action  Given Dose  50 mg Route  Bladder Instillation Administered By  Yaneth Mejia, INDIANA Mejia, RN  DENISSE, BSN

## 2019-01-31 LAB
LEFT EYE DIABETIC RETINOPATHY: NORMAL
RIGHT EYE DIABETIC RETINOPATHY: NORMAL

## 2019-01-31 PROCEDURE — 3072F LOW RISK FOR RETINOPATHY: CPT | Performed by: NURSE PRACTITIONER

## 2019-02-03 DIAGNOSIS — E11.8 TYPE 2 DIABETES MELLITUS WITH COMPLICATION, WITHOUT LONG-TERM CURRENT USE OF INSULIN (HCC): ICD-10-CM

## 2019-02-11 ENCOUNTER — HOSPITAL ENCOUNTER (OUTPATIENT)
Dept: NON INVASIVE DIAGNOSTICS | Facility: CLINIC | Age: 69
Discharge: HOME | End: 2019-02-11
Payer: COMMERCIAL

## 2019-02-11 DIAGNOSIS — R01.1 CARDIAC MURMUR: ICD-10-CM

## 2019-02-11 PROCEDURE — 93306 TTE W/DOPPLER COMPLETE: CPT | Performed by: INTERNAL MEDICINE

## 2019-02-11 PROCEDURE — 93306 TTE W/DOPPLER COMPLETE: CPT

## 2019-02-12 ENCOUNTER — TELEPHONE (OUTPATIENT)
Dept: INTERNAL MEDICINE CLINIC | Age: 69
End: 2019-02-12

## 2019-02-15 ENCOUNTER — OFFICE VISIT (OUTPATIENT)
Dept: OBGYN CLINIC | Facility: CLINIC | Age: 69
End: 2019-02-15
Payer: COMMERCIAL

## 2019-02-15 VITALS
BODY MASS INDEX: 28.52 KG/M2 | HEIGHT: 62 IN | WEIGHT: 155 LBS | DIASTOLIC BLOOD PRESSURE: 84 MMHG | SYSTOLIC BLOOD PRESSURE: 130 MMHG

## 2019-02-15 DIAGNOSIS — N89.8 VAGINAL ITCHING: ICD-10-CM

## 2019-02-15 DIAGNOSIS — Z12.39 SCREENING FOR BREAST CANCER: ICD-10-CM

## 2019-02-15 DIAGNOSIS — R30.0 BURNING WITH URINATION: Primary | ICD-10-CM

## 2019-02-15 LAB
SL AMB  POCT GLUCOSE, UA: NEGATIVE
SL AMB LEUKOCYTE ESTERASE,UA: NEGATIVE
SL AMB POCT BILIRUBIN,UA: NEGATIVE
SL AMB POCT BLOOD,UA: ABNORMAL
SL AMB POCT KETONES,UA: NEGATIVE
SL AMB POCT NITRITE,UA: NEGATIVE
SL AMB POCT PH,UA: NEGATIVE
SL AMB POCT SPECIFIC GRAVITY,UA: NEGATIVE
SL AMB POCT URINE PROTEIN: NEGATIVE
SL AMB POCT UROBILINOGEN: NEGATIVE

## 2019-02-15 PROCEDURE — 81002 URINALYSIS NONAUTO W/O SCOPE: CPT | Performed by: PHYSICIAN ASSISTANT

## 2019-02-15 PROCEDURE — 99213 OFFICE O/P EST LOW 20 MIN: CPT | Performed by: PHYSICIAN ASSISTANT

## 2019-02-15 NOTE — PROGRESS NOTES
Pt is here for external vaginal itching, burning and is swollen since December  Saw PCP in December, he suggested Monostat 3, pt felt better but symptoms came back  Also saw Tonia Kang, (pt has history of bladder cancer) they suggested that the itching was from vaginal dryness, and had pt start Permarn, which made pt burn  PT is having occ discharge, liquid like discharge       4/17/2017:Normal pap, culture done but could not read due to scant cells, Pt treated with T7

## 2019-02-15 NOTE — PROGRESS NOTES
Assessment/Plan:    No problem-specific Assessment & Plan notes found for this encounter  Diagnoses and all orders for this visit:    Burning with urination  -     POCT urine dip  -     GP Bacterial Vaginosis w/o Pap w/o HPV Plus    Vaginal itching  -     POCT urine dip  -     GP Bacterial Vaginosis w/o Pap w/o HPV Plus    Screening for breast cancer  -     Mammo diagnostic bilateral w cad; Future        Will use cortisone prn for now  Will treat as appropriate after culture results  If cult neg, may need vulvar colpo  Estrace and vagifem discussed as possible future treatment options    Subjective:      Patient ID: Rosalee Franklin is a 70 y o  female  Pt presents today with c/o vaginal itching  Sx's since December  No change in product/medicine/vitamin  Pt gets Hcg infusions in her bladder every 3 months  She used Monistat in December that gave her little relief  Her urologist gave her an rx for Premarin which she used twice, but had  A lot of burning and itching after  She has only occ discharge  No odor  No bleeding  Bowel and bladder are mostly regular  UA--trace blood (pt always has trace blood due to bladder issue)      The following portions of the patient's history were reviewed and updated as appropriate: allergies, current medications, past family history, past medical history, past social history, past surgical history and problem list     Review of Systems   Constitutional: Negative for chills, fever and unexpected weight change  Gastrointestinal: Negative for abdominal pain, blood in stool, constipation and diarrhea  Genitourinary: Positive for dysuria (occ), hematuria (trace always) and vaginal pain (itching and burning externally x 2 months)  Negative for flank pain, frequency and genital sores           Objective:      /84 (BP Location: Left arm, Patient Position: Sitting, Cuff Size: Standard)   Ht 5' 1 5" (1 562 m)   Wt 70 3 kg (155 lb)   BMI 28 81 kg/m² Physical Exam   Constitutional: She appears well-developed and well-nourished  HENT:   Head: Normocephalic and atraumatic  Neck: Normal range of motion  Pulmonary/Chest: Right breast exhibits no inverted nipple, no mass, no nipple discharge and no skin change  Left breast exhibits no inverted nipple, no mass, no nipple discharge and no skin change  Abdominal: Soft  Genitourinary: Pelvic exam was performed with patient supine  There is no rash, tenderness or lesion on the right labia  There is no rash, tenderness or lesion on the left labia  Cervix exhibits no motion tenderness, no discharge and no friability  Right adnexum displays no mass, no tenderness and no fullness  Left adnexum displays no mass, no tenderness and no fullness  Genitourinary Comments: Atrophy noted  No d/c   No bledding   Lymphadenopathy: No inguinal adenopathy noted on the right or left side  Nursing note and vitals reviewed

## 2019-02-17 LAB
A VAGINAE DNA VAG NAA+PROBE-LOG#: <3.25
A VAGINAE DNA VAG QL NAA+PROBE: NOT DETECTED
G VAGINALIS DNA SPEC QL NAA+PROBE: NOT DETECTED
G VAGINALIS DNA VAG NAA+PROBE-LOG#: <3.25
SL AMB MOBILIUNCUS (SPECIES) BY RT PCR: NOT DETECTED

## 2019-02-18 DIAGNOSIS — E11.8 TYPE 2 DIABETES MELLITUS WITH COMPLICATION, WITHOUT LONG-TERM CURRENT USE OF INSULIN (HCC): ICD-10-CM

## 2019-02-18 DIAGNOSIS — F41.8 DEPRESSION WITH ANXIETY: ICD-10-CM

## 2019-02-19 RX ORDER — CLONAZEPAM 0.5 MG/1
0.5 TABLET ORAL
Qty: 30 TABLET | Refills: 0 | Status: SHIPPED | OUTPATIENT
Start: 2019-02-19 | End: 2019-04-04 | Stop reason: SDUPTHER

## 2019-02-20 DIAGNOSIS — I10 ESSENTIAL HYPERTENSION: ICD-10-CM

## 2019-02-20 PROCEDURE — 4010F ACE/ARB THERAPY RXD/TAKEN: CPT | Performed by: NURSE PRACTITIONER

## 2019-02-20 RX ORDER — LOSARTAN POTASSIUM 50 MG/1
50 TABLET ORAL DAILY
Qty: 90 TABLET | Refills: 1 | Status: SHIPPED | OUTPATIENT
Start: 2019-02-20 | End: 2019-04-04 | Stop reason: ALTCHOICE

## 2019-02-22 ENCOUNTER — TELEPHONE (OUTPATIENT)
Dept: OBGYN CLINIC | Facility: CLINIC | Age: 69
End: 2019-02-22

## 2019-02-22 DIAGNOSIS — F51.01 PRIMARY INSOMNIA: ICD-10-CM

## 2019-02-22 RX ORDER — ZOLPIDEM TARTRATE 10 MG/1
10 TABLET ORAL
Qty: 30 TABLET | Refills: 0 | Status: SHIPPED | OUTPATIENT
Start: 2019-02-22 | End: 2019-03-25 | Stop reason: SDUPTHER

## 2019-02-25 DIAGNOSIS — E78.00 HYPERCHOLESTEROLEMIA: ICD-10-CM

## 2019-02-26 ENCOUNTER — TELEPHONE (OUTPATIENT)
Dept: OBGYN CLINIC | Facility: CLINIC | Age: 69
End: 2019-02-26

## 2019-02-26 RX ORDER — SIMVASTATIN 40 MG
TABLET ORAL
Qty: 90 TABLET | Refills: 1 | Status: SHIPPED | OUTPATIENT
Start: 2019-02-26 | End: 2019-09-05 | Stop reason: SDUPTHER

## 2019-02-26 NOTE — TELEPHONE ENCOUNTER
----- Message from Jn Kim PA-C sent at 2/26/2019 10:57 AM EST -----  Cultures are negative    Pt may use Terazol 7 per her symptoms

## 2019-02-27 DIAGNOSIS — B37.9 YEAST INFECTION: Primary | ICD-10-CM

## 2019-03-01 ENCOUNTER — APPOINTMENT (OUTPATIENT)
Dept: LAB | Facility: MEDICAL CENTER | Age: 69
End: 2019-03-01
Payer: COMMERCIAL

## 2019-03-01 DIAGNOSIS — C67.9 MALIGNANT NEOPLASM OF URINARY BLADDER, UNSPECIFIED SITE (HCC): ICD-10-CM

## 2019-03-01 LAB — SODIUM 24H UR-SCNC: 81 MOL/L

## 2019-03-01 PROCEDURE — 84300 ASSAY OF URINE SODIUM: CPT | Performed by: INTERNAL MEDICINE

## 2019-03-01 PROCEDURE — 88112 CYTOPATH CELL ENHANCE TECH: CPT | Performed by: PATHOLOGY

## 2019-03-06 ENCOUNTER — TELEPHONE (OUTPATIENT)
Dept: UROLOGY | Facility: AMBULATORY SURGERY CENTER | Age: 69
End: 2019-03-06

## 2019-03-06 NOTE — TELEPHONE ENCOUNTER
Called patient and reviewed results of urine cytology  Patient verbalized understanding and confirmed appt with Dr Benny Claire for 3/8

## 2019-03-07 DIAGNOSIS — E78.00 HYPERCHOLESTEROLEMIA: ICD-10-CM

## 2019-03-07 RX ORDER — FENOFIBRATE 145 MG/1
TABLET, COATED ORAL
Qty: 90 TABLET | Refills: 0 | OUTPATIENT
Start: 2019-03-07

## 2019-03-08 ENCOUNTER — PROCEDURE VISIT (OUTPATIENT)
Dept: UROLOGY | Facility: AMBULATORY SURGERY CENTER | Age: 69
End: 2019-03-08
Payer: COMMERCIAL

## 2019-03-08 VITALS
DIASTOLIC BLOOD PRESSURE: 60 MMHG | BODY MASS INDEX: 29.26 KG/M2 | WEIGHT: 159 LBS | SYSTOLIC BLOOD PRESSURE: 160 MMHG | HEART RATE: 87 BPM | HEIGHT: 62 IN

## 2019-03-08 DIAGNOSIS — C67.2 MALIGNANT NEOPLASM OF LATERAL WALL OF URINARY BLADDER (HCC): Primary | ICD-10-CM

## 2019-03-08 LAB
SL AMB  POCT GLUCOSE, UA: NORMAL
SL AMB LEUKOCYTE ESTERASE,UA: NORMAL
SL AMB POCT BILIRUBIN,UA: NORMAL
SL AMB POCT BLOOD,UA: NORMAL
SL AMB POCT CLARITY,UA: CLEAR
SL AMB POCT COLOR,UA: YELLOW
SL AMB POCT KETONES,UA: NORMAL
SL AMB POCT NITRITE,UA: NORMAL
SL AMB POCT PH,UA: 6
SL AMB POCT SPECIFIC GRAVITY,UA: 1.02
SL AMB POCT URINE PROTEIN: NORMAL
SL AMB POCT UROBILINOGEN: 0.2

## 2019-03-08 PROCEDURE — 81002 URINALYSIS NONAUTO W/O SCOPE: CPT | Performed by: UROLOGY

## 2019-03-08 PROCEDURE — 52000 CYSTOURETHROSCOPY: CPT | Performed by: UROLOGY

## 2019-03-08 NOTE — PROGRESS NOTES
Cystoscopy  Date/Time: 3/8/2019 2:10 PM  Performed by: Tanvi Simmons MD  Authorized by: Tanvi Simmons MD     Procedure details: cystoscopy        Tavo Mendiola is a 16-year-old female with a history of high-grade TA/T1 urothelial carcinoma the bladder dating back to late 2016  In early 2017 she started BCG therapy  She has continued maintenance therapy now for 2 years  She has not had a recurrence since  She returns in follow-up today  Urine cytology is negative for malignant cells  Cystoscopy Procedure note    Risk and benefits of flexible cystoscopy were discussed  Informed consent was obtained  A urine dip is adequate for cystoscopy  The patient was placed into the modified supine position  Her genitalia was prepped and draped in a sterile fashion  Viscous lidocaine was instilled into the urethra  Flexible cystoscopy was then performed  The bladder was thoroughly inspected  Scar was identified along the floor of the bladder towards the right side  The left ureteral orifice was identified the right ureteral orifice could only be seen on retroflexion  Two small punctate areas of erythema were identified along the posterior wall over the region of the prior biopsy site  There was no laine recurrence of urothelial carcinoma identified  My impression is high-grade TA/T1 urothelial carcinoma the bladder  We discussed that her tumor was essentially superficial with scant T1 invasion  She has received 2 years of BCG therapy and we discussed discontinuing therapy based on her lower urinary tract symptoms and vaginal irritation after treatment  In addition she has not had a recurrence now for over 2 years  We did discuss the mild erythema along the posterior wall of the bladder  This may be secondary to BCG irritation  I provided her with reassurance that her recent urine cytology shows no evidence of malignant cells  We discussed discontinuing BCG therapy    I recommend follow-up in 3 months for cystoscopy and cytology  If cystoscopy is stable she can then return in 6 months time for surveillance cystoscopy  In the interim she was instructed to return immediately if she were to develop gross hematuria

## 2019-03-25 DIAGNOSIS — E78.00 HYPERCHOLESTEROLEMIA: ICD-10-CM

## 2019-03-25 DIAGNOSIS — F51.01 PRIMARY INSOMNIA: ICD-10-CM

## 2019-03-25 RX ORDER — ZOLPIDEM TARTRATE 10 MG/1
10 TABLET ORAL
Qty: 30 TABLET | Refills: 0 | Status: SHIPPED | OUTPATIENT
Start: 2019-03-25 | End: 2019-04-29 | Stop reason: SDUPTHER

## 2019-03-25 RX ORDER — FENOFIBRATE 145 MG/1
145 TABLET, COATED ORAL DAILY
Qty: 90 TABLET | Refills: 0 | Status: SHIPPED | OUTPATIENT
Start: 2019-03-25 | End: 2019-06-18 | Stop reason: SDUPTHER

## 2019-03-30 ENCOUNTER — APPOINTMENT (OUTPATIENT)
Dept: LAB | Facility: MEDICAL CENTER | Age: 69
End: 2019-03-30
Payer: COMMERCIAL

## 2019-03-30 DIAGNOSIS — E11.9 NON-INSULIN DEPENDENT TYPE 2 DIABETES MELLITUS (HCC): ICD-10-CM

## 2019-03-30 DIAGNOSIS — E78.2 MIXED HYPERLIPIDEMIA: ICD-10-CM

## 2019-03-30 DIAGNOSIS — E53.8 VITAMIN B12 DEFICIENCY: ICD-10-CM

## 2019-03-30 LAB
ALBUMIN SERPL BCP-MCNC: 4.2 G/DL (ref 3.5–5)
ALP SERPL-CCNC: 51 U/L (ref 46–116)
ALT SERPL W P-5'-P-CCNC: 40 U/L (ref 12–78)
ANION GAP SERPL CALCULATED.3IONS-SCNC: 6 MMOL/L (ref 4–13)
AST SERPL W P-5'-P-CCNC: 25 U/L (ref 5–45)
BILIRUB SERPL-MCNC: 0.38 MG/DL (ref 0.2–1)
BUN SERPL-MCNC: 17 MG/DL (ref 5–25)
CALCIUM SERPL-MCNC: 9.8 MG/DL (ref 8.3–10.1)
CHLORIDE SERPL-SCNC: 103 MMOL/L (ref 100–108)
CHOLEST SERPL-MCNC: 177 MG/DL (ref 50–200)
CO2 SERPL-SCNC: 26 MMOL/L (ref 21–32)
CREAT SERPL-MCNC: 0.96 MG/DL (ref 0.6–1.3)
EST. AVERAGE GLUCOSE BLD GHB EST-MCNC: 166 MG/DL
FOLATE SERPL-MCNC: >20 NG/ML (ref 3.1–17.5)
GFR SERPL CREATININE-BSD FRML MDRD: 61 ML/MIN/1.73SQ M
GLUCOSE P FAST SERPL-MCNC: 151 MG/DL (ref 65–99)
HBA1C MFR BLD: 7.4 % (ref 4.2–6.3)
HDLC SERPL-MCNC: 33 MG/DL (ref 40–60)
LDLC SERPL CALC-MCNC: 98 MG/DL (ref 0–100)
NONHDLC SERPL-MCNC: 144 MG/DL
POTASSIUM SERPL-SCNC: 5 MMOL/L (ref 3.5–5.3)
PROT SERPL-MCNC: 7.4 G/DL (ref 6.4–8.2)
SODIUM SERPL-SCNC: 135 MMOL/L (ref 136–145)
TRIGL SERPL-MCNC: 231 MG/DL
VIT B12 SERPL-MCNC: 879 PG/ML (ref 100–900)

## 2019-03-30 PROCEDURE — 82607 VITAMIN B-12: CPT

## 2019-03-30 PROCEDURE — 80061 LIPID PANEL: CPT

## 2019-03-30 PROCEDURE — 80053 COMPREHEN METABOLIC PANEL: CPT

## 2019-03-30 PROCEDURE — 36415 COLL VENOUS BLD VENIPUNCTURE: CPT

## 2019-03-30 PROCEDURE — 82746 ASSAY OF FOLIC ACID SERUM: CPT

## 2019-03-30 PROCEDURE — 83036 HEMOGLOBIN GLYCOSYLATED A1C: CPT

## 2019-04-02 ENCOUNTER — HOSPITAL ENCOUNTER (OUTPATIENT)
Dept: MAMMOGRAPHY | Facility: CLINIC | Age: 69
Discharge: HOME | End: 2019-04-02
Payer: COMMERCIAL

## 2019-04-02 VITALS — BODY MASS INDEX: 28 KG/M2 | WEIGHT: 158 LBS | HEIGHT: 63 IN

## 2019-04-02 DIAGNOSIS — Z12.39 SCREENING FOR BREAST CANCER: ICD-10-CM

## 2019-04-02 PROCEDURE — 77067 SCR MAMMO BI INCL CAD: CPT

## 2019-04-02 PROCEDURE — 77063 BREAST TOMOSYNTHESIS BI: CPT

## 2019-04-04 ENCOUNTER — OFFICE VISIT (OUTPATIENT)
Dept: INTERNAL MEDICINE CLINIC | Age: 69
End: 2019-04-04
Payer: COMMERCIAL

## 2019-04-04 VITALS
TEMPERATURE: 97.7 F | OXYGEN SATURATION: 98 % | HEART RATE: 78 BPM | BODY MASS INDEX: 28.27 KG/M2 | WEIGHT: 159.6 LBS | SYSTOLIC BLOOD PRESSURE: 160 MMHG | DIASTOLIC BLOOD PRESSURE: 94 MMHG

## 2019-04-04 DIAGNOSIS — I34.0 MITRAL VALVE INSUFFICIENCY, UNSPECIFIED ETIOLOGY: ICD-10-CM

## 2019-04-04 DIAGNOSIS — I10 ESSENTIAL HYPERTENSION: Primary | ICD-10-CM

## 2019-04-04 DIAGNOSIS — I51.89 DIASTOLIC DYSFUNCTION: ICD-10-CM

## 2019-04-04 DIAGNOSIS — E87.1 HYPONATREMIA: ICD-10-CM

## 2019-04-04 DIAGNOSIS — E22.2 SIADH (SYNDROME OF INAPPROPRIATE ADH PRODUCTION) (HCC): ICD-10-CM

## 2019-04-04 DIAGNOSIS — N89.8 VAGINAL ITCHING: ICD-10-CM

## 2019-04-04 DIAGNOSIS — F41.8 DEPRESSION WITH ANXIETY: ICD-10-CM

## 2019-04-04 DIAGNOSIS — R25.2 LEG CRAMPING: ICD-10-CM

## 2019-04-04 PROCEDURE — 99214 OFFICE O/P EST MOD 30 MIN: CPT | Performed by: INTERNAL MEDICINE

## 2019-04-04 PROCEDURE — 1036F TOBACCO NON-USER: CPT | Performed by: INTERNAL MEDICINE

## 2019-04-04 PROCEDURE — 4010F ACE/ARB THERAPY RXD/TAKEN: CPT | Performed by: INTERNAL MEDICINE

## 2019-04-04 PROCEDURE — 1160F RVW MEDS BY RX/DR IN RCRD: CPT | Performed by: INTERNAL MEDICINE

## 2019-04-04 RX ORDER — ENALAPRIL MALEATE 5 MG/1
5 TABLET ORAL DAILY
Qty: 30 TABLET | Refills: 3 | Status: SHIPPED | OUTPATIENT
Start: 2019-04-04 | End: 2019-07-24 | Stop reason: ALTCHOICE

## 2019-04-04 RX ORDER — DIAPER,BRIEF,INFANT-TODD,DISP
EACH MISCELLANEOUS AS NEEDED
COMMUNITY
End: 2020-01-14

## 2019-04-04 RX ORDER — CLONAZEPAM 0.5 MG/1
0.5 TABLET ORAL
Qty: 30 TABLET | Refills: 0 | Status: SHIPPED | OUTPATIENT
Start: 2019-04-04 | End: 2019-06-11 | Stop reason: SDUPTHER

## 2019-04-17 ENCOUNTER — OFFICE VISIT (OUTPATIENT)
Dept: URGENT CARE | Facility: MEDICAL CENTER | Age: 69
End: 2019-04-17
Payer: COMMERCIAL

## 2019-04-17 VITALS
BODY MASS INDEX: 28 KG/M2 | HEART RATE: 78 BPM | OXYGEN SATURATION: 97 % | SYSTOLIC BLOOD PRESSURE: 138 MMHG | TEMPERATURE: 97.9 F | DIASTOLIC BLOOD PRESSURE: 82 MMHG | HEIGHT: 63 IN | RESPIRATION RATE: 20 BRPM | WEIGHT: 158 LBS

## 2019-04-17 DIAGNOSIS — J01.90 ACUTE SINUSITIS, RECURRENCE NOT SPECIFIED, UNSPECIFIED LOCATION: Primary | ICD-10-CM

## 2019-04-17 PROCEDURE — 99213 OFFICE O/P EST LOW 20 MIN: CPT | Performed by: PHYSICIAN ASSISTANT

## 2019-04-17 RX ORDER — AZELASTINE 1 MG/ML
1 SPRAY, METERED NASAL 2 TIMES DAILY
Qty: 1 BOTTLE | Refills: 0 | Status: SHIPPED | OUTPATIENT
Start: 2019-04-17 | End: 2019-07-24 | Stop reason: ALTCHOICE

## 2019-04-17 RX ORDER — AMOXICILLIN AND CLAVULANATE POTASSIUM 875; 125 MG/1; MG/1
1 TABLET, FILM COATED ORAL EVERY 12 HOURS SCHEDULED
Qty: 14 TABLET | Refills: 0 | Status: SHIPPED | OUTPATIENT
Start: 2019-04-17 | End: 2019-04-24

## 2019-04-25 DIAGNOSIS — F51.01 PRIMARY INSOMNIA: ICD-10-CM

## 2019-04-25 RX ORDER — ZOLPIDEM TARTRATE 10 MG/1
10 TABLET ORAL
Qty: 30 TABLET | Refills: 0 | OUTPATIENT
Start: 2019-04-25

## 2019-04-29 DIAGNOSIS — F51.01 PRIMARY INSOMNIA: ICD-10-CM

## 2019-04-29 RX ORDER — ZOLPIDEM TARTRATE 10 MG/1
10 TABLET ORAL
Qty: 30 TABLET | Refills: 0 | Status: SHIPPED | OUTPATIENT
Start: 2019-04-29 | End: 2019-05-28 | Stop reason: SDUPTHER

## 2019-04-30 DIAGNOSIS — E03.9 ACQUIRED HYPOTHYROIDISM: ICD-10-CM

## 2019-04-30 DIAGNOSIS — E11.9 TYPE 2 DIABETES MELLITUS WITHOUT COMPLICATION, WITHOUT LONG-TERM CURRENT USE OF INSULIN (HCC): ICD-10-CM

## 2019-05-02 RX ORDER — LEVOTHYROXINE SODIUM 0.05 MG/1
TABLET ORAL
Qty: 90 TABLET | Refills: 1 | OUTPATIENT
Start: 2019-05-02

## 2019-05-02 RX ORDER — BLOOD SUGAR DIAGNOSTIC
STRIP MISCELLANEOUS
Refills: 1 | OUTPATIENT
Start: 2019-05-02

## 2019-05-09 ENCOUNTER — APPOINTMENT (OUTPATIENT)
Dept: LAB | Facility: MEDICAL CENTER | Age: 69
End: 2019-05-09
Payer: COMMERCIAL

## 2019-05-09 ENCOUNTER — TELEPHONE (OUTPATIENT)
Dept: INTERNAL MEDICINE CLINIC | Age: 69
End: 2019-05-09

## 2019-05-09 DIAGNOSIS — R25.2 LEG CRAMPING: ICD-10-CM

## 2019-05-09 DIAGNOSIS — I36.1 NON-RHEUMATIC TRICUSPID VALVE INSUFFICIENCY: ICD-10-CM

## 2019-05-09 DIAGNOSIS — E87.1 HYPONATREMIA: ICD-10-CM

## 2019-05-09 DIAGNOSIS — I51.89 DIASTOLIC DYSFUNCTION: Primary | ICD-10-CM

## 2019-05-09 DIAGNOSIS — E22.2 SIADH (SYNDROME OF INAPPROPRIATE ADH PRODUCTION) (HCC): ICD-10-CM

## 2019-05-09 LAB
CA-I BLD-SCNC: 1.18 MMOL/L (ref 1.12–1.32)
MAGNESIUM SERPL-MCNC: 1.7 MG/DL (ref 1.6–2.6)
OSMOLALITY UR: 721 MMOL/KG
SODIUM 24H UR-SCNC: 29 MOL/L
SODIUM SERPL-SCNC: 133 MMOL/L (ref 136–145)

## 2019-05-09 PROCEDURE — 83935 ASSAY OF URINE OSMOLALITY: CPT | Performed by: INTERNAL MEDICINE

## 2019-05-09 PROCEDURE — 82330 ASSAY OF CALCIUM: CPT

## 2019-05-09 PROCEDURE — 84295 ASSAY OF SERUM SODIUM: CPT

## 2019-05-09 PROCEDURE — 83735 ASSAY OF MAGNESIUM: CPT

## 2019-05-09 PROCEDURE — 84300 ASSAY OF URINE SODIUM: CPT | Performed by: INTERNAL MEDICINE

## 2019-05-09 PROCEDURE — 36415 COLL VENOUS BLD VENIPUNCTURE: CPT

## 2019-05-14 DIAGNOSIS — J01.90 ACUTE SINUSITIS, RECURRENCE NOT SPECIFIED, UNSPECIFIED LOCATION: ICD-10-CM

## 2019-05-14 RX ORDER — AZELASTINE HYDROCHLORIDE 137 UG/1
SPRAY, METERED NASAL
Refills: 0 | OUTPATIENT
Start: 2019-05-14

## 2019-05-20 ENCOUNTER — APPOINTMENT (OUTPATIENT)
Dept: RADIOLOGY | Facility: MEDICAL CENTER | Age: 69
End: 2019-05-20
Payer: COMMERCIAL

## 2019-05-20 DIAGNOSIS — I34.0 MITRAL VALVE INSUFFICIENCY, UNSPECIFIED ETIOLOGY: ICD-10-CM

## 2019-05-20 DIAGNOSIS — I51.89 DIASTOLIC DYSFUNCTION: ICD-10-CM

## 2019-05-20 DIAGNOSIS — E22.2 SIADH (SYNDROME OF INAPPROPRIATE ADH PRODUCTION) (HCC): ICD-10-CM

## 2019-05-20 PROCEDURE — 71046 X-RAY EXAM CHEST 2 VIEWS: CPT

## 2019-05-21 ENCOUNTER — OFFICE VISIT (OUTPATIENT)
Dept: INTERNAL MEDICINE CLINIC | Age: 69
End: 2019-05-21
Payer: COMMERCIAL

## 2019-05-21 VITALS
WEIGHT: 159.2 LBS | SYSTOLIC BLOOD PRESSURE: 138 MMHG | DIASTOLIC BLOOD PRESSURE: 80 MMHG | HEART RATE: 82 BPM | BODY MASS INDEX: 28.2 KG/M2 | TEMPERATURE: 97.7 F | OXYGEN SATURATION: 97 %

## 2019-05-21 DIAGNOSIS — D49.4 NEOPLASM OF BLADDER: ICD-10-CM

## 2019-05-21 DIAGNOSIS — E11.9 NON-INSULIN DEPENDENT TYPE 2 DIABETES MELLITUS (HCC): ICD-10-CM

## 2019-05-21 DIAGNOSIS — E03.9 ACQUIRED HYPOTHYROIDISM: Primary | ICD-10-CM

## 2019-05-21 DIAGNOSIS — I10 ESSENTIAL HYPERTENSION: ICD-10-CM

## 2019-05-21 PROCEDURE — 99213 OFFICE O/P EST LOW 20 MIN: CPT | Performed by: INTERNAL MEDICINE

## 2019-05-28 DIAGNOSIS — F51.01 PRIMARY INSOMNIA: ICD-10-CM

## 2019-05-28 RX ORDER — ZOLPIDEM TARTRATE 10 MG/1
10 TABLET ORAL
Qty: 30 TABLET | Refills: 0 | Status: SHIPPED | OUTPATIENT
Start: 2019-05-28 | End: 2019-06-25 | Stop reason: SDUPTHER

## 2019-05-31 ENCOUNTER — APPOINTMENT (OUTPATIENT)
Dept: LAB | Facility: MEDICAL CENTER | Age: 69
End: 2019-05-31
Payer: COMMERCIAL

## 2019-05-31 ENCOUNTER — HOSPITAL ENCOUNTER (OUTPATIENT)
Dept: NON INVASIVE DIAGNOSTICS | Facility: CLINIC | Age: 69
Discharge: HOME | End: 2019-05-31
Payer: COMMERCIAL

## 2019-05-31 DIAGNOSIS — I51.89 DIASTOLIC DYSFUNCTION: ICD-10-CM

## 2019-05-31 DIAGNOSIS — I36.1 NON-RHEUMATIC TRICUSPID VALVE INSUFFICIENCY: ICD-10-CM

## 2019-05-31 LAB
CREAT UR-MCNC: 194 MG/DL
MICROALBUMIN UR-MCNC: 55.5 MG/L (ref 0–20)
MICROALBUMIN/CREAT 24H UR: 29 MG/G CREATININE (ref 0–30)

## 2019-05-31 PROCEDURE — A9502 TC99M TETROFOSMIN: HCPCS

## 2019-05-31 PROCEDURE — 78452 HT MUSCLE IMAGE SPECT MULT: CPT | Performed by: INTERNAL MEDICINE

## 2019-05-31 PROCEDURE — 93018 CV STRESS TEST I&R ONLY: CPT | Performed by: INTERNAL MEDICINE

## 2019-05-31 PROCEDURE — 93017 CV STRESS TEST TRACING ONLY: CPT

## 2019-05-31 PROCEDURE — 82570 ASSAY OF URINE CREATININE: CPT | Performed by: INTERNAL MEDICINE

## 2019-05-31 PROCEDURE — 3061F NEG MICROALBUMINURIA REV: CPT | Performed by: INTERNAL MEDICINE

## 2019-05-31 PROCEDURE — 93016 CV STRESS TEST SUPVJ ONLY: CPT | Performed by: INTERNAL MEDICINE

## 2019-05-31 PROCEDURE — 82043 UR ALBUMIN QUANTITATIVE: CPT | Performed by: INTERNAL MEDICINE

## 2019-05-31 PROCEDURE — 78452 HT MUSCLE IMAGE SPECT MULT: CPT

## 2019-05-31 RX ADMIN — REGADENOSON 0.4 MG: 0.08 INJECTION, SOLUTION INTRAVENOUS at 13:35

## 2019-06-03 ENCOUNTER — TELEPHONE (OUTPATIENT)
Dept: UROLOGY | Facility: CLINIC | Age: 69
End: 2019-06-03

## 2019-06-03 DIAGNOSIS — C67.9 MALIGNANT NEOPLASM OF URINARY BLADDER, UNSPECIFIED SITE (HCC): Primary | ICD-10-CM

## 2019-06-04 ENCOUNTER — APPOINTMENT (OUTPATIENT)
Dept: LAB | Facility: MEDICAL CENTER | Age: 69
End: 2019-06-04
Payer: COMMERCIAL

## 2019-06-04 DIAGNOSIS — C67.9 MALIGNANT NEOPLASM OF URINARY BLADDER, UNSPECIFIED SITE (HCC): ICD-10-CM

## 2019-06-04 PROCEDURE — 88112 CYTOPATH CELL ENHANCE TECH: CPT | Performed by: PATHOLOGY

## 2019-06-05 ENCOUNTER — PROCEDURE VISIT (OUTPATIENT)
Dept: UROLOGY | Facility: AMBULATORY SURGERY CENTER | Age: 69
End: 2019-06-05
Payer: COMMERCIAL

## 2019-06-05 VITALS
WEIGHT: 163.2 LBS | HEIGHT: 63 IN | BODY MASS INDEX: 28.92 KG/M2 | HEART RATE: 88 BPM | SYSTOLIC BLOOD PRESSURE: 152 MMHG | DIASTOLIC BLOOD PRESSURE: 82 MMHG

## 2019-06-05 DIAGNOSIS — C67.9 MALIGNANT NEOPLASM OF URINARY BLADDER, UNSPECIFIED SITE (HCC): Primary | ICD-10-CM

## 2019-06-05 LAB
SL AMB  POCT GLUCOSE, UA: NORMAL
SL AMB LEUKOCYTE ESTERASE,UA: NORMAL
SL AMB POCT BILIRUBIN,UA: NORMAL
SL AMB POCT BLOOD,UA: NORMAL
SL AMB POCT CLARITY,UA: CLEAR
SL AMB POCT COLOR,UA: YELLOW
SL AMB POCT KETONES,UA: NORMAL
SL AMB POCT NITRITE,UA: NORMAL
SL AMB POCT PH,UA: 7
SL AMB POCT SPECIFIC GRAVITY,UA: 1.01
SL AMB POCT URINE PROTEIN: NORMAL
SL AMB POCT UROBILINOGEN: NORMAL

## 2019-06-05 PROCEDURE — 81002 URINALYSIS NONAUTO W/O SCOPE: CPT | Performed by: UROLOGY

## 2019-06-05 PROCEDURE — 52000 CYSTOURETHROSCOPY: CPT | Performed by: UROLOGY

## 2019-06-05 RX ORDER — LOSARTAN POTASSIUM 50 MG/1
TABLET ORAL
COMMUNITY
Start: 2019-05-25 | End: 2019-07-24 | Stop reason: ALTCHOICE

## 2019-06-06 DIAGNOSIS — E03.9 ACQUIRED HYPOTHYROIDISM: ICD-10-CM

## 2019-06-07 ENCOUNTER — TELEPHONE (OUTPATIENT)
Dept: UROLOGY | Facility: MEDICAL CENTER | Age: 69
End: 2019-06-07

## 2019-06-07 RX ORDER — LEVOTHYROXINE SODIUM 0.05 MG/1
50 TABLET ORAL DAILY
Qty: 90 TABLET | Refills: 1 | Status: SHIPPED | OUTPATIENT
Start: 2019-06-07 | End: 2020-01-14 | Stop reason: SDUPTHER

## 2019-06-11 DIAGNOSIS — F41.8 DEPRESSION WITH ANXIETY: ICD-10-CM

## 2019-06-11 RX ORDER — CLONAZEPAM 0.5 MG/1
0.5 TABLET ORAL
Qty: 30 TABLET | Refills: 0 | Status: SHIPPED | OUTPATIENT
Start: 2019-06-11 | End: 2019-07-24 | Stop reason: SDUPTHER

## 2019-06-16 DIAGNOSIS — E78.00 HYPERCHOLESTEROLEMIA: ICD-10-CM

## 2019-06-17 RX ORDER — FENOFIBRATE 145 MG/1
TABLET, COATED ORAL
Qty: 90 TABLET | Refills: 0 | OUTPATIENT
Start: 2019-06-17

## 2019-06-18 DIAGNOSIS — E78.00 HYPERCHOLESTEROLEMIA: ICD-10-CM

## 2019-06-18 RX ORDER — FENOFIBRATE 145 MG/1
145 TABLET, COATED ORAL DAILY
Qty: 90 TABLET | Refills: 0 | Status: SHIPPED | OUTPATIENT
Start: 2019-06-18 | End: 2019-09-16 | Stop reason: SDUPTHER

## 2019-06-25 ENCOUNTER — OFFICE VISIT (OUTPATIENT)
Dept: INTERNAL MEDICINE CLINIC | Age: 69
End: 2019-06-25
Payer: COMMERCIAL

## 2019-06-25 VITALS
HEART RATE: 79 BPM | SYSTOLIC BLOOD PRESSURE: 122 MMHG | BODY MASS INDEX: 29.48 KG/M2 | DIASTOLIC BLOOD PRESSURE: 72 MMHG | HEIGHT: 62 IN | TEMPERATURE: 97.9 F | WEIGHT: 160.2 LBS | OXYGEN SATURATION: 96 %

## 2019-06-25 DIAGNOSIS — E11.9 NON-INSULIN DEPENDENT TYPE 2 DIABETES MELLITUS (HCC): ICD-10-CM

## 2019-06-25 DIAGNOSIS — S83.242D TEAR OF MEDIAL MENISCUS OF LEFT KNEE, UNSPECIFIED TEAR TYPE, UNSPECIFIED WHETHER OLD OR CURRENT TEAR, SUBSEQUENT ENCOUNTER: ICD-10-CM

## 2019-06-25 DIAGNOSIS — R05.9 COUGH: ICD-10-CM

## 2019-06-25 DIAGNOSIS — I10 ESSENTIAL HYPERTENSION: ICD-10-CM

## 2019-06-25 DIAGNOSIS — E87.1 HYPONATREMIA: ICD-10-CM

## 2019-06-25 DIAGNOSIS — G47.00 INSOMNIA, UNSPECIFIED TYPE: Primary | ICD-10-CM

## 2019-06-25 DIAGNOSIS — E03.9 ACQUIRED HYPOTHYROIDISM: ICD-10-CM

## 2019-06-25 DIAGNOSIS — F41.9 ANXIETY: ICD-10-CM

## 2019-06-25 DIAGNOSIS — M25.562 ACUTE PAIN OF LEFT KNEE: ICD-10-CM

## 2019-06-25 DIAGNOSIS — M17.0 PRIMARY OSTEOARTHRITIS OF BOTH KNEES: ICD-10-CM

## 2019-06-25 DIAGNOSIS — F51.01 PRIMARY INSOMNIA: ICD-10-CM

## 2019-06-25 PROCEDURE — 1160F RVW MEDS BY RX/DR IN RCRD: CPT | Performed by: INTERNAL MEDICINE

## 2019-06-25 PROCEDURE — 3078F DIAST BP <80 MM HG: CPT | Performed by: INTERNAL MEDICINE

## 2019-06-25 PROCEDURE — 3008F BODY MASS INDEX DOCD: CPT | Performed by: INTERNAL MEDICINE

## 2019-06-25 PROCEDURE — 1036F TOBACCO NON-USER: CPT | Performed by: INTERNAL MEDICINE

## 2019-06-25 PROCEDURE — 3074F SYST BP LT 130 MM HG: CPT | Performed by: INTERNAL MEDICINE

## 2019-06-25 PROCEDURE — 1101F PT FALLS ASSESS-DOCD LE1/YR: CPT | Performed by: INTERNAL MEDICINE

## 2019-06-25 PROCEDURE — 99214 OFFICE O/P EST MOD 30 MIN: CPT | Performed by: INTERNAL MEDICINE

## 2019-06-25 RX ORDER — ZOLPIDEM TARTRATE 10 MG/1
TABLET ORAL
Qty: 30 TABLET | Refills: 0 | Status: SHIPPED | OUTPATIENT
Start: 2019-06-25 | End: 2019-07-24 | Stop reason: SDUPTHER

## 2019-06-25 RX ORDER — FLUTICASONE PROPIONATE 50 MCG
1 SPRAY, SUSPENSION (ML) NASAL DAILY
Qty: 1 BOTTLE | Refills: 1 | Status: SHIPPED | OUTPATIENT
Start: 2019-06-25 | End: 2019-09-19

## 2019-06-25 RX ORDER — AMLODIPINE BESYLATE 5 MG/1
5 TABLET ORAL DAILY
Qty: 30 TABLET | Refills: 3 | Status: SHIPPED | OUTPATIENT
Start: 2019-06-25 | End: 2019-09-20 | Stop reason: SDUPTHER

## 2019-07-23 ENCOUNTER — OFFICE VISIT (OUTPATIENT)
Dept: OBGYN CLINIC | Facility: CLINIC | Age: 69
End: 2019-07-23
Payer: COMMERCIAL

## 2019-07-23 VITALS
SYSTOLIC BLOOD PRESSURE: 140 MMHG | WEIGHT: 155 LBS | HEIGHT: 62 IN | BODY MASS INDEX: 28.52 KG/M2 | DIASTOLIC BLOOD PRESSURE: 70 MMHG

## 2019-07-23 DIAGNOSIS — N76.3 CHRONIC VULVITIS: ICD-10-CM

## 2019-07-23 DIAGNOSIS — N32.89 BLADDER IRRITATION: Primary | ICD-10-CM

## 2019-07-23 DIAGNOSIS — Z85.51 HISTORY OF BLADDER CANCER: ICD-10-CM

## 2019-07-23 LAB
SL AMB  POCT GLUCOSE, UA: NEGATIVE
SL AMB LEUKOCYTE ESTERASE,UA: NORMAL
SL AMB POCT BILIRUBIN,UA: NEGATIVE
SL AMB POCT BLOOD,UA: NEGATIVE
SL AMB POCT CLARITY,UA: CLEAR
SL AMB POCT COLOR,UA: NORMAL
SL AMB POCT KETONES,UA: NORMAL
SL AMB POCT NITRITE,UA: NEGATIVE
SL AMB POCT PH,UA: 5
SL AMB POCT SPECIFIC GRAVITY,UA: 1000
SL AMB POCT URINE PROTEIN: NORMAL
SL AMB POCT UROBILINOGEN: NORMAL

## 2019-07-23 PROCEDURE — 81002 URINALYSIS NONAUTO W/O SCOPE: CPT | Performed by: OBSTETRICS & GYNECOLOGY

## 2019-07-23 PROCEDURE — 99213 OFFICE O/P EST LOW 20 MIN: CPT | Performed by: OBSTETRICS & GYNECOLOGY

## 2019-07-23 NOTE — PROGRESS NOTES
Assessment/Plan:   1  Bladder irritation  - POCT urine dip    2  History of bladder cancer  -following     3  Chronic vulvitis  -will plan for follow up next week for vulvar colposcopy with biopsy    -hydrocortisone recommended  Subjective:     Patient ID: Trudy Garcia is a 70 y o  female  HPI  70year old  female with history of T2DM, bladder cancer and melanoma here for vaginal irritation and burning  She has tried hydrocortisone cream which did help in the past for 1 month but it did come back  She denies any new shampoos, lotions or soaps  Patient uses non-scented soaps  She states her blood sugars have not been under control lately with fasting blood sugars averaging in 200's  as she has not been able to watch what she is eating with added family and stress at home  She denies any dysuria, hematuria, frequency, urgency, vaginal discharge  Review of Systems    As stated above  Objective:     Physical Exam   Genitourinary:         Genitourinary Comments: Red lesion at right labia minora  Flat White lesion located at posterior

## 2019-07-23 NOTE — PROGRESS NOTES
The patient is having vaginal itching, pain and swelling  The patient has had itching and burning since she last saw [facility]  No discharge  The patient feels soreness in certain spots  The patient has burning when she urinates and a bad odor sometimes  No urinary frequency or urgency  The patient was referred to us by Dr Pricilla Delcid  The patient used hydrocortisone cream for one month  The patient stopped the cream months ago

## 2019-07-24 ENCOUNTER — OFFICE VISIT (OUTPATIENT)
Dept: INTERNAL MEDICINE CLINIC | Age: 69
End: 2019-07-24
Payer: COMMERCIAL

## 2019-07-24 VITALS
WEIGHT: 161 LBS | OXYGEN SATURATION: 97 % | SYSTOLIC BLOOD PRESSURE: 142 MMHG | HEART RATE: 100 BPM | DIASTOLIC BLOOD PRESSURE: 90 MMHG | TEMPERATURE: 97.7 F | BODY MASS INDEX: 29.45 KG/M2

## 2019-07-24 DIAGNOSIS — M19.90 ARTHRITIS: ICD-10-CM

## 2019-07-24 DIAGNOSIS — F41.8 DEPRESSION WITH ANXIETY: ICD-10-CM

## 2019-07-24 DIAGNOSIS — E13.9 DIABETES 1.5, MANAGED AS TYPE 2 (HCC): Primary | ICD-10-CM

## 2019-07-24 DIAGNOSIS — F51.01 PRIMARY INSOMNIA: ICD-10-CM

## 2019-07-24 PROCEDURE — 99214 OFFICE O/P EST MOD 30 MIN: CPT | Performed by: INTERNAL MEDICINE

## 2019-07-24 RX ORDER — CLONAZEPAM 0.5 MG/1
0.5 TABLET ORAL
Qty: 30 TABLET | Refills: 0 | Status: SHIPPED | OUTPATIENT
Start: 2019-07-24 | End: 2019-09-16 | Stop reason: SDUPTHER

## 2019-07-24 RX ORDER — ZOLPIDEM TARTRATE 10 MG/1
TABLET ORAL
Qty: 30 TABLET | Refills: 0 | Status: SHIPPED | OUTPATIENT
Start: 2019-07-24 | End: 2019-08-22 | Stop reason: SDUPTHER

## 2019-07-24 NOTE — PROGRESS NOTES
Assessment/Plan:  1  Uncontrolled diabetes patient is under lot of stress at this time and her diet is very poor  She will need to go on a stringent diet and continue on her Glucophage  Patient does not want to go on any other medications at this time although she will need to have at least another drug  2  Patient has to gyn lesions for which she is going could biopsied tomorrow  Questionable fungal infection versus melanoma  3  Bladder cancer status post BCG treatment this may have precipitated some fungal infection vaginally to wait biopsies  4  Stress patient has general anxiety disorder she is on Xanax and zolpidem patient at this time is to continue on the same  When more stable will need to see Psychology and wean herself off of these medications  5  Arthritis patient has severe arthritis of left the she also has arthritis of the hands and shoulders  She was taking Aleve/Advil however I warned her about the effects on the kidney and blood pressure  She is cyst on this type of medication therefore I will use of Voltaren gel as a slightly better alternative  And to use Tylenol on a routine basis  She was told to take 2 g of Tylenol per day straight  And use full tear in gel 1 or twice a day    7  Hypertension and patient was warned about the caffeine and to restrict her salt  She did not want to go on a another blood pressure pill  She will try and restrict her salt and caffeine better  Diagnoses and all orders for this visit:    Diabetes 1 5, managed as type [facility]  -     Ambulatory referral to Weight Management; Future    Depression with anxiety  -     clonazePAM (KlonoPIN) 0 5 mg tablet; Take 1 tablet (0 5 mg total) by mouth daily at bedtime    Primary insomnia  -     zolpidem (AMBIEN) 10 mg tablet; Take one daily          Subjective:      Patient ID: Trudy Garcia is a 70 y o  female      This is a case of a 43-year-old white female with significant diabetes which is uncontrolled at this time who has several lesions vaginally which are going to be biopsied tomorrow  Depending on the biopsy we will coordinate some of her care  The following portions of the patient's history were reviewed and updated as appropriate: allergies, current medications, past family history, past medical history, past social history, past surgical history and problem list     Review of Systems   Constitutional: Positive for fatigue and unexpected weight change (gone up)  Negative for appetite change, chills, diaphoresis and fever  Eyes: Negative for photophobia, pain, discharge, redness, itching and visual disturbance  See  opto   Respiratory: Positive for cough  Negative for chest tightness, shortness of breath, wheezing and stridor  Cardiovascular: Negative for chest pain, palpitations and leg swelling  Genitourinary: Negative for difficulty urinating  Musculoskeletal: Positive for arthralgias, back pain, joint swelling and myalgias  Off of advil   Allergic/Immunologic: Negative for environmental allergies and food allergies  Neurological: Negative for dizziness, speech difficulty, light-headedness, numbness and headaches  Hematological: Negative for adenopathy  Psychiatric/Behavioral: Positive for dysphoric mood  The patient is nervous/anxious  Objective:      /90 (BP Location: Left arm, Patient Position: Sitting, Cuff Size: Standard)   Pulse 100   Temp 97 7 °F (36 5 °C) (Tympanic)   Wt 73 kg (161 lb)   SpO2 97%   BMI 29 45 kg/m²          Physical Exam   Constitutional: She is oriented to person, place, and time  She appears well-developed and well-nourished  No distress  HENT:   Head: Normocephalic and atraumatic  Mouth/Throat: No oropharyngeal exudate  Eyes: EOM are normal  Right eye exhibits no discharge  Left eye exhibits no discharge  No scleral icterus  Neck: Neck supple  No JVD present  No thyromegaly present     Cardiovascular: Normal rate and regular rhythm  Murmur heard  Pulmonary/Chest: Effort normal and breath sounds normal  No respiratory distress  She has no wheezes  She has no rales  She exhibits no tenderness  Abdominal: Soft  Bowel sounds are normal  She exhibits no distension and no mass  There is no tenderness  There is no rebound  Musculoskeletal: She exhibits no edema, tenderness or deformity  Neurological: She is alert and oriented to person, place, and time  She displays abnormal reflex (decreased)  No cranial nerve deficit  Skin: Skin is warm and dry  No rash noted  She is not diaphoretic  No erythema

## 2019-07-24 NOTE — PROGRESS NOTES
Diabetic Foot Exam    Patient's shoes and socks removed  Right Foot/Ankle   Right Foot Inspection  Skin Exam: skin normal, skin intact and dry skin no warmth, no callus, no erythema, no maceration, no abnormal color, no pre-ulcer, no ulcer and no callus                          Toe Exam: ROM and strength within normal limits  Sensory     Proprioception: intact   Monofilament testing: intact  Vascular  Capillary refills: < 3 seconds  The right DP pulse is 2+  The right PT pulse is 2+  Left Foot/Ankle  Left Foot Inspection  Skin Exam: skin normal, skin intact, dry skin and callusno warmth, no erythema, no maceration, normal color, no pre-ulcer and no ulcer                         Toe Exam: ROM and strength within normal limits                   Sensory     Proprioception: intact  Monofilament: intact  Vascular  Capillary refills: < 3 seconds  The left DP pulse is 2+  The left PT pulse is 2+  Assign Risk Category:  No deformity present;  No loss of protective sensation;        Risk: 0

## 2019-07-24 NOTE — PROGRESS NOTES
BMI Counseling: Body mass index is 29 45 kg/m²  Discussed the patient's BMI with her  The BMI is above average  BMI counseling and education was provided to the patient  Referral to weight management was provided to the patient

## 2019-07-24 NOTE — PATIENT INSTRUCTIONS
1  You will need to go on other blood sugar medications we can hold off until year stress level is somewhat reduced in the meantime continue with her Glucophage and stay on a very strict diet with no concentrated sweets and no white starches  2  Try and not take Advil this is quite detrimental to your kidneys as a diabetic  we can try Voltaren gel 2 times a day   In addition take Tylenol 500 mg 2 tablets twice a day straight  3 Return to  Office  In 6 weeks  4   Restrict her salt and caffeine completely since her blood pressure is elevated again

## 2019-07-25 ENCOUNTER — PROCEDURE VISIT (OUTPATIENT)
Dept: OBGYN CLINIC | Facility: CLINIC | Age: 69
End: 2019-07-25
Payer: COMMERCIAL

## 2019-07-25 VITALS
WEIGHT: 160 LBS | SYSTOLIC BLOOD PRESSURE: 140 MMHG | DIASTOLIC BLOOD PRESSURE: 80 MMHG | HEIGHT: 62 IN | BODY MASS INDEX: 29.44 KG/M2

## 2019-07-25 DIAGNOSIS — N76.6 VULVAR ULCERATION: ICD-10-CM

## 2019-07-25 DIAGNOSIS — N76.3 CHRONIC VULVITIS: Primary | ICD-10-CM

## 2019-07-25 DIAGNOSIS — T14.8XXA BLOOD BLISTER: ICD-10-CM

## 2019-07-25 PROCEDURE — 56821 COLPOSCOPY VULVA W/BIOPSY: CPT | Performed by: OBSTETRICS & GYNECOLOGY

## 2019-07-25 NOTE — PROGRESS NOTES
The patient is here for a vulvar colposcopy  The patient still has vaginal itching, soreness and burning  The patient says that her symptoms have not got any better

## 2019-07-25 NOTE — PROGRESS NOTES
Colposcopy  Date/Time: 7/25/2019 3:06 PM  Performed by: Jesica Lundberg MD  Authorized by: Jesica Lundberg MD     Consent:     Consent obtained:  Written    Consent given by:  Patient    Procedural risks discussed:  Bleeding and infection    Patient questions answered: yes      Patient agrees, verbalizes understanding, and wants to proceed: yes      Educational handouts given: no      Instructions and paperwork completed: no    Pre-procedure:     Pre-procedure timeout performed: yes      Prepped with: acetic acid      Local anesthetic:  Lidocaine 1% w/o epi  Procedure:     Procedure: Colposcopy of Vulva with Biopsy      Under satisfactory analgesia the patient was prepped and draped in the dorsal lithotomy position: yes      [place] speculum was placed in the vagina: no      Under colposcopic examination the transition zone was seen in entirety: no      Intracervical block was performed: no      Tampon inserted: no      Monsel's solution was applied: yes      Biopsy(s): yes      Location:  Upper right labia minora, posterior fourchette     Specimen to pathology: yes    Comments:      1  Red ulcerated area at Upper right labia minora near the urethra  2  Flat acetic white lesion consistent with lichens sclerosus at the posterior fourchette and lower bilateral labia minora   3  Small blood blister excised    Silver nitrate used for cautery  No complications         Physical Exam   Genitourinary:         Genitourinary Comments: Black denotes biopsy sites

## 2019-08-01 LAB — BIOPSY SPEC-IMP: NORMAL

## 2019-08-02 ENCOUNTER — TELEPHONE (OUTPATIENT)
Dept: OBGYN CLINIC | Facility: CLINIC | Age: 69
End: 2019-08-02

## 2019-08-02 DIAGNOSIS — L90.0 LICHEN SCLEROSUS: Primary | ICD-10-CM

## 2019-08-02 RX ORDER — MOMETASONE FUROATE 1 MG/G
CREAM TOPICAL DAILY
Qty: 45 G | Refills: 0 | Status: SHIPPED | OUTPATIENT
Start: 2019-08-02 | End: 2019-11-11 | Stop reason: ALTCHOICE

## 2019-08-12 DIAGNOSIS — E11.8 TYPE 2 DIABETES MELLITUS WITH COMPLICATION, WITHOUT LONG-TERM CURRENT USE OF INSULIN (HCC): ICD-10-CM

## 2019-08-12 DIAGNOSIS — E78.00 HYPERCHOLESTEROLEMIA: ICD-10-CM

## 2019-08-12 RX ORDER — SIMVASTATIN 40 MG
TABLET ORAL
Qty: 90 TABLET | Refills: 1 | OUTPATIENT
Start: 2019-08-12

## 2019-08-22 DIAGNOSIS — F51.01 PRIMARY INSOMNIA: ICD-10-CM

## 2019-08-22 DIAGNOSIS — E11.8 TYPE 2 DIABETES MELLITUS WITH COMPLICATION, WITHOUT LONG-TERM CURRENT USE OF INSULIN (HCC): ICD-10-CM

## 2019-08-22 RX ORDER — ZOLPIDEM TARTRATE 10 MG/1
TABLET ORAL
Qty: 30 TABLET | Refills: 0 | Status: SHIPPED | OUTPATIENT
Start: 2019-08-22 | End: 2019-09-20 | Stop reason: SDUPTHER

## 2019-08-22 NOTE — TELEPHONE ENCOUNTER
Will resend to [facility] provider pool to sign off on, since Dr Varsha Garcia is no longer in office

## 2019-08-28 ENCOUNTER — OFFICE VISIT (OUTPATIENT)
Dept: INTERNAL MEDICINE CLINIC | Age: 69
End: 2019-08-28
Payer: COMMERCIAL

## 2019-08-28 ENCOUNTER — APPOINTMENT (OUTPATIENT)
Dept: LAB | Age: 69
End: 2019-08-28
Payer: COMMERCIAL

## 2019-08-28 VITALS
WEIGHT: 156.2 LBS | SYSTOLIC BLOOD PRESSURE: 128 MMHG | HEIGHT: 62 IN | TEMPERATURE: 98.8 F | OXYGEN SATURATION: 97 % | HEART RATE: 102 BPM | DIASTOLIC BLOOD PRESSURE: 68 MMHG | BODY MASS INDEX: 28.74 KG/M2

## 2019-08-28 DIAGNOSIS — R00.0 TACHYCARDIA: ICD-10-CM

## 2019-08-28 DIAGNOSIS — R35.0 FREQUENCY OF MICTURITION: ICD-10-CM

## 2019-08-28 DIAGNOSIS — I10 ESSENTIAL HYPERTENSION: ICD-10-CM

## 2019-08-28 DIAGNOSIS — R07.9 CHEST PAIN, UNSPECIFIED TYPE: ICD-10-CM

## 2019-08-28 DIAGNOSIS — E11.9 NON-INSULIN DEPENDENT TYPE 2 DIABETES MELLITUS (HCC): ICD-10-CM

## 2019-08-28 DIAGNOSIS — N30.00 ACUTE CYSTITIS WITHOUT HEMATURIA: ICD-10-CM

## 2019-08-28 DIAGNOSIS — E11.8 TYPE 2 DIABETES MELLITUS WITH COMPLICATION, WITHOUT LONG-TERM CURRENT USE OF INSULIN (HCC): ICD-10-CM

## 2019-08-28 DIAGNOSIS — E13.9 DIABETES 1.5, MANAGED AS TYPE 2 (HCC): ICD-10-CM

## 2019-08-28 DIAGNOSIS — R07.9 CHEST PAIN AT REST: ICD-10-CM

## 2019-08-28 DIAGNOSIS — I10 ESSENTIAL HYPERTENSION: Primary | ICD-10-CM

## 2019-08-28 DIAGNOSIS — E03.9 ACQUIRED HYPOTHYROIDISM: ICD-10-CM

## 2019-08-28 LAB
ALBUMIN SERPL BCP-MCNC: 4.6 G/DL (ref 3.5–5)
ALP SERPL-CCNC: 73 U/L (ref 46–116)
ALT SERPL W P-5'-P-CCNC: 33 U/L (ref 12–78)
ANION GAP SERPL CALCULATED.3IONS-SCNC: 7 MMOL/L (ref 4–13)
AST SERPL W P-5'-P-CCNC: 19 U/L (ref 5–45)
BASOPHILS # BLD AUTO: 0.05 THOUSANDS/ΜL (ref 0–0.1)
BASOPHILS NFR BLD AUTO: 0 % (ref 0–1)
BILIRUB SERPL-MCNC: 0.66 MG/DL (ref 0.2–1)
BUN SERPL-MCNC: 20 MG/DL (ref 5–25)
CALCIUM ALBUM COR SERPL-MCNC: 10.3 MG/DL (ref 8.3–10.1)
CALCIUM SERPL-MCNC: 10.8 MG/DL (ref 8.3–10.1)
CHLORIDE SERPL-SCNC: 103 MMOL/L (ref 100–108)
CO2 SERPL-SCNC: 26 MMOL/L (ref 21–32)
CREAT SERPL-MCNC: 0.95 MG/DL (ref 0.6–1.3)
EOSINOPHIL # BLD AUTO: 0.09 THOUSAND/ΜL (ref 0–0.61)
EOSINOPHIL NFR BLD AUTO: 1 % (ref 0–6)
ERYTHROCYTE [DISTWIDTH] IN BLOOD BY AUTOMATED COUNT: 13.2 % (ref 11.6–15.1)
EST. AVERAGE GLUCOSE BLD GHB EST-MCNC: 200 MG/DL
GFR SERPL CREATININE-BSD FRML MDRD: 61 ML/MIN/1.73SQ M
GLUCOSE P FAST SERPL-MCNC: 233 MG/DL (ref 65–99)
HBA1C MFR BLD: 8.6 % (ref 4.2–6.3)
HCT VFR BLD AUTO: 43.2 % (ref 34.8–46.1)
HGB BLD-MCNC: 14.1 G/DL (ref 11.5–15.4)
IMM GRANULOCYTES # BLD AUTO: 0.03 THOUSAND/UL (ref 0–0.2)
IMM GRANULOCYTES NFR BLD AUTO: 0 % (ref 0–2)
LYMPHOCYTES # BLD AUTO: 1.51 THOUSANDS/ΜL (ref 0.6–4.47)
LYMPHOCYTES NFR BLD AUTO: 14 % (ref 14–44)
MCH RBC QN AUTO: 30.5 PG (ref 26.8–34.3)
MCHC RBC AUTO-ENTMCNC: 32.6 G/DL (ref 31.4–37.4)
MCV RBC AUTO: 93 FL (ref 82–98)
MONOCYTES # BLD AUTO: 1.22 THOUSAND/ΜL (ref 0.17–1.22)
MONOCYTES NFR BLD AUTO: 11 % (ref 4–12)
NEUTROPHILS # BLD AUTO: 8.31 THOUSANDS/ΜL (ref 1.85–7.62)
NEUTS SEG NFR BLD AUTO: 74 % (ref 43–75)
NRBC BLD AUTO-RTO: 0 /100 WBCS
PHOSPHATE SERPL-MCNC: 3 MG/DL (ref 2.3–4.1)
PLATELET # BLD AUTO: 198 THOUSANDS/UL (ref 149–390)
PMV BLD AUTO: 10.7 FL (ref 8.9–12.7)
POTASSIUM SERPL-SCNC: 4.9 MMOL/L (ref 3.5–5.3)
PROT SERPL-MCNC: 8.3 G/DL (ref 6.4–8.2)
RBC # BLD AUTO: 4.63 MILLION/UL (ref 3.81–5.12)
SL AMB  POCT GLUCOSE, UA: NEGATIVE
SL AMB LEUKOCYTE ESTERASE,UA: NORMAL
SL AMB POCT BILIRUBIN,UA: NORMAL
SL AMB POCT BLOOD,UA: NORMAL
SL AMB POCT CLARITY,UA: NORMAL
SL AMB POCT COLOR,UA: NORMAL
SL AMB POCT KETONES,UA: NORMAL
SL AMB POCT NITRITE,UA: POSITIVE
SL AMB POCT PH,UA: 5.5
SL AMB POCT SPECIFIC GRAVITY,UA: 1.03
SL AMB POCT URINE PROTEIN: NORMAL
SL AMB POCT UROBILINOGEN: 0.02
SODIUM SERPL-SCNC: 136 MMOL/L (ref 136–145)
TSH SERPL DL<=0.05 MIU/L-ACNC: 1.65 UIU/ML (ref 0.36–3.74)
WBC # BLD AUTO: 11.21 THOUSAND/UL (ref 4.31–10.16)

## 2019-08-28 PROCEDURE — 87186 SC STD MICRODIL/AGAR DIL: CPT | Performed by: INTERNAL MEDICINE

## 2019-08-28 PROCEDURE — 83036 HEMOGLOBIN GLYCOSYLATED A1C: CPT

## 2019-08-28 PROCEDURE — 80053 COMPREHEN METABOLIC PANEL: CPT

## 2019-08-28 PROCEDURE — 99214 OFFICE O/P EST MOD 30 MIN: CPT | Performed by: INTERNAL MEDICINE

## 2019-08-28 PROCEDURE — 87086 URINE CULTURE/COLONY COUNT: CPT | Performed by: INTERNAL MEDICINE

## 2019-08-28 PROCEDURE — 36415 COLL VENOUS BLD VENIPUNCTURE: CPT

## 2019-08-28 PROCEDURE — 93000 ELECTROCARDIOGRAM COMPLETE: CPT | Performed by: INTERNAL MEDICINE

## 2019-08-28 PROCEDURE — 87077 CULTURE AEROBIC IDENTIFY: CPT | Performed by: INTERNAL MEDICINE

## 2019-08-28 PROCEDURE — 84443 ASSAY THYROID STIM HORMONE: CPT

## 2019-08-28 PROCEDURE — 84100 ASSAY OF PHOSPHORUS: CPT

## 2019-08-28 PROCEDURE — 85025 COMPLETE CBC W/AUTO DIFF WBC: CPT

## 2019-08-28 PROCEDURE — 81002 URINALYSIS NONAUTO W/O SCOPE: CPT | Performed by: INTERNAL MEDICINE

## 2019-08-28 RX ORDER — SULFAMETHOXAZOLE AND TRIMETHOPRIM 800; 160 MG/1; MG/1
1 TABLET ORAL EVERY 12 HOURS SCHEDULED
Qty: 14 TABLET | Refills: 0 | Status: SHIPPED | OUTPATIENT
Start: 2019-08-28 | End: 2019-09-04

## 2019-08-28 NOTE — PROGRESS NOTES
Assessment/Plan:    1  Type 2 DM - Pts sugar has been in the 200 range and her skin has felt really hot and she feels like she has a fever  She also complains of sweating with that  Last night her BS was 278 and her /86, pulse 107  This morning her BS was 229 at 8 Am this morning after an extra does of metformin  Pt is taking Glucophage 500 mg  She was prescribed Sitaglipitin phosphate or Januvia 50 mg to take with the metformin  Will have a f/u for this in 2 weeks  2  Dysuria- Pt has frequency and urgent continence  She also reports her skin feeling really warm and diaphoretic  She completed a urinary analysis and results confirmed an infection as her nitrates were positive and she had a small amount of leukocytes  Pt was prescribed Batrim 160 mg for UTI  3  Cardio - Pt complained of nonspecific chest pain Pts echo was essentially normal and the stress thalium was normal six months ago  However todays EKG is abnormal and showed left axis deviation and sinus tachycardia  Pt will be referred to cardiology for complete cardiac workup  4  HTN - Pts BP has been consistently high at home  Todays BP was more moderate howeever we will start her on small dose of Lopressor 12 5 mg once a day for tachycardia and BP  Pt  was warned about the caffeine and to restrict her salt  Pt is to keep a record of her BP at home  5  Anxiety - Pts stress level is apparently higher and contributing to the tachycardia and uncontrolled sugar  Pt is to continue on Clonezepam 0 5 mg      6  Health Maintenance - Pt needs to receive pneumoia shot, flu shot, as well as Hep B in the future  She also needs to schedule her AWV  Pt should decrease caffeine intake and restrict salt intake  She should continue on diabetic diet of no concentrated sugars and white starches  Follow up in 2 weeks       I have spent 45 minutes with patient and family today in which greater than 50% of this time was spent in counseling/coordination of care regarding Diagnostic results, Prognosis, Risks and benefits of tx options, Intructions for management, Patient and family education, Importance of tx compliance and Impressions  Diagnoses and all orders for this visit:    Essential hypertension  -     Comprehensive metabolic panel; Future  -     CBC and differential; Future  -     Renal function panel; Future    Type 2 diabetes mellitus with complication, without long-term current use of insulin (HCC)  -     Comprehensive metabolic panel; Future  -     CBC and differential; Future  -     Renal function panel; Future          Subjective:      Patient ID: Renetta Antonio is a 78 y o  female  This is a case of a 69-year-old white female who presents with frequency and discomfort on urination as well as uncontrolled diabetes and blood pressure  The patient had a urinalysis with in the office which showed positive nitrates and a small amount of leukocytes patient also noted that she had tachy cardia an EKG did reveal sinus tachycardia and left axis deviation  This been noted that the patient had a normal stress thallium as well as echocardiogram some 3 months ago  Renetta Antonio is a 78 y o  female with a cc of not feeling well overall and her BP and BS being abnormally high  Pts sugar has been in the 200 range and her skin has felt really hot and she feels like she has a fever  She also complains of sweating with that  Last night her BS was 278 and her /86, pulse 107  This morning her BS was 229 at 8 Am this morning after an extra does of metformin  Her pulse today was 119  She states urinating every ½ hour to every 45 minutes and it has been very frequent and urgent  Due to this, she has sleep disturbance  She states she has lost weight as well  She denies feeling dizzy or lightheadness  She is feeling really tired and been going to bed earlier than usual and has been falling asleep throughout the day   She has not eaten anything since dinner last night  Pts  states her anxiety level is extremely high and she worries a lot  She also complains of feeling like her chest is pounding at times at night  It only lasts for a few minutes and resolves  Pt reports having palpitations and having some edema in her left leg  She says it could be her knee  She also feels SOB sometimes and has developed a wet cough  She has been using a nasal spray and has been using generic for Zyrtec for her runny nose  Her wrist, hands, and knee has been bothering her the most      The following portions of the patient's history were reviewed and updated as appropriate: allergies, current medications, past family history, past medical history, past social history, past surgical history and problem list     Review of Systems   Constitutional: Positive for diaphoresis, fatigue and unexpected weight change  Negative for appetite change, chills and fever  HENT: Positive for postnasal drip and rhinorrhea  Negative for congestion, hearing loss and mouth sores  On zyrtec   Eyes: Negative  Respiratory: Positive for cough, chest tightness, shortness of breath and wheezing  Cardiovascular: Positive for palpitations and leg swelling (left leg)  Negative for chest pain  Genitourinary: Positive for dysuria, frequency, hematuria and urgency  Negative for difficulty urinating  Musculoskeletal: Positive for arthralgias  Wrist ,hand  And knees left over rt  Skin:        Hot sensation in skin   Neurological: Negative for dizziness, tremors, speech difficulty, light-headedness and headaches  Hematological: Does not bruise/bleed easily  Psychiatric/Behavioral: Positive for dysphoric mood and sleep disturbance  Negative for agitation, decreased concentration, hallucinations, self-injury and suicidal ideas  The patient is nervous/anxious  The patient is not hyperactive            Objective:      /68 (BP Location: Left arm, Patient Position: Sitting, Cuff Size: Adult)   Pulse 102   Temp 98 8 °F (37 1 °C) (Tympanic)   Ht 5' 1 81" (1 57 m)   Wt 70 9 kg (156 lb 3 2 oz)   SpO2 97%   BMI 28 74 kg/m²          Physical Exam   Constitutional: She is oriented to person, place, and time  She appears well-developed and well-nourished  No distress  HENT:   Head: Normocephalic and atraumatic  Right Ear: External ear normal    Left Ear: External ear normal    Mouth/Throat: No oropharyngeal exudate  Eyes: Pupils are equal, round, and reactive to light  EOM are normal  Right eye exhibits no discharge  Left eye exhibits no discharge  No scleral icterus  Neck: Normal range of motion  Neck supple  No JVD present  No thyromegaly present  Cardiovascular: Regular rhythm  Murmur heard  tachycardia   Pulmonary/Chest: Effort normal and breath sounds normal  No stridor  No respiratory distress  She has no wheezes  She has no rales  Abdominal: Soft  Bowel sounds are normal  She exhibits no distension and no mass  There is tenderness (min )  There is no rebound and no guarding  Musculoskeletal: Normal range of motion  She exhibits no edema, tenderness or deformity  Neurological: She is alert and oriented to person, place, and time  She displays normal reflexes  No sensory deficit  She exhibits normal muscle tone  Skin: Skin is warm and dry  She is not diaphoretic  No erythema  Psychiatric: Judgment and thought content normal          Attestation:   By signing my name below, Kat Salazar, attest that this documentation has been prepared under the direction and in the presence of Valma Phalen, MD  Electronically Signed: Giulia Dobson  8/28/19      I, Valma Phalen, personally performed the services described in this documentation  All medical record entries made by the scribe were at my direction and in my presence  I have reviewed the chart and discharge instructions and agree that the record reflects my personal performance and is accurate and complete  Ishan Bowman MD  8/28/19

## 2019-08-28 NOTE — PATIENT INSTRUCTIONS
1  Monitor blood pressure daily  2  Check blood sugar twice a day morning and night  3  Keep a log of pulse daily  4  Start taking Januvia 50 mg with metformin  5  Start Batrium 160 mg to control infection  6  Decrease caffeine intake and restrict salt  7  Continue diabetic diet with no concentrated sweets or white starches  8  Start taking Lopressor 12 5 mg to control blood pressure  9  Continue the use of medications  10  Call Dr Lesa Can if you develop any chest pain, rapid heart beat, excessive sweating, and nausea or vomiting  11  Complete blood work before next visit  15  Cosult Dr Oneal Lott for complete cardiac follow up  13  Follow up in 2 weeks

## 2019-08-31 LAB
BACTERIA UR CULT: ABNORMAL
BACTERIA UR CULT: ABNORMAL

## 2019-09-05 DIAGNOSIS — E78.00 HYPERCHOLESTEROLEMIA: ICD-10-CM

## 2019-09-05 RX ORDER — SIMVASTATIN 40 MG
TABLET ORAL
Qty: 90 TABLET | Refills: 1 | Status: SHIPPED | OUTPATIENT
Start: 2019-09-05 | End: 2020-03-13 | Stop reason: SDUPTHER

## 2019-09-09 DIAGNOSIS — E78.00 HYPERCHOLESTEROLEMIA: ICD-10-CM

## 2019-09-09 RX ORDER — FENOFIBRATE 145 MG/1
TABLET, COATED ORAL
Qty: 90 TABLET | Refills: 0 | OUTPATIENT
Start: 2019-09-09

## 2019-09-16 DIAGNOSIS — E78.00 HYPERCHOLESTEROLEMIA: ICD-10-CM

## 2019-09-16 DIAGNOSIS — F41.8 DEPRESSION WITH ANXIETY: ICD-10-CM

## 2019-09-16 DIAGNOSIS — E11.9 TYPE 2 DIABETES MELLITUS WITHOUT COMPLICATION, WITHOUT LONG-TERM CURRENT USE OF INSULIN (HCC): ICD-10-CM

## 2019-09-16 RX ORDER — FENOFIBRATE 145 MG/1
145 TABLET, COATED ORAL DAILY
Qty: 90 TABLET | Refills: 0 | Status: SHIPPED | OUTPATIENT
Start: 2019-09-16 | End: 2019-12-12 | Stop reason: SDUPTHER

## 2019-09-16 RX ORDER — BLOOD SUGAR DIAGNOSTIC
1 STRIP MISCELLANEOUS DAILY
Qty: 100 EACH | Refills: 1 | Status: SHIPPED | OUTPATIENT
Start: 2019-09-16 | End: 2020-04-20 | Stop reason: SDUPTHER

## 2019-09-16 RX ORDER — CLONAZEPAM 0.5 MG/1
0.5 TABLET ORAL
Qty: 30 TABLET | Refills: 0 | Status: SHIPPED | OUTPATIENT
Start: 2019-09-16 | End: 2019-11-19 | Stop reason: SDUPTHER

## 2019-09-16 NOTE — TELEPHONE ENCOUNTER
Pt is requesting refills on clonazepam, fenofibrate, one touch verio test strips  Last OV:08/28/19  Future OV: no appt was scheduled at time         PDMP completed

## 2019-09-19 ENCOUNTER — OFFICE VISIT (OUTPATIENT)
Dept: INTERNAL MEDICINE CLINIC | Age: 69
End: 2019-09-19
Payer: MEDICARE

## 2019-09-19 VITALS
HEART RATE: 64 BPM | WEIGHT: 161 LBS | OXYGEN SATURATION: 95 % | SYSTOLIC BLOOD PRESSURE: 110 MMHG | BODY MASS INDEX: 29.63 KG/M2 | TEMPERATURE: 97.9 F | HEIGHT: 62 IN | DIASTOLIC BLOOD PRESSURE: 68 MMHG

## 2019-09-19 DIAGNOSIS — R19.7 DIARRHEA, UNSPECIFIED TYPE: ICD-10-CM

## 2019-09-19 DIAGNOSIS — C67.2 MALIGNANT NEOPLASM OF LATERAL WALL OF URINARY BLADDER (HCC): ICD-10-CM

## 2019-09-19 DIAGNOSIS — R10.32 ABDOMINAL PAIN, LLQ (LEFT LOWER QUADRANT): Primary | ICD-10-CM

## 2019-09-19 DIAGNOSIS — I10 ESSENTIAL HYPERTENSION: ICD-10-CM

## 2019-09-19 DIAGNOSIS — E11.9 NON-INSULIN DEPENDENT TYPE 2 DIABETES MELLITUS (HCC): ICD-10-CM

## 2019-09-19 PROBLEM — R56.9 SEIZURES (HCC): Status: ACTIVE | Noted: 2019-09-19

## 2019-09-19 LAB
SL AMB  POCT GLUCOSE, UA: ABNORMAL
SL AMB LEUKOCYTE ESTERASE,UA: ABNORMAL
SL AMB POCT BILIRUBIN,UA: ABNORMAL
SL AMB POCT BLOOD,UA: ABNORMAL
SL AMB POCT CLARITY,UA: CLEAR
SL AMB POCT COLOR,UA: ABNORMAL
SL AMB POCT KETONES,UA: ABNORMAL
SL AMB POCT NITRITE,UA: ABNORMAL
SL AMB POCT PH,UA: 6.5
SL AMB POCT SPECIFIC GRAVITY,UA: 1.02
SL AMB POCT URINE PROTEIN: ABNORMAL
SL AMB POCT UROBILINOGEN: 0.2

## 2019-09-19 PROCEDURE — 81003 URINALYSIS AUTO W/O SCOPE: CPT | Performed by: PHYSICIAN ASSISTANT

## 2019-09-19 PROCEDURE — 99214 OFFICE O/P EST MOD 30 MIN: CPT | Performed by: PHYSICIAN ASSISTANT

## 2019-09-19 NOTE — PROGRESS NOTES
Assessment/Plan:         Diagnoses and all orders for this visit:    Abdominal pain, LLQ (left lower quadrant)  Comments:  clear liquids, brat diet  go to ER if sx worsen prior to CT scan   Orders:  -     CT abdomen pelvis w contrast; Future    Diarrhea, unspecified type  Comments:  recommend daily probiotic  may need stool testing if persists   Orders:  -     CT abdomen pelvis w contrast; Future    Malignant neoplasm of lateral wall of urinary bladder ([facility] [address] )    Non-insulin dependent type 2 diabetes mellitus ([facility] [address] )  Comments:  not well controlled, cut down on sweets and carbs   will need to hold metformin after ct  may increase januvia to 1 daily     Essential hypertension  Comments:  controlled         Pt to have CT asap, pt states she can not go now due to prior commitment with her grandson, I explained to her that if she has acute diveriticulitis she needs to be evaluated urgently - she is at risk for abscess formation or perforation, we discussed need to go to ER for emergent eval should her pain become constant and persistent, or if she develops fever, diff urinating or n/v  Pt agreeable to schedule ct tomorrow  Will d/w clinical staff in office to check on this tomorrow am  Pt to avoid high fiber diet at this time, drink fluids and follow brat diet only  U/a negative for blood so renal calculi low on differential       Subjective:      Patient ID: Betzaida Jolly is a 78 y o  female  Patient says she has had "muscle spasm" on both Sunday and Tuesday  The pain was only on the L side and at that point she started to "poke around" and felt a tender point in the LLQ  Never had kidney stones, pt does have an elevated ca+ level (10 8) on most recent labs     Has diagnosis of diverticulosis from colonoscopy (2/18)  Diarrhea every so often, 2-3days at a time and is going 3-4x a day   Has not noticed blood in her stool or mucous  She notes this after taking abx for uti last month but states she does not have stool daily that she can recall     Flank Pain   This is a new problem  The current episode started in the past 7 days  The problem occurs intermittently  The problem has been waxing and waning since onset  The pain is present in the lumbar spine and sacro-iliac  The quality of the pain is described as cramping  The pain does not radiate  The pain is at a severity of 9/10  Worse during: during the day both times she has experienced it  Associated symptoms include abdominal pain, dysuria (last time she was here she had UTI, burning has been there since then) and pelvic pain  Pertinent negatives include no bladder incontinence, bowel incontinence, chest pain, fever, headaches or numbness  Risk factors include history of cancer (history of bladder cancer)  She has tried NSAIDs for the symptoms  The treatment provided no relief  Back Pain   Associated symptoms include abdominal pain, dysuria (last time she was here she had UTI, burning has been there since then) and pelvic pain  Pertinent negatives include no bladder incontinence, bowel incontinence, chest pain, fever, headaches or numbness  The following portions of the patient's history were reviewed and updated as appropriate: allergies, current medications, past family history, past medical history, past social history, past surgical history and problem list     Review of Systems   Constitutional: Positive for appetite change and fatigue  Negative for activity change, chills, diaphoresis and fever  HENT: Negative for congestion and postnasal drip  Eyes: Negative for visual disturbance  Respiratory: Negative for cough, shortness of breath and wheezing  Cardiovascular: Negative for chest pain  Gastrointestinal: Positive for abdominal pain and diarrhea (3 weeks not constant, very loose bowels, when "its aggravated" 3-4x a day)  Negative for blood in stool, bowel incontinence, constipation, nausea and vomiting     Genitourinary: Positive for dysuria (last time she was here she had UTI, burning has been there since then), flank pain and pelvic pain  Negative for bladder incontinence, frequency, hematuria, urgency, vaginal bleeding and vaginal discharge  No hesitancy     Musculoskeletal: Positive for back pain  Skin: Negative for rash and wound  Neurological: Negative for dizziness, numbness and headaches  Hematological: Negative for adenopathy  Does not bruise/bleed easily  Psychiatric/Behavioral: Positive for sleep disturbance  The patient is nervous/anxious  Objective:      /68 (BP Location: Left arm, Patient Position: Sitting, Cuff Size: Adult)   Pulse 64   Temp 97 9 °F (36 6 °C) (Tympanic)   Ht 5' 1 81" (1 57 m)   Wt 73 kg (161 lb)   SpO2 95%   BMI 29 63 kg/m²          Physical Exam   Constitutional: She is oriented to person, place, and time  She appears well-developed and well-nourished  No distress  HENT:   Head: Normocephalic and atraumatic  Right Ear: External ear normal    Left Ear: External ear normal    Mouth/Throat: Oropharynx is clear and moist    Eyes: Pupils are equal, round, and reactive to light  Conjunctivae and EOM are normal    Neck: Normal range of motion  Neck supple  Cardiovascular: Normal rate, regular rhythm and normal heart sounds  Pulmonary/Chest: Effort normal and breath sounds normal  She has no wheezes  She has no rales  Abdominal: She exhibits no distension and no mass  There is tenderness (LLQ tenderness with guarding )  There is guarding  There is no rebound  No rashes present on flank    cva tenderness negative on exam    Musculoskeletal: Normal range of motion  She exhibits no edema or deformity  Neurological: She is alert and oriented to person, place, and time  Skin: Skin is warm and dry  No rash noted  She is not diaphoretic  No erythema  Psychiatric: She has a normal mood and affect  Her behavior is normal  Judgment and thought content normal    Vitals reviewed

## 2019-09-20 ENCOUNTER — HOSPITAL ENCOUNTER (OUTPATIENT)
Dept: RADIOLOGY | Facility: MEDICAL CENTER | Age: 69
Discharge: HOME | End: 2019-09-20
Payer: MEDICARE

## 2019-09-20 DIAGNOSIS — I10 ESSENTIAL HYPERTENSION: ICD-10-CM

## 2019-09-20 DIAGNOSIS — R10.32 ABDOMINAL PAIN, LLQ (LEFT LOWER QUADRANT): ICD-10-CM

## 2019-09-20 DIAGNOSIS — R19.7 DIARRHEA, UNSPECIFIED TYPE: ICD-10-CM

## 2019-09-20 DIAGNOSIS — F51.01 PRIMARY INSOMNIA: ICD-10-CM

## 2019-09-20 PROCEDURE — 74177 CT ABD & PELVIS W/CONTRAST: CPT

## 2019-09-20 RX ORDER — AMLODIPINE BESYLATE 5 MG/1
5 TABLET ORAL DAILY
Qty: 30 TABLET | Refills: 3 | Status: SHIPPED | OUTPATIENT
Start: 2019-09-20 | End: 2020-01-21 | Stop reason: SDUPTHER

## 2019-09-20 RX ORDER — ZOLPIDEM TARTRATE 10 MG/1
TABLET ORAL
Qty: 30 TABLET | Refills: 0 | OUTPATIENT
Start: 2019-09-20

## 2019-09-20 RX ORDER — AMLODIPINE BESYLATE 5 MG/1
5 TABLET ORAL DAILY
Qty: 30 TABLET | Refills: 3 | OUTPATIENT
Start: 2019-09-20

## 2019-09-20 RX ORDER — ZOLPIDEM TARTRATE 10 MG/1
TABLET ORAL
Qty: 30 TABLET | Refills: 0 | Status: SHIPPED | OUTPATIENT
Start: 2019-09-20 | End: 2019-10-21 | Stop reason: SDUPTHER

## 2019-09-20 RX ADMIN — IOHEXOL 100 ML: 350 INJECTION, SOLUTION INTRAVENOUS at 15:08

## 2019-09-20 NOTE — TELEPHONE ENCOUNTER
Latasha Hoyt,  The last two people that this patient has seen was Dr Elba Woody and Kaity Castañeda and both have refilled this medication, but  Dr Elba Woody is out on medical leave and Kaity Castañeda is not here in the office, therefore it was sent to the pool to be taken care of  Please advise if you are able to fill these medications     Thank you

## 2019-09-20 NOTE — TELEPHONE ENCOUNTER
Pt is requesting refill on amlodipine, ambien  Last OV:09/19/19  Future OV: No appointment is scheduled    Iris not in office please refill medication         PDMP completed

## 2019-09-20 NOTE — TELEPHONE ENCOUNTER
Controlled medications need to be sent to the managing provider unless they are on PTO  Please send to correct provider and tiger text them to let them know a refill Is needed       Please send the refill for amlodipine back to the provider pool separately so we can refill that    Thanks  [place]

## 2019-09-23 ENCOUNTER — TELEPHONE (OUTPATIENT)
Dept: INTERNAL MEDICINE CLINIC | Age: 69
End: 2019-09-23

## 2019-09-23 NOTE — TELEPHONE ENCOUNTER
Per CAMILLA Keane, STAT CT results:    Acute epiploic appendagitis left  proximal to mid descending colon

## 2019-09-23 NOTE — TELEPHONE ENCOUNTER
Discussed with Dr Epifanio Champagne,   Would recommend Tylenol/ibuprofen and hydration  Called and left message on machine for patient to call back to review results and check in on patient to see however symptoms are

## 2019-09-25 ENCOUNTER — OFFICE VISIT (OUTPATIENT)
Dept: OBGYN CLINIC | Facility: CLINIC | Age: 69
End: 2019-09-25
Payer: MEDICARE

## 2019-09-25 DIAGNOSIS — L28.0 LICHENOID DERMATITIS: Primary | ICD-10-CM

## 2019-09-25 DIAGNOSIS — N95.2 VAGINAL ATROPHY: ICD-10-CM

## 2019-09-25 PROCEDURE — 99213 OFFICE O/P EST LOW 20 MIN: CPT | Performed by: OBSTETRICS & GYNECOLOGY

## 2019-09-25 RX ORDER — ESTRADIOL 0.1 MG/G
1 CREAM VAGINAL WEEKLY
Qty: 30 G | Refills: 3 | Status: SHIPPED | OUTPATIENT
Start: 2019-09-25 | End: 2019-11-11 | Stop reason: SDUPTHER

## 2019-09-25 NOTE — PROGRESS NOTES
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

## 2019-10-04 DIAGNOSIS — I10 ESSENTIAL HYPERTENSION: ICD-10-CM

## 2019-10-04 DIAGNOSIS — R00.0 TACHYCARDIA: ICD-10-CM

## 2019-10-04 NOTE — TELEPHONE ENCOUNTER
MED:Meteprolol 25mg  SUPPLY: 90day  PHARMACY: [facility]   PATIENT PHONE #: [phone]  LAST OV: 9/19/2019  UPCOMING OV: LMOM for patient to call back and schedule jemima

## 2019-10-21 DIAGNOSIS — F51.01 PRIMARY INSOMNIA: ICD-10-CM

## 2019-10-23 RX ORDER — ZOLPIDEM TARTRATE 10 MG/1
TABLET ORAL
Qty: 30 TABLET | Refills: 0 | Status: SHIPPED | OUTPATIENT
Start: 2019-10-23 | End: 2019-11-05 | Stop reason: SDUPTHER

## 2019-10-29 ENCOUNTER — TELEPHONE (OUTPATIENT)
Dept: INTERNAL MEDICINE CLINIC | Age: 69
End: 2019-10-29

## 2019-11-04 DIAGNOSIS — F51.01 PRIMARY INSOMNIA: ICD-10-CM

## 2019-11-05 NOTE — TELEPHONE ENCOUNTER
Patient is requesting refill of Zolpidem 10 mg  [facility] [place] [place] would need this by end of day Tuesday as she is traveling

## 2019-11-11 ENCOUNTER — OFFICE VISIT (OUTPATIENT)
Dept: OBGYN CLINIC | Facility: CLINIC | Age: 69
End: 2019-11-11
Payer: MEDICARE

## 2019-11-11 VITALS
DIASTOLIC BLOOD PRESSURE: 80 MMHG | WEIGHT: 162 LBS | HEIGHT: 63 IN | SYSTOLIC BLOOD PRESSURE: 120 MMHG | BODY MASS INDEX: 28.7 KG/M2

## 2019-11-11 DIAGNOSIS — N95.2 VAGINAL ATROPHY: ICD-10-CM

## 2019-11-11 DIAGNOSIS — R31.9 HEMATURIA, UNSPECIFIED TYPE: Primary | ICD-10-CM

## 2019-11-11 DIAGNOSIS — Z12.39 SCREENING FOR BREAST CANCER: ICD-10-CM

## 2019-11-11 LAB
SL AMB  POCT GLUCOSE, UA: NEGATIVE
SL AMB LEUKOCYTE ESTERASE,UA: NEGATIVE
SL AMB POCT BILIRUBIN,UA: NEGATIVE
SL AMB POCT BLOOD,UA: ABNORMAL
SL AMB POCT CLARITY,UA: ABNORMAL
SL AMB POCT COLOR,UA: ABNORMAL
SL AMB POCT KETONES,UA: NEGATIVE
SL AMB POCT NITRITE,UA: NEGATIVE
SL AMB POCT PH,UA: 5
SL AMB POCT SPECIFIC GRAVITY,UA: 1000
SL AMB POCT URINE PROTEIN: NEGATIVE
SL AMB POCT UROBILINOGEN: NORMAL

## 2019-11-11 PROCEDURE — 99213 OFFICE O/P EST LOW 20 MIN: CPT | Performed by: OBSTETRICS & GYNECOLOGY

## 2019-11-11 RX ORDER — ZOLPIDEM TARTRATE 10 MG/1
TABLET ORAL
Qty: 30 TABLET | Refills: 0 | Status: SHIPPED | OUTPATIENT
Start: 2019-11-11 | End: 2019-12-17 | Stop reason: SDUPTHER

## 2019-11-11 RX ORDER — ESTRADIOL 0.1 MG/G
1 CREAM VAGINAL WEEKLY
Qty: 30 G | Refills: 3 | Status: SHIPPED | OUTPATIENT
Start: 2019-11-11 | End: 2020-07-01

## 2019-11-11 NOTE — PROGRESS NOTES
Assessment/Plan:  Normal breast and gyn exam except for vaginal atrophy  Encouraged healthy diabetic diet and exercise  History of bladder cancer and melanoma  Abnormal A1C and abnormal FBS at home  Make appt with PCP  Trace blood on UA dip  F/U with urologist and dermatologist  rx mammogram    Subjective:      Patient ID: Gonzales Cameron is a 78 y  o  female with history of 2 C-sections    FBS are never lower than 200 while taking oral DMmedication and A1C is up to 8 6  Denies breast changes, lumps, pelvic pain, vaginal bleeding, bladder or bowel issues  Pt using vaginal estrogen cream once a week for vaginal atrophy    Colonoscopy 2017 normal    Review of Systems   Constitutional: Negative  HENT: Negative  Eyes: Negative  Respiratory: Negative  Cardiovascular: Negative  Gastrointestinal: Negative  Endocrine: Negative  Musculoskeletal: Negative  Skin: Negative  Allergic/Immunologic: Negative  Neurological: Negative  Hematological: Negative  Psychiatric/Behavioral: Negative  All other systems reviewed and are negative  Objective:      /80 (BP Location: Left arm, Patient Position: Sitting, Cuff Size: Standard)   Ht 5' 2 6" (1 59 m)   Wt 73 5 kg (162 lb)   BMI 29 07 kg/m²          Physical Exam   Constitutional: She is oriented to person, place, and time  She appears well-developed and well-nourished  HENT:   Head: Normocephalic and atraumatic  Neck: Normal range of motion  Neck supple  No tracheal deviation present  No thyromegaly present  Cardiovascular: Normal rate, regular rhythm and normal heart sounds  Pulmonary/Chest: Effort normal and breath sounds normal  No stridor  No respiratory distress  She has no wheezes  She has no rales  She exhibits no tenderness  Right breast exhibits no inverted nipple, no mass, no nipple discharge, no skin change and no tenderness   Left breast exhibits no inverted nipple, no mass, no nipple discharge, no skin change and no tenderness  No breast swelling, tenderness, discharge or bleeding  Breasts are symmetrical    Abdominal: Soft  Bowel sounds are normal  She exhibits no distension and no mass  There is no tenderness  There is no rebound and no guarding  No hernia  Hernia confirmed negative in the right inguinal area and confirmed negative in the left inguinal area  Genitourinary: Vagina normal and uterus normal  Rectal exam shows no external hemorrhoid, no internal hemorrhoid, no fissure and no mass  No breast swelling, tenderness, discharge or bleeding  No labial fusion  There is no rash, tenderness, lesion or injury on the right labia  There is no rash, tenderness, lesion or injury on the left labia  Uterus is not deviated, not enlarged, not fixed and not tender  Cervix exhibits no motion tenderness, no discharge and no friability  Right adnexum displays no mass, no tenderness and no fullness  Left adnexum displays no mass, no tenderness and no fullness  No erythema, tenderness or bleeding in the vagina  No foreign body in the vagina  No signs of injury around the vagina  No vaginal discharge found  Genitourinary Comments: Normal urethra  Vaginal atrophy     Lymphadenopathy: No inguinal adenopathy noted on the right or left side  Neurological: She is alert and oriented to person, place, and time  Skin: Skin is warm and dry  Psychiatric: She has a normal mood and affect   Her behavior is normal  Judgment and thought content normal

## 2019-11-11 NOTE — PROGRESS NOTES
The patient is here for a re-check for her lichenoid dermatitis  The patient has vaginal itching and irritation  The patient has white, light discharge sometimes  The patient has no new urinary issues

## 2019-11-19 DIAGNOSIS — F41.8 DEPRESSION WITH ANXIETY: ICD-10-CM

## 2019-11-20 RX ORDER — CLONAZEPAM 0.5 MG/1
0.5 TABLET ORAL
Qty: 30 TABLET | Refills: 0 | Status: SHIPPED | OUTPATIENT
Start: 2019-11-20 | End: 2020-01-14 | Stop reason: SDUPTHER

## 2019-12-02 DIAGNOSIS — E03.9 ACQUIRED HYPOTHYROIDISM: ICD-10-CM

## 2019-12-02 RX ORDER — LEVOTHYROXINE SODIUM 0.05 MG/1
TABLET ORAL
Qty: 90 TABLET | Refills: 1 | OUTPATIENT
Start: 2019-12-02

## 2019-12-04 ENCOUNTER — APPOINTMENT (OUTPATIENT)
Dept: LAB | Facility: MEDICAL CENTER | Age: 69
End: 2019-12-04
Payer: MEDICARE

## 2019-12-04 DIAGNOSIS — C67.9 MALIGNANT NEOPLASM OF URINARY BLADDER, UNSPECIFIED SITE (HCC): ICD-10-CM

## 2019-12-04 PROCEDURE — 88112 CYTOPATH CELL ENHANCE TECH: CPT | Performed by: PATHOLOGY

## 2019-12-09 ENCOUNTER — TELEPHONE (OUTPATIENT)
Dept: UROLOGY | Facility: AMBULATORY SURGERY CENTER | Age: 69
End: 2019-12-09

## 2019-12-09 NOTE — TELEPHONE ENCOUNTER
Patient managed by Kamari Sears is calling to say since she last was seen she was diagnosed with Lichenoid dermatitis  She has been having a flare up since Saturday and taking medication  Would like to know if she would need to hold off on her cystoscopy appointment for 12/10/19    Please advise

## 2019-12-12 DIAGNOSIS — E78.00 HYPERCHOLESTEROLEMIA: ICD-10-CM

## 2019-12-15 RX ORDER — FENOFIBRATE 145 MG/1
TABLET, COATED ORAL
Qty: 90 TABLET | Refills: 0 | Status: SHIPPED | OUTPATIENT
Start: 2019-12-15 | End: 2019-12-17 | Stop reason: SDUPTHER

## 2019-12-17 DIAGNOSIS — F51.01 PRIMARY INSOMNIA: ICD-10-CM

## 2019-12-17 DIAGNOSIS — E78.00 HYPERCHOLESTEROLEMIA: ICD-10-CM

## 2019-12-17 RX ORDER — ZOLPIDEM TARTRATE 10 MG/1
TABLET ORAL
Qty: 30 TABLET | Refills: 1 | Status: SHIPPED | OUTPATIENT
Start: 2019-12-18 | End: 2020-01-14 | Stop reason: SDUPTHER

## 2019-12-17 RX ORDER — FENOFIBRATE 145 MG/1
145 TABLET, COATED ORAL DAILY
Qty: 90 TABLET | Refills: 1 | Status: SHIPPED | OUTPATIENT
Start: 2019-12-17 | End: 2020-09-21

## 2019-12-20 ENCOUNTER — TELEPHONE (OUTPATIENT)
Dept: UROLOGY | Facility: MEDICAL CENTER | Age: 69
End: 2019-12-20

## 2019-12-20 NOTE — TELEPHONE ENCOUNTER
Patient of Dr Yasemin Walker seen in [facility] office  Patient requesting results of cytology  Patient states she had this done on 12/04 and results should be in  Patient requesting someone to call to get results  Please advise

## 2019-12-20 NOTE — TELEPHONE ENCOUNTER
Call placed to Zelda Jhaveri in Cytology department  She is going to check on status, as still marked in process and will call office back

## 2020-01-03 ENCOUNTER — OFFICE VISIT (OUTPATIENT)
Dept: URGENT CARE | Facility: MEDICAL CENTER | Age: 70
End: 2020-01-03
Payer: MEDICARE

## 2020-01-03 VITALS
RESPIRATION RATE: 20 BRPM | TEMPERATURE: 98 F | HEART RATE: 72 BPM | BODY MASS INDEX: 28.35 KG/M2 | SYSTOLIC BLOOD PRESSURE: 120 MMHG | HEIGHT: 63 IN | WEIGHT: 160 LBS | DIASTOLIC BLOOD PRESSURE: 80 MMHG | OXYGEN SATURATION: 95 %

## 2020-01-03 DIAGNOSIS — J06.9 ACUTE URI: Primary | ICD-10-CM

## 2020-01-03 PROCEDURE — G0463 HOSPITAL OUTPT CLINIC VISIT: HCPCS | Performed by: PHYSICIAN ASSISTANT

## 2020-01-03 PROCEDURE — 99213 OFFICE O/P EST LOW 20 MIN: CPT | Performed by: PHYSICIAN ASSISTANT

## 2020-01-03 RX ORDER — FLUTICASONE PROPIONATE 50 MCG
1 SPRAY, SUSPENSION (ML) NASAL DAILY
Qty: 1 BOTTLE | Refills: 0 | Status: SHIPPED | OUTPATIENT
Start: 2020-01-03 | End: 2022-05-27 | Stop reason: SDUPTHER

## 2020-01-03 NOTE — PATIENT INSTRUCTIONS
Upper respiratory infection  flonase one nasal spray daily  Steam from shower  Follow up with PCP in 3-5 days  Proceed to  ER if symptoms worsen  Pharyngitis   WHAT YOU NEED TO KNOW:   Pharyngitis, or sore throat, is inflammation of the tissues and structures in your pharynx (throat)  Pharyngitis is most often caused by bacteria  It may also be caused by a cold or flu virus  Other causes include smoking, allergies, or acid reflux  DISCHARGE INSTRUCTIONS:   Call 911 for any of the following:   · You have trouble breathing or swallowing because your throat is swollen or sore  Return to the emergency department if:   · You are drooling because it hurts too much to swallow  · Your fever is higher than 102? F (39?C) or lasts longer than 3 days  · You are confused  · You taste blood in your throat  Contact your healthcare provider if:   · Your throat pain gets worse  · You have a painful lump in your throat that does not go away after 5 days  · Your symptoms do not improve after 5 days  · You have questions or concerns about your condition or care  Medicines:  Viral pharyngitis will go away on its own without treatment  Your sore throat should start to feel better in 3 to 5 days for both viral and bacterial infections  You may need any of the following:  · Antibiotics  treat a bacterial infection  · NSAIDs , such as ibuprofen, help decrease swelling, pain, and fever  NSAIDs can cause stomach bleeding or kidney problems in certain people  If you take blood thinner medicine, always ask your healthcare provider if NSAIDs are safe for you  Always read the medicine label and follow directions  · Acetaminophen  decreases pain and fever  It is available without a doctor's order  Ask how much to take and how often to take it  Follow directions  Acetaminophen can cause liver damage if not taken correctly  · Take your medicine as directed    Contact your healthcare provider if you think your medicine is not helping or if you have side effects  Tell him or her if you are allergic to any medicine  Keep a list of the medicines, vitamins, and herbs you take  Include the amounts, and when and why you take them  Bring the list or the pill bottles to follow-up visits  Carry your medicine list with you in case of an emergency  Manage your symptoms:   · Gargle salt water  Mix ¼ teaspoon salt in an 8 ounce glass of warm water and gargle  This may help decrease swelling in your throat  · Drink liquids as directed  You may need to drink more liquids than usual  Liquids may help soothe your throat and prevent dehydration  Ask how much liquid to drink each day and which liquids are best for you  · Use a cool-steam humidifier  to help moisten the air in your room and calm your cough  · Soothe your throat  with cough drops, ice, soft foods, or popsicles  Prevent the spread of pharyngitis:  Cover your mouth and nose when you cough or sneeze  Do not share food or drinks  Wash your hands often  Use soap and water  If soap and water are unavailable, use an alcohol based hand   Follow up with your healthcare provider as directed:  Write down your questions so you remember to ask them during your visits  © 2017 [employer] [employer] Information is for End User's use only and may not be sold, redistributed or otherwise used for commercial purposes  All illustrations and images included in CareNotes® are the copyrighted property of [employer]  or [employer]  The above information is an  only  It is not intended as medical advice for individual conditions or treatments  Talk to your doctor, nurse or pharmacist before following any medical regimen to see if it is safe and effective for you

## 2020-01-03 NOTE — PROGRESS NOTES
[address] [facility]        NAME: Earnest Celis is a 78 y o  female  : 1950    MRN: [id number]  DATE: January 3, 2020  TIME: 12:29 PM    Assessment and Plan   Acute URI [J06 9]  1  Acute URI  fluticasone (FLONASE) 50 mcg/act nasal spray         Patient Instructions     Upper respiratory infection  flonase one nasal spray daily  Steam from shower  Follow up with PCP in 3-5 days  Proceed to  ER if symptoms worsen  Chief Complaint     Chief Complaint   Patient presents with    URI     Headache, right ear pain , stool is soft, mushy and mucosy   had about 10 eposides a day  congestion, sore throat,          History of Present Illness       79 y/o female presents c/o congestion, ear pain, non productive cough and post nasal drip x 4 days  States she has taken over the counter medication with partial relief of the symptoms  Denies chest pain, SOB      Review of Systems   Review of Systems   Constitutional: Negative for activity change, appetite change, chills, diaphoresis, fatigue and fever  HENT: Positive for congestion, ear pain and rhinorrhea  Negative for ear discharge, facial swelling, hearing loss, mouth sores, nosebleeds, postnasal drip, sinus pressure, sinus pain, sneezing, sore throat and voice change  Respiratory: Positive for cough  Negative for apnea, choking, chest tightness, shortness of breath, wheezing and stridor  Cardiovascular: Negative            Current Medications       Current Outpatient Medications:     amLODIPine (NORVASC) 5 mg tablet, Take 1 tablet (5 mg total) by mouth daily, Disp: 30 tablet, Rfl: 3    clonazePAM (KlonoPIN) 0 5 mg tablet, Take 1 tablet (0 5 mg total) by mouth daily at bedtime, Disp: 30 tablet, Rfl: 0    Cyanocobalamin 1000 MCG CAPS, Take 1 capsule by mouth daily  , Disp: , Rfl:     estradiol (ESTRACE) 0 1 mg/g vaginal cream, Insert 1 g into the vagina once a week Apply small amount on finger tip to vulva once a week , Disp: 30 g, Rfl: 3    famotidine (PEPCID) 20 mg tablet, Take 1 tablet (20 mg total) by mouth daily (Patient taking differently: Take 40 mg by mouth daily ), Disp: 90 tablet, Rfl: 0    fenofibrate (TRICOR) 145 mg tablet, Take 1 tablet (145 mg total) by mouth daily, Disp: 90 tablet, Rfl: 1    fluticasone (FLONASE) 50 mcg/act nasal spray, 1 spray into each nostril daily, Disp: 1 Bottle, Rfl: 0    hydrocortisone 0 5 % cream, Apply topically as needed, Disp: , Rfl:     ibuprofen (MOTRIN) 200 mg tablet, Take 200 mg by mouth every 6 (six) hours as needed for mild pain, Disp: , Rfl:     levothyroxine 50 mcg tablet, Take 1 tablet (50 mcg total) by mouth daily, Disp: 90 tablet, Rfl: 1    Loratadine 10 MG CAPS, Take 1 tablet by mouth as needed , Disp: , Rfl:     metFORMIN (GLUCOPHAGE) 500 mg tablet, Take 2 tablets (1,000 mg total) by mouth 2 (two) times a day with meals, Disp: 360 tablet, Rfl: 1    metoprolol tartrate (LOPRESSOR) 25 mg tablet, Take 0 5 tablets (12 5 mg total) by mouth every 12 (twelve) hours, Disp: 90 tablet, Rfl: 0    ONETOUCH VERIO test strip, 1 each by Other route daily, Disp: 100 each, Rfl: 1    Probiotic Product (PROBIOTIC-10) CAPS, Take 1 capsule by mouth daily, Disp: , Rfl:     simvastatin (ZOCOR) 40 mg tablet, TAKE 1 TABLET BY MOUTH AT BEDTIME, Disp: 90 tablet, Rfl: 1    zolpidem (AMBIEN) 10 mg tablet, Take one daily, Disp: 30 tablet, Rfl: 1    Current Allergies     Allergies as of 01/03/2020    (No Known Allergies)            The following portions of the patient's history were reviewed and updated as appropriate: allergies, current medications, past family history, past medical history, past social history, past surgical history and problem list      Past Medical History:   Diagnosis Date    Anxiety     Arthritis     Bladder [facility] ([facility] [address] )     Bladder mass     Depression     Diabetes mellitus ([facility] [address] )     Disease of thyroid gland     thyroid nodules-not treating at this time    Dysuria     Last assessed 02/16/17  GERD (gastroesophageal reflux disease)      2 para 2     HLD (hyperlipidemia)     HTN (hypertension)     Hypercholesterolemia     Hypertension     Knee pain     Mild aortic regurgitation with left ventricular dilation by prior echocardiogram     Papanicolaou smear 2017    NEG    PONV (postoperative nausea and vomiting)        Past Surgical History:   Procedure Laterality Date    CARPAL TUNNEL RELEASE Right      SECTION      x2    CHOLECYSTECTOMY      CYSTOSCOPY N/A 2016    Procedure: CYSTOSCOPY WITH BIOPSIES;  Surgeon: Carlota Davalos MD;  [facility];  Service:    Darinel Boudreaux CYSTOSCOPY N/A 2016    Procedure: CYSTOSCOPY;  Surgeon: Carlota Davalos MD;  [facility];  Service:     CYSTOSCOPY      HERNIA REPAIR      MS CYSTO/URETERO W/LITHOTRIPSY &INDWELL STENT INSRT  2016    Procedure: CYSTOSCOPY URETEROSCOPY WITH LITHOTRIPSY HOLMIUM LASER RIGHT, RETROGRADE PYELOGRAM BILATERAL: AND INSERTION STENT URETERAL Right ;  Surgeon: Carlota Davalos MD;  [facility];  Service: Urology    MS CYSTO/URETERO/PYELOSCOPY, DX Right 2017    Procedure: URETEROSCOPY;  Surgeon: Carlota Davalos MD;  [facility];  Service: Urology    MS CYSTOSCOPY,INSERT URETERAL STENT Left 2016    Procedure: URETERAL STENTS;  Surgeon: Carlota Davalos MD;  [facility];  Service: Urology    MS CYSTOURETHROSCOPY,FULGUR <0 5 CM LESN N/A 2016    Procedure: TRANSURETHRAL RESECTION OF BLADDER TUMOR (TURBT);   Surgeon: Carlota Davalos MD;  [facility];  Service: Urology    MS CYSTOURETHROSCOPY,FULGUR <0 5 CM LESN N/A 2016    Procedure: Eliane Calvillo; TUR BLADDER TUMOR ;  Surgeon: Carlota Davalos MD;  [facility];  Service: Urology    MS CYSTOURETHROSCOPY,FULGUR <0 5 CM LESN N/A 2016    Procedure: TUR BLADDER TUMOR ;  Surgeon: Carlota Dvaalos MD;  [facility];  Service: Urology    MS CYSTOURETHROSCOPY,FULGUR <0 5 CM LESN Bilateral 5/9/2017    Procedure: CYSTOSCOPY, RIGHT URETERAL WASHINGS, ;  Surgeon: Adeel Lao MD;  [facility];  Service: Urology    NM CYSTOURETHROSCOPY,URETER CATHETER Bilateral 9/29/2016    Procedure: RETROGRADE PYELOGRAM ;  Surgeon: Adeel Lao MD;  [facility];  Service: Urology    NM CYSTOURETHROSCOPY,URETER CATHETER Bilateral 5/9/2017    Procedure: RETROGRADE PYELOGRAM WITH STENT EXCHANGE, RIGHT;  Surgeon: Adeel Lao MD;  [facility];  Service: Urology    NM INSTILL ANTICANCER AGENT IN BLADDER N/A 11/29/2016    Procedure: Elige Pitch;  Surgeon: Adeel Lao MD;  [facility];  Service: Urology    NM KNEE SCOPE,MED/LAT MENISECTOMY Left 4/4/2016    Procedure: KNEE ARTHROSCOPIC PARTIAL MEDIAL MENISCECTOMY ;  Surgeon: Tessie Arcos MD;  [facility];  Service: Orthopedics    REMOVAL URETERAL STENT Left 11/29/2016    Procedure: REMOVAL STENT URETERAL;  Surgeon: Adeel Lao MD;  [facility];  Service:     TONSILLECTOMY         Family History   Problem Relation Age of Onset    Heart attack Mother     Heart disease Mother     ALS Father     Diabetes Maternal Grandfather     Lung cancer Paternal Grandfather     Heart disease Maternal Grandmother     Breast cancer Neg Hx          Medications have been verified  Objective   /80   Pulse 72   Temp 98 °F (36 7 °C)   Resp 20   Ht 5' 3" (1 6 m)   Wt 72 6 kg (160 lb)   SpO2 95%   BMI 28 34 kg/m²        Physical Exam     Physical Exam   Constitutional: She appears well-developed and well-nourished  No distress  HENT:   Head: Normocephalic and atraumatic  Right Ear: Hearing, tympanic membrane, external ear and ear canal normal    Left Ear: Hearing, tympanic membrane, external ear and ear canal normal    Nose: Rhinorrhea present  Mouth/Throat: Uvula is midline, oropharynx is clear and moist and mucous membranes are normal    Neck: Normal range of motion   Neck supple  Cardiovascular: Normal rate, regular rhythm, normal heart sounds and intact distal pulses  Pulmonary/Chest: Effort normal and breath sounds normal  No stridor  No respiratory distress  She has no wheezes  She has no rales  She exhibits no tenderness  Lymphadenopathy:     She has cervical adenopathy  Skin: She is not diaphoretic

## 2020-01-06 DIAGNOSIS — E03.9 ACQUIRED HYPOTHYROIDISM: ICD-10-CM

## 2020-01-07 RX ORDER — LEVOTHYROXINE SODIUM 0.05 MG/1
TABLET ORAL
Qty: 90 TABLET | Refills: 0 | OUTPATIENT
Start: 2020-01-07

## 2020-01-14 ENCOUNTER — APPOINTMENT (OUTPATIENT)
Dept: LAB | Age: 70
End: 2020-01-14
Payer: MEDICARE

## 2020-01-14 ENCOUNTER — OFFICE VISIT (OUTPATIENT)
Dept: INTERNAL MEDICINE CLINIC | Age: 70
End: 2020-01-14
Payer: MEDICARE

## 2020-01-14 VITALS
TEMPERATURE: 97.6 F | SYSTOLIC BLOOD PRESSURE: 150 MMHG | BODY MASS INDEX: 29.44 KG/M2 | WEIGHT: 160 LBS | HEIGHT: 62 IN | DIASTOLIC BLOOD PRESSURE: 70 MMHG | HEART RATE: 61 BPM | OXYGEN SATURATION: 96 %

## 2020-01-14 DIAGNOSIS — E11.8 TYPE 2 DIABETES MELLITUS WITH COMPLICATION, WITHOUT LONG-TERM CURRENT USE OF INSULIN (HCC): ICD-10-CM

## 2020-01-14 DIAGNOSIS — E78.2 MIXED HYPERLIPIDEMIA: ICD-10-CM

## 2020-01-14 DIAGNOSIS — F51.01 PRIMARY INSOMNIA: ICD-10-CM

## 2020-01-14 DIAGNOSIS — C67.9 MALIGNANT NEOPLASM OF URINARY BLADDER, UNSPECIFIED SITE (HCC): ICD-10-CM

## 2020-01-14 DIAGNOSIS — E03.9 ACQUIRED HYPOTHYROIDISM: ICD-10-CM

## 2020-01-14 DIAGNOSIS — R10.32 ABDOMINAL PAIN, LLQ (LEFT LOWER QUADRANT): ICD-10-CM

## 2020-01-14 DIAGNOSIS — F41.8 DEPRESSION WITH ANXIETY: ICD-10-CM

## 2020-01-14 DIAGNOSIS — E22.2 SIADH (SYNDROME OF INAPPROPRIATE ADH PRODUCTION) (HCC): ICD-10-CM

## 2020-01-14 DIAGNOSIS — R56.9 SEIZURES (HCC): ICD-10-CM

## 2020-01-14 DIAGNOSIS — E03.9 HYPOTHYROIDISM, UNSPECIFIED TYPE: ICD-10-CM

## 2020-01-14 DIAGNOSIS — E78.2 MIXED HYPERLIPIDEMIA: Primary | ICD-10-CM

## 2020-01-14 DIAGNOSIS — Z23 NEED FOR PNEUMOCOCCAL VACCINATION: ICD-10-CM

## 2020-01-14 LAB
ALT SERPL W P-5'-P-CCNC: 42 U/L (ref 12–78)
CHOLEST SERPL-MCNC: 146 MG/DL (ref 50–200)
EST. AVERAGE GLUCOSE BLD GHB EST-MCNC: 229 MG/DL
HBA1C MFR BLD: 9.6 % (ref 4.2–6.3)
HDLC SERPL-MCNC: 27 MG/DL
LDLC SERPL DIRECT ASSAY-MCNC: 67 MG/DL (ref 0–100)
TRIGL SERPL-MCNC: 471 MG/DL
TSH SERPL DL<=0.05 MIU/L-ACNC: 2.67 UIU/ML (ref 0.36–3.74)

## 2020-01-14 PROCEDURE — 83036 HEMOGLOBIN GLYCOSYLATED A1C: CPT

## 2020-01-14 PROCEDURE — G0009 ADMIN PNEUMOCOCCAL VACCINE: HCPCS | Performed by: INTERNAL MEDICINE

## 2020-01-14 PROCEDURE — 36415 COLL VENOUS BLD VENIPUNCTURE: CPT

## 2020-01-14 PROCEDURE — 84443 ASSAY THYROID STIM HORMONE: CPT

## 2020-01-14 PROCEDURE — 84460 ALANINE AMINO (ALT) (SGPT): CPT

## 2020-01-14 PROCEDURE — 83721 ASSAY OF BLOOD LIPOPROTEIN: CPT

## 2020-01-14 PROCEDURE — 90670 PCV13 VACCINE IM: CPT | Performed by: INTERNAL MEDICINE

## 2020-01-14 PROCEDURE — 80061 LIPID PANEL: CPT

## 2020-01-14 PROCEDURE — 99214 OFFICE O/P EST MOD 30 MIN: CPT | Performed by: INTERNAL MEDICINE

## 2020-01-14 RX ORDER — LEVOTHYROXINE SODIUM 0.05 MG/1
50 TABLET ORAL DAILY
Qty: 90 TABLET | Refills: 1 | Status: SHIPPED | OUTPATIENT
Start: 2020-01-14 | End: 2020-01-14 | Stop reason: SDUPTHER

## 2020-01-14 RX ORDER — CLONAZEPAM 0.5 MG/1
0.5 TABLET ORAL
Qty: 30 TABLET | Refills: 0 | Status: SHIPPED | OUTPATIENT
Start: 2020-01-14 | End: 2020-03-02 | Stop reason: SDUPTHER

## 2020-01-14 RX ORDER — LEVOTHYROXINE SODIUM 0.05 MG/1
50 TABLET ORAL DAILY
Qty: 90 TABLET | Refills: 1 | Status: SHIPPED | OUTPATIENT
Start: 2020-01-14 | End: 2020-01-14

## 2020-01-14 RX ORDER — LEVOTHYROXINE SODIUM 0.05 MG/1
50 TABLET ORAL DAILY
Qty: 90 TABLET | Refills: 1 | Status: SHIPPED | OUTPATIENT
Start: 2020-01-14 | End: 2020-07-01

## 2020-01-14 RX ORDER — ZOLPIDEM TARTRATE 10 MG/1
TABLET ORAL
Qty: 30 TABLET | Refills: 1 | Status: SHIPPED | OUTPATIENT
Start: 2020-01-14 | End: 2020-02-12 | Stop reason: SDUPTHER

## 2020-01-14 NOTE — PROGRESS NOTES
Assessment/Plan:    No problem-specific Assessment & Plan notes found for this encounter  Diagnoses and all orders for this visit:    Acquired hypothyroidism  -     levothyroxine 50 mcg tablet; Take 1 tablet (50 mcg total) by mouth daily    Depression with anxiety  -     clonazePAM (KlonoPIN) 0 5 mg tablet; Take 1 tablet (0 5 mg total) by mouth daily at bedtime    Primary insomnia  -     zolpidem (AMBIEN) 10 mg tablet; Take one daily    Other orders  -     Hemoglobin A1C; Future          Subjective:      Patient ID: Radah Ho is a 78 y o  female      HPI    The following portions of the patient's history were reviewed and updated as appropriate: allergies, current medications, past family history, past medical history, past social history, past surgical history and problem list     Review of Systems      Objective:      /70 (BP Location: Left arm, Patient Position: Sitting, Cuff Size: Adult)   Pulse 61   Temp 97 6 °F (36 4 °C) (Tympanic)   Ht 5' 2" (1 575 m)   Wt 72 6 kg (160 lb)   SpO2 96%   BMI 29 26 kg/m²          Physical Exam

## 2020-01-14 NOTE — PATIENT INSTRUCTIONS
1  Complete blood work as soon as you can  2  Start using Lantus 10 mg for DM  3  Continue to take metformin as prescribed  4  Make sure to take a 10 AM healthy snack  5  Referral to endocrinologist, Dr Antony Hernandez  Address: [address]                 Phone: [phone]  6  Continue minimizing processed foods and increase natural foods in diet  7  Minimize white starches, carbohydrates, and concentration sugars from diet  8  Eat smaller portions and decrease caloric intake  9  Try to exercise at least 150 min/week  10  Recommend 1200 calorie ADA diet  11  Limit your salt and caffeine intake  12  Schedule Medicare Annual Wellness Visit  13  Follow up in 3 weeks or as needed  14  You are beautiful inside and out  15  Life is too short to see yourself as anything but beautiful  =)        What is it? A 1200 calorie diabetic diet means eating no more than 1200 calories of food each day  You may need this diet to control your blood sugar or lose weight  Or lower your risk for heart problems  · Blood sugar is the amount of glucose (simple sugar) in your blood  Glucose is the main source of energy for your body  Glucose comes from carbohydrates in your diet  · A diabetic diet limits how much carbohydrate (kfp-ia-qa-drate), fat, and protein you eat  A 1200 calorie diet is low in calories and fat  Care:  · Ask your caregiver for the CareNote about the diabetic exchange diet to learn more about serving sizes  Your caregiver will tell you when to eat meals and snacks to control your diabetes  Talk with your caregiver if your blood sugar levels are too low or too high  · A sample of a 1200 calorie diet is listed below  You can exchange or trade one food for another from the same food group  For example, you can choose 1 slice of bread instead of 3/4 cup of another dry cereal  Or you can choose 1/2 cup fruit juice instead of 1-1/4 cups of melon    Serving Sizes: Use the list below to measure foods and serving sizes  A serving size means the size of food after it is cooked or prepared  · 1 pint or 2 cups (16 fluid ounces) of liquid is the size of 1-1/3 soda-pop cans  · 1-1/2 cup (12 fluid ounces) of liquid is the size of a soda-pop can  · 1 cup of food is the size of a large handful, or 8 fluid ounces of liquid  · 1/2 cup of food is about half of a large handful, or 4 fluid ounces of liquid  · 2 tablespoons (Tbsp) is about the size of a large walnut  · 1 tablespoon (Tbsp) is about the size of the tip of your thumb (from the last crease)  · 1 teaspoon (tsp) is about the size of the tip of your little finger (from the last crease)  · 3 ounces of cooked meat, fish, or poultry is about the size of a deck of cards  · 1 ounce of cooked meat, fish, or poultry is about 1/4 cup (c)  · One ounce of hard cheese is about a 1 inch cube  · A serving of vegetables is 1/2 cup (1/2 handful) cooked, or 1 cup (1 handful) raw  SAMPLE 1200 CALORIE MENU:  Breakfast:  · 1 bread or starch, such as 3/4 cup (6 ounces) bran flakes  · 1 fruit, such as 1 small banana (5 inch) or 1/2 of a 9 inch banana  · 1 milk, such as 1 cup skim or 1% milk  · 1 meat or meat substitute, such as 1/4 cup cottage cheese or 1 poached egg  Lunch:  · The following can be combined to make 1/2 sandwich:  ? 2 ounces meat or protein, such as 1/2 cup drained water-packed tuna  ? 1 fat, such as 1 tsp regular or 2 tsp lowfat mayonnaise  ? 1/2 vegetable, such as 1/2 cup chopped celery  ? 1 vegetable, such as 2 tomato slices  ? 1 free vegetable, such as 1 lettuce leaf on a sandwich  ? 1 bread or starch, such as 1 slice bread  · Add the following foods for lunch:  ? 1 fruit, such as 1 medium orange  ?  1 free food, such as 1 can (12 ounces) diet soda  Dinner:  · 2 ounces meat or protein, such as 2 ounces lean chicken breast  · 1 starch, such as 1/2 cup cooked pasta  · 1 fat, such as 1 tsp margarine  · 1 vegetable, such as 1 cup steamed broccoli  · 1 fruit, such as 1-1/4 cup melon (watermelon or cantaloupe) cubes  · 1 milk, such as 1 cup skim milk  Evening Snack:  · 1 bread, such as 3 (2-1/2 inch) squares fracisco cracklissett  CALL YOUR CAREGIVER IF:  · You have questions about the serving sizes on this diet  · You have questions about how to prepare or cook foods on this diet  · You have questions about how or where to buy foods on this diet  · You have questions or concerns about your illness, medicine, or this diet  Care Agreement  You have the right to help plan your care  To help with this plan, you must learn about your diet or illness  You can then discuss your treatment options with your caregiver  You can work with your caregiver to decide what care will be used to treat you  You always have the right to refuse treatment  What is it? A 1200 calorie diabetic diet means eating no more than 1200 calories of food each day  You may need this diet to control your blood sugar or lose weight  Or lower your risk for heart problems  · Blood sugar is the amount of glucose (simple sugar) in your blood  Glucose is the main source of energy for your body  Glucose comes from carbohydrates in your diet  · A diabetic diet limits how much carbohydrate (eyl-iw-nq-drate), fat, and protein you eat  A 1200 calorie diet is low in calories and fat  Care:  · Ask your caregiver for the CareNote about the diabetic exchange diet to learn more about serving sizes  Your caregiver will tell you when to eat meals and snacks to control your diabetes  Talk with your caregiver if your blood sugar levels are too low or too high  · A sample of a 1200 calorie diet is listed below  You can exchange or trade one food for another from the same food group  For example, you can choose 1 slice of bread instead of 3/4 cup of another dry cereal  Or you can choose 1/2 cup fruit juice instead of 1-1/4 cups of melon    Serving Sizes: Use the list below to measure foods and serving sizes  A serving size means the size of food after it is cooked or prepared  · 1 pint or 2 cups (16 fluid ounces) of liquid is the size of 1-1/3 soda-pop cans  · 1-1/2 cup (12 fluid ounces) of liquid is the size of a soda-pop can  · 1 cup of food is the size of a large handful, or 8 fluid ounces of liquid  · 1/2 cup of food is about half of a large handful, or 4 fluid ounces of liquid  · 2 tablespoons (Tbsp) is about the size of a large walnut  · 1 tablespoon (Tbsp) is about the size of the tip of your thumb (from the last crease)  · 1 teaspoon (tsp) is about the size of the tip of your little finger (from the last crease)  · 3 ounces of cooked meat, fish, or poultry is about the size of a deck of cards  · 1 ounce of cooked meat, fish, or poultry is about 1/4 cup (c)  · One ounce of hard cheese is about a 1 inch cube  · A serving of vegetables is 1/2 cup (1/2 handful) cooked, or 1 cup (1 handful) raw  SAMPLE 1200 CALORIE MENU:  Breakfast:  · 1 bread or starch, such as 3/4 cup (6 ounces) bran flakes  · 1 fruit, such as 1 small banana (5 inch) or 1/2 of a 9 inch banana  · 1 milk, such as 1 cup skim or 1% milk  · 1 meat or meat substitute, such as 1/4 cup cottage cheese or 1 poached egg  Lunch:  · The following can be combined to make 1/2 sandwich:  ? 2 ounces meat or protein, such as 1/2 cup drained water-packed tuna  ? 1 fat, such as 1 tsp regular or 2 tsp lowfat mayonnaise  ? 1/2 vegetable, such as 1/2 cup chopped celery  ? 1 vegetable, such as 2 tomato slices  ? 1 free vegetable, such as 1 lettuce leaf on a sandwich  ? 1 bread or starch, such as 1 slice bread  · Add the following foods for lunch:  ? 1 fruit, such as 1 medium orange  ?  1 free food, such as 1 can (12 ounces) diet soda  Dinner:  · 2 ounces meat or protein, such as 2 ounces lean chicken breast  · 1 starch, such as 1/2 cup cooked pasta  · 1 fat, such as 1 tsp margarine  · 1 vegetable, such as 1 cup steamed broccoli  · 1 fruit, such as 1-1/4 cup melon (watermelon or cantaloupe) cubes  · 1 milk, such as 1 cup skim milk  Evening Snack:  · 1 bread, such as 3 (2-1/2 inch) squares fracisco martínez  CALL YOUR CAREGIVER IF:  · You have questions about the serving sizes on this diet  · You have questions about how to prepare or cook foods on this diet  · You have questions about how or where to buy foods on this diet  · You have questions or concerns about your illness, medicine, or this diet  Care Agreement  You have the right to help plan your care  To help with this plan, you must learn about your diet or illness  You can then discuss your treatment options with your caregiver  You can work with your caregiver to decide what care will be used to treat you  You always have the right to refuse treatment

## 2020-01-14 NOTE — PROGRESS NOTES
Chief Complaint   Patient presents with    Follow-up     last labs 8/28/19, reports blood sugar fasting average is 240-250    Cold Like Symptoms     cough        Assessment/Plan:    DM Type II - Pt states her glucose levels have been elevated and are not stable at this time  She reports having an average level of 245 in the morning  She has not been good with her diet  She is currently on metformin 500 mg  She will need to complete blood work as soon as she can for hemoglobin A1C  I will refer her to an endocrinologist  I also prescribed her Lantus 10 mg  I suggested the following as well: Continue minimizing processed foods and increase natural foods in diet  Minimize white starches, carbohydrates, and concentration sugars from diet  She will also need to check glucose levels in the morning and evenings  Recommended 1200 ADA diet plan  Pt  Did not want any medication  Orally that would  Affect the liver or pancrease  E ssential HTN - Pts BP in the office today was 150/70 while sitting  She was advised to continue on her medications as prescribed  She should also limit her caffeine and salt intake  Thyroid - Her blood work from 8/28/19 was normal  She states she was taking the levothyroxine at that time  Pt states she ran out of her levothyroxine and has not been able to get it again  Her fatigue most likely is due to this  She denies feeling weak  I have prescribed her the levothyroxine 50 mcg  She will need to recheck her TSH blood work  HLD - Pt is currently taking Zocor 40 mg  Pt will need to complete lipid panel as soon as she can  Diverticulitis- Pts presented with abdominal pain in the LLQ  She was advised to stay on a brat diet and go to ER is sx worsen  Her CT of abdomen results are below  Pt will need to complete US of the abdomen due to inflammation  BMI - Pts BMI in the office today was 29 26   She was advised the following: Continue minimizing processed foods and increase natural foods in diet  Minimize white starches, carbohydrates, and concentration sugars from diet  Eat smaller portions and decrease caloric intake  Try to exercise at least 150 min/week  Health Maintenance - Pt is due for her medicare AWV  She will receive the PCV13 today in the office  She will take Tdap next visit  Follow up in 3 weeks or as needed  BMI Counseling: Body mass index is 29 26 kg/m²  The BMI is above normal  Nutrition recommendations include decreasing portion sizes, encouraging healthy choices of fruits and vegetables, decreasing fast food intake, consuming healthier snacks, limiting drinks that contain sugar, moderation in carbohydrate intake and reducing intake of cholesterol  Exercise recommendations include moderate physical activity 150 minutes/week  No pharmacotherapy was ordered  I have spent 45 minutes with Patient  today in which greater than 50% of this time was spent in counseling/coordination of care regarding Intructions for management, Patient and family education and Importance of tx compliance  Diagnoses and all orders for this visit:    Mixed hyperlipidemia  -     Lipid Panel with Direct LDL reflex; Future  -     ALT; Future    Acquired hypothyroidism  -     Discontinue: levothyroxine 50 mcg tablet; Take 1 tablet (50 mcg total) by mouth daily  -     TSH, 3rd generation with Free T4 reflex; Future  -     Discontinue: levothyroxine 50 mcg tablet; Take 1 tablet (50 mcg total) by mouth daily    Depression with anxiety  -     clonazePAM (KlonoPIN) 0 5 mg tablet; Take 1 tablet (0 5 mg total) by mouth daily at bedtime    Primary insomnia  -     zolpidem (AMBIEN) 10 mg tablet; Take one daily    Type 2 diabetes mellitus with complication, without long-term current use of insulin (HCC)  -     Hemoglobin A1C; Future  -     Ambulatory referral to Endocrinology;  Future  -     insulin glargine (LANTUS SOLOSTAR) 100 units/mL injection pen 10 Units  -     insulin glargine (LANTUS SOLOSTAR) 100 units/mL injection pen; Inject 10 Units under the skin daily  -     Insulin Pen Needle (BD PEN NEEDLE OREN U/F) 32G X 4 MM MISC; Once daily    Malignant neoplasm of urinary bladder, unspecified site (HCC)    Seizures (HCC)    SIADH (syndrome of inappropriate ADH production) (HCC)    Abdominal pain, LLQ (left lower quadrant)  -     US abdomen complete; Future    Hypothyroidism, unspecified type  -     levothyroxine 50 mcg tablet; Take 1 tablet (50 mcg total) by mouth daily    Need for pneumococcal vaccination  -     PNEUMOCOCCAL CONJUGATE VACCINE 13-VALENT GREATER THAN 6 MONTHS        Subjective:      Patient ID: Vicky Ayala is a 78 y o  female  HPI    The following portions of the patient's history were reviewed and updated as appropriate: allergies, current medications, past family history, past medical history, past social history, past surgical history and problem list     Review of Systems   Constitutional: Positive for fatigue  Negative for chills, diaphoresis and fever  HENT: Positive for postnasal drip and rhinorrhea  Negative for sinus pressure and sinus pain  Eyes: Negative for photophobia, pain, discharge, redness and itching  Respiratory: Positive for cough  Gastrointestinal: Negative for abdominal distention, abdominal pain, blood in stool, constipation, diarrhea, nausea, rectal pain and vomiting  Genitourinary: Negative for difficulty urinating, dysuria, flank pain and frequency  Musculoskeletal: Positive for back pain  Negative for arthralgias, gait problem and joint swelling  Neurological: Negative for dizziness, syncope, speech difficulty, weakness, light-headedness and headaches  Hematological: Does not bruise/bleed easily  Psychiatric/Behavioral: Positive for dysphoric mood  The patient is nervous/anxious              No Known Allergies     Past Medical History:   Diagnosis Date    Anxiety     Arthritis     Bladder carcinoma (HCC)     Bladder mass     Depression     Diabetes mellitus ([facility] [address] )     Disease of thyroid gland     thyroid nodules-not treating at this time    Dysuria     Last assessed 17    GERD (gastroesophageal reflux disease)      2 para 2     HLD (hyperlipidemia)     HTN (hypertension)     Hypercholesterolemia     Hypertension     Knee pain     Mild aortic regurgitation with left ventricular dilation by prior echocardiogram     Papanicolaou smear 2017    NEG    PONV (postoperative nausea and vomiting)      Current Outpatient Medications on File Prior to Visit   Medication Sig Dispense Refill    amLODIPine (NORVASC) 5 mg tablet Take 1 tablet (5 mg total) by mouth daily 30 tablet 3    Cyanocobalamin 1000 MCG CAPS Take 1 capsule by mouth daily        estradiol (ESTRACE) 0 1 mg/g vaginal cream Insert 1 g into the vagina once a week Apply small amount on finger tip to vulva once a week   30 g 3    famotidine (PEPCID) 20 mg tablet Take 1 tablet (20 mg total) by mouth daily (Patient taking differently: Take 40 mg by mouth daily ) 90 tablet 0    fenofibrate (TRICOR) 145 mg tablet Take 1 tablet (145 mg total) by mouth daily 90 tablet 1    fluticasone (FLONASE) 50 mcg/act nasal spray 1 spray into each nostril daily 1 Bottle 0    ibuprofen (MOTRIN) 200 mg tablet Take 200 mg by mouth every 6 (six) hours as needed for mild pain      metFORMIN (GLUCOPHAGE) 500 mg tablet Take 2 tablets (1,000 mg total) by mouth 2 (two) times a day with meals 360 tablet 1    metoprolol tartrate (LOPRESSOR) 25 mg tablet Take 0 5 tablets (12 5 mg total) by mouth every 12 (twelve) hours 90 tablet 0    ONETOUCH VERIO test strip 1 each by Other route daily 100 each 1    Probiotic Product (PROBIOTIC-10) CAPS Take 1 capsule by mouth daily      simvastatin (ZOCOR) 40 mg tablet TAKE 1 TABLET BY MOUTH AT BEDTIME 90 tablet 1    [DISCONTINUED] clonazePAM (KlonoPIN) 0 5 mg tablet Take 1 tablet (0 5 mg total) by mouth daily at bedtime 30 tablet 0    [DISCONTINUED] zolpidem (AMBIEN) 10 mg tablet Take one daily 30 tablet 1    [DISCONTINUED] hydrocortisone 0 5 % cream Apply topically as needed      [DISCONTINUED] levothyroxine 50 mcg tablet Take 1 tablet (50 mcg total) by mouth daily (Patient not taking: Reported on 2020) 90 tablet 1    [DISCONTINUED] Loratadine 10 MG CAPS Take 1 tablet by mouth as needed        No current facility-administered medications on file prior to visit  Past Surgical History:   Procedure Laterality Date    CARPAL TUNNEL RELEASE Right      SECTION      x2    CHOLECYSTECTOMY      CYSTOSCOPY N/A 2016    Procedure: CYSTOSCOPY WITH BIOPSIES;  Surgeon: Felicity Maguire MD;  [facility];  Service:    Kylah Lavender N/A 2016    Procedure: CYSTOSCOPY;  Surgeon: Felicity Maguire MD;  [facility];  Service:     CYSTOSCOPY      HERNIA REPAIR      MT CYSTO/URETERO W/LITHOTRIPSY &INDWELL STENT INSRT  2016    Procedure: CYSTOSCOPY URETEROSCOPY WITH LITHOTRIPSY HOLMIUM LASER RIGHT, RETROGRADE PYELOGRAM BILATERAL: AND INSERTION STENT URETERAL Right ;  Surgeon: Felicity Maguire MD;  [facility];  Service: Urology    MT CYSTO/URETERO/PYELOSCOPY, DX Right 2017    Procedure: URETEROSCOPY;  Surgeon: Felicity Maguire MD;  [facility];  Service: Urology    MT CYSTOSCOPY,INSERT URETERAL STENT Left 2016    Procedure: URETERAL STENTS;  Surgeon: Felicity Maguire MD;  [facility];  Service: Urology    MT CYSTOURETHROSCOPY,FULGUR <0 5 CM LESN N/A 2016    Procedure: TRANSURETHRAL RESECTION OF BLADDER TUMOR (TURBT);   Surgeon: Felicity Maguire MD;  [facility];  Service: Urology    MT CYSTOURETHROSCOPY,FULGUR <0 5 CM LESN N/A 2016    Procedure: Janis Hartley; TUR BLADDER TUMOR ;  Surgeon: Felicity Magurie MD;  [facility];  Service: Urology    MT CYSTOURETHROSCOPY,FULGUR <0 5 CM LESN N/A 2016    Procedure: TUR BLADDER TUMOR ;  Surgeon: Jenny Maya India Sheikh MD;  [facility];  Service: Urology    VA CYSTOURETHROSCOPY,FULGUR <0 5 CM LESN Bilateral 2017    Procedure: Sabiha Sifuentes, ;  Surgeon: Bhupendra Grey MD;  [facility];  Service: Urology    VA CYSTOURETHROSCOPY,URETER CATHETER Bilateral 2016    Procedure: RETROGRADE PYELOGRAM ;  Surgeon: Bhupendra Grey MD;  [facility];  Service: Urology    VA CYSTOURETHROSCOPY,URETER CATHETER Bilateral 2017    Procedure: RETROGRADE PYELOGRAM WITH STENT EXCHANGE, RIGHT;  Surgeon: Bhupendra Grey MD;  [facility];  Service: Urology    VA INSTILL ANTICANCER AGENT IN BLADDER N/A 2016    Procedure: Ammy Ruff;  Surgeon: Bhupendra Grey MD;  [facility];  Service: Urology    VA KNEE SCOPE,MED/LAT MENISECTOMY Left 2016    Procedure: KNEE ARTHROSCOPIC PARTIAL MEDIAL MENISCECTOMY ;  Surgeon: Liza Eng MD;  [facility];  Service: Orthopedics    REMOVAL URETERAL STENT Left 2016    Procedure: REMOVAL STENT URETERAL;  Surgeon: Bhupendra Grey MD;  [facility];  Service:    Cedric Surendra TONSILLECTOMY       Social History     Socioeconomic History    Marital status: /Civil Union     Spouse name: Not on file    Number of children: 2    Years of education: Not on file    Highest education level: Not on file   Occupational History    Occupation: RETIRED   Social Needs    Financial resource strain: Not on file    Food insecurity:     Worry: Not on file     Inability: Not on file   Iceotope needs:     Medical: Not on file     Non-medical: Not on file   Tobacco Use    Smoking status: Former Smoker     Packs/day: 1 00     Years: 20 00     Pack years: 20 00     Types: Cigarettes     Last attempt to quit:      Years since quittin 0    Smokeless tobacco: Never Used   Substance and Sexual Activity    Alcohol use: No    Drug use: No    Sexual activity: Not Currently     Partners: Male Birth control/protection: Post-menopausal   Lifestyle    Physical activity:     Days per week: Not on file     Minutes per session: Not on file    Stress: Not on file   Relationships    Social connections:     Talks on phone: Not on file     Gets together: Not on file     Attends [employer] service: Not on file     Active member of club or organization: Not on file     Attends meetings of clubs or organizations: Not on file     Relationship status: Not on file    Intimate partner violence:     Fear of current or ex partner: Not on file     Emotionally abused: Not on file     Physically abused: Not on file     Forced sexual activity: Not on file   Other Topics Concern    Not on file   Social History Narrative    Not on file     Objective:      /70 (BP Location: Left arm, Patient Position: Sitting, Cuff Size: Adult)   Pulse 61   Temp 97 6 °F (36 4 °C) (Tympanic)   Ht 5' 2" (1 575 m)   Wt 72 6 kg (160 lb)   SpO2 96%   BMI 29 26 kg/m²          Physical Exam          Attestation:   By signing my name below, Monse Cabrera, attest that this documentation has been prepared under the direction and in the presence of Deepa Ayala MD  Electronically Signed: Giulia Mas  1/14/20      I, Deepa Ayala, personally performed the services described in this documentation  All medical record entries made by the scribe were at my direction and in my presence  I have reviewed the chart and discharge instructions and agree that the record reflects my personal performance and is accurate and complete   Deepa Ayala MD  1/14/20

## 2020-01-20 ENCOUNTER — PROCEDURE VISIT (OUTPATIENT)
Dept: UROLOGY | Facility: CLINIC | Age: 70
End: 2020-01-20
Payer: MEDICARE

## 2020-01-20 VITALS
HEART RATE: 68 BPM | BODY MASS INDEX: 29.81 KG/M2 | HEIGHT: 62 IN | SYSTOLIC BLOOD PRESSURE: 142 MMHG | WEIGHT: 162 LBS | DIASTOLIC BLOOD PRESSURE: 80 MMHG

## 2020-01-20 DIAGNOSIS — C67.9 MALIGNANT NEOPLASM OF URINARY BLADDER, UNSPECIFIED SITE (HCC): Primary | ICD-10-CM

## 2020-01-20 PROCEDURE — 52000 CYSTOURETHROSCOPY: CPT | Performed by: UROLOGY

## 2020-01-20 RX ORDER — MOMETASONE FUROATE 1 MG/G
CREAM TOPICAL DAILY
COMMUNITY
End: 2021-11-10 | Stop reason: SDUPTHER

## 2020-01-20 NOTE — PROGRESS NOTES
Cystoscopy  Date/Time: 1/20/2020 2:11 PM  Performed by: Hortencia Augustin MD  Authorized by: Hortencia Augustin MD     Procedure details: cystoscopy        Zoey Stanford is a 71-year-old female with a history of urothelial carcinoma the bladder dating back to 2016  Pathology revealed high-grade TA/T1 urothelial carcinoma the bladder  In early 2017 she started BCG therapy  She continued maintenance for 2 years  With a national BCG shortage she did not receive any additional BCG  She continues on surveillance  Her initial resection was in September 2016  She had a repeat resection in November 2016  Which showed some low-grade residual disease at the bladder neck  She has not had a recurrence since  Her most recent urine cytology from December 2019 is negative for malignant cells  She is very concerned about a recent diagnosis of lichenoid dermatitis of the vulva  She is under the care of gynecology as well as dermatology  Cystoscopy Procedure note    Risk and benefits of flexible cystoscopy were discussed  Informed consent was obtained  A urine dip is adequate for cystoscopy  The patient was placed into the modified supine position  Her genitalia was prepped and draped in a sterile fashion  Viscous lidocaine was instilled into the urethra  Flexible cystoscopy was then performed  The bladder was thoroughly inspected  Both ureteral orifices were visualized with clear efflux of urine  The bladder mucosa was thoroughly inspected  Prior biopsy scars were identified with mild neovascularity but no evidence of recurrent urothelial carcinoma Retroflexion reveals a slight resection defect at the bladder neck from prior TUR but no evidence of recurrent disease     Overall this was a negative cystoscopy  A female office staff member was present throughout the entire procedure  External genitalia was grossly normal on examination today      Impression:  History of urothelial carcinoma the bladder without recurrence, lichenoid dermatitis    I recommend that she continue to follow recommendations of gynecology and Dermatology for her lichenoid dermatitis  With regards to bladder cancer surveillance, I recommend that she return in 6 months time with cystoscopy and cytology  We had initially discussed converting to yearly cystoscopy, however, I feel that the neovascularity around the prior biopsy sites warrants an additional cystoscopy in 6 months time

## 2020-01-21 DIAGNOSIS — I10 ESSENTIAL HYPERTENSION: ICD-10-CM

## 2020-01-21 DIAGNOSIS — R00.0 TACHYCARDIA: ICD-10-CM

## 2020-01-21 RX ORDER — AMLODIPINE BESYLATE 5 MG/1
5 TABLET ORAL DAILY
Qty: 30 TABLET | Refills: 3 | Status: SHIPPED | OUTPATIENT
Start: 2020-01-21 | End: 2020-08-18 | Stop reason: SDUPTHER

## 2020-01-22 ENCOUNTER — OFFICE VISIT (OUTPATIENT)
Dept: CARDIOLOGY CLINIC | Facility: CLINIC | Age: 70
End: 2020-01-22
Payer: MEDICARE

## 2020-01-22 VITALS
OXYGEN SATURATION: 96 % | DIASTOLIC BLOOD PRESSURE: 86 MMHG | HEART RATE: 66 BPM | WEIGHT: 162 LBS | HEIGHT: 62 IN | SYSTOLIC BLOOD PRESSURE: 144 MMHG | BODY MASS INDEX: 29.81 KG/M2

## 2020-01-22 DIAGNOSIS — I10 ESSENTIAL HYPERTENSION: ICD-10-CM

## 2020-01-22 DIAGNOSIS — E78.2 MIXED HYPERLIPIDEMIA: ICD-10-CM

## 2020-01-22 DIAGNOSIS — E11.8 TYPE 2 DIABETES MELLITUS WITH COMPLICATION, WITHOUT LONG-TERM CURRENT USE OF INSULIN (HCC): ICD-10-CM

## 2020-01-22 DIAGNOSIS — R94.31 ABNORMAL EKG: Primary | ICD-10-CM

## 2020-01-22 PROCEDURE — 93000 ELECTROCARDIOGRAM COMPLETE: CPT | Performed by: INTERNAL MEDICINE

## 2020-01-22 PROCEDURE — 99203 OFFICE O/P NEW LOW 30 MIN: CPT | Performed by: INTERNAL MEDICINE

## 2020-01-22 NOTE — PROGRESS NOTES
Cardiology Consultation     Coward Duty  [id number]  1950  HEART & VASCULAR [facility] [place] [address] [place] [address]    1  Abnormal EKG  POCT ECG   2  Essential hypertension     3  Mixed hyperlipidemia     4  Type 2 diabetes mellitus with complication, without long-term current use of insulin ([facility])         HPI:    Mrs Javier Mejia comes in for consultation to discuss her risk factors for cardiovascular disease and also given her history of an abnormal ECG  Zoey Stanford tells me she has had an abnormal ECG for the last 10 years  By reviewing this and by ECG today she has a left anterior fascicular block with nonspecific changes otherwise  She has risk factors that include hyperlipidemia, hypertension and diabetes mellitus  She has elevated triglycerides, and her LDL is under good control  Her diabetes is not under control, and she was recently started on insulin  Because of her risk factors and abnormal ECG she did go through testing last year that included an echocardiogram and a stress nuclear study  Both of these were unremarkable  Echocardiogram showed normal LV systolic function without significant valve disease  Stress nuclear study was normal   She was not symptomatic at that time  Zoey Stanford currently denies any cardiovascular symptoms  She denies chest pain or any symptoms suggestive of angina  She denies shortness of breath or any signs /symptoms of CHF  No palpitations, lightheadedness or any history of syncope  She denies any limitations        Patient Active Problem List   Diagnosis    Tear of medial meniscus of left knee    Abnormal EKG    Acquired hypothyroidism    Acute pain of left knee    Acute pain of right shoulder    Anxiety    Asthma    Neoplasm of bladder    Abdominal pain    Bursitis of shoulder    Chronic cough    Dental abscess    Hyponatremia    Fibromyositis    Ganglion and cyst of synovium, tendon and bursa    Mixed hyperlipidemia    Hypertension    Malignant neoplasm of lateral wall of urinary bladder (HCC)    Type 2 diabetes mellitus with complication, without long-term current use of insulin (HCC)    Nontoxic single thyroid nodule    Obesity    Osteoporosis    Primary localized osteoarthritis of left knee    Other fatigue    Acute non-recurrent frontal sinusitis    Seizures (HCC)    Lichenoid dermatitis    SIADH (syndrome of inappropriate ADH production) (HCC)     Past Medical History:   Diagnosis Date    Anxiety     Arthritis     Bladder carcinoma (HCC)     Bladder mass     Depression     Diabetes mellitus ([address] )     Disease of thyroid gland     thyroid nodules-not treating at this time    Dysuria     Last assessed 17    GERD (gastroesophageal reflux disease)      2 para 2     HLD (hyperlipidemia)     HTN (hypertension)     Hypercholesterolemia     Hypertension     Knee pain     Mild aortic regurgitation with left ventricular dilation by prior echocardiogram     Papanicolaou smear 2017    NEG    PONV (postoperative nausea and vomiting)      Social History     Socioeconomic History    Marital status: /Civil Union     Spouse name: Not on file    Number of children: 2    Years of education: Not on file    Highest education level: Not on file   Occupational History    Occupation: RETIRED   Social Needs    Financial resource strain: Not on file    Food insecurity:     Worry: Not on file     Inability: Not on file   [employer] needs:     Medical: Not on file     Non-medical: Not on file   Tobacco Use    Smoking status: Former Smoker     Packs/day: 1 00     Years: 20 00     Pack years: 20 00     Types: Cigarettes     Last attempt to quit:      Years since quittin 0    Smokeless tobacco: Never Used   Substance and Sexual Activity    Alcohol use: No    Drug use: No    Sexual activity: Not Currently     Partners: Male     Birth control/protection: Post-menopausal   Lifestyle    Physical activity:     Days per week: Not on file     Minutes per session: Not on file    Stress: Not on file   Relationships    Social connections:     Talks on phone: Not on file     Gets together: Not on file     Attends Confucianism service: Not on file     Active member of club or organization: Not on file     Attends meetings of clubs or organizations: Not on file     Relationship status: Not on file    Intimate partner violence:     Fear of current or ex partner: Not on file     Emotionally abused: Not on file     Physically abused: Not on file     Forced sexual activity: Not on file   Other Topics Concern    Not on file   Social History Narrative    Not on file      Family History   Problem Relation Age of Onset    Heart attack Mother     Heart disease Mother     ALS Father     Diabetes Maternal Grandfather     Lung cancer Paternal Grandfather     Heart disease Maternal Grandmother     Breast cancer Neg Hx      Past Surgical History:   Procedure Laterality Date    CARPAL TUNNEL RELEASE Right      SECTION      x2    CHOLECYSTECTOMY      CYSTOSCOPY N/A 2016    Procedure: CYSTOSCOPY WITH BIOPSIES;  Surgeon: Lizbeth Zafar MD;  [facility];  Service:    [place] Drop CYSTOSCOPY N/A 2016    Procedure: CYSTOSCOPY;  Surgeon: Lizbeth Zafar MD;  [facility];  Service:     CYSTOSCOPY      HERNIA REPAIR      [place] CYSTO/URETERO W/LITHOTRIPSY &INDWELL STENT INSRT  2016    Procedure: CYSTOSCOPY URETEROSCOPY WITH LITHOTRIPSY HOLMIUM LASER RIGHT, RETROGRADE PYELOGRAM BILATERAL: AND INSERTION STENT URETERAL Right ;  Surgeon: Lizbeth Zafar MD;  [facility];  Service: Urology    [place] CYSTO/URETERO/PYELOSCOPY, DX Right 2017    Procedure: URETEROSCOPY;  Surgeon: Lizbeth Zafar MD;  [facility];  Service: Urology    [place] CYSTOSCOPY,INSERT URETERAL STENT Left 2016    Procedure: URETERAL STENTS;  Surgeon: Jigar Mckeon MD;  [facility];  Service: Urology    MD CYSTOURETHROSCOPY,FULGUR <0 5 CM LESN N/A 11/29/2016    Procedure: TRANSURETHRAL RESECTION OF BLADDER TUMOR (TURBT);   Surgeon: Jigar Mckeon MD;  [facility];  Service: Urology    MD CYSTOURETHROSCOPY,FULGUR <0 5 CM LESN N/A 9/29/2016    Procedure: Pamela Ada; TUR BLADDER TUMOR ;  Surgeon: Jigar Mckeon MD;  [facility];  Service: Urology    MD CYSTOURETHROSCOPY,FULGUR <0 5 CM LESN N/A 9/1/2016    Procedure: TUR BLADDER TUMOR ;  Surgeon: Jigar Mckeon MD;  [facility];  Service: Urology    MD CYSTOURETHROSCOPY,FULGUR <0 5 CM LESN Bilateral 5/9/2017    Procedure: CYSTOSCOPY, RIGHT URETERAL WASHINGS, ;  Surgeon: Jigar Mckeon MD;  [facility];  Service: Urology    MD CYSTOURETHROSCOPY,URETER CATHETER Bilateral 9/29/2016    Procedure: RETROGRADE PYELOGRAM ;  Surgeon: Jigar Mckeon MD;  [facility];  Service: Urology    MD CYSTOURETHROSCOPY,URETER CATHETER Bilateral 5/9/2017    Procedure: RETROGRADE PYELOGRAM WITH STENT EXCHANGE, RIGHT;  Surgeon: Jigar Mckeon MD;  [facility];  Service: Urology    MD INSTILL ANTICANCER AGENT IN BLADDER N/A 11/29/2016    Procedure: George Flash;  Surgeon: Jigar Mckeon MD;  [facility];  Service: Urology    MD KNEE SCOPE,MED/LAT MENISECTOMY Left 4/4/2016    Procedure: KNEE ARTHROSCOPIC PARTIAL MEDIAL MENISCECTOMY ;  Surgeon: Denice Haro MD;  [facility];  Service: Orthopedics    REMOVAL URETERAL STENT Left 11/29/2016    Procedure: REMOVAL STENT URETERAL;  Surgeon: Jigar Mckeon MD;  [facility];  Service:    Payam Glover TONSILLECTOMY         Current Outpatient Medications:     amLODIPine (NORVASC) 5 mg tablet, Take 1 tablet (5 mg total) by mouth daily, Disp: 30 tablet, Rfl: 3    clonazePAM (KlonoPIN) 0 5 mg tablet, Take 1 tablet (0 5 mg total) by mouth daily at bedtime, Disp: 30 tablet, Rfl: 0    Cyanocobalamin 1000 MCG CAPS, Take 1 capsule by mouth daily  , Disp: , Rfl:     estradiol (ESTRACE) 0 1 mg/g vaginal cream, Insert 1 g into the vagina once a week Apply small amount on finger tip to vulva once a week , Disp: 30 g, Rfl: 3    famotidine (PEPCID) 20 mg tablet, Take 1 tablet (20 mg total) by mouth daily (Patient taking differently: Take 40 mg by mouth daily ), Disp: 90 tablet, Rfl: 0    fluticasone (FLONASE) 50 mcg/act nasal spray, 1 spray into each nostril daily, Disp: 1 Bottle, Rfl: 0    ibuprofen (MOTRIN) 200 mg tablet, Take 200 mg by mouth every 6 (six) hours as needed for mild pain, Disp: , Rfl:     insulin glargine (LANTUS SOLOSTAR) 100 units/mL injection pen, Inject 10 Units under the skin daily, Disp: 5 pen, Rfl: 1    Insulin Pen Needle (BD PEN NEEDLE OREN U/F) 32G X 4 MM MISC, Once daily, Disp: 100 each, Rfl: 1    levothyroxine 50 mcg tablet, Take 1 tablet (50 mcg total) by mouth daily, Disp: 90 tablet, Rfl: 1    metFORMIN (GLUCOPHAGE) 500 mg tablet, Take 2 tablets (1,000 mg total) by mouth 2 (two) times a day with meals, Disp: 360 tablet, Rfl: 1    metoprolol tartrate (LOPRESSOR) 25 mg tablet, Take 0 5 tablets (12 5 mg total) by mouth every 12 (twelve) hours, Disp: 90 tablet, Rfl: 0    mometasone (ELOCON) 0 1 % cream, Apply topically daily, Disp: , Rfl:     ONETOUCH VERIO test strip, 1 each by Other route daily, Disp: 100 each, Rfl: 1    Probiotic Product (PROBIOTIC-10) CAPS, Take 1 capsule by mouth daily, Disp: , Rfl:     simvastatin (ZOCOR) 40 mg tablet, TAKE 1 TABLET BY MOUTH AT BEDTIME, Disp: 90 tablet, Rfl: 1    zolpidem (AMBIEN) 10 mg tablet, Take one daily, Disp: 30 tablet, Rfl: 1    fenofibrate (TRICOR) 145 mg tablet, Take 1 tablet (145 mg total) by mouth daily, Disp: 90 tablet, Rfl: 1  No Known Allergies  Vitals:    01/22/20 1022   BP: 144/86   BP Location: Right arm   Patient Position: Sitting   Cuff Size: Standard   Pulse: 66   SpO2: 96%   Weight: 73 5 kg (162 lb)   Height: 5' 2" (1 575 m)       Labs:  Lab Results   Component Value Date    K 4 9 08/28/2019     08/28/2019    CO2 26 08/28/2019    BUN 20 08/28/2019    CREATININE 0 95 08/28/2019    CALCIUM 10 8 (H) 08/28/2019     Lab Results   Component Value Date    WBC 11 21 (H) 08/28/2019    HGB 14 1 08/28/2019    HCT 43 2 08/28/2019    MCV 93 08/28/2019     08/28/2019     Lab Results   Component Value Date    TRIG 471 (H) 01/14/2020    HDL 27 (L) 01/14/2020    LDLDIRECT 67 01/14/2020     Imaging:  ECG obtained today demonstrates sinus rhythm, left anterior fascicular block and poor R-wave progression  STRESS NUCLEAR (5/2019):  SUMMARY:  -  Stress results: There was no chest pain during stress  -  ECG conclusions: The stress ECG was negative for ischemia and normal   -  Perfusion imaging: There were no perfusion defects   -  Gated SPECT: The calculated left ventricular ejection fraction was 76 %  There was no left ventricular regional abnormality      IMPRESSIONS: Normal study after pharmacologic vasodilation  Myocardial perfusion imaging was normal at rest and with stress  ECHO (2/2019):  LEFT VENTRICLE:  Systolic function was normal  Ejection fraction was estimated to be 60 %  There were no regional wall motion abnormalities  Doppler parameters were consistent with abnormal left ventricular relaxation (grade 1 diastolic dysfunction)      TRICUSPID VALVE:  There was trace regurgitation      Review of Systems:  Review of Systems   Constitutional: Positive for fatigue  HENT: Negative  Eyes: Negative  Respiratory: Negative  Cardiovascular: Negative  Gastrointestinal: Negative  Musculoskeletal: Negative  Skin: Negative  Allergic/Immunologic: Negative  Neurological: Negative  Hematological: Negative  Psychiatric/Behavioral: Negative  All other systems reviewed and are negative      Vitals:    01/22/20 1022   BP: 144/86   Pulse: 66   SpO2: 96%     Physical Exam:  Physical Exam   Constitutional: She is oriented to person, place, and time  She appears well-developed and well-nourished  HENT:   Head: Normocephalic and atraumatic  Eyes: Pupils are equal, round, and reactive to light  EOM are normal  Right eye exhibits no discharge  Left eye exhibits no discharge  No scleral icterus  Neck: Normal range of motion  Neck supple  No JVD present  No thyromegaly present  Cardiovascular: Normal rate, regular rhythm, S1 normal, S2 normal, normal heart sounds, intact distal pulses and normal pulses  No extrasystoles are present  Exam reveals no gallop, no friction rub and no decreased pulses  No murmur heard  Pulmonary/Chest: Effort normal and breath sounds normal  No respiratory distress  She has no wheezes  She has no rales  Abdominal: Soft  Bowel sounds are normal  She exhibits no distension  There is no tenderness  Musculoskeletal: Normal range of motion  She exhibits no edema, tenderness or deformity  Neurological: She is alert and oriented to person, place, and time  No cranial nerve deficit  Skin: Skin is warm and dry  No rash noted  Psychiatric: She has a normal mood and affect  Judgment and thought content normal    Nursing note and vitals reviewed  Discussion/Summary:    1  Abnormal ECG - Nikki Rosales has a left anterior fascicular block and nonspecific changes otherwise, which is chronic  We went over her testing from last year which was unremarkable  She had a stress nuclear study and echocardiogram that were both essentially normal   No testing is needed at this time  She is asymptomatic from a cardiovascular standpoint  2   Hypertension - Her blood pressure is mildly elevated today  She is not checking this at home so she does not morocho out runs otherwise  I have asked her to start checking this at home if it remains elevated I would suggest adding lisinopril, and even backing off of beta-blocker    She will follow-up with her PCP regarding this     3    Hyperlipidemia - Her LDL is under control with simvastatin, but she does have high triglycerides  Normally they will sit in the 200s, but they did rise on this most little of recent check  I would suggest getting her diabetes under better control and dietary modifications before making any changes to her pharmacologic therapy  She is going to have this followed closely by her PCP and endocrine  Counseling / Coordination of Care  Total office time spent today 40 minutes  Greater than 50% of total time was spent with the patient and / or family counseling and / or coordination of care

## 2020-02-04 ENCOUNTER — OFFICE VISIT (OUTPATIENT)
Dept: INTERNAL MEDICINE CLINIC | Age: 70
End: 2020-02-04
Payer: MEDICARE

## 2020-02-04 VITALS
WEIGHT: 162 LBS | BODY MASS INDEX: 29.81 KG/M2 | HEART RATE: 66 BPM | TEMPERATURE: 97.4 F | HEIGHT: 62 IN | OXYGEN SATURATION: 96 % | DIASTOLIC BLOOD PRESSURE: 90 MMHG | SYSTOLIC BLOOD PRESSURE: 156 MMHG

## 2020-02-04 DIAGNOSIS — E03.9 ACQUIRED HYPOTHYROIDISM: Primary | ICD-10-CM

## 2020-02-04 DIAGNOSIS — E78.2 MIXED HYPERLIPIDEMIA: ICD-10-CM

## 2020-02-04 DIAGNOSIS — E04.1 NONTOXIC SINGLE THYROID NODULE: ICD-10-CM

## 2020-02-04 DIAGNOSIS — E11.8 TYPE 2 DIABETES MELLITUS WITH COMPLICATION, WITHOUT LONG-TERM CURRENT USE OF INSULIN (HCC): ICD-10-CM

## 2020-02-04 DIAGNOSIS — E22.2 SIADH (SYNDROME OF INAPPROPRIATE ADH PRODUCTION) (HCC): ICD-10-CM

## 2020-02-04 DIAGNOSIS — J45.909 MILD ASTHMA WITHOUT COMPLICATION, UNSPECIFIED WHETHER PERSISTENT: ICD-10-CM

## 2020-02-04 PROCEDURE — 2022F DILAT RTA XM EVC RTNOPTHY: CPT | Performed by: INTERNAL MEDICINE

## 2020-02-04 PROCEDURE — 1160F RVW MEDS BY RX/DR IN RCRD: CPT | Performed by: INTERNAL MEDICINE

## 2020-02-04 PROCEDURE — 1036F TOBACCO NON-USER: CPT | Performed by: INTERNAL MEDICINE

## 2020-02-04 PROCEDURE — 1123F ACP DISCUSS/DSCN MKR DOCD: CPT | Performed by: INTERNAL MEDICINE

## 2020-02-04 PROCEDURE — G0402 INITIAL PREVENTIVE EXAM: HCPCS | Performed by: INTERNAL MEDICINE

## 2020-02-04 PROCEDURE — 3080F DIAST BP >= 90 MM HG: CPT | Performed by: INTERNAL MEDICINE

## 2020-02-04 PROCEDURE — 4040F PNEUMOC VAC/ADMIN/RCVD: CPT | Performed by: INTERNAL MEDICINE

## 2020-02-04 PROCEDURE — 1125F AMNT PAIN NOTED PAIN PRSNT: CPT | Performed by: INTERNAL MEDICINE

## 2020-02-04 PROCEDURE — 99213 OFFICE O/P EST LOW 20 MIN: CPT | Performed by: INTERNAL MEDICINE

## 2020-02-04 PROCEDURE — 3008F BODY MASS INDEX DOCD: CPT | Performed by: INTERNAL MEDICINE

## 2020-02-04 PROCEDURE — 1170F FXNL STATUS ASSESSED: CPT | Performed by: INTERNAL MEDICINE

## 2020-02-04 PROCEDURE — 3046F HEMOGLOBIN A1C LEVEL >9.0%: CPT | Performed by: INTERNAL MEDICINE

## 2020-02-04 PROCEDURE — 3077F SYST BP >= 140 MM HG: CPT | Performed by: INTERNAL MEDICINE

## 2020-02-04 NOTE — PATIENT INSTRUCTIONS
1  Increase Lantus to 18 units  2  Highly recommend to restrict white starches and carbohydrates  3  Continue to follow up with Dr Nancy Salmeron for DM  4  Continue to monitor glucose levels and call the office every week with levels  5  Complete blood work prior to next visit  6  Continue minimizing processed foods and increase natural foods in diet  7  Minimize white starches, carbohydrates, and concentration sugars from diet  8  Eat smaller portions and decrease caloric intake  9  Try to exercise at least 150 min/week  10  Follow up in 2 months or as needed  11  Look for something positive in each day, even if some days you have to look a little harder  =)              Medicare Preventive Visit Patient Instructions  Thank you for completing your Welcome to Medicare Visit or Medicare Annual Wellness Visit today  Your next wellness visit will be due in one year (2/4/2021)  The screening/preventive services that you may require over the next 5-10 years are detailed below  Some tests may not apply to you based off risk factors and/or age  Screening tests ordered at today's visit but not completed yet may show as past due  Also, please note that scanned in results may not display below  Preventive Screenings:  Service Recommendations Previous Testing/Comments   Colorectal Cancer Screening  * Colonoscopy    * Fecal Occult Blood Test (FOBT)/Fecal Immunochemical Test (FIT)  * Fecal DNA/Cologuard Test  * Flexible Sigmoidoscopy Age: 54-65 years old   Colonoscopy: every 10 years (may be performed more frequently if at higher risk)  OR  FOBT/FIT: every 1 year  OR  Cologuard: every 3 years  OR  Sigmoidoscopy: every 5 years  Screening may be recommended earlier than age 48 if at higher risk for colorectal cancer  Also, an individualized decision between you and your healthcare provider will decide whether screening between the ages of 74-80 would be appropriate   Colonoscopy: 02/27/2018  FOBT/FIT: Not on file  Cologuard: Not on file  Sigmoidoscopy: Not on file    Screening Current     Breast Cancer Screening Age: 36 years old  Frequency: every 1-2 years  Not required if history of left and right mastectomy Mammogram: 04/02/2019    Screening Current   Cervical Cancer Screening Between the ages of 21-29, pap smear recommended once every 3 years  Between the ages of 33-67, can perform pap smear with HPV co-testing every 5 years  Recommendations may differ for women with a history of total hysterectomy, cervical cancer, or abnormal pap smears in past  Pap Smear: Not on file    Screening Not Indicated   Hepatitis C Screening Once for adults born between 1945 and 1965  More frequently in patients at high risk for Hepatitis C Hep C Antibody: 02/22/2018    Screening Current   Diabetes Screening 1-2 times per year if you're at risk for diabetes or have pre-diabetes Fasting glucose: 233 mg/dL   A1C: 9 6 %    Screening Not Indicated  History Diabetes   Cholesterol Screening Once every 5 years if you don't have a lipid disorder  May order more often based on risk factors  Lipid panel: 01/14/2020    Screening Not Indicated  History Lipid Disorder     Other Preventive Screenings Covered by Medicare:  1  Abdominal Aortic Aneurysm (AAA) Screening: covered once if your at risk  You're considered to be at risk if you have a family history of AAA  2  Lung Cancer Screening: covers low dose CT scan once per year if you meet all of the following conditions: (1) Age 50-69; (2) No signs or symptoms of lung cancer; (3) Current smoker or have quit smoking within the last 15 years; (4) You have a tobacco smoking history of at least 30 pack years (packs per day multiplied by number of years you smoked); (5) You get a written order from a healthcare provider    3  Glaucoma Screening: covered annually if you're considered high risk: (1) You have diabetes OR (2) Family history of glaucoma OR (3)  aged 48 and older OR (4)  American aged 72 and older  3  Osteoporosis Screening: covered every 2 years if you meet one of the following conditions: (1) You're estrogen deficient and at risk for osteoporosis based off medical history and other findings; (2) Have a vertebral abnormality; (3) On glucocorticoid therapy for more than 3 months; (4) Have primary hyperparathyroidism; (5) On osteoporosis medications and need to assess response to drug therapy  · Last bone density test (DXA Scan): Not on file  5  HIV Screening: covered annually if you're between the age of 12-76  Also covered annually if you are younger than 13 and older than 72 with risk factors for HIV infection  For pregnant patients, it is covered up to 3 times per pregnancy  Immunizations:  Immunization Recommendations   Influenza Vaccine Annual influenza vaccination during flu season is recommended for all persons aged >= 6 months who do not have contraindications   Pneumococcal Vaccine (Prevnar and Pneumovax)  * Prevnar = PCV13  * Pneumovax = PPSV23   Adults 25-60 years old: 1-3 doses may be recommended based on certain risk factors  Adults 72 years old: Prevnar (PCV13) vaccine recommended followed by Pneumovax (PPSV23) vaccine  If already received PPSV23 since turning 65, then PCV13 recommended at least one year after PPSV23 dose  Hepatitis B Vaccine 3 dose series if at intermediate or high risk (ex: diabetes, end stage renal disease, liver disease)   Tetanus (Td) Vaccine - COST NOT COVERED BY MEDICARE PART B Following completion of primary series, a booster dose should be given every 10 years to maintain immunity against tetanus  Td may also be given as tetanus wound prophylaxis  Tdap Vaccine - COST NOT COVERED BY MEDICARE PART B Recommended at least once for all adults  For pregnant patients, recommended with each pregnancy     Shingles Vaccine (Shingrix) - COST NOT COVERED BY MEDICARE PART B  2 shot series recommended in those aged 48 and above     Health Maintenance Due:      Topic Date Due    MAMMOGRAM  04/02/2020    CRC Screening: Colonoscopy  02/27/2028    Hepatitis C Screening  Completed     Immunizations Due:      Topic Date Due    Hepatitis B Vaccine (1 of 3 - Risk 3-dose series) 07/30/1969     Advance Directives   What are advance directives? Advance directives are legal documents that state your wishes and plans for medical care  These plans are made ahead of time in case you lose your ability to make decisions for yourself  Advance directives can apply to any medical decision, such as the treatments you want, and if you want to donate organs  What are the types of advance directives? There are many types of advance directives, and each state has rules about how to use them  You may choose a combination of any of the following:  · Living will: This is a written record of the treatment you want  You can also choose which treatments you do not want, which to limit, and which to stop at a certain time  This includes surgery, medicine, IV fluid, and tube feedings  · Durable power of  for healthcare [facility]): This is a written record that states who you want to make healthcare choices for you when you are unable to make them for yourself  This person, called a proxy, is usually a family member or a friend  You may choose more than 1 proxy  · Do not resuscitate (DNR) order:  A DNR order is used in case your heart stops beating or you stop breathing  It is a request not to have certain forms of treatment, such as CPR  A DNR order may be included in other types of advance directives  · Medical directive: This covers the care that you want if you are in a coma, near death, or unable to make decisions for yourself  You can list the treatments you want for each condition  Treatment may include pain medicine, surgery, blood transfusions, dialysis, IV or tube feedings, and a ventilator (breathing machine)  · Values history:   This document has questions about your views, beliefs, and how you feel and think about life  This information can help others choose the care that you would choose  Why are advance directives important? An advance directive helps you control your care  Although spoken wishes may be used, it is better to have your wishes written down  Spoken wishes can be misunderstood, or not followed  Treatments may be given even if you do not want them  An advance directive may make it easier for your family to make difficult choices about your care  Urinary Incontinence   Urinary incontinence (UI)  is when you lose control of your bladder  UI develops because your bladder cannot store or empty urine properly  The 3 most common types of UI are stress incontinence, urge incontinence, or both  Medicines:   · May be given to help strengthen your bladder control  Report any side effects of medication to your healthcare provider  Do pelvic muscle exercises often:  Your pelvic muscles help you stop urinating  Squeeze these muscles tight for 5 seconds, then relax for 5 seconds  Gradually work up to squeezing for 10 seconds  Do 3 sets of 15 repetitions a day, or as directed  This will help strengthen your pelvic muscles and improve bladder control  Train your bladder:  Go to the bathroom at set times, such as every 2 hours, even if you do not feel the urge to go  You can also try to hold your urine when you feel the urge to go  For example, hold your urine for 5 minutes when you feel the urge to go  As that becomes easier, hold your urine for 10 minutes  Self-care:   · Keep a UI record  Write down how often you leak urine and how much you leak  Make a note of what you were doing when you leaked urine  · Drink liquids as directed  You may need to limit the amount of liquid you drink to help control your urine leakage  Do not drink any liquid right before you go to bed  Limit or do not have drinks that contain caffeine or alcohol     · Prevent constipation  Eat a variety of high-fiber foods  Good examples are high-fiber cereals, beans, vegetables, and whole-grain breads  Walking is the best way to trigger your intestines to have a bowel movement  · Exercise regularly and maintain a healthy weight  Weight loss and exercise will decrease pressure on your bladder and help you control your leakage  · Use a catheter as directed  to help empty your bladder  A catheter is a tiny, plastic tube that is put into your bladder to drain your urine  · Go to behavior therapy as directed  Behavior therapy may be used to help you learn to control your urge to urinate  Weight Management   Why it is important to manage your weight:  Being overweight increases your risk of health conditions such as heart disease, high blood pressure, type 2 diabetes, and certain types of cancer  It can also increase your risk for osteoarthritis, sleep apnea, and other respiratory problems  Aim for a slow, steady weight loss  Even a small amount of weight loss can lower your risk of health problems  How to lose weight safely:  A safe and healthy way to lose weight is to eat fewer calories and get regular exercise  You can lose up about 1 pound a week by decreasing the number of calories you eat by 500 calories each day  Healthy meal plan for weight management:  A healthy meal plan includes a variety of foods, contains fewer calories, and helps you stay healthy  A healthy meal plan includes the following:  · Eat whole-grain foods more often  A healthy meal plan should contain fiber  Fiber is the part of grains, fruits, and vegetables that is not broken down by your body  Whole-grain foods are healthy and provide extra fiber in your diet  Some examples of whole-grain foods are whole-wheat breads and pastas, oatmeal, brown rice, and bulgur  · Eat a variety of vegetables every day  Include dark, leafy greens such as spinach, kale, gal greens, and mustard greens   Eat yellow and orange vegetables such as carrots, sweet potatoes, and winter squash  · Eat a variety of fruits every day  Choose fresh or canned fruit (canned in its own juice or light syrup) instead of juice  Fruit juice has very little or no fiber  · Eat low-fat dairy foods  Drink fat-free (skim) milk or 1% milk  Eat fat-free yogurt and low-fat cottage cheese  Try low-fat cheeses such as mozzarella and other reduced-fat cheeses  · Choose meat and other protein foods that are low in fat  Choose beans or other legumes such as split peas or lentils  Choose fish, skinless poultry (chicken or turkey), or lean cuts of red meat (beef or pork)  Before you cook meat or poultry, cut off any visible fat  · Use less fat and oil  Try baking foods instead of frying them  Add less fat, such as margarine, sour cream, regular salad dressing and mayonnaise to foods  Eat fewer high-fat foods  Some examples of high-fat foods include french fries, doughnuts, ice cream, and cakes  · Eat fewer sweets  Limit foods and drinks that are high in sugar  This includes candy, cookies, regular soda, and sweetened drinks  Exercise:  Exercise at least 30 minutes per day on most days of the week  Some examples of exercise include walking, biking, dancing, and swimming  You can also fit in more physical activity by taking the stairs instead of the elevator or parking farther away from stores  Ask your healthcare provider about the best exercise plan for you  © Copyright [employer] 2018 Information is for End User's use only and may not be sold, redistributed or otherwise used for commercial purposes   All illustrations and images included in CareNotes® are the copyrighted property of [employer]  or [address] [place] [address]

## 2020-02-04 NOTE — PROGRESS NOTES
Chief Complaint   Patient presents with   [facility] Wellness Visit     Pt is here today for AWV, and 3 week follow-up  Assessment/Plan:    Benign murmur  Pt has a mild murmur  She visited cardiologist, Dr Katja Whitney, and was told everything is fine  I will continue to monitor this for now  DM  Pt states he glucose levels are still elevated  She reports the levels being in the 200s in the morning and 300s later in the day  She states she has an appt with endocrinologist, Dr Erica Soares end of February  Lab results from 1/14/20 revealed hemoglobin A1C of 9 6  She was advised to increase her Lantus to 18 units and stay on metformin  She will need to monitor her glucose levels and call the office every week  She was also to continue on diabetic diet  I offered her referral to dietician; however pt refused  Essential HTN - Pts BP in the office today was 156/90 while sitting  She was advised to continue on her medications as prescribed  She should also limit her caffeine and salt intake  Thyroid She is currently taking levothyroxine 50 mcg at this time  Pt TSH lab results from 1/14/20  were normal with TSH level of 2 670  She should continue on her medication regimen at this time,      HLD Lipid panel from 1/14/20 revealed elevated triglycerides level of 471 and decreased level of HDL of 27  Her ALT was normal at 42  Pt is currently taking Zocor 40 mg  She will need to complete follow up lipid panel  She was highly advised to limit her white starches and carbohydrates and continue her medication  Pt refused dietician referral     BMI  BMI in the office today was 29 77  She was advised the following: Continue minimizing processed foods and increase natural foods in diet  Minimize white starches, carbohydrates, and concentration sugars from diet  Eat smaller portions and decrease caloric intake  Try to exercise at least 150 min/week  Health Maintenance  Pt is due for her Td shot  AWV done today  Follow up in 2 months or as needed  BMI Counseling: Body mass index is 29 77 kg/m²  The BMI is above normal  Nutrition recommendations include decreasing portion sizes, encouraging healthy choices of fruits and vegetables, decreasing fast food intake, consuming healthier snacks, limiting drinks that contain sugar, moderation in carbohydrate intake and reducing intake of cholesterol  Exercise recommendations include moderate physical activity 150 minutes/week  No pharmacotherapy was ordered  I have spent 30 minutes with Patient  today in which greater than 50% of this time was spent in counseling/coordination of care regarding Intructions for management, Patient and family education and Importance of tx compliance  Diagnoses and all orders for this visit:    Acquired hypothyroidism    Type 2 diabetes mellitus with complication, without long-term current use of insulin (HCC)    SIADH (syndrome of inappropriate ADH production) (HCC)    Nontoxic single thyroid nodule    Mild asthma without complication, unspecified whether persistent    Mixed hyperlipidemia  -     Lipid Panel with Direct LDL reflex; Future    BMI 29 0-29 9,adult        Subjective:      Patient ID: Twan Dong is a 78 y o  female  This is the case of a 78year old female with a PMHx of HLD, DM Type II, and hypothyroidism,  presenting in the office today for medical annual wellness visit and 3 week follow up for DM  Pt states he glucose levels are still elevated  She reports the levels being in the 200s in the morning and 300s later in the day  She states she has an appt with endocrinologist, Dr Timothy Stallworth end of February  Lab results from 1/14/20  Pt has a mild murmur  She visited cardiologist, Dr Nadia Castañeda, and was told everything is fine  revealed hemoglobin A1C of 9 6  Positive for anxiety  Lab results were reviewed and dicussed with patient           The following portions of the patient's history were reviewed and updated as appropriate: allergies, current medications, past family history, past medical history, past social history, past surgical history and problem list     Review of Systems   Constitutional: Positive for fatigue  Negative for chills, diaphoresis and fever  HENT: Positive for postnasal drip, rhinorrhea, sinus pressure and sinus pain  Negative for congestion, drooling, ear discharge, ear pain, mouth sores, nosebleeds, sneezing and sore throat  Eyes: Negative for pain, discharge, redness and itching  Respiratory: Positive for cough  Negative for apnea, choking, chest tightness, shortness of breath and stridor  Cardiovascular: Positive for leg swelling  Negative for chest pain and palpitations  Gastrointestinal: Negative for abdominal distention, abdominal pain, blood in stool, constipation, diarrhea, nausea, rectal pain and vomiting  Genitourinary: Positive for urgency  Negative for difficulty urinating, dysuria and frequency  Musculoskeletal: Positive for arthralgias (knee pain), back pain and myalgias  Negative for neck pain  Skin: Negative  Neurological: Negative for dizziness, seizures, syncope, speech difficulty, weakness, light-headedness, numbness and headaches  Hematological: Negative for adenopathy  Does not bruise/bleed easily  Psychiatric/Behavioral: Positive for dysphoric mood  Negative for agitation, behavioral problems, decreased concentration, hallucinations, self-injury and suicidal ideas  The patient is nervous/anxious            No Known Allergies     Past Medical History:   Diagnosis Date    Anxiety     Arthritis     Bladder carcinoma (HCC)     Bladder mass     Depression     Diabetes mellitus (HCC)     Disease of thyroid gland     thyroid nodules-not treating at this time    Dysuria     Last assessed 17    GERD (gastroesophageal reflux disease)      2 para 2     HLD (hyperlipidemia)     HTN (hypertension)     Hypercholesterolemia     Hypertension     Knee pain     Mild aortic regurgitation with left ventricular dilation by prior echocardiogram     Papanicolaou smear 04/17/2017    NEG    PONV (postoperative nausea and vomiting)      Current Outpatient Medications on File Prior to Visit   Medication Sig Dispense Refill    amLODIPine (NORVASC) 5 mg tablet Take 1 tablet (5 mg total) by mouth daily 30 tablet 3    clonazePAM (KlonoPIN) 0 5 mg tablet Take 1 tablet (0 5 mg total) by mouth daily at bedtime 30 tablet 0    Cyanocobalamin 1000 MCG CAPS Take 1 capsule by mouth daily        estradiol (ESTRACE) 0 1 mg/g vaginal cream Insert 1 g into the vagina once a week Apply small amount on finger tip to vulva once a week   30 g 3    famotidine (PEPCID) 20 mg tablet Take 1 tablet (20 mg total) by mouth daily (Patient taking differently: Take 40 mg by mouth daily ) 90 tablet 0    fenofibrate (TRICOR) 145 mg tablet Take 1 tablet (145 mg total) by mouth daily 90 tablet 1    fluticasone (FLONASE) 50 mcg/act nasal spray 1 spray into each nostril daily 1 Bottle 0    ibuprofen (MOTRIN) 200 mg tablet Take 200 mg by mouth every 6 (six) hours as needed for mild pain      insulin glargine (LANTUS SOLOSTAR) 100 units/mL injection pen Inject 10 Units under the skin daily 5 pen 1    Insulin Pen Needle (BD PEN NEEDLE OREN U/F) 32G X 4 MM MISC Once daily 100 each 1    levothyroxine 50 mcg tablet Take 1 tablet (50 mcg total) by mouth daily 90 tablet 1    metFORMIN (GLUCOPHAGE) 500 mg tablet Take 2 tablets (1,000 mg total) by mouth 2 (two) times a day with meals 360 tablet 1    metoprolol tartrate (LOPRESSOR) 25 mg tablet Take 0 5 tablets (12 5 mg total) by mouth every 12 (twelve) hours 90 tablet 0    mometasone (ELOCON) 0 1 % cream Apply topically daily      ONETOUCH VERIO test strip 1 each by Other route daily 100 each 1    Probiotic Product (PROBIOTIC-10) CAPS Take 1 capsule by mouth daily      simvastatin (ZOCOR) 40 mg tablet TAKE 1 TABLET BY MOUTH AT BEDTIME 90 tablet 1    zolpidem (AMBIEN) 10 mg tablet Take one daily 30 tablet 1     No current facility-administered medications on file prior to visit  Past Surgical History:   Procedure Laterality Date    CARPAL TUNNEL RELEASE Right      SECTION      x2    CHOLECYSTECTOMY      CYSTOSCOPY N/A 2016    Procedure: CYSTOSCOPY WITH BIOPSIES;  Surgeon: Jennifer Berger MD;  [facility];  Service:    Ashley Batter N/A 2016    Procedure: CYSTOSCOPY;  Surgeon: Jennifer Berger MD;  [facility];  Service:     CYSTOSCOPY      HERNIA REPAIR      CA CYSTO/URETERO W/LITHOTRIPSY &INDWELL STENT INSRT  2016    Procedure: CYSTOSCOPY URETEROSCOPY WITH LITHOTRIPSY HOLMIUM LASER RIGHT, RETROGRADE PYELOGRAM BILATERAL: AND INSERTION STENT URETERAL Right ;  Surgeon: Jennifer Berger MD;  [facility];  Service: Urology    CA CYSTO/URETERO/PYELOSCOPY, DX Right 2017    Procedure: URETEROSCOPY;  Surgeon: Jennifer Berger MD;  [facility];  Service: Urology    CA CYSTOSCOPY,INSERT URETERAL STENT Left 2016    Procedure: URETERAL STENTS;  Surgeon: Jennifer Berger MD;  [facility];  Service: Urology    CA CYSTOURETHROSCOPY,FULGUR <0 5 CM LESN N/A 2016    Procedure: TRANSURETHRAL RESECTION OF BLADDER TUMOR (TURBT);   Surgeon: Jennifer Berger MD;  [facility];  Service: Urology    CA CYSTOURETHROSCOPY,FULGUR <0 5 CM LESN N/A 2016    Procedure: Toya Fore; TUR BLADDER TUMOR ;  Surgeon: Jennifer Berger MD;  [facility];  Service: Urology    CA CYSTOURETHROSCOPY,FULGUR <0 5 CM LESN N/A 2016    Procedure: TUR BLADDER TUMOR ;  Surgeon: Jennifer Berger MD;  [facility];  Service: Urology    CA CYSTOURETHROSCOPY,FULGUR <0 5 CM LESN Bilateral 2017    Procedure: Vaughn Osorio, ;  Surgeon: Jennifer Berger MD;  [facility];  Service: Urology    CA CYSTOURETHROSCOPY,URETER CATHETER Bilateral 2016 Procedure: RETROGRADE PYELOGRAM ;  Surgeon: Glenn Adkins MD;  [facility];  Service: Urology    IN CYSTOURETHROSCOPY,URETER CATHETER Bilateral 2017    Procedure: RETROGRADE PYELOGRAM WITH STENT EXCHANGE, RIGHT;  Surgeon: Glenn Adkins MD;  [facility];  Service: Urology    IN INSTILL ANTICANCER AGENT IN BLADDER N/A 2016    Procedure: Duarte Lema;  Surgeon: Glenn Adkins MD;  [facility];  Service: Urology    IN KNEE SCOPE,MED/LAT MENISECTOMY Left 2016    Procedure: KNEE ARTHROSCOPIC PARTIAL MEDIAL MENISCECTOMY ;  Surgeon: Mandi Belle MD;  [facility];  Service: Orthopedics    REMOVAL URETERAL STENT Left 2016    Procedure: REMOVAL STENT URETERAL;  Surgeon: Glenn Adkins MD;  [facility];  Service:    [facility] TONSILLECTOMY       Social History     Socioeconomic History    Marital status: /Civil Union     Spouse name: Not on file    Number of children: 2    Years of education: Not on file    Highest education level: Not on file   Occupational History    Occupation: RETIRED   Social Needs    Financial resource strain: Not on file    Food insecurity:     Worry: Not on file     Inability: Not on file   [employer] needs:     Medical: Not on file     Non-medical: Not on file   Tobacco Use    Smoking status: Former Smoker     Packs/day: 1 00     Years: 20 00     Pack years: 20 00     Types: Cigarettes     Last attempt to quit:      Years since quittin 1    Smokeless tobacco: Never Used   Substance and Sexual Activity    Alcohol use: No    Drug use: No    Sexual activity: Not Currently     Partners: Male     Birth control/protection: Post-menopausal   Lifestyle    Physical activity:     Days per week: Not on file     Minutes per session: Not on file    Stress: Not on file   Relationships    Social connections:     Talks on phone: Not on file     Gets together: Not on file     Attends [employer] service: Not on file     Active member of club or organization: Not on file     Attends meetings of clubs or organizations: Not on file     Relationship status: Not on file    Intimate partner violence:     Fear of current or ex partner: Not on file     Emotionally abused: Not on file     Physically abused: Not on file     Forced sexual activity: Not on file   Other Topics Concern    Not on file   Social History Narrative    Not on file       Objective:      /90 (BP Location: Left arm, Patient Position: Sitting, Cuff Size: Adult)   Pulse 66   Temp (!) 97 4 °F (36 3 °C) (Tympanic)   Ht 5' 1 85" (1 571 m)   Wt 73 5 kg (162 lb)   SpO2 96%   BMI 29 77 kg/m²          Physical Exam   Constitutional: She is oriented to person, place, and time  She appears well-developed and well-nourished  No distress  HENT:   Head: Atraumatic  Mouth/Throat: No oropharyngeal exudate  Eyes: EOM are normal  Right eye exhibits no discharge  Left eye exhibits no discharge  No scleral icterus  Neck: Neck supple  No tracheal deviation present  No thyromegaly present  Cardiovascular: Normal rate and regular rhythm  Murmur (soft) heard  Pulmonary/Chest: Effort normal and breath sounds normal  No respiratory distress  She has no wheezes  She has no rales  Abdominal: Soft  Bowel sounds are normal  She exhibits no distension and no mass  There is no tenderness  There is rebound  Musculoskeletal: Normal range of motion  She exhibits no edema, tenderness or deformity  Neurological: She is alert and oriented to person, place, and time  She displays normal reflexes  No cranial nerve deficit  She exhibits normal muscle tone  Skin: Skin is warm and dry  She is not diaphoretic  Psychiatric: She has a normal mood and affect  Her behavior is normal  Judgment and thought content normal    Nursing note and vitals reviewed          Attestation:   By signing my name below, Roxy Mcdonough, attest that this documentation has been prepared under the direction and in the presence of Dwight Galindo MD  Electronically Signed: Giulia Parson  2/4/20      I, Diwght Galindo, personally performed the services described in this documentation  All medical record entries made by the scribe were at my direction and in my presence  I have reviewed the chart and discharge instructions and agree that the record reflects my personal performance and is accurate and complete   Dwight Galindo MD  2/4/20

## 2020-02-04 NOTE — PROGRESS NOTES
Assessment and Plan:     Problem List Items Addressed This Visit     None           Preventive health issues were discussed with patient, and age appropriate screening tests were ordered as noted in patient's After Visit Summary  Personalized health advice and appropriate referrals for health education or preventive services given if needed, as noted in patient's After Visit Summary       History of Present Illness:     Patient presents for Medicare Annual Wellness visit    Patient Care Team:  Mai Rosen MD as PCP - MD Arely Sapp MD Jenna Fiscal, MD Lennice Ready, MD     Problem List:     Patient Active Problem List   Diagnosis    Tear of medial meniscus of left knee    Abnormal EKG    Acquired hypothyroidism    Acute pain of left knee    Acute pain of right shoulder    Anxiety    Asthma    Neoplasm of bladder    Abdominal pain    Bursitis of shoulder    Chronic cough    Dental abscess    Hyponatremia    Fibromyositis    Ganglion and cyst of synovium, tendon and bursa    Mixed hyperlipidemia    Hypertension    Malignant neoplasm of lateral wall of urinary bladder ([facility] [address] )    Type 2 diabetes mellitus with complication, without long-term current use of insulin (HCC)    Nontoxic single thyroid nodule    Obesity    Osteoporosis    Primary localized osteoarthritis of left knee    Other fatigue    Acute non-recurrent frontal sinusitis    Seizures ([facility] [address] )    Lichenoid dermatitis    SIADH (syndrome of inappropriate ADH production) ([facility] [address] )      Past Medical and Surgical History:     Past Medical History:   Diagnosis Date    Anxiety     Arthritis     Bladder carcinoma ([facility] [address] [facility]     Bladder mass     Depression     Diabetes mellitus ([facility] [address] [facility]     Disease of thyroid gland     thyroid nodules-not treating at this time    Dysuria     Last assessed 17    GERD (gastroesophageal reflux disease)      2 para 2     HLD (hyperlipidemia)     HTN (hypertension)  Hypercholesterolemia     Hypertension     Knee pain     Mild aortic regurgitation with left ventricular dilation by prior echocardiogram     Papanicolaou smear 2017    NEG    PONV (postoperative nausea and vomiting)      Past Surgical History:   Procedure Laterality Date    CARPAL TUNNEL RELEASE Right      SECTION      x2    CHOLECYSTECTOMY      CYSTOSCOPY N/A 2016    Procedure: CYSTOSCOPY WITH BIOPSIES;  Surgeon: Zoey Mcbride MD;  [facility];  Service:    Sean Rizwann N/A 2016    Procedure: CYSTOSCOPY;  Surgeon: Zoey Mcbride MD;  [facility];  Service:     CYSTOSCOPY      HERNIA REPAIR      KS CYSTO/URETERO W/LITHOTRIPSY &INDWELL STENT INSRT  2016    Procedure: CYSTOSCOPY URETEROSCOPY WITH LITHOTRIPSY HOLMIUM LASER RIGHT, RETROGRADE PYELOGRAM BILATERAL: AND INSERTION STENT URETERAL Right ;  Surgeon: Zoey Mcbride MD;  [facility];  Service: Urology    KS CYSTO/URETERO/PYELOSCOPY, DX Right 2017    Procedure: URETEROSCOPY;  Surgeon: Zoey Mcbride MD;  [facility];  Service: Urology    KS CYSTOSCOPY,INSERT URETERAL STENT Left 2016    Procedure: URETERAL STENTS;  Surgeon: Zoey Mcbride MD;  [facility];  Service: Urology    KS CYSTOURETHROSCOPY,FULGUR <0 5 CM LESN N/A 2016    Procedure: TRANSURETHRAL RESECTION OF BLADDER TUMOR (TURBT);   Surgeon: Zoey Mcbride MD;  [facility];  Service: Urology    KS CYSTOURETHROSCOPY,FULGUR <0 5 CM LESN N/A 2016    Procedure: Ania Aldana; TUR BLADDER TUMOR ;  Surgeon: Zoey Mcbride MD;  [facility];  Service: Urology    KS CYSTOURETHROSCOPY,FULGUR <0 5 CM LESN N/A 2016    Procedure: TUR BLADDER TUMOR ;  Surgeon: Zoey Mcbride MD;  [facility];  Service: Urology    KS CYSTOURETHROSCOPY,FULGUR <0 5 CM LESN Bilateral 2017    Procedure: Amy Nj, ;  Surgeon: Zoey Mcbride MD;  [facility];  Service: Urology    RI CYSTOURETHROSCOPY,URETER CATHETER Bilateral 2016    Procedure: RETROGRADE PYELOGRAM ;  Surgeon: Bhupendra Grey MD;  [facility];  Service: Urology    RI CYSTOURETHROSCOPY,URETER CATHETER Bilateral 2017    Procedure: RETROGRADE PYELOGRAM WITH STENT EXCHANGE, RIGHT;  Surgeon: Bhupendra Grey MD;  [facility];  Service: Urology    RI INSTILL ANTICANCER AGENT IN BLADDER N/A 2016    Procedure: Jamse Manner;  Surgeon: Bhupendra Grey MD;  [facility];  Service: Urology    RI KNEE SCOPE,MED/LAT MENISECTOMY Left 2016    Procedure: KNEE ARTHROSCOPIC PARTIAL MEDIAL MENISCECTOMY ;  Surgeon: Liza Eng MD;  [facility];  Service: Orthopedics    REMOVAL URETERAL STENT Left 2016    Procedure: REMOVAL STENT URETERAL;  Surgeon: Bhupendra Grey MD;  [facility];  Service:    Cedric Surendra TONSILLECTOMY        Family History:     Family History   Problem Relation Age of Onset    Heart attack Mother     Heart disease Mother     ALS Father     Diabetes Maternal Grandfather     Lung cancer Paternal Grandfather     Heart disease Maternal Grandmother     Breast cancer Neg Hx       Social History:     Social History     Socioeconomic History    Marital status: /Civil Union     Spouse name: None    Number of children: 2    Years of education: None    Highest education level: None   Occupational History    Occupation: RETIRED   Social Needs    Financial resource strain: None    Food insecurity:     Worry: None     Inability: None    Transportation needs:     Medical: None     Non-medical: None   Tobacco Use    Smoking status: Former Smoker     Packs/day: 1 00     Years: 20 00     Pack years: 20 00     Types: Cigarettes     Last attempt to quit: [address] [facility]     Years since quittin 1    Smokeless tobacco: Never Used   Substance and Sexual Activity    Alcohol use: No    Drug use: No    Sexual activity: Not Currently     Partners: Male     Birth control/protection: Post-menopausal   Lifestyle    Physical activity:     Days per week: None     Minutes per session: None    Stress: None   Relationships    Social connections:     Talks on phone: None     Gets together: None     Attends [employer] service: None     Active member of club or organization: None     Attends meetings of clubs or organizations: None     Relationship status: None    Intimate partner violence:     Fear of current or ex partner: None     Emotionally abused: None     Physically abused: None     Forced sexual activity: None   Other Topics Concern    None   Social History Narrative    None       Medications and Allergies:     Current Outpatient Medications   Medication Sig Dispense Refill    amLODIPine (NORVASC) 5 mg tablet Take 1 tablet (5 mg total) by mouth daily 30 tablet 3    clonazePAM (KlonoPIN) 0 5 mg tablet Take 1 tablet (0 5 mg total) by mouth daily at bedtime 30 tablet 0    Cyanocobalamin 1000 MCG CAPS Take 1 capsule by mouth daily        estradiol (ESTRACE) 0 1 mg/g vaginal cream Insert 1 g into the vagina once a week Apply small amount on finger tip to vulva once a week   30 g 3    famotidine (PEPCID) 20 mg tablet Take 1 tablet (20 mg total) by mouth daily (Patient taking differently: Take 40 mg by mouth daily ) 90 tablet 0    fenofibrate (TRICOR) 145 mg tablet Take 1 tablet (145 mg total) by mouth daily 90 tablet 1    fluticasone (FLONASE) 50 mcg/act nasal spray 1 spray into each nostril daily 1 Bottle 0    ibuprofen (MOTRIN) 200 mg tablet Take 200 mg by mouth every 6 (six) hours as needed for mild pain      insulin glargine (LANTUS SOLOSTAR) 100 units/mL injection pen Inject 10 Units under the skin daily 5 pen 1    Insulin Pen Needle (BD PEN NEEDLE OREN U/F) 32G X 4 MM MISC Once daily 100 each 1    levothyroxine 50 mcg tablet Take 1 tablet (50 mcg total) by mouth daily 90 tablet 1    metFORMIN (GLUCOPHAGE) 500 mg tablet Take 2 tablets (1,000 mg total) by mouth 2 (two) times a day with meals 360 tablet 1    metoprolol tartrate (LOPRESSOR) 25 mg tablet Take 0 5 tablets (12 5 mg total) by mouth every 12 (twelve) hours 90 tablet 0    mometasone (ELOCON) 0 1 % cream Apply topically daily      ONETOUCH VERIO test strip 1 each by Other route daily 100 each 1    Probiotic Product (PROBIOTIC-10) CAPS Take 1 capsule by mouth daily      simvastatin (ZOCOR) 40 mg tablet TAKE 1 TABLET BY MOUTH AT BEDTIME 90 tablet 1    zolpidem (AMBIEN) 10 mg tablet Take one daily 30 tablet 1     No current facility-administered medications for this visit  No Known Allergies   Immunizations:     Immunization History   Administered Date(s) Administered    INFLUENZA 01/10/2018    Influenza Split High Dose Preservative Free IM 01/10/2018, 10/29/2019    Influenza TIV (IM) 12/13/2010, 12/13/2011, 12/09/2013, 11/10/2019    Pneumococcal Conjugate 13-Valent 01/14/2020    Pneumococcal Polysaccharide PPV23 11/17/2015    Td (adult), adsorbed 12/13/2010    Zoster 08/01/2013      Health Maintenance:         Topic Date Due    MAMMOGRAM  04/02/2020    CRC Screening: Colonoscopy  02/27/2028    Hepatitis C Screening  Completed         Topic Date Due    Hepatitis B Vaccine (1 of 3 - Risk 3-dose series) 07/30/1969      Medicare Health Risk Assessment:     There were no vitals taken for this visit  Health Risk Assessment:   Patient rates overall health as good  Patient feels that their physical health rating is same  Eyesight was rated as same  Hearing was rated as same  Patient feels that their emotional and mental health rating is same  Pain experienced in the last 7 days has been some  Patient's pain rating has been 5/10  Patient states that she has experienced weight loss or gain in last 6 months  Depression Screening:   PHQ-2 Score: 0  PHQ-9 Score: 6      Fall Risk Screening:    In the past year, patient has experienced: no history of falling in past year      Urinary Incontinence Screening:   Patient has leaked urine accidently in the last six months  Home Safety:  Patient has trouble with stairs inside or outside of their home  Patient has working smoke alarms and has no working carbon monoxide detector  Home safety hazards include: none  Nutrition:   Current diet is No Added Salt, Regular and Frequent junk food  Pt  At this point refused to go to dietician    Medications:   Patient is currently taking over-the-counter supplements  OTC medications include: see medication list  Patient is able to manage medications  Activities of Daily Living (ADLs)/Instrumental Activities of Daily Living (IADLs):   Walk and transfer into and out of bed and chair?: Yes  Dress and groom yourself?: Yes    Bathe or shower yourself?: Yes    Feed yourself?  Yes  Do your laundry/housekeeping?: Yes  Manage your money, pay your bills and track your expenses?: Yes  Make your own meals?: Yes    Do your own shopping?: Yes    Previous Hospitalizations:   Any hospitalizations or ED visits within the last 12 months?: No      Advance Care Planning:   Living will: No    Durable POA for healthcare: No    Advanced directive: No    End of Life Decisions reviewed with patient: Yes    Provider agrees with end of life decisions: Yes      PREVENTIVE SCREENINGS      Cardiovascular Screening:    General: History Lipid Disorder and Risks and Benefits Discussed    Due for: Lipid Panel      Diabetes Screening:     General: Screening Not Indicated and History Diabetes      Colorectal Cancer Screening:     General: Screening Current      Breast Cancer Screening:     General: Screening Current      Cervical Cancer Screening:    General: Screening Not Indicated      Osteoporosis Screening:    General: Screening Not Indicated and History Osteoporosis      Abdominal Aortic Aneurysm (AAA) Screening:        General: Screening Not Indicated      Lung Cancer Screening:     General: Screening Not Indicated      Hepatitis C Screening:    General: Screening Current    Other Counseling Topics:   Car/seat belt/driving safety and skin self-exam        Deandra Marti MD

## 2020-02-12 DIAGNOSIS — F51.01 PRIMARY INSOMNIA: ICD-10-CM

## 2020-02-12 RX ORDER — ZOLPIDEM TARTRATE 10 MG/1
TABLET ORAL
Qty: 30 TABLET | Refills: 1 | Status: SHIPPED | OUTPATIENT
Start: 2020-02-12 | End: 2020-03-11

## 2020-02-13 LAB
LEFT EYE DIABETIC RETINOPATHY: NORMAL
RIGHT EYE DIABETIC RETINOPATHY: NORMAL

## 2020-02-15 DIAGNOSIS — E11.8 TYPE 2 DIABETES MELLITUS WITH COMPLICATION, WITHOUT LONG-TERM CURRENT USE OF INSULIN (HCC): ICD-10-CM

## 2020-02-28 ENCOUNTER — TRANSCRIBE ORDERS (OUTPATIENT)
Dept: ADMINISTRATIVE | Facility: HOSPITAL | Age: 70
End: 2020-02-28

## 2020-02-28 DIAGNOSIS — E04.9 ENLARGEMENT OF THYROID: ICD-10-CM

## 2020-02-28 DIAGNOSIS — IMO0002 UNCONTROLLED DIABETES MELLITUS WITH COMPLICATIONS: Primary | ICD-10-CM

## 2020-02-28 DIAGNOSIS — E66.8 OBESITY OF ENDOCRINE ORIGIN: ICD-10-CM

## 2020-02-29 ENCOUNTER — TRANSCRIBE ORDERS (OUTPATIENT)
Dept: ADMINISTRATIVE | Facility: HOSPITAL | Age: 70
End: 2020-02-29

## 2020-02-29 ENCOUNTER — APPOINTMENT (OUTPATIENT)
Dept: LAB | Facility: MEDICAL CENTER | Age: 70
End: 2020-02-29
Payer: MEDICARE

## 2020-02-29 DIAGNOSIS — E11.65 UNCONTROLLED TYPE 2 DIABETES MELLITUS WITH HYPERGLYCEMIA (HCC): ICD-10-CM

## 2020-02-29 DIAGNOSIS — E66.8 OBESITY OF ENDOCRINE ORIGIN: ICD-10-CM

## 2020-02-29 DIAGNOSIS — E04.9 ENLARGEMENT OF THYROID: ICD-10-CM

## 2020-02-29 DIAGNOSIS — E66.8 OBESITY OF ENDOCRINE ORIGIN: Primary | ICD-10-CM

## 2020-02-29 LAB
25(OH)D3 SERPL-MCNC: 15.6 NG/ML (ref 30–100)
ALBUMIN SERPL BCP-MCNC: 4.3 G/DL (ref 3.5–5)
ALP SERPL-CCNC: 57 U/L (ref 46–116)
ALT SERPL W P-5'-P-CCNC: 35 U/L (ref 12–78)
ANION GAP SERPL CALCULATED.3IONS-SCNC: 6 MMOL/L (ref 4–13)
AST SERPL W P-5'-P-CCNC: 30 U/L (ref 5–45)
BASOPHILS # BLD AUTO: 0.06 THOUSANDS/ΜL (ref 0–0.1)
BASOPHILS NFR BLD AUTO: 1 % (ref 0–1)
BILIRUB SERPL-MCNC: 0.34 MG/DL (ref 0.2–1)
BILIRUB UR QL STRIP: NEGATIVE
BUN SERPL-MCNC: 17 MG/DL (ref 5–25)
CALCIUM SERPL-MCNC: 9.5 MG/DL (ref 8.3–10.1)
CEA SERPL-MCNC: 0.6 NG/ML (ref 0–3)
CHLORIDE SERPL-SCNC: 105 MMOL/L (ref 100–108)
CLARITY UR: CLEAR
CO2 SERPL-SCNC: 26 MMOL/L (ref 21–32)
COLOR UR: YELLOW
CORTIS AM PEAK SERPL-MCNC: 11.6 UG/DL (ref 4.2–22.4)
CREAT SERPL-MCNC: 0.86 MG/DL (ref 0.6–1.3)
CREAT UR-MCNC: 116 MG/DL
EOSINOPHIL # BLD AUTO: 0.25 THOUSAND/ΜL (ref 0–0.61)
EOSINOPHIL NFR BLD AUTO: 5 % (ref 0–6)
ERYTHROCYTE [DISTWIDTH] IN BLOOD BY AUTOMATED COUNT: 13.2 % (ref 11.6–15.1)
EST. AVERAGE GLUCOSE BLD GHB EST-MCNC: 214 MG/DL
GFR SERPL CREATININE-BSD FRML MDRD: 69 ML/MIN/1.73SQ M
GLUCOSE P FAST SERPL-MCNC: 199 MG/DL (ref 65–99)
GLUCOSE UR STRIP-MCNC: NEGATIVE MG/DL
HBA1C MFR BLD: 9.1 %
HCT VFR BLD AUTO: 41.9 % (ref 34.8–46.1)
HGB BLD-MCNC: 13.5 G/DL (ref 11.5–15.4)
HGB UR QL STRIP.AUTO: NEGATIVE
IMM GRANULOCYTES # BLD AUTO: 0.02 THOUSAND/UL (ref 0–0.2)
IMM GRANULOCYTES NFR BLD AUTO: 0 % (ref 0–2)
KETONES UR STRIP-MCNC: NEGATIVE MG/DL
LEUKOCYTE ESTERASE UR QL STRIP: NEGATIVE
LYMPHOCYTES # BLD AUTO: 1.03 THOUSANDS/ΜL (ref 0.6–4.47)
LYMPHOCYTES NFR BLD AUTO: 20 % (ref 14–44)
MAGNESIUM SERPL-MCNC: 1.7 MG/DL (ref 1.6–2.6)
MCH RBC QN AUTO: 30.3 PG (ref 26.8–34.3)
MCHC RBC AUTO-ENTMCNC: 32.2 G/DL (ref 31.4–37.4)
MCV RBC AUTO: 94 FL (ref 82–98)
MICROALBUMIN UR-MCNC: 26.6 MG/L (ref 0–20)
MICROALBUMIN/CREAT 24H UR: 23 MG/G CREATININE (ref 0–30)
MONOCYTES # BLD AUTO: 0.4 THOUSAND/ΜL (ref 0.17–1.22)
MONOCYTES NFR BLD AUTO: 8 % (ref 4–12)
NEUTROPHILS # BLD AUTO: 3.29 THOUSANDS/ΜL (ref 1.85–7.62)
NEUTS SEG NFR BLD AUTO: 66 % (ref 43–75)
NITRITE UR QL STRIP: NEGATIVE
NRBC BLD AUTO-RTO: 0 /100 WBCS
PH UR STRIP.AUTO: 6 [PH]
PHOSPHATE SERPL-MCNC: 3.3 MG/DL (ref 2.3–4.1)
PLATELET # BLD AUTO: 177 THOUSANDS/UL (ref 149–390)
PMV BLD AUTO: 10.3 FL (ref 8.9–12.7)
POTASSIUM SERPL-SCNC: 4.9 MMOL/L (ref 3.5–5.3)
PROT SERPL-MCNC: 7.4 G/DL (ref 6.4–8.2)
PROT UR STRIP-MCNC: NEGATIVE MG/DL
RBC # BLD AUTO: 4.45 MILLION/UL (ref 3.81–5.12)
SODIUM SERPL-SCNC: 137 MMOL/L (ref 136–145)
SP GR UR STRIP.AUTO: 1.02 (ref 1–1.03)
T4 FREE SERPL-MCNC: 1.01 NG/DL (ref 0.76–1.46)
TSH SERPL DL<=0.05 MIU/L-ACNC: 2.36 UIU/ML (ref 0.36–3.74)
UROBILINOGEN UR QL STRIP.AUTO: 0.2 E.U./DL
WBC # BLD AUTO: 5.05 THOUSAND/UL (ref 4.31–10.16)

## 2020-02-29 PROCEDURE — 82308 ASSAY OF CALCITONIN: CPT

## 2020-02-29 PROCEDURE — 82533 TOTAL CORTISOL: CPT

## 2020-02-29 PROCEDURE — 82024 ASSAY OF ACTH: CPT

## 2020-02-29 PROCEDURE — 84439 ASSAY OF FREE THYROXINE: CPT

## 2020-02-29 PROCEDURE — 36415 COLL VENOUS BLD VENIPUNCTURE: CPT

## 2020-02-29 PROCEDURE — 84443 ASSAY THYROID STIM HORMONE: CPT

## 2020-02-29 PROCEDURE — 82043 UR ALBUMIN QUANTITATIVE: CPT | Performed by: SPECIALIST

## 2020-02-29 PROCEDURE — 82570 ASSAY OF URINE CREATININE: CPT | Performed by: SPECIALIST

## 2020-02-29 PROCEDURE — 84206 ASSAY OF PROINSULIN: CPT

## 2020-02-29 PROCEDURE — 81003 URINALYSIS AUTO W/O SCOPE: CPT | Performed by: SPECIALIST

## 2020-02-29 PROCEDURE — 83036 HEMOGLOBIN GLYCOSYLATED A1C: CPT

## 2020-02-29 PROCEDURE — 82378 CARCINOEMBRYONIC ANTIGEN: CPT

## 2020-02-29 PROCEDURE — 82306 VITAMIN D 25 HYDROXY: CPT

## 2020-02-29 PROCEDURE — 85025 COMPLETE CBC W/AUTO DIFF WBC: CPT

## 2020-02-29 PROCEDURE — 83835 ASSAY OF METANEPHRINES: CPT

## 2020-02-29 PROCEDURE — 84681 ASSAY OF C-PEPTIDE: CPT

## 2020-02-29 PROCEDURE — 84100 ASSAY OF PHOSPHORUS: CPT

## 2020-02-29 PROCEDURE — 83735 ASSAY OF MAGNESIUM: CPT

## 2020-02-29 PROCEDURE — 80053 COMPREHEN METABOLIC PANEL: CPT

## 2020-03-02 DIAGNOSIS — E11.8 TYPE 2 DIABETES MELLITUS WITH COMPLICATION, WITHOUT LONG-TERM CURRENT USE OF INSULIN (HCC): ICD-10-CM

## 2020-03-02 DIAGNOSIS — F41.8 DEPRESSION WITH ANXIETY: ICD-10-CM

## 2020-03-02 NOTE — TELEPHONE ENCOUNTER
Pt is requesting refill on metformin, klonopin    Last OV:02/04/2020  Future OV:04/08/2020    PDMP completed

## 2020-03-03 ENCOUNTER — TELEPHONE (OUTPATIENT)
Dept: UROLOGY | Facility: CLINIC | Age: 70
End: 2020-03-03

## 2020-03-03 LAB
ACTH PLAS-MCNC: 10.4 PG/ML (ref 7.2–63.3)
C PEPTIDE SERPL-MCNC: 5.4 NG/ML (ref 1.1–4.4)
CALCIT SERPL-MCNC: <2 PG/ML (ref 0–5)

## 2020-03-03 RX ORDER — CLONAZEPAM 0.5 MG/1
0.5 TABLET ORAL
Qty: 30 TABLET | Refills: 0 | Status: SHIPPED | OUTPATIENT
Start: 2020-03-03 | End: 2020-06-01 | Stop reason: SDUPTHER

## 2020-03-04 LAB
METANEPH FREE SERPL-MCNC: 42 PG/ML (ref 0–62)
NORMETANEPHRINE SERPL-MCNC: 113 PG/ML (ref 0–145)
PROINSULIN SERPL-SCNC: 9.2 PMOL/L (ref 0–10)

## 2020-03-09 ENCOUNTER — HOSPITAL ENCOUNTER (OUTPATIENT)
Dept: RADIOLOGY | Facility: MEDICAL CENTER | Age: 70
Discharge: HOME | End: 2020-03-09
Payer: MEDICARE

## 2020-03-09 ENCOUNTER — APPOINTMENT (OUTPATIENT)
Dept: RADIOLOGY | Facility: MEDICAL CENTER | Age: 70
End: 2020-03-09
Payer: MEDICARE

## 2020-03-09 DIAGNOSIS — E04.9 ENLARGEMENT OF THYROID: ICD-10-CM

## 2020-03-09 DIAGNOSIS — E66.8 OBESITY OF ENDOCRINE ORIGIN: ICD-10-CM

## 2020-03-09 DIAGNOSIS — IMO0002 UNCONTROLLED DIABETES MELLITUS WITH COMPLICATIONS: ICD-10-CM

## 2020-03-09 PROCEDURE — 76700 US EXAM ABDOM COMPLETE: CPT

## 2020-03-09 PROCEDURE — 76536 US EXAM OF HEAD AND NECK: CPT

## 2020-03-11 DIAGNOSIS — F41.8 DEPRESSION WITH ANXIETY: Primary | ICD-10-CM

## 2020-03-11 RX ORDER — ZOLPIDEM TARTRATE 10 MG/1
10 TABLET ORAL
Qty: 30 TABLET | Refills: 0 | Status: SHIPPED | OUTPATIENT
Start: 2020-03-11 | End: 2020-04-07 | Stop reason: SDUPTHER

## 2020-03-12 DIAGNOSIS — E78.00 HYPERCHOLESTEROLEMIA: ICD-10-CM

## 2020-03-13 DIAGNOSIS — E78.00 HYPERCHOLESTEROLEMIA: ICD-10-CM

## 2020-03-13 RX ORDER — SIMVASTATIN 40 MG
40 TABLET ORAL
Qty: 90 TABLET | Refills: 1 | Status: SHIPPED | OUTPATIENT
Start: 2020-03-13 | End: 2020-04-16 | Stop reason: SDUPTHER

## 2020-03-13 RX ORDER — FENOFIBRATE 145 MG/1
145 TABLET, COATED ORAL DAILY
Qty: 90 TABLET | Refills: 1 | Status: CANCELLED | OUTPATIENT
Start: 2020-03-13

## 2020-03-15 RX ORDER — SIMVASTATIN 40 MG
TABLET ORAL
Qty: 90 TABLET | Refills: 1 | Status: SHIPPED | OUTPATIENT
Start: 2020-03-15 | End: 2020-08-12 | Stop reason: SDUPTHER

## 2020-03-19 DIAGNOSIS — E11.8 TYPE 2 DIABETES MELLITUS WITH COMPLICATION, WITHOUT LONG-TERM CURRENT USE OF INSULIN (HCC): ICD-10-CM

## 2020-03-19 NOTE — TELEPHONE ENCOUNTER
Pt is requesting refill on lantus  Unit went up to 24 instead of 10    Last OV:02/04/2020  Future OV:04/08/2020

## 2020-03-19 NOTE — TELEPHONE ENCOUNTER
Patient would need to have a refill on the Lantus Solostar injectable pen    Went to 24 units instead of 10  On her last pen today and would need this refilled today    [facility] [address]    5 pens supply with refill

## 2020-04-07 DIAGNOSIS — F41.8 DEPRESSION WITH ANXIETY: ICD-10-CM

## 2020-04-10 RX ORDER — ZOLPIDEM TARTRATE 10 MG/1
10 TABLET ORAL
Qty: 30 TABLET | Refills: 0 | Status: SHIPPED | OUTPATIENT
Start: 2020-04-10 | End: 2020-05-11 | Stop reason: SDUPTHER

## 2020-04-16 ENCOUNTER — TELEMEDICINE (OUTPATIENT)
Dept: INTERNAL MEDICINE CLINIC | Age: 70
End: 2020-04-16
Payer: MEDICARE

## 2020-04-16 VITALS
HEIGHT: 62 IN | WEIGHT: 163 LBS | BODY MASS INDEX: 30 KG/M2 | HEART RATE: 74 BPM | DIASTOLIC BLOOD PRESSURE: 90 MMHG | SYSTOLIC BLOOD PRESSURE: 150 MMHG | TEMPERATURE: 97.4 F

## 2020-04-16 DIAGNOSIS — L24.9 IRRITANT CONTACT DERMATITIS, UNSPECIFIED TRIGGER: Primary | ICD-10-CM

## 2020-04-16 DIAGNOSIS — E11.8 TYPE 2 DIABETES MELLITUS WITH COMPLICATION, WITHOUT LONG-TERM CURRENT USE OF INSULIN (HCC): ICD-10-CM

## 2020-04-16 PROCEDURE — 99214 OFFICE O/P EST MOD 30 MIN: CPT | Performed by: NURSE PRACTITIONER

## 2020-04-16 RX ORDER — BETAMETHASONE DIPROPIONATE 0.5 MG/G
OINTMENT TOPICAL
COMMUNITY
Start: 2020-04-15 | End: 2020-10-14

## 2020-04-16 RX ORDER — PREDNISONE 20 MG/1
40 TABLET ORAL DAILY
Qty: 10 TABLET | Refills: 0 | Status: SHIPPED | OUTPATIENT
Start: 2020-04-16 | End: 2020-04-21

## 2020-04-20 DIAGNOSIS — I10 ESSENTIAL HYPERTENSION: ICD-10-CM

## 2020-04-20 DIAGNOSIS — R00.0 TACHYCARDIA: ICD-10-CM

## 2020-04-20 DIAGNOSIS — E11.9 TYPE 2 DIABETES MELLITUS WITHOUT COMPLICATION, WITHOUT LONG-TERM CURRENT USE OF INSULIN (HCC): ICD-10-CM

## 2020-04-20 RX ORDER — BLOOD SUGAR DIAGNOSTIC
STRIP MISCELLANEOUS
Qty: 300 EACH | Refills: 1 | Status: SHIPPED | OUTPATIENT
Start: 2020-04-20

## 2020-05-11 DIAGNOSIS — F41.8 DEPRESSION WITH ANXIETY: ICD-10-CM

## 2020-05-11 RX ORDER — ZOLPIDEM TARTRATE 10 MG/1
10 TABLET ORAL
Qty: 30 TABLET | Refills: 0 | Status: SHIPPED | OUTPATIENT
Start: 2020-05-11 | End: 2020-06-11 | Stop reason: SDUPTHER

## 2020-06-01 DIAGNOSIS — F41.8 DEPRESSION WITH ANXIETY: ICD-10-CM

## 2020-06-03 RX ORDER — CLONAZEPAM 0.5 MG/1
0.5 TABLET ORAL
Qty: 30 TABLET | Refills: 0 | Status: SHIPPED | OUTPATIENT
Start: 2020-06-03 | End: 2020-07-23 | Stop reason: SDUPTHER

## 2020-06-11 DIAGNOSIS — F41.8 DEPRESSION WITH ANXIETY: ICD-10-CM

## 2020-06-11 RX ORDER — CLONAZEPAM 0.5 MG/1
0.5 TABLET ORAL
Qty: 30 TABLET | Refills: 0 | OUTPATIENT
Start: 2020-06-11

## 2020-06-11 RX ORDER — ZOLPIDEM TARTRATE 10 MG/1
10 TABLET ORAL
Qty: 30 TABLET | Refills: 0 | Status: SHIPPED | OUTPATIENT
Start: 2020-06-11 | End: 2020-07-01 | Stop reason: SDUPTHER

## 2020-06-30 DIAGNOSIS — E03.9 HYPOTHYROIDISM, UNSPECIFIED TYPE: ICD-10-CM

## 2020-07-01 ENCOUNTER — TELEPHONE (OUTPATIENT)
Dept: INTERNAL MEDICINE CLINIC | Facility: CLINIC | Age: 70
End: 2020-07-01

## 2020-07-01 ENCOUNTER — OFFICE VISIT (OUTPATIENT)
Dept: INTERNAL MEDICINE CLINIC | Age: 70
End: 2020-07-01
Payer: MEDICARE

## 2020-07-01 VITALS
BODY MASS INDEX: 30.14 KG/M2 | OXYGEN SATURATION: 98 % | SYSTOLIC BLOOD PRESSURE: 142 MMHG | WEIGHT: 163.8 LBS | HEIGHT: 62 IN | TEMPERATURE: 97.3 F | DIASTOLIC BLOOD PRESSURE: 80 MMHG | HEART RATE: 61 BPM

## 2020-07-01 DIAGNOSIS — F41.8 DEPRESSION WITH ANXIETY: ICD-10-CM

## 2020-07-01 DIAGNOSIS — G47.00 INSOMNIA, UNSPECIFIED TYPE: ICD-10-CM

## 2020-07-01 DIAGNOSIS — E78.2 MIXED HYPERLIPIDEMIA: ICD-10-CM

## 2020-07-01 DIAGNOSIS — E13.9 DIABETES 1.5, MANAGED AS TYPE 2 (HCC): Primary | ICD-10-CM

## 2020-07-01 PROCEDURE — 3079F DIAST BP 80-89 MM HG: CPT | Performed by: INTERNAL MEDICINE

## 2020-07-01 PROCEDURE — 4040F PNEUMOC VAC/ADMIN/RCVD: CPT | Performed by: INTERNAL MEDICINE

## 2020-07-01 PROCEDURE — 1036F TOBACCO NON-USER: CPT | Performed by: INTERNAL MEDICINE

## 2020-07-01 PROCEDURE — 3046F HEMOGLOBIN A1C LEVEL >9.0%: CPT | Performed by: INTERNAL MEDICINE

## 2020-07-01 PROCEDURE — 99214 OFFICE O/P EST MOD 30 MIN: CPT | Performed by: INTERNAL MEDICINE

## 2020-07-01 PROCEDURE — 2022F DILAT RTA XM EVC RTNOPTHY: CPT | Performed by: INTERNAL MEDICINE

## 2020-07-01 PROCEDURE — 3008F BODY MASS INDEX DOCD: CPT | Performed by: INTERNAL MEDICINE

## 2020-07-01 PROCEDURE — 1160F RVW MEDS BY RX/DR IN RCRD: CPT | Performed by: INTERNAL MEDICINE

## 2020-07-01 PROCEDURE — 3077F SYST BP >= 140 MM HG: CPT | Performed by: INTERNAL MEDICINE

## 2020-07-01 RX ORDER — ZOLPIDEM TARTRATE 10 MG/1
TABLET ORAL
Qty: 30 TABLET | Refills: 0 | OUTPATIENT
Start: 2020-07-01

## 2020-07-01 RX ORDER — ZOLPIDEM TARTRATE 10 MG/1
TABLET ORAL
Qty: 30 TABLET | Refills: 0 | Status: SHIPPED | OUTPATIENT
Start: 2020-07-01 | End: 2020-08-12 | Stop reason: SDUPTHER

## 2020-07-01 RX ORDER — LEVOTHYROXINE SODIUM 0.05 MG/1
TABLET ORAL
Qty: 90 TABLET | Refills: 1 | Status: SHIPPED | OUTPATIENT
Start: 2020-07-01 | End: 2020-12-21

## 2020-07-01 NOTE — TELEPHONE ENCOUNTER
Call to [facility] [place] and spoke to Pharmacist Brooke Moscoso who confirmed that they did not receive the Ambien script from this AM   Griffin Sexton Verbal instruction for 30 tabs with 0 refills as was sent in by Dr Valentín Torres this morning

## 2020-07-01 NOTE — TELEPHONE ENCOUNTER
Patient said that she just went to the pharmacy and they said they never received the zolpidem order  Can someone check with the pharmacy and then call the patient with an update

## 2020-07-01 NOTE — PROGRESS NOTES
Chief Complaint   Patient presents with    Follow-up    Hyperlipidemia    Diabetes     having a hard time losing weight, number have been 150-200 fasting in AM          Assessment/Plan:    DM with insulin use - CMP results from 2/29/20 revealed an elevated FBG of 199  She has not followed up with Dr Janell Conley since the start of the pandemic  She is under high stress at this time due to her grand daughter's illness  She has gained weight and states she is not eating healthy  Her DM is uncontrolled at this time  She tried slighlty adjusting her insulin on her own with no improvement  She has a bruise on her belly at the injection site  Eye exam was done on 2/13/20 and was negative for DM retinopathy  She was advised to try to reach out to Dr Yolie Cronin and continue to follow up  She was informed to increase her insulin from 24 units to 26 units  If her average blood glucose level is over 150, she can go up 2 units every 2 weeks as long as the average blood sugar level is above 150  She will also restart her Januvia 25 mg  Follow up in one month as a virtual visit  Sleep disturbance - Patient states she cannot reduce her Ambien  She has tried in the past because of depression and NELIA  She is stressed over her granddaughter and does not/cannot wenn off as it is the only regimen that helps her sleep  She was informed the dosage is quite addictive and she will need to withdraw from the Ambien entirely when she is stable  Bladder cancer - She presents with cancer of the lateral wall of the urinary bladder  She is followed by her oncologist  She is stable and the cancer is  under some control  Positive for urinary urgency and frequency  Continue to follow up with oncologist      Cardiology  -  EKG from 1/22/2020 was abnormal  Patient follows up with her cardiologist  The cardiologist did not feel that patient clinically had any issues; however her endocrinologist, Dr Janell Conley, recommended a calcification score   Patient was informed this might lead to a cardiac cath if the score is very high  She states that at this time she would like to know whether she has CAD and will proceed with calcification score  She understands she might have to pay for it  Skin condition - Patient suffers from several skin conditions for which she is closely followed by dermatology       HLD - Pt is currently taking Zocor 40 mg  She will need to complete follow up lipid panel  She was highly advised to limit her white starches and carbohydrates and continue her medication       BMI - Body mass index is 30 1 kg/m²  She states she has gained about 5 to 6 lbs due to not stress and grazing  She was advised the following: Try having bowls of baby carrots out at all times, so you are more prone to reach for the carrots in terms of grazing instead of processed foods  50 % vegetables/fruits, 25% protein, 25% carbohydrates  Do not eat after 7 PM  Continue minimizing processed foods and increase natural foods in diet  Try to exercise at least 150 min/week  Essential HTN - Pts BP in the office today was 142/80 while sitting  She was advised to continue on her medications as prescribed  She should also limit her caffeine and salt intake       Health Maintenance - Pt is due for her Td shot  Follow up in one month as a virtual visit or as needed       BMI Counseling: Body mass index is 30 1 kg/m²  The BMI is above normal  Nutrition recommendations include encouraging healthy choices of fruits and vegetables, decreasing fast food intake, consuming healthier snacks, limiting drinks that contain sugar, moderation in carbohydrate intake and reducing intake of cholesterol  Exercise recommendations include moderate physical activity 150 minutes/week  No pharmacotherapy was ordered  Diagnoses and all orders for this visit:    Diabetes 1 5, managed as type 2 ([facility] )  -     Hemoglobin A1C; Future  -     sitaGLIPtin (JANUVIA) 25 mg tablet;  Take 1 tablet (25 mg total) by mouth daily    Depression with anxiety  -     Hemoglobin A1C; Future  -     zolpidem (AMBIEN) 10 mg tablet; Pt  Unable to cut down unable to funtion without it  She is aware of the fact that this dose is very addictive    Mixed hyperlipidemia  -     Lipid panel; Future  -     ALT; Future    Insomnia, unspecified type    BMI 30 0-30 9,adult          Subjective:      Patient ID: Dorothy Swenson is a 78 y o  female  Dorothy Swenson is a 78 y o  female who presents in the office today for a 2 month follow up for her DM  The patient is diabetic her diabetes is uncontrolled she had been followed by a diabetologist Dr Gene Eduardo to however during the epidemic he has not been available for her to see and at this time she is adjusting her insulin slightly but basically it is not under a good control  She has marked anxiety and depression who was requiring high doses of sleepy medications at night to order to function during the day  They have a granddaughter who is ill and requires there help         this is a case of a 23-year-old white female who has uncontrolled diabetes insulin-requiring to at this time because of family situation and hyper anxiety is unable to go on a strict diet  The following portions of the patient's history were reviewed and updated as appropriate: allergies, current medications, past family history, past medical history, past social history, past surgical history and problem list     Review of Systems   Constitutional: Positive for fatigue and unexpected weight change (poor diet)  Negative for activity change, appetite change, chills, diaphoresis and fever  HENT: Negative for congestion, drooling, ear discharge, ear pain, mouth sores, nosebleeds, postnasal drip, rhinorrhea, sinus pressure, sinus pain, sneezing and sore throat  Eyes: Negative for pain, discharge, redness and itching  Respiratory: Positive for cough   Negative for apnea, choking, chest tightness, shortness of breath, wheezing and stridor  Cardiovascular: Negative for chest pain, palpitations and leg swelling  Gastrointestinal: Negative for abdominal distention, abdominal pain, blood in stool, constipation, nausea, rectal pain and vomiting  Genitourinary: Positive for frequency and urgency  Negative for difficulty urinating and dysuria  Bladder cancer followed by urology   Musculoskeletal: Positive for arthralgias, back pain and myalgias  Negative for neck pain  Knee pain  Had jell shots   Skin: Negative  Neurological: Negative for dizziness, seizures, syncope, speech difficulty, weakness, light-headedness, numbness and headaches  Hematological: Negative for adenopathy  Does not bruise/bleed easily  Psychiatric/Behavioral: Positive for confusion, dysphoric mood and sleep disturbance  Negative for agitation, behavioral problems, decreased concentration, hallucinations, self-injury and suicidal ideas  The patient is nervous/anxious           Memory loss         No Known Allergies     Current Outpatient Medications on File Prior to Visit   Medication Sig Dispense Refill    amLODIPine (NORVASC) 5 mg tablet Take 1 tablet (5 mg total) by mouth daily 30 tablet 3    betamethasone, augmented, (DIPROLENE) 0 05 % ointment       clonazePAM (KlonoPIN) 0 5 mg tablet Take 1 tablet (0 5 mg total) by mouth daily at bedtime 30 tablet 0    Cyanocobalamin 1000 MCG CAPS Take 1 capsule by mouth daily        famotidine (PEPCID) 20 mg tablet Take 1 tablet (20 mg total) by mouth daily (Patient taking differently: Take 20 mg by mouth daily As needed) 90 tablet 0    fenofibrate (TRICOR) 145 mg tablet Take 1 tablet (145 mg total) by mouth daily 90 tablet 1    fluticasone (FLONASE) 50 mcg/act nasal spray 1 spray into each nostril daily 1 Bottle 0    ibuprofen (MOTRIN) 200 mg tablet Take 200 mg by mouth every 6 (six) hours as needed for mild pain      insulin glargine (Lantus SoloStar) 100 units/mL injection pen Inject 24 Units under the skin daily 5 pen 1    Insulin Pen Needle (BD PEN NEEDLE OREN U/F) 32G X 4 MM MISC Once daily 100 each 1    levothyroxine 50 mcg tablet Take 1 tablet (50 mcg total) by mouth daily 90 tablet 1    metFORMIN (GLUCOPHAGE) 500 mg tablet Take 2 tablets (1,000 mg total) by mouth 2 (two) times a day with meals 360 tablet 1    metoprolol tartrate (LOPRESSOR) 25 mg tablet Take 0 5 tablets (12 5 mg total) by mouth every 12 (twelve) hours 90 tablet 0    mometasone (ELOCON) 0 1 % cream Apply topically daily      ONETOUCH VERIO test strip Test 3 times daily 300 each 1    Probiotic Product (PROBIOTIC-10) CAPS Take 1 capsule by mouth daily      simvastatin (ZOCOR) 40 mg tablet TAKE 1 TABLET BY MOUTH AT BEDTIME 90 tablet 1    [DISCONTINUED] zolpidem (AMBIEN) 10 mg tablet Take 1 tablet (10 mg total) by mouth daily at bedtime as needed for sleep 30 tablet 0    [DISCONTINUED] estradiol (ESTRACE) 0 1 mg/g vaginal cream Insert 1 g into the vagina once a week Apply small amount on finger tip to vulva once a week  (Patient not taking: Reported on 2020) 30 g 3    [DISCONTINUED] hydrocortisone 2 5 % ointment Apply topically 2 (two) times a day 30 g 0     No current facility-administered medications on file prior to visit          Past Medical History:   Diagnosis Date    Anxiety     Arthritis     Bladder carcinoma (HCC)     Bladder mass     Depression     Diabetes mellitus [facility] )     Disease of thyroid gland     thyroid nodules-not treating at this time    Dysuria     Last assessed 17    GERD (gastroesophageal reflux disease)      2 para 2     HLD (hyperlipidemia)     HTN (hypertension)     Hypercholesterolemia     Hypertension     Knee pain     Mild aortic regurgitation with left ventricular dilation by prior echocardiogram     Papanicolaou smear 2017    NEG    PONV (postoperative nausea and vomiting)      Past Surgical History:   Procedure Laterality Date    CARPAL TUNNEL RELEASE Right      SECTION      x2    CHOLECYSTECTOMY      CYSTOSCOPY N/A 2016    Procedure: CYSTOSCOPY WITH BIOPSIES;  Surgeon: Efraín Stewart MD;  [facility];  Service:    Kevin Mejias N/A 2016    Procedure: Wandy Li;  Surgeon: Efraín Stewart MD;  [facility];  Service:     CYSTOSCOPY      HERNIA REPAIR      MI CYSTO/URETERO W/LITHOTRIPSY &INDWELL STENT INSRT  2016    Procedure: CYSTOSCOPY URETEROSCOPY WITH LITHOTRIPSY HOLMIUM LASER RIGHT, RETROGRADE PYELOGRAM BILATERAL: AND INSERTION STENT URETERAL Right ;  Surgeon: Efraín Stewart MD;  [facility];  Service: Urology    MI CYSTO/URETERO/PYELOSCOPY, DX Right 2017    Procedure: URETEROSCOPY;  Surgeon: Efraín Steawrt MD;  [facility];  Service: Urology    MI CYSTOSCOPY,INSERT URETERAL STENT Left 2016    Procedure: URETERAL STENTS;  Surgeon: Efraín Stewart MD;  [facility];  Service: Urology    MI CYSTOURETHROSCOPY,FULGUR <0 5 CM LESN N/A 2016    Procedure: TRANSURETHRAL RESECTION OF BLADDER TUMOR (TURBT);   Surgeon: Efraín Stewart MD;  [facility];  Service: Urology    MI CYSTOURETHROSCOPY,FULGUR <0 5 CM LESN N/A 2016    Procedure: Wandy Li; TUR BLADDER TUMOR ;  Surgeon: Efraín Stewart MD;  [facility];  Service: Urology    MI CYSTOURETHROSCOPY,FULGUR <0 5 CM LESN N/A 2016    Procedure: TUR BLADDER TUMOR ;  Surgeon: Efraín Stewart MD;  [facility];  Service: Urology    MI CYSTOURETHROSCOPY,FULGUR <0 5 CM LESN Bilateral 2017    Procedure: Leandro Conner, ;  Surgeon: Efraín Stewart MD;  [facility];  Service: Urology    MI CYSTOURETHROSCOPY,URETER CATHETER Bilateral 2016    Procedure: RETROGRADE PYELOGRAM ;  Surgeon: Efraín Stewart MD;  [facility];  Service: Urology    MI CYSTOURETHROSCOPY,URETER CATHETER Bilateral 2017    Procedure: RETROGRADE PYELOGRAM WITH STENT EXCHANGE, RIGHT;  Surgeon: French Pope MD;  [facility];  Service: Urology    WA INSTILL ANTICANCER AGENT IN BLADDER N/A 2016    Procedure: Mariana Pour;  Surgeon: French Pope MD;  [facility];  Service: Urology    WA KNEE SCOPE,MED/LAT MENISECTOMY Left 2016    Procedure: KNEE ARTHROSCOPIC PARTIAL MEDIAL MENISCECTOMY ;  Surgeon: Abdiaziz Vargas MD;  [facility];  Service: Orthopedics    REMOVAL URETERAL STENT Left 2016    Procedure: REMOVAL STENT URETERAL;  Surgeon: French Pope MD;  [facility];  Service:    [place] TONSILLECTOMY       Social History     Socioeconomic History    Marital status: /Civil Union     Spouse name: Not on file    Number of children: 2    Years of education: Not on file    Highest education level: Not on file   Occupational History    Occupation: RETIRED   Social Needs    Financial resource strain: Not on file    Food insecurity:     Worry: Not on file     Inability: Not on file   [employer] needs:     Medical: Not on file     Non-medical: Not on file   Tobacco Use    Smoking status: Former Smoker     Packs/day: 1 00     Years: 20 00     Pack years: 20 00     Types: Cigarettes     Last attempt to quit:      Years since quittin 5    Smokeless tobacco: Never Used   Substance and Sexual Activity    Alcohol use: No    Drug use: No    Sexual activity: Not Currently     Partners: Male     Birth control/protection: Post-menopausal   Lifestyle    Physical activity:     Days per week: Not on file     Minutes per session: Not on file    Stress: Not on file   Relationships    Social connections:     Talks on phone: Not on file     Gets together: Not on file     Attends Yazdanism service: Not on file     Active member of club or organization: Not on file     Attends meetings of clubs or organizations: Not on file     Relationship status: Not on file    Intimate partner violence:     Fear of current or ex partner: Not on file     Emotionally abused: Not on file     Physically abused: Not on file     Forced sexual activity: Not on file   Other Topics Concern    Not on file   Social History Narrative    Not on file         Objective:      /80 (BP Location: Left arm, Patient Position: Sitting, Cuff Size: Adult)   Pulse 61   Temp (!) 97 3 °F (36 3 °C) (Tympanic)   Ht 5' 1 85" (1 571 m)   Wt 74 3 kg (163 lb 12 8 oz)   SpO2 98%   BMI 30 10 kg/m²          Physical Exam   Constitutional: She is oriented to person, place, and time  She appears well-developed and well-nourished  No distress  Gained 5-6 lbs   HENT:   Head: Normocephalic  Right Ear: External ear normal    Left Ear: External ear normal    Mouth/Throat: Oropharynx is clear and moist    Eyes: EOM are normal  Right eye exhibits no discharge  Left eye exhibits no discharge  No scleral icterus  Sees optho  ok   Cardiovascular: Exam reveals no gallop and no friction rub  No murmur heard  Pulmonary/Chest: No respiratory distress  She has no wheezes  She has no rales  Abdominal: Soft  Bowel sounds are normal  She exhibits distension  There is no tenderness  Musculoskeletal: She exhibits no edema or deformity  Neurological: She is alert and oriented to person, place, and time  She displays normal reflexes  No cranial nerve deficit  Coordination normal    Skin: No rash noted  She is not diaphoretic  No erythema  No pallor  Nursing note and vitals reviewed

## 2020-07-01 NOTE — PATIENT INSTRUCTIONS
1  Take Benefiber daily in the morning  2  Take Align (probiotic) in the afternoon  3  Complete all blood work prior to next visit  4  Contact your endocrinologist for DM follow up    5  Increase insulin from 24 units to 26 units  If your average blood sugar level is over 150, go up by 2 units every 2 weeks  Keep going up by 2 units every 2 weeks as long as average blood sugar level is above 150    6  Start Januvia 25 mg    7  Continue minimizing processed foods and increase natural foods in diet  8  Try having bowls of baby carrots out at all times, so you are more prone to reach for the carrots in terms of grazing instead of processed foods  9  50 % vegetables/fruits, 25% protein, 25% carbohydrates  10  Do not eat after 7 PM   11  Try to exercise at least 150 min/week  12  Follow up in 1 month as a virtual visit or as needed

## 2020-07-01 NOTE — PROGRESS NOTES
Right Foot/Ankle   Right Foot Inspection  Skin Exam: dry skin                            Sensory   Vibration: diminished  Proprioception: diminished   Monofilament testing: intact  Vascular    The right DP pulse is 2+  The right PT pulse is 2+  Left Foot/Ankle  Left Foot Inspection  Skin Exam: dry skin                                         Sensory   Vibration: diminished  Proprioception: diminished  Monofilament: intact  Vascular    The left DP pulse is 2+  The left PT pulse is 2+     Assign Risk Category:  ; ;        Risk: 2

## 2020-07-23 ENCOUNTER — APPOINTMENT (OUTPATIENT)
Dept: LAB | Facility: MEDICAL CENTER | Age: 70
End: 2020-07-23
Payer: MEDICARE

## 2020-07-23 DIAGNOSIS — R00.0 TACHYCARDIA: ICD-10-CM

## 2020-07-23 DIAGNOSIS — F41.8 DEPRESSION WITH ANXIETY: ICD-10-CM

## 2020-07-23 DIAGNOSIS — I10 ESSENTIAL HYPERTENSION: ICD-10-CM

## 2020-07-23 DIAGNOSIS — E11.8 TYPE 2 DIABETES MELLITUS WITH COMPLICATION, WITHOUT LONG-TERM CURRENT USE OF INSULIN (HCC): ICD-10-CM

## 2020-07-23 DIAGNOSIS — C67.9 MALIGNANT NEOPLASM OF URINARY BLADDER, UNSPECIFIED SITE (HCC): ICD-10-CM

## 2020-07-23 PROCEDURE — 88112 CYTOPATH CELL ENHANCE TECH: CPT | Performed by: PATHOLOGY

## 2020-07-23 RX ORDER — CLONAZEPAM 0.5 MG/1
0.5 TABLET ORAL
Qty: 30 TABLET | Refills: 0 | Status: SHIPPED | OUTPATIENT
Start: 2020-07-23 | End: 2020-09-14 | Stop reason: SDUPTHER

## 2020-08-03 ENCOUNTER — HOSPITAL ENCOUNTER (OUTPATIENT)
Dept: MAMMOGRAPHY | Facility: CLINIC | Age: 70
Discharge: HOME | End: 2020-08-03
Payer: MEDICARE

## 2020-08-03 ENCOUNTER — HOSPITAL ENCOUNTER (OUTPATIENT)
Dept: ULTRASOUND IMAGING | Facility: CLINIC | Age: 70
Discharge: HOME | End: 2020-08-03
Payer: MEDICARE

## 2020-08-03 VITALS — HEIGHT: 62 IN | WEIGHT: 163 LBS | BODY MASS INDEX: 30 KG/M2

## 2020-08-03 DIAGNOSIS — Z12.39 SCREENING FOR BREAST CANCER: ICD-10-CM

## 2020-08-03 DIAGNOSIS — N64.4 NIPPLE PAIN: ICD-10-CM

## 2020-08-03 PROCEDURE — 77066 DX MAMMO INCL CAD BI: CPT

## 2020-08-03 PROCEDURE — G0279 TOMOSYNTHESIS, MAMMO: HCPCS

## 2020-08-03 PROCEDURE — 76642 ULTRASOUND BREAST LIMITED: CPT

## 2020-08-07 ENCOUNTER — PROCEDURE VISIT (OUTPATIENT)
Dept: UROLOGY | Facility: CLINIC | Age: 70
End: 2020-08-07
Payer: MEDICARE

## 2020-08-07 VITALS
DIASTOLIC BLOOD PRESSURE: 64 MMHG | BODY MASS INDEX: 30.36 KG/M2 | SYSTOLIC BLOOD PRESSURE: 130 MMHG | HEART RATE: 68 BPM | TEMPERATURE: 97.3 F | WEIGHT: 166 LBS

## 2020-08-07 DIAGNOSIS — C67.9 MALIGNANT NEOPLASM OF URINARY BLADDER, UNSPECIFIED SITE (HCC): Primary | ICD-10-CM

## 2020-08-07 LAB
SL AMB  POCT GLUCOSE, UA: NORMAL
SL AMB LEUKOCYTE ESTERASE,UA: NORMAL
SL AMB POCT BILIRUBIN,UA: NORMAL
SL AMB POCT BLOOD,UA: NORMAL
SL AMB POCT CLARITY,UA: CLEAR
SL AMB POCT COLOR,UA: YELLOW
SL AMB POCT KETONES,UA: NORMAL
SL AMB POCT NITRITE,UA: NORMAL
SL AMB POCT PH,UA: 5
SL AMB POCT SPECIFIC GRAVITY,UA: 1.01
SL AMB POCT URINE PROTEIN: NORMAL
SL AMB POCT UROBILINOGEN: 0.2

## 2020-08-07 PROCEDURE — 52000 CYSTOURETHROSCOPY: CPT | Performed by: UROLOGY

## 2020-08-07 PROCEDURE — 81002 URINALYSIS NONAUTO W/O SCOPE: CPT | Performed by: UROLOGY

## 2020-08-07 NOTE — PROGRESS NOTES
Cystoscopy    Date/Time: 8/7/2020 8:49 AM  Performed by: Christine Lew MD  Authorized by: Christine Lew MD     Procedure details: cystoscopy        Ana Murray is a 69-year-old female who was 1st identified to have urothelial carcinoma the bladder in 2016  Pathology was TA/T1  She had a repeat resection which revealed a small amount of residual tumor at the bladder neck  She has not had a recurrence since that time  She received 2 years of BCG induction and maintenance  She returns in follow-up today  She denies any gross hematuria  Urine cytology is negative for malignant cells  Cystoscopy Procedure note    Risk and benefits of flexible cystoscopy were discussed  Informed consent was obtained  A urine dip is adequate for cystoscopy  The patient was placed into the modified supine position  Her genitalia was prepped and draped in a sterile fashion  Viscous lidocaine was instilled into the urethra  Flexible cystoscopy was then performed  The bladder was thoroughly inspected  Both ureteral orifices were visualized with clear efflux of urine  The bladder mucosa was thoroughly inspected  Multiple prior biopsy sites were identified without recurrent urothelial carcinoma  The right ureteral orifice was somewhat patulous from prior resection and stent insertion  Overall this was a negative cystoscopy without evidence of recurrent urothelial carcinoma  Impression:  History of urothelial carcinoma the bladder without recurrence  Plan:  I recommend follow-up in 1 year with repeat urine cytology and flexible cystoscopy  The patient was instructed to call sooner if she were to develop gross hematuria

## 2020-08-12 DIAGNOSIS — F41.8 DEPRESSION WITH ANXIETY: ICD-10-CM

## 2020-08-12 DIAGNOSIS — E78.00 HYPERCHOLESTEROLEMIA: ICD-10-CM

## 2020-08-12 RX ORDER — ZOLPIDEM TARTRATE 10 MG/1
TABLET ORAL
Qty: 30 TABLET | Refills: 0 | Status: SHIPPED | OUTPATIENT
Start: 2020-08-12 | End: 2020-09-14 | Stop reason: SDUPTHER

## 2020-08-12 RX ORDER — SIMVASTATIN 40 MG
40 TABLET ORAL
Qty: 90 TABLET | Refills: 1 | Status: SHIPPED | OUTPATIENT
Start: 2020-08-12 | End: 2021-09-09 | Stop reason: SDUPTHER

## 2020-08-18 DIAGNOSIS — I10 ESSENTIAL HYPERTENSION: ICD-10-CM

## 2020-08-18 RX ORDER — AMLODIPINE BESYLATE 5 MG/1
5 TABLET ORAL DAILY
Qty: 30 TABLET | Refills: 3 | Status: SHIPPED | OUTPATIENT
Start: 2020-08-18 | End: 2021-01-06 | Stop reason: SDUPTHER

## 2020-08-20 ENCOUNTER — TELEMEDICINE (OUTPATIENT)
Dept: INTERNAL MEDICINE CLINIC | Age: 70
End: 2020-08-20
Payer: MEDICARE

## 2020-08-20 ENCOUNTER — TELEPHONE (OUTPATIENT)
Dept: INTERNAL MEDICINE CLINIC | Age: 70
End: 2020-08-20

## 2020-08-20 VITALS — DIASTOLIC BLOOD PRESSURE: 83 MMHG | TEMPERATURE: 97.3 F | HEART RATE: 87 BPM | SYSTOLIC BLOOD PRESSURE: 142 MMHG

## 2020-08-20 DIAGNOSIS — E11.8 TYPE 2 DIABETES MELLITUS WITH COMPLICATION, WITHOUT LONG-TERM CURRENT USE OF INSULIN (HCC): ICD-10-CM

## 2020-08-20 DIAGNOSIS — E78.2 MIXED HYPERLIPIDEMIA: ICD-10-CM

## 2020-08-20 DIAGNOSIS — I10 ESSENTIAL HYPERTENSION: Primary | ICD-10-CM

## 2020-08-20 PROCEDURE — 1160F RVW MEDS BY RX/DR IN RCRD: CPT | Performed by: PHYSICIAN ASSISTANT

## 2020-08-20 PROCEDURE — 3079F DIAST BP 80-89 MM HG: CPT | Performed by: PHYSICIAN ASSISTANT

## 2020-08-20 PROCEDURE — 3046F HEMOGLOBIN A1C LEVEL >9.0%: CPT | Performed by: PHYSICIAN ASSISTANT

## 2020-08-20 PROCEDURE — 4040F PNEUMOC VAC/ADMIN/RCVD: CPT | Performed by: PHYSICIAN ASSISTANT

## 2020-08-20 PROCEDURE — 2022F DILAT RTA XM EVC RTNOPTHY: CPT | Performed by: PHYSICIAN ASSISTANT

## 2020-08-20 PROCEDURE — 99213 OFFICE O/P EST LOW 20 MIN: CPT | Performed by: PHYSICIAN ASSISTANT

## 2020-08-20 PROCEDURE — 3077F SYST BP >= 140 MM HG: CPT | Performed by: PHYSICIAN ASSISTANT

## 2020-08-20 PROCEDURE — 1036F TOBACCO NON-USER: CPT | Performed by: PHYSICIAN ASSISTANT

## 2020-08-20 RX ORDER — INSULIN GLARGINE 100 [IU]/ML
30 INJECTION, SOLUTION SUBCUTANEOUS DAILY
Qty: 10 PEN | Refills: 1 | Status: SHIPPED | OUTPATIENT
Start: 2020-08-20 | End: 2021-03-10 | Stop reason: SDUPTHER

## 2020-08-20 NOTE — PROGRESS NOTES
Virtual Regular Visit      Assessment/Plan:    Problem List Items Addressed This Visit        Endocrine    Type 2 diabetes mellitus with complication, without long-term current use of insulin (HCC)    Relevant Medications    insulin glargine (Lantus SoloStar) 100 units/mL injection pen    Other Relevant Orders    CBC and differential    Comprehensive metabolic panel    Lipid panel    TSH, 3rd generation    HEMOGLOBIN A1C W/ EAG ESTIMATION       Cardiovascular and Mediastinum    Hypertension - Primary    Relevant Orders    CBC and differential    Comprehensive metabolic panel    Lipid panel    TSH, 3rd generation    HEMOGLOBIN A1C W/ EAG ESTIMATION       Other    Mixed hyperlipidemia    Relevant Orders    CBC and differential    Comprehensive metabolic panel    Lipid panel    TSH, 3rd generation    HEMOGLOBIN A1C W/ EAG ESTIMATION      discussed diet and exercise goals, urged to go for labs asap to review most recent blood sugars  Will likely need to adjust lantus dose and possibly januvia  Pt to f/u in office for exam in 1-2 months            Reason for visit is   Chief Complaint   Patient presents with    Follow-up    Diabetes     141- blood sugar 8am 8/20/20    Virtual Regular Visit        Encounter provider Vignesh Jain PA-C    Provider located at [address] [place] [id number]      Recent Visits  No visits were found meeting these conditions  Showing recent visits within past 7 days and meeting all other requirements     Today's Visits  Date Type Provider Dept   08/20/20 Telemedicine Vignesh Jain PA-C [facility]   Showing today's visits and meeting all other requirements     Future Appointments  No visits were found meeting these conditions  Showing future appointments within next 150 days and meeting all other requirements        The patient was identified by name and date of birth  Pedro Gibson was informed that this is a telemedicine visit and that the visit is being conducted through [address] and patient was informed that this is not a secure, HIPAA-complaint platform  She agrees to proceed     My office door was closed  No one else was in the room  She acknowledged consent and understanding of privacy and security of the video platform  The patient has agreed to participate and understands they can discontinue the visit at any time  Patient is aware this is a billable service  Subjective  Pedro Gibson is a 69 y o  female with hx of dm, gerd, hypothyroid, dyslipidemia, htn        Pt is due for labs, has not been compliant with diet   She reports her FBS in the am run between 150-200   She reports snacking quite a bit at night  Last hga1c in   At that time lantus was increased and [place] and [place] was added     Pt c/o low back pain, notices after she uses her vacuum   Had xray in  which showed ddd only  Resolved now, no pain at rest            Past Medical History:   Diagnosis Date    Anxiety     Arthritis     Bladder carcinoma ([facility] [address] ) 2016    Bladder mass     Depression     Diabetes mellitus ([facility] [address] )     Disease of thyroid gland     thyroid nodules-not treating at this time    Dysuria     Last assessed 17    GERD (gastroesophageal reflux disease)      2 para 2     HLD (hyperlipidemia)     HTN (hypertension)     Hypercholesterolemia     Hypertension     Knee pain     Lichen sclerosus 4460    Melanoma ([facility] [address] )     Mild aortic regurgitation with left ventricular dilation by prior echocardiogram     Papanicolaou smear 2017    NEG    PONV (postoperative nausea and vomiting)        Past Surgical History:   Procedure Laterality Date    CARPAL TUNNEL RELEASE Right      SECTION      x2    CHOLECYSTECTOMY      CYSTOSCOPY N/A 2016    Procedure: CYSTOSCOPY WITH BIOPSIES;  Surgeon: Gaby Pratt MD;  Location:  MAIN OR;  Service:    Serafin Medina CYSTOSCOPY N/A 9/1/2016    Procedure: CYSTOSCOPY;  Surgeon: Az Schafer MD;  [facility];  Service:     CYSTOSCOPY  08/07/2020    HERNIA REPAIR      SD CYSTO/URETERO W/LITHOTRIPSY &INDWELL STENT INSRT  11/29/2016    Procedure: CYSTOSCOPY URETEROSCOPY WITH LITHOTRIPSY HOLMIUM LASER RIGHT, RETROGRADE PYELOGRAM BILATERAL: AND INSERTION STENT URETERAL Right ;  Surgeon: Az Schafer MD;  Location: [facility] MAIN OR;  Service: Urology    SD CYSTO/URETERO/PYELOSCOPY, DX Right 5/9/2017    Procedure: URETEROSCOPY;  Surgeon: Az Schafer MD;  Location: [facility];  Service: Urology    SD CYSTOSCOPY,INSERT URETERAL STENT Left 9/29/2016    Procedure: URETERAL STENTS;  Surgeon: Az Schafer MD;  Location: [facility] OR;  Service: Urology    SD CYSTOURETHROSCOPY,FULGUR <0 5 CM LESN N/A 11/29/2016    Procedure: TRANSURETHRAL RESECTION OF BLADDER TUMOR (TURBT);   Surgeon: Az Schafer MD;  Location: [facility];  Service: Urology    SD CYSTOURETHROSCOPY,FULGUR <0 5 CM LESN N/A 9/29/2016    Procedure: [place]; TUR BLADDER TUMOR ;  Surgeon: Az Schafer MD;  Location: [facility];  Service: Urology    SD CYSTOURETHROSCOPY,FULGUR <0 5 CM LESN N/A 9/1/2016    Procedure: TUR BLADDER TUMOR ;  Surgeon: Az Schafer MD;  Location: [facility] Main OR;  Service: Urology    SD CYSTOURETHROSCOPY,FULGUR <0 5 CM LESN Bilateral 5/9/2017    Procedure: CYSTOSCOPY, RIGHT URETERAL WASHINGS, ;  Surgeon: Az Schafer MD;  Location: [facility];  Service: Urology    SD CYSTOURETHROSCOPY,URETER CATHETER Bilateral 9/29/2016    Procedure: RETROGRADE PYELOGRAM ;  Surgeon: Az Schafer MD;  Location: [facility];  Service: Urology    SD CYSTOURETHROSCOPY,URETER CATHETER Bilateral 5/9/2017    Procedure: RETROGRADE PYELOGRAM WITH STENT EXCHANGE, RIGHT;  Surgeon: Az Schafer MD;  [facility];  Service: Urology    SD INSTILL ANTICANCER AGENT IN BLADDER N/A 11/29/2016    Procedure: Lelon Misael;  Surgeon: Rosalio Ellington Maurice Cruz MD;  [facility];  Service: Urology    AL KNEE SCOPE,MED/LAT MENISECTOMY Left 4/4/2016    Procedure: KNEE ARTHROSCOPIC PARTIAL MEDIAL MENISCECTOMY ;  Surgeon: Bryson Verma MD;  Location: [facility];  Service: Orthopedics    REMOVAL URETERAL STENT Left 11/29/2016    Procedure: REMOVAL STENT URETERAL;  Surgeon: Alan Lai MD;  Location: [facility];  Service:    [facility] TONSILLECTOMY         Current Outpatient Medications   Medication Sig Dispense Refill    amLODIPine (NORVASC) 5 mg tablet Take 1 tablet (5 mg total) by mouth daily 30 tablet 3    betamethasone, augmented, (DIPROLENE) 0 05 % ointment       clonazePAM (KlonoPIN) 0 5 mg tablet Take 1 tablet (0 5 mg total) by mouth daily at bedtime 30 tablet 0    Cyanocobalamin 1000 MCG CAPS Take 1 capsule by mouth daily        famotidine (PEPCID) 20 mg tablet Take 1 tablet (20 mg total) by mouth daily (Patient taking differently: Take 20 mg by mouth daily As needed) 90 tablet 0    fenofibrate (TRICOR) 145 mg tablet Take 1 tablet (145 mg total) by mouth daily 90 tablet 1    fluticasone (FLONASE) 50 mcg/act nasal spray 1 spray into each nostril daily 1 Bottle 0    ibuprofen (MOTRIN) 200 mg tablet Take 200 mg by mouth every 6 (six) hours as needed for mild pain      insulin glargine (Lantus SoloStar) 100 units/mL injection pen Inject 30 Units under the skin daily 10 pen 1    Insulin Pen Needle (BD Pen Needle Shanti U/F) 32G X 4 MM MISC Once daily 100 each 1    levothyroxine 50 mcg tablet TAKE 1 TABLET BY MOUTH EVERY DAY 90 tablet 1    metFORMIN (GLUCOPHAGE) 500 mg tablet Take 2 tablets (1,000 mg total) by mouth 2 (two) times a day with meals 360 tablet 1    metoprolol tartrate (LOPRESSOR) 25 mg tablet Take 0 5 tablets (12 5 mg total) by mouth every 12 (twelve) hours 90 tablet 0    mometasone (ELOCON) 0 1 % cream Apply topically daily      ONETOUCH VERIO test strip Test 3 times daily 300 each 1    Probiotic Product (PROBIOTIC-10) CAPS Take 1 capsule by mouth daily      simvastatin (ZOCOR) 40 mg tablet Take 1 tablet (40 mg total) by mouth daily at bedtime 90 tablet 1    sitaGLIPtin (JANUVIA) 25 mg tablet Take 1 tablet (25 mg total) by mouth daily 30 tablet 5    zolpidem (AMBIEN) 10 mg tablet Pt  Unable to cut down unable to funtion without it  She is aware of the fact that this dose is very addictive 30 tablet 0     No current facility-administered medications for this visit  No Known Allergies    Review of Systems   Constitutional: Negative for activity change, appetite change, fatigue and fever  HENT: Negative for congestion and postnasal drip  Respiratory: Negative for cough, shortness of breath and wheezing  Cardiovascular: Negative for chest pain, palpitations and leg swelling  Gastrointestinal: Negative for abdominal pain, constipation, diarrhea and nausea  Genitourinary: Negative for dysuria and flank pain  Musculoskeletal: Positive for arthralgias (general )  Skin: Negative for rash  Neurological: Negative for dizziness and headaches  Psychiatric/Behavioral: Negative for sleep disturbance  The patient is not nervous/anxious  Video Exam    Vitals:    08/20/20 0918   BP: 142/83   Pulse: 87   Temp: (!) 97 3 °F (36 3 °C)       Physical Exam  Vitals signs reviewed  Constitutional:       General: She is not in acute distress  HENT:      Head: Normocephalic and atraumatic  Eyes:      Extraocular Movements: Extraocular movements intact  Pulmonary:      Effort: Pulmonary effort is normal  No respiratory distress  Skin:     Findings: No rash  Neurological:      General: No focal deficit present  Mental Status: She is alert and oriented to person, place, and time  Psychiatric:         Mood and Affect: Mood normal           I spent 25 minutes directly with the patient during this visit      VIRTUAL VISIT DISCLAIMER    Geovanna Read acknowledges that she has consented to an online visit or consultation  She understands that the online visit is based solely on information provided by her, and that, in the absence of a face-to-face physical evaluation by the physician, the diagnosis she receives is both limited and provisional in terms of accuracy and completeness  This is not intended to replace a full medical face-to-face evaluation by the physician  Hood Swartz understands and accepts these terms

## 2020-08-22 DIAGNOSIS — E11.8 TYPE 2 DIABETES MELLITUS WITH COMPLICATION, WITHOUT LONG-TERM CURRENT USE OF INSULIN (HCC): ICD-10-CM

## 2020-09-14 DIAGNOSIS — F41.8 DEPRESSION WITH ANXIETY: ICD-10-CM

## 2020-09-16 DIAGNOSIS — F41.8 DEPRESSION WITH ANXIETY: ICD-10-CM

## 2020-09-16 RX ORDER — CLONAZEPAM 0.5 MG/1
0.5 TABLET ORAL
Qty: 30 TABLET | Refills: 0 | Status: SHIPPED | OUTPATIENT
Start: 2020-09-16 | End: 2020-10-29 | Stop reason: SDUPTHER

## 2020-09-16 RX ORDER — ZOLPIDEM TARTRATE 10 MG/1
10 TABLET ORAL
Qty: 30 TABLET | Refills: 0 | Status: SHIPPED | OUTPATIENT
Start: 2020-09-16 | End: 2020-10-14 | Stop reason: SDUPTHER

## 2020-09-16 RX ORDER — ZOLPIDEM TARTRATE 10 MG/1
TABLET ORAL
Qty: 30 TABLET | Refills: 0 | Status: SHIPPED | OUTPATIENT
Start: 2020-09-16 | End: 2020-09-16 | Stop reason: SDUPTHER

## 2020-09-17 DIAGNOSIS — E78.00 HYPERCHOLESTEROLEMIA: ICD-10-CM

## 2020-09-21 RX ORDER — FENOFIBRATE 145 MG/1
TABLET, COATED ORAL
Qty: 90 TABLET | Refills: 1 | Status: SHIPPED | OUTPATIENT
Start: 2020-09-21 | End: 2020-12-28

## 2020-10-06 ENCOUNTER — APPOINTMENT (OUTPATIENT)
Dept: LAB | Facility: MEDICAL CENTER | Age: 70
End: 2020-10-06
Payer: MEDICARE

## 2020-10-06 DIAGNOSIS — I10 ESSENTIAL HYPERTENSION: ICD-10-CM

## 2020-10-06 DIAGNOSIS — E11.8 TYPE 2 DIABETES MELLITUS WITH COMPLICATION, WITHOUT LONG-TERM CURRENT USE OF INSULIN (HCC): ICD-10-CM

## 2020-10-06 DIAGNOSIS — E78.2 MIXED HYPERLIPIDEMIA: ICD-10-CM

## 2020-10-06 LAB
ALBUMIN SERPL BCP-MCNC: 4.5 G/DL (ref 3.5–5)
ALP SERPL-CCNC: 51 U/L (ref 46–116)
ALT SERPL W P-5'-P-CCNC: 40 U/L (ref 12–78)
ANION GAP SERPL CALCULATED.3IONS-SCNC: 5 MMOL/L (ref 4–13)
AST SERPL W P-5'-P-CCNC: 27 U/L (ref 5–45)
BASOPHILS # BLD AUTO: 0.08 THOUSANDS/ΜL (ref 0–0.1)
BASOPHILS NFR BLD AUTO: 1 % (ref 0–1)
BILIRUB SERPL-MCNC: 0.33 MG/DL (ref 0.2–1)
BUN SERPL-MCNC: 16 MG/DL (ref 5–25)
CALCIUM SERPL-MCNC: 9.5 MG/DL (ref 8.3–10.1)
CHLORIDE SERPL-SCNC: 99 MMOL/L (ref 100–108)
CHOLEST SERPL-MCNC: 158 MG/DL (ref 50–200)
CO2 SERPL-SCNC: 27 MMOL/L (ref 21–32)
CREAT SERPL-MCNC: 0.82 MG/DL (ref 0.6–1.3)
EOSINOPHIL # BLD AUTO: 0.26 THOUSAND/ΜL (ref 0–0.61)
EOSINOPHIL NFR BLD AUTO: 4 % (ref 0–6)
ERYTHROCYTE [DISTWIDTH] IN BLOOD BY AUTOMATED COUNT: 13.1 % (ref 11.6–15.1)
EST. AVERAGE GLUCOSE BLD GHB EST-MCNC: 148 MG/DL
GFR SERPL CREATININE-BSD FRML MDRD: 72 ML/MIN/1.73SQ M
GLUCOSE P FAST SERPL-MCNC: 101 MG/DL (ref 65–99)
HBA1C MFR BLD: 6.8 %
HCT VFR BLD AUTO: 39.4 % (ref 34.8–46.1)
HDLC SERPL-MCNC: 32 MG/DL
HGB BLD-MCNC: 13.4 G/DL (ref 11.5–15.4)
IMM GRANULOCYTES # BLD AUTO: 0.03 THOUSAND/UL (ref 0–0.2)
IMM GRANULOCYTES NFR BLD AUTO: 1 % (ref 0–2)
LDLC SERPL CALC-MCNC: 71 MG/DL (ref 0–100)
LYMPHOCYTES # BLD AUTO: 1.26 THOUSANDS/ΜL (ref 0.6–4.47)
LYMPHOCYTES NFR BLD AUTO: 21 % (ref 14–44)
MCH RBC QN AUTO: 30.5 PG (ref 26.8–34.3)
MCHC RBC AUTO-ENTMCNC: 34 G/DL (ref 31.4–37.4)
MCV RBC AUTO: 90 FL (ref 82–98)
MONOCYTES # BLD AUTO: 0.57 THOUSAND/ΜL (ref 0.17–1.22)
MONOCYTES NFR BLD AUTO: 9 % (ref 4–12)
NEUTROPHILS # BLD AUTO: 3.92 THOUSANDS/ΜL (ref 1.85–7.62)
NEUTS SEG NFR BLD AUTO: 64 % (ref 43–75)
NRBC BLD AUTO-RTO: 0 /100 WBCS
PLATELET # BLD AUTO: 223 THOUSANDS/UL (ref 149–390)
PMV BLD AUTO: 9.8 FL (ref 8.9–12.7)
POTASSIUM SERPL-SCNC: 4 MMOL/L (ref 3.5–5.3)
PROT SERPL-MCNC: 7.7 G/DL (ref 6.4–8.2)
RBC # BLD AUTO: 4.4 MILLION/UL (ref 3.81–5.12)
SODIUM SERPL-SCNC: 131 MMOL/L (ref 136–145)
TRIGL SERPL-MCNC: 277 MG/DL
TSH SERPL DL<=0.05 MIU/L-ACNC: 1.8 UIU/ML (ref 0.36–3.74)
WBC # BLD AUTO: 6.12 THOUSAND/UL (ref 4.31–10.16)

## 2020-10-06 PROCEDURE — 36415 COLL VENOUS BLD VENIPUNCTURE: CPT

## 2020-10-06 PROCEDURE — 83036 HEMOGLOBIN GLYCOSYLATED A1C: CPT

## 2020-10-06 PROCEDURE — 80053 COMPREHEN METABOLIC PANEL: CPT

## 2020-10-06 PROCEDURE — 85025 COMPLETE CBC W/AUTO DIFF WBC: CPT

## 2020-10-06 PROCEDURE — 80061 LIPID PANEL: CPT

## 2020-10-06 PROCEDURE — 84443 ASSAY THYROID STIM HORMONE: CPT

## 2020-10-14 ENCOUNTER — TELEPHONE (OUTPATIENT)
Dept: ADMINISTRATIVE | Facility: OTHER | Age: 70
End: 2020-10-14

## 2020-10-14 ENCOUNTER — OFFICE VISIT (OUTPATIENT)
Dept: INTERNAL MEDICINE CLINIC | Age: 70
End: 2020-10-14
Payer: MEDICARE

## 2020-10-14 VITALS
HEIGHT: 62 IN | SYSTOLIC BLOOD PRESSURE: 150 MMHG | OXYGEN SATURATION: 98 % | WEIGHT: 162.2 LBS | BODY MASS INDEX: 29.85 KG/M2 | HEART RATE: 78 BPM | DIASTOLIC BLOOD PRESSURE: 90 MMHG | TEMPERATURE: 97.9 F

## 2020-10-14 DIAGNOSIS — Z79.4 TYPE 2 DIABETES MELLITUS WITHOUT COMPLICATION, WITH LONG-TERM CURRENT USE OF INSULIN (HCC): Primary | ICD-10-CM

## 2020-10-14 DIAGNOSIS — I10 ESSENTIAL HYPERTENSION: ICD-10-CM

## 2020-10-14 DIAGNOSIS — R00.0 TACHYCARDIA: ICD-10-CM

## 2020-10-14 DIAGNOSIS — E87.1 HYPONATREMIA: ICD-10-CM

## 2020-10-14 DIAGNOSIS — E11.9 TYPE 2 DIABETES MELLITUS WITHOUT COMPLICATION, WITH LONG-TERM CURRENT USE OF INSULIN (HCC): Primary | ICD-10-CM

## 2020-10-14 DIAGNOSIS — E78.2 MIXED HYPERLIPIDEMIA: ICD-10-CM

## 2020-10-14 DIAGNOSIS — F41.8 DEPRESSION WITH ANXIETY: ICD-10-CM

## 2020-10-14 PROCEDURE — 99214 OFFICE O/P EST MOD 30 MIN: CPT | Performed by: PHYSICIAN ASSISTANT

## 2020-10-14 RX ORDER — ZOLPIDEM TARTRATE 10 MG/1
10 TABLET ORAL
Qty: 30 TABLET | Refills: 0 | Status: SHIPPED | OUTPATIENT
Start: 2020-10-14 | End: 2020-11-10 | Stop reason: SDUPTHER

## 2020-10-15 DIAGNOSIS — I10 ESSENTIAL HYPERTENSION: ICD-10-CM

## 2020-10-15 DIAGNOSIS — R00.0 TACHYCARDIA: ICD-10-CM

## 2020-10-23 DIAGNOSIS — E13.9 DIABETES 1.5, MANAGED AS TYPE 2 (HCC): ICD-10-CM

## 2020-10-27 RX ORDER — SITAGLIPTIN 25 MG/1
TABLET, FILM COATED ORAL
Qty: 90 TABLET | Refills: 1 | Status: SHIPPED | OUTPATIENT
Start: 2020-10-27 | End: 2021-04-28

## 2020-10-29 DIAGNOSIS — F41.8 DEPRESSION WITH ANXIETY: ICD-10-CM

## 2020-10-29 RX ORDER — CLONAZEPAM 0.5 MG/1
0.5 TABLET ORAL
Qty: 30 TABLET | Refills: 0 | Status: SHIPPED | OUTPATIENT
Start: 2020-10-29 | End: 2020-11-27 | Stop reason: SDUPTHER

## 2020-11-10 DIAGNOSIS — E11.8 TYPE 2 DIABETES MELLITUS WITH COMPLICATION, WITHOUT LONG-TERM CURRENT USE OF INSULIN (HCC): ICD-10-CM

## 2020-11-10 DIAGNOSIS — F41.8 DEPRESSION WITH ANXIETY: ICD-10-CM

## 2020-11-12 RX ORDER — PEN NEEDLE, DIABETIC 32GX 5/32"
NEEDLE, DISPOSABLE MISCELLANEOUS
Qty: 100 EACH | Refills: 1 | Status: SHIPPED | OUTPATIENT
Start: 2020-11-12 | End: 2021-02-11 | Stop reason: SDUPTHER

## 2020-11-12 RX ORDER — ZOLPIDEM TARTRATE 10 MG/1
10 TABLET ORAL
Qty: 30 TABLET | Refills: 0 | Status: SHIPPED | OUTPATIENT
Start: 2020-11-12 | End: 2020-12-10 | Stop reason: SDUPTHER

## 2020-11-27 DIAGNOSIS — F41.8 DEPRESSION WITH ANXIETY: ICD-10-CM

## 2020-11-27 RX ORDER — CLONAZEPAM 0.5 MG/1
0.5 TABLET ORAL
Qty: 30 TABLET | Refills: 0 | Status: SHIPPED | OUTPATIENT
Start: 2020-11-27 | End: 2020-12-28 | Stop reason: SDUPTHER

## 2020-12-10 DIAGNOSIS — F41.8 DEPRESSION WITH ANXIETY: ICD-10-CM

## 2020-12-10 RX ORDER — ZOLPIDEM TARTRATE 10 MG/1
10 TABLET ORAL
Qty: 30 TABLET | Refills: 0 | Status: SHIPPED | OUTPATIENT
Start: 2020-12-10 | End: 2021-01-12 | Stop reason: SDUPTHER

## 2020-12-18 DIAGNOSIS — E03.9 HYPOTHYROIDISM, UNSPECIFIED TYPE: ICD-10-CM

## 2020-12-21 RX ORDER — LEVOTHYROXINE SODIUM 0.05 MG/1
TABLET ORAL
Qty: 90 TABLET | Refills: 1 | Status: SHIPPED | OUTPATIENT
Start: 2020-12-21 | End: 2021-06-23

## 2020-12-23 DIAGNOSIS — E78.00 HYPERCHOLESTEROLEMIA: ICD-10-CM

## 2020-12-28 DIAGNOSIS — F41.8 DEPRESSION WITH ANXIETY: ICD-10-CM

## 2020-12-28 RX ORDER — FENOFIBRATE 145 MG/1
TABLET, COATED ORAL
Qty: 90 TABLET | Refills: 1 | Status: SHIPPED | OUTPATIENT
Start: 2020-12-28 | End: 2021-06-23

## 2020-12-28 RX ORDER — CLONAZEPAM 0.5 MG/1
0.5 TABLET ORAL
Qty: 30 TABLET | Refills: 0 | Status: SHIPPED | OUTPATIENT
Start: 2020-12-28 | End: 2021-01-26 | Stop reason: SDUPTHER

## 2021-01-06 DIAGNOSIS — I10 ESSENTIAL HYPERTENSION: ICD-10-CM

## 2021-01-06 RX ORDER — AMLODIPINE BESYLATE 5 MG/1
5 TABLET ORAL DAILY
Qty: 30 TABLET | Refills: 5 | Status: SHIPPED | OUTPATIENT
Start: 2021-01-06 | End: 2021-07-08 | Stop reason: SDUPTHER

## 2021-01-12 DIAGNOSIS — F41.8 DEPRESSION WITH ANXIETY: ICD-10-CM

## 2021-01-13 RX ORDER — ZOLPIDEM TARTRATE 10 MG/1
10 TABLET ORAL
Qty: 30 TABLET | Refills: 0 | Status: SHIPPED | OUTPATIENT
Start: 2021-01-13 | End: 2021-02-10 | Stop reason: SDUPTHER

## 2021-01-21 ENCOUNTER — TELEPHONE (OUTPATIENT)
Dept: INTERNAL MEDICINE CLINIC | Age: 71
End: 2021-01-21

## 2021-01-21 NOTE — TELEPHONE ENCOUNTER
Kiera Pink calling to see about getting her and her  Mel Zelaya  49 set up for the COVID vaccine   They are willing to go anywhere and any time

## 2021-01-22 NOTE — TELEPHONE ENCOUNTER
Waiting for them to give us the go to schedule the under [address] but not right now    Stated to her that we will call her once we get the go ahead to schedule

## 2021-01-26 DIAGNOSIS — F41.8 DEPRESSION WITH ANXIETY: ICD-10-CM

## 2021-01-27 RX ORDER — CLONAZEPAM 0.5 MG/1
0.5 TABLET ORAL
Qty: 30 TABLET | Refills: 0 | Status: SHIPPED | OUTPATIENT
Start: 2021-01-27 | End: 2021-02-25 | Stop reason: SDUPTHER

## 2021-02-02 DIAGNOSIS — E78.00 HYPERCHOLESTEROLEMIA: ICD-10-CM

## 2021-02-03 RX ORDER — SIMVASTATIN 40 MG
TABLET ORAL
Qty: 90 TABLET | Refills: 1 | OUTPATIENT
Start: 2021-02-03

## 2021-02-10 DIAGNOSIS — F41.8 DEPRESSION WITH ANXIETY: ICD-10-CM

## 2021-02-10 RX ORDER — ZOLPIDEM TARTRATE 10 MG/1
10 TABLET ORAL
Qty: 30 TABLET | Refills: 0 | Status: SHIPPED | OUTPATIENT
Start: 2021-02-10 | End: 2021-03-10 | Stop reason: SDUPTHER

## 2021-02-11 DIAGNOSIS — E11.8 TYPE 2 DIABETES MELLITUS WITH COMPLICATION, WITHOUT LONG-TERM CURRENT USE OF INSULIN (HCC): ICD-10-CM

## 2021-02-11 RX ORDER — PEN NEEDLE, DIABETIC 32GX 5/32"
NEEDLE, DISPOSABLE MISCELLANEOUS
Qty: 100 EACH | Refills: 1 | Status: SHIPPED | OUTPATIENT
Start: 2021-02-11 | End: 2021-07-22

## 2021-02-11 NOTE — TELEPHONE ENCOUNTER
Spoke with pt reports her medication box says 0 refills, and was filled on 11/12/20    Tried calling pharmacy to confirm to refills on file however can not get through to speak with pharmacist     Please advise on sending in new order

## 2021-02-13 DIAGNOSIS — Z23 ENCOUNTER FOR IMMUNIZATION: ICD-10-CM

## 2021-02-20 DIAGNOSIS — E11.8 TYPE 2 DIABETES MELLITUS WITH COMPLICATION, WITHOUT LONG-TERM CURRENT USE OF INSULIN (HCC): ICD-10-CM

## 2021-02-25 DIAGNOSIS — F41.8 DEPRESSION WITH ANXIETY: ICD-10-CM

## 2021-02-25 RX ORDER — CLONAZEPAM 0.5 MG/1
0.5 TABLET ORAL
Qty: 30 TABLET | Refills: 0 | Status: SHIPPED | OUTPATIENT
Start: 2021-02-25 | End: 2021-03-30 | Stop reason: SDUPTHER

## 2021-02-26 ENCOUNTER — OFFICE VISIT (OUTPATIENT)
Dept: URGENT CARE | Facility: MEDICAL CENTER | Age: 71
End: 2021-02-26
Payer: MEDICARE

## 2021-02-26 VITALS
HEART RATE: 65 BPM | SYSTOLIC BLOOD PRESSURE: 163 MMHG | HEIGHT: 62 IN | TEMPERATURE: 97.5 F | RESPIRATION RATE: 18 BRPM | OXYGEN SATURATION: 98 % | BODY MASS INDEX: 30.91 KG/M2 | WEIGHT: 168 LBS | DIASTOLIC BLOOD PRESSURE: 76 MMHG

## 2021-02-26 DIAGNOSIS — H61.21 IMPACTED CERUMEN OF RIGHT EAR: ICD-10-CM

## 2021-02-26 DIAGNOSIS — H60.501 ACUTE OTITIS EXTERNA OF RIGHT EAR, UNSPECIFIED TYPE: Primary | ICD-10-CM

## 2021-02-26 PROCEDURE — 69209 REMOVE IMPACTED EAR WAX UNI: CPT | Performed by: PHYSICIAN ASSISTANT

## 2021-02-26 PROCEDURE — 99213 OFFICE O/P EST LOW 20 MIN: CPT | Performed by: PHYSICIAN ASSISTANT

## 2021-02-26 PROCEDURE — G0463 HOSPITAL OUTPT CLINIC VISIT: HCPCS | Performed by: PHYSICIAN ASSISTANT

## 2021-02-26 RX ORDER — OFLOXACIN 3 MG/ML
10 SOLUTION AURICULAR (OTIC) DAILY
Qty: 5 ML | Refills: 0 | Status: SHIPPED | OUTPATIENT
Start: 2021-02-26 | End: 2021-03-05

## 2021-02-26 RX ORDER — AMOXICILLIN 500 MG/1
500 CAPSULE ORAL EVERY 8 HOURS SCHEDULED
Qty: 21 CAPSULE | Refills: 0 | Status: SHIPPED | OUTPATIENT
Start: 2021-02-26 | End: 2021-03-05

## 2021-02-26 NOTE — PROGRESS NOTES
[address] [facility]        NAME: Jessica Valverde is a 69 y o  female  : 1950    MRN: [id number]  DATE: 2021  TIME: 4:11 PM    Assessment and Plan   Acute otitis externa of right ear, unspecified type [H60 501]  1  Acute otitis externa of right ear, unspecified type     2  Impacted cerumen of right ear           Patient Instructions     Otitis externa  Cerumen impaction  Amoxicillin as directed  floxin otic as directed  Follow up with PCP in 3-5 days  Proceed to  ER if symptoms worsen  Chief Complaint     Chief Complaint   Patient presents with    Earache     right ear pain, also states that ear fells blocked          History of Present Illness       70 y/o female presents c/o decreased hearing and pain to right ear x 3 weeks  Patient denies fever, chills, SOB, chest pain, cough      Review of Systems   Review of Systems   Constitutional: Negative  HENT: Positive for ear pain and hearing loss  Eyes: Negative  Respiratory: Negative  Negative for cough, chest tightness, shortness of breath, wheezing and stridor  Cardiovascular: Negative  Negative for chest pain, palpitations and leg swelling           Current Medications       Current Outpatient Medications:     amLODIPine (NORVASC) 5 mg tablet, Take 1 tablet (5 mg total) by mouth daily, Disp: 30 tablet, Rfl: 5    clonazePAM (KlonoPIN) 0 5 mg tablet, Take 1 tablet (0 5 mg total) by mouth daily at bedtime, Disp: 30 tablet, Rfl: 0    Cyanocobalamin 1000 MCG CAPS, Take 1 capsule by mouth daily  , Disp: , Rfl:     famotidine (PEPCID) 20 mg tablet, Take 1 tablet (20 mg total) by mouth daily (Patient taking differently: Take 20 mg by mouth daily As needed), Disp: 90 tablet, Rfl: 0    fenofibrate (TRICOR) 145 mg tablet, TAKE 1 TABLET BY MOUTH EVERY DAY, Disp: 90 tablet, Rfl: 1    fluticasone (FLONASE) 50 mcg/act nasal spray, 1 spray into each nostril daily, Disp: 1 Bottle, Rfl: 0    ibuprofen (MOTRIN) 200 mg tablet, Take 200 mg by mouth every 6 (six) hours as needed for mild pain, Disp: , Rfl:     insulin glargine (Lantus SoloStar) 100 units/mL injection pen, Inject 30 Units under the skin daily, Disp: 10 pen, Rfl: 1    Insulin Pen Needle (BD Pen Needle Shanti U/F) 32G X 4 MM MISC, Once daily, Disp: 100 each, Rfl: 1    Januvia 25 MG tablet, TAKE 1 TABLET BY MOUTH EVERY DAY, Disp: 90 tablet, Rfl: 1    levothyroxine 50 mcg tablet, TAKE 1 TABLET BY MOUTH EVERY DAY, Disp: 90 tablet, Rfl: 1    metFORMIN (GLUCOPHAGE) 500 mg tablet, TAKE 2 TABLETS BY MOUTH TWICE A DAY WITH FOOD, Disp: 360 tablet, Rfl: 1    metoprolol tartrate (LOPRESSOR) 25 mg tablet, Take 1 tablet (25 mg total) by mouth every 12 (twelve) hours, Disp: 180 tablet, Rfl: 1    mometasone (ELOCON) 0 1 % cream, Apply topically daily, Disp: , Rfl:     ONETOUCH VERIO test strip, Test 3 times daily, Disp: 300 each, Rfl: 1    Probiotic Product (PROBIOTIC-10) CAPS, Take 1 capsule by mouth daily, Disp: , Rfl:     simvastatin (ZOCOR) 40 mg tablet, Take 1 tablet (40 mg total) by mouth daily at bedtime, Disp: 90 tablet, Rfl: 1    zolpidem (AMBIEN) 10 mg tablet, Take 1 tablet (10 mg total) by mouth daily at bedtime, Disp: 30 tablet, Rfl: 0    Current Allergies     Allergies as of 2021    (No Known Allergies)            The following portions of the patient's history were reviewed and updated as appropriate: allergies, current medications, past family history, past medical history, past social history, past surgical history and problem list      Past Medical History:   Diagnosis Date    Anxiety     Arthritis     Bladder carcinoma ([facility] [address] ) 2016    Bladder mass     Depression     Diabetes mellitus ([facility] [address] )     Disease of thyroid gland     thyroid nodules-not treating at this time    Dysuria     Last assessed 17    GERD (gastroesophageal reflux disease)      2 para 2     HLD (hyperlipidemia)     HTN (hypertension)     Hypercholesterolemia     Hypertension     Knee pain     Lichen sclerosus 1710    Melanoma ([facility] [address] ) 2014    Mild aortic regurgitation with left ventricular dilation by prior echocardiogram     Papanicolaou smear 2017    NEG    PONV (postoperative nausea and vomiting)        Past Surgical History:   Procedure Laterality Date    CARPAL TUNNEL RELEASE Right      SECTION      x2    CHOLECYSTECTOMY      CYSTOSCOPY N/A 2016    Procedure: CYSTOSCOPY WITH BIOPSIES;  Surgeon: Karl Marlow MD;  [facility];  Service:    Celeste Lame N/A 2016    Procedure: CYSTOSCOPY;  Surgeon: Karl Marlow MD;  [facility];  Service:     CYSTOSCOPY  2020    HERNIA REPAIR      [place] CYSTO/URETERO W/LITHOTRIPSY &INDWELL STENT INSRT  2016    Procedure: CYSTOSCOPY URETEROSCOPY WITH LITHOTRIPSY HOLMIUM LASER RIGHT, RETROGRADE PYELOGRAM BILATERAL: AND INSERTION STENT URETERAL Right ;  Surgeon: Karl Marlow MD;  [facility]: [facility];  Service: Urology    [place] CYSTO/URETERO/PYELOSCOPY, DX Right 2017    Procedure: URETEROSCOPY;  Surgeon: Karl Marlow MD;  [facility];  Service: Urology    W[place] CYSTOSCOPY,INSERT URETERAL STENT Left 2016    Procedure: URETERAL STENTS;  Surgeon: Karl Marlow MD;  [facility];  Service: Urology    W[place] CYSTOURETHROSCOPY,FULGUR <0 5 CM LESN N/A 2016    Procedure: TRANSURETHRAL RESECTION OF BLADDER TUMOR (TURBT);   Surgeon: Karl Marlow MD;  [facility];  Service: Urology    W[place] CYSTOURETHROSCOPY,FULGUR <0 5 CM LESN N/A 2016    Procedure: Welford Gallery; TUR BLADDER TUMOR ;  Surgeon: Karl Marlow MD;  [facility];  Service: Urology    WY CYSTOURETHROSCOPY,FULGUR <0 5 CM LESN N/A 2016    Procedure: TUR BLADDER TUMOR ;  Surgeon: Karl Marlow MD;  [facility];  Service: Urology    WY CYSTOURETHROSCOPY,FULGUR <0 5 CM LESN Bilateral 2017    Procedure: Gale Syed, ;  Surgeon: Karl Marlow MD; [facility];  Service: Urology    VA CYSTOURETHROSCOPY,URETER CATHETER Bilateral 9/29/2016    Procedure: RETROGRADE PYELOGRAM ;  Surgeon: Terrance Mckeon MD;  [facility];  Service: Urology    VA [address] Bilateral 5/9/2017    Procedure: RETROGRADE PYELOGRAM WITH STENT EXCHANGE, RIGHT;  Surgeon: Terrance Mckeon MD;  [facility]: [facility];  Service: Urology    VA INSTILL ANTICANCER AGENT IN BLADDER N/A 11/29/2016    Procedure: Cristy Hard;  Surgeon: Terrance Mckeon MD;  [facility];  Service: Urology    VA KNEE SCOPE,MED/LAT MENISECTOMY Left 4/4/2016    Procedure: KNEE ARTHROSCOPIC PARTIAL MEDIAL MENISCECTOMY ;  Surgeon: Cory Vera MD;  [facility];  Service: Orthopedics    REMOVAL URETERAL STENT Left 11/29/2016    Procedure: REMOVAL STENT URETERAL;  Surgeon: Terrance Mckeon MD;  [facility];  Service:     TONSILLECTOMY         Family History   Problem Relation Age of Onset    Heart attack Mother     Heart disease Mother     ALS Father     Diabetes Maternal Grandfather     Lung cancer Paternal Grandfather     Heart disease Maternal Grandmother     No Known Problems Daughter     No Known Problems Daughter     No Known Problems Paternal Aunt     No Known Problems Paternal Aunt     No Known Problems Paternal Aunt     No Known Problems Paternal Aunt     No Known Problems Paternal Aunt     Breast cancer Neg Hx          Medications have been verified  Objective   /76   Pulse 65   Temp 97 5 °F (36 4 °C)   Resp 18   Ht 5' 2" (1 575 m)   Wt 76 2 kg (168 lb)   SpO2 98%   BMI 30 73 kg/m²          Physical Exam     Physical Exam  Constitutional:       General: She is not in acute distress  Appearance: She is well-developed  She is not diaphoretic  HENT:      Head: Normocephalic and atraumatic        Right Ear: Hearing, tympanic membrane, ear canal and external ear normal       Left Ear: Hearing, tympanic membrane and external ear normal       Ears:        Mouth/Throat:      Pharynx: Uvula midline  Eyes:      Conjunctiva/sclera: Conjunctivae normal       Pupils: Pupils are equal, round, and reactive to light  Neck:      Musculoskeletal: Normal range of motion and neck supple  Cardiovascular:      Rate and Rhythm: Normal rate and regular rhythm  Heart sounds: Normal heart sounds  Pulmonary:      Effort: Pulmonary effort is normal       Breath sounds: Normal breath sounds  Lymphadenopathy:      Cervical: No cervical adenopathy  Ear cerumen removal    Date/Time: 2/26/2021 4:16 PM  Performed by: aRfy Brewer PA-C  Authorized by: Rafy Brewer PA-C   Universal Protocol:  Consent: Verbal consent obtained  Consent given by: patient  Patient understanding: patient states understanding of the procedure being performed  Patient identity confirmed: verbally with patient      Patient location:  Clinic  Procedure details:     Local anesthetic:  None    Location:  R ear    Procedure type: irrigation only      Approach:  External  Post-procedure details:     Complication:  None    Hearing quality:  Normal    Patient tolerance of procedure:   Tolerated well, no immediate complications

## 2021-02-26 NOTE — PATIENT INSTRUCTIONS
Otitis externa  Cerumen impaction  Amoxicillin as directed  floxin otic as directed  Follow up with PCP in 3-5 days  Proceed to  ER if symptoms worsen  Otitis Externa   WHAT YOU NEED TO KNOW:   Otitis externa, or swimmer's ear, is an infection in the outer ear canal  This canal goes from the outside of the ear to the eardrum  DISCHARGE INSTRUCTIONS:   Return to the emergency department if:   · You have severe ear pain  · You are suddenly unable to hear at all  · You have new swelling in your face, behind your ears, or in your neck  · You suddenly cannot move part of your face  · Your face suddenly feels numb  Contact your healthcare provider if:   · You have a fever  · Your signs and symptoms do not get better after 2 days of treatment  · Your signs and symptoms go away for a time, but then come back  · You have questions or concerns about your condition or care  Medicines:   · NSAIDs , such as ibuprofen, help decrease swelling, pain, and fever  This medicine is available with or without a doctor's order  NSAIDs can cause stomach bleeding or kidney problems in certain people  If you take blood thinner medicine, always ask if NSAIDs are safe for you  Always read the medicine label and follow directions  Do not give these medicines to children under 10months of age without direction from your child's healthcare provider  · Acetaminophen  decreases pain and fever  It is available without a doctor's order  Ask how much to take and how often to take it  Follow directions  Acetaminophen can cause liver damage if not taken correctly  · Ear drops  that contain an antibiotic may be given  The antibiotic helps treat a bacterial infection  You may also be given steroid medicine  The steroid helps decrease redness, swelling, and pain  · Take your medicine as directed  Contact your healthcare provider if you think your medicine is not helping or if you have side effects   Tell him or her if you are allergic to any medicine  Keep a list of the medicines, vitamins, and herbs you take  Include the amounts, and when and why you take them  Bring the list or the pill bottles to follow-up visits  Carry your medicine list with you in case of an emergency  Follow up with your healthcare provider as directed:  Write down your questions so you remember to ask them during your visits  How to use eardrops:   · Lie down on your side with your infected ear facing up  · Carefully drip the correct number of eardrops into your ear  Have another person help you if possible  · Gently move the outside part of your ear back and forth to help the medicine reach your ear canal      · Stay lying down in the same position (with your ear facing up) for 3 to 5 minutes  Prevent otitis externa:   · Do not put cotton swabs or foreign objects in your ears  · Wrap a clean moist washcloth around your finger, and use it to clean your outer ear and remove extra ear wax  · Use ear plugs when you swim  Dry your outer ears completely after you swim or bathe  © Copyright [address] Information is for End User's use only and may not be sold, redistributed or otherwise used for commercial purposes  All illustrations and images included in CareNotes® are the copyrighted property of A D A M , [employer]  or [address] [place], [place] [address]maco   The above information is an  only  It is not intended as medical advice for individual conditions or treatments  Talk to your doctor, nurse or pharmacist before following any medical regimen to see if it is safe and effective for you

## 2021-03-01 DIAGNOSIS — E11.8 TYPE 2 DIABETES MELLITUS WITH COMPLICATION, WITHOUT LONG-TERM CURRENT USE OF INSULIN (HCC): ICD-10-CM

## 2021-03-03 RX ORDER — INSULIN GLARGINE 100 [IU]/ML
30 INJECTION, SOLUTION SUBCUTANEOUS DAILY
Qty: 5 PEN | Refills: 1 | OUTPATIENT
Start: 2021-03-03

## 2021-03-09 ENCOUNTER — APPOINTMENT (OUTPATIENT)
Dept: LAB | Facility: MEDICAL CENTER | Age: 71
End: 2021-03-09
Payer: MEDICARE

## 2021-03-09 DIAGNOSIS — E11.9 TYPE 2 DIABETES MELLITUS WITHOUT COMPLICATION, WITH LONG-TERM CURRENT USE OF INSULIN (HCC): ICD-10-CM

## 2021-03-09 DIAGNOSIS — F41.8 DEPRESSION WITH ANXIETY: ICD-10-CM

## 2021-03-09 DIAGNOSIS — Z79.4 TYPE 2 DIABETES MELLITUS WITHOUT COMPLICATION, WITH LONG-TERM CURRENT USE OF INSULIN (HCC): ICD-10-CM

## 2021-03-09 DIAGNOSIS — E78.2 MIXED HYPERLIPIDEMIA: ICD-10-CM

## 2021-03-09 DIAGNOSIS — I10 ESSENTIAL HYPERTENSION: ICD-10-CM

## 2021-03-09 LAB
ALBUMIN SERPL BCP-MCNC: 4.1 G/DL (ref 3.5–5)
ALP SERPL-CCNC: 48 U/L (ref 46–116)
ALT SERPL W P-5'-P-CCNC: 34 U/L (ref 12–78)
ANION GAP SERPL CALCULATED.3IONS-SCNC: 7 MMOL/L (ref 4–13)
AST SERPL W P-5'-P-CCNC: 26 U/L (ref 5–45)
BASOPHILS # BLD AUTO: 0.07 THOUSANDS/ΜL (ref 0–0.1)
BASOPHILS NFR BLD AUTO: 1 % (ref 0–1)
BILIRUB SERPL-MCNC: 0.44 MG/DL (ref 0.2–1)
BUN SERPL-MCNC: 15 MG/DL (ref 5–25)
CALCIUM SERPL-MCNC: 9.5 MG/DL (ref 8.3–10.1)
CHLORIDE SERPL-SCNC: 104 MMOL/L (ref 100–108)
CO2 SERPL-SCNC: 25 MMOL/L (ref 21–32)
CREAT SERPL-MCNC: 0.96 MG/DL (ref 0.6–1.3)
EOSINOPHIL # BLD AUTO: 0.45 THOUSAND/ΜL (ref 0–0.61)
EOSINOPHIL NFR BLD AUTO: 7 % (ref 0–6)
ERYTHROCYTE [DISTWIDTH] IN BLOOD BY AUTOMATED COUNT: 13.2 % (ref 11.6–15.1)
EST. AVERAGE GLUCOSE BLD GHB EST-MCNC: 137 MG/DL
GFR SERPL CREATININE-BSD FRML MDRD: 60 ML/MIN/1.73SQ M
GLUCOSE P FAST SERPL-MCNC: 127 MG/DL (ref 65–99)
HBA1C MFR BLD: 6.4 %
HCT VFR BLD AUTO: 40.8 % (ref 34.8–46.1)
HGB BLD-MCNC: 13.3 G/DL (ref 11.5–15.4)
IMM GRANULOCYTES # BLD AUTO: 0.03 THOUSAND/UL (ref 0–0.2)
IMM GRANULOCYTES NFR BLD AUTO: 0 % (ref 0–2)
LYMPHOCYTES # BLD AUTO: 1.25 THOUSANDS/ΜL (ref 0.6–4.47)
LYMPHOCYTES NFR BLD AUTO: 19 % (ref 14–44)
MCH RBC QN AUTO: 30.4 PG (ref 26.8–34.3)
MCHC RBC AUTO-ENTMCNC: 32.6 G/DL (ref 31.4–37.4)
MCV RBC AUTO: 93 FL (ref 82–98)
MONOCYTES # BLD AUTO: 0.49 THOUSAND/ΜL (ref 0.17–1.22)
MONOCYTES NFR BLD AUTO: 7 % (ref 4–12)
NEUTROPHILS # BLD AUTO: 4.44 THOUSANDS/ΜL (ref 1.85–7.62)
NEUTS SEG NFR BLD AUTO: 66 % (ref 43–75)
NRBC BLD AUTO-RTO: 0 /100 WBCS
PLATELET # BLD AUTO: 257 THOUSANDS/UL (ref 149–390)
PMV BLD AUTO: 10 FL (ref 8.9–12.7)
POTASSIUM SERPL-SCNC: 4.8 MMOL/L (ref 3.5–5.3)
PROT SERPL-MCNC: 7.6 G/DL (ref 6.4–8.2)
RBC # BLD AUTO: 4.38 MILLION/UL (ref 3.81–5.12)
SODIUM SERPL-SCNC: 136 MMOL/L (ref 136–145)
WBC # BLD AUTO: 6.73 THOUSAND/UL (ref 4.31–10.16)

## 2021-03-09 PROCEDURE — 83036 HEMOGLOBIN GLYCOSYLATED A1C: CPT

## 2021-03-09 PROCEDURE — 85025 COMPLETE CBC W/AUTO DIFF WBC: CPT

## 2021-03-09 PROCEDURE — 36415 COLL VENOUS BLD VENIPUNCTURE: CPT

## 2021-03-09 PROCEDURE — 80053 COMPREHEN METABOLIC PANEL: CPT

## 2021-03-10 ENCOUNTER — OFFICE VISIT (OUTPATIENT)
Dept: INTERNAL MEDICINE CLINIC | Age: 71
End: 2021-03-10
Payer: MEDICARE

## 2021-03-10 VITALS
BODY MASS INDEX: 29.74 KG/M2 | SYSTOLIC BLOOD PRESSURE: 128 MMHG | HEIGHT: 62 IN | HEART RATE: 79 BPM | TEMPERATURE: 97.8 F | WEIGHT: 161.6 LBS | DIASTOLIC BLOOD PRESSURE: 70 MMHG | OXYGEN SATURATION: 98 %

## 2021-03-10 DIAGNOSIS — C67.9 MALIGNANT NEOPLASM OF URINARY BLADDER, UNSPECIFIED SITE (HCC): ICD-10-CM

## 2021-03-10 DIAGNOSIS — R56.9 SEIZURES (HCC): ICD-10-CM

## 2021-03-10 DIAGNOSIS — E11.9 TYPE 2 DIABETES MELLITUS WITHOUT COMPLICATION, WITH LONG-TERM CURRENT USE OF INSULIN (HCC): ICD-10-CM

## 2021-03-10 DIAGNOSIS — Z79.4 TYPE 2 DIABETES MELLITUS WITHOUT COMPLICATION, WITH LONG-TERM CURRENT USE OF INSULIN (HCC): Primary | ICD-10-CM

## 2021-03-10 DIAGNOSIS — E22.2 SIADH (SYNDROME OF INAPPROPRIATE ADH PRODUCTION) (HCC): ICD-10-CM

## 2021-03-10 DIAGNOSIS — E78.00 HYPERCHOLESTEROLEMIA: ICD-10-CM

## 2021-03-10 DIAGNOSIS — I10 ESSENTIAL HYPERTENSION: ICD-10-CM

## 2021-03-10 DIAGNOSIS — Z79.4 TYPE 2 DIABETES MELLITUS WITHOUT COMPLICATION, WITH LONG-TERM CURRENT USE OF INSULIN (HCC): ICD-10-CM

## 2021-03-10 DIAGNOSIS — Z00.00 ENCOUNTER FOR ANNUAL WELLNESS EXAM IN MEDICARE PATIENT: ICD-10-CM

## 2021-03-10 DIAGNOSIS — E11.9 TYPE 2 DIABETES MELLITUS WITHOUT COMPLICATION, WITH LONG-TERM CURRENT USE OF INSULIN (HCC): Primary | ICD-10-CM

## 2021-03-10 DIAGNOSIS — F41.8 DEPRESSION WITH ANXIETY: ICD-10-CM

## 2021-03-10 LAB
CREAT UR-MCNC: 166 MG/DL
MICROALBUMIN UR-MCNC: 25.4 MG/L (ref 0–20)
MICROALBUMIN/CREAT 24H UR: 15 MG/G CREATININE (ref 0–30)

## 2021-03-10 PROCEDURE — 1123F ACP DISCUSS/DSCN MKR DOCD: CPT | Performed by: PHYSICIAN ASSISTANT

## 2021-03-10 PROCEDURE — G0439 PPPS, SUBSEQ VISIT: HCPCS | Performed by: PHYSICIAN ASSISTANT

## 2021-03-10 PROCEDURE — 82043 UR ALBUMIN QUANTITATIVE: CPT | Performed by: PHYSICIAN ASSISTANT

## 2021-03-10 PROCEDURE — 99214 OFFICE O/P EST MOD 30 MIN: CPT | Performed by: PHYSICIAN ASSISTANT

## 2021-03-10 PROCEDURE — 82570 ASSAY OF URINE CREATININE: CPT | Performed by: PHYSICIAN ASSISTANT

## 2021-03-10 RX ORDER — ZOLPIDEM TARTRATE 10 MG/1
10 TABLET ORAL
Qty: 30 TABLET | Refills: 0 | Status: SHIPPED | OUTPATIENT
Start: 2021-03-13 | End: 2021-04-12 | Stop reason: SDUPTHER

## 2021-03-10 RX ORDER — INSULIN GLARGINE 100 [IU]/ML
30 INJECTION, SOLUTION SUBCUTANEOUS DAILY
Qty: 10 ML | Refills: 1 | Status: SHIPPED | OUTPATIENT
Start: 2021-03-10 | End: 2021-09-09 | Stop reason: SDUPTHER

## 2021-03-10 NOTE — PROGRESS NOTES
Assessment and Plan:     Problem List Items Addressed This Visit        Endocrine    Type 2 diabetes mellitus without complication, with long-term current use of insulin ([facility]) - Primary    Relevant Medications    insulin glargine (Lantus SoloStar) 100 units/mL injection pen    Other Relevant Orders    Microalbumin / creatinine urine ratio    SIADH (syndrome of inappropriate ADH production) ([place] [id number] )    Relevant Orders    CBC and differential    Comprehensive metabolic panel    Lipid panel    HEMOGLOBIN A1C W/ EAG ESTIMATION       Cardiovascular and Mediastinum    Hypertension    Relevant Orders    CBC and differential    Comprehensive metabolic panel    Lipid panel    HEMOGLOBIN A1C W/ EAG ESTIMATION       Other    Seizures ([facility] )      Other Visit Diagnoses     Depression with anxiety        Relevant Medications    zolpidem (AMBIEN) 10 mg tablet (Start on 3/13/2021)    Other Relevant Orders    CBC and differential    Comprehensive metabolic panel    Lipid panel    HEMOGLOBIN A1C W/ EAG ESTIMATION    Type 2 diabetes mellitus with complication, without long-term current use of insulin ([facility])        discussed diet and avoiding nighttime eating  urged to go for labs asap   continue lantus 30 units for now   consider increase dose to this and [place]     Relevant Medications    insulin glargine (Lantus SoloStar) 100 units/mL injection pen    Other Relevant Orders    CBC and differential    Comprehensive metabolic panel    Lipid panel    HEMOGLOBIN A1C W/ EAG ESTIMATION    Malignant neoplasm of urinary bladder, unspecified site ([place] [address] )        Encounter for annual wellness exam in Medicare patient        Hypercholesterolemia        Relevant Orders    CBC and differential    Comprehensive metabolic panel    Lipid panel    HEMOGLOBIN A1C W/ EAG ESTIMATION        BMI Counseling: Body mass index is 29 56 kg/m²   The BMI is above normal  Nutrition recommendations include decreasing portion sizes, decreasing fast food intake, consuming healthier snacks and moderation in carbohydrate intake  Exercise recommendations include exercising 3-5 times per week  Preventive health issues were discussed with patient, and age appropriate screening tests were ordered as noted in patient's After Visit Summary  Personalized health advice and appropriate referrals for health education or preventive services given if needed, as noted in patient's After Visit Summary       History of Present Illness:     Patient presents for Medicare Annual Wellness visit    Patient Care Team:  Carmenza Alonso MD as PCP - General  MD Korin Hill MD Andreas Render, MD     Problem List:     Patient Active Problem List   Diagnosis    Tear of medial meniscus of left knee    Abnormal EKG    Acquired hypothyroidism    Acute pain of left knee    Acute pain of right shoulder    Anxiety    Asthma    Neoplasm of bladder    Abdominal pain    Bursitis of shoulder    Chronic cough    Dental abscess    Hyponatremia    Fibromyositis    Ganglion and cyst of synovium, tendon and bursa    Mixed hyperlipidemia    Hypertension    Malignant neoplasm of lateral wall of urinary bladder ([address] )    Type 2 diabetes mellitus without complication, with long-term current use of insulin (HCC)    Nontoxic single thyroid nodule    Obesity    Osteoporosis    Primary localized osteoarthritis of left knee    Other fatigue    Acute non-recurrent frontal sinusitis    Seizures ([address] )    Lichenoid dermatitis    SIADH (syndrome of inappropriate ADH production) ([address] )    Irritant contact dermatitis      Past Medical and Surgical History:     Past Medical History:   Diagnosis Date    Anxiety     Arthritis     Bladder carcinoma ([address] ) 2016    Bladder mass     Depression     Diabetes mellitus ([address] )     Disease of thyroid gland     thyroid nodules-not treating at this time    Dysuria     Last assessed 02/16/17    GERD (gastroesophageal reflux disease)    Derrek Preston 2 para 2     HLD (hyperlipidemia)     HTN (hypertension)     Hypercholesterolemia     Hypertension     Knee pain     Lichen sclerosus 6874    Melanoma ([facility] ) 2014    Mild aortic regurgitation with left ventricular dilation by prior echocardiogram     Papanicolaou smear 2017    NEG    PONV (postoperative nausea and vomiting)      Past Surgical History:   Procedure Laterality Date    CARPAL TUNNEL RELEASE Right      SECTION      x2    CHOLECYSTECTOMY      CYSTOSCOPY N/A 2016    Procedure: CYSTOSCOPY WITH BIOPSIES;  Surgeon: Hortencia Augustin MD;  [facility];  Service:    Hope Costain N/A 2016    Procedure: CYSTOSCOPY;  Surgeon: Hortencia Augustin MD;  [facility];  Service:     CYSTOSCOPY  2020    HERNIA REPAIR      NJ CYSTO/URETERO W/LITHOTRIPSY &INDWELL STENT INSRT  2016    Procedure: CYSTOSCOPY URETEROSCOPY WITH LITHOTRIPSY HOLMIUM LASER RIGHT, RETROGRADE PYELOGRAM BILATERAL: AND INSERTION STENT URETERAL Right ;  Surgeon: Hortencia Augustin MD;  [facility];  Service: Urology    NJ CYSTO/URETERO/PYELOSCOPY, DX Right 2017    Procedure: URETEROSCOPY;  Surgeon: Hortencia Augustin MD;  [facility];  Service: Urology    NJ CYSTOSCOPY,INSERT URETERAL STENT Left 2016    Procedure: URETERAL STENTS;  Surgeon: Hortencia Augustin MD;  [facility];  Service: Urology    NJ CYSTOURETHROSCOPY,FULGUR <0 5 CM LESN N/A 2016    Procedure: TRANSURETHRAL RESECTION OF BLADDER TUMOR (TURBT);   Surgeon: Hortencia Augustin MD;  [facility];  Service: Urology    NJ CYSTOURETHROSCOPY,FULGUR <0 5 CM LESN N/A 2016    Procedure: Kalyani Driver; TUR BLADDER TUMOR ;  Surgeon: Hortencia Augustin MD;  [facility];  Service: Urology    NJ CYSTOURETHROSCOPY,FULGUR <0 5 CM LESN N/A 2016    Procedure: TUR BLADDER TUMOR ;  Surgeon: Hortencia Augustin MD;  [facility];  Service: Urology    NJ CYSTOURETHROSCOPY,FULGUR <0 5 CM LESN Bilateral 5/9/2017    Procedure: CYSTOSCOPY, RIGHT URETERAL WASHINGS, ;  Surgeon: Carlota Davalos MD;  [facility];  Service: Urology    MO CYSTOURETHROSCOPY,URETER CATHETER Bilateral 9/29/2016    Procedure: Durga Damico ;  Surgeon: Carlota Davalos MD;  [facility];  Service: Urology    MO CYSTOURETHROSCOPY,URETER CATHETER Bilateral 5/9/2017    Procedure: RETROGRADE PYELOGRAM WITH STENT EXCHANGE, RIGHT;  Surgeon: Carlota Davalos MD;  [facility];  Service: Urology    MO INSTILL ANTICANCER AGENT IN BLADDER N/A 11/29/2016    Procedure: Edman Breeding;  Surgeon: Carlota Davalos MD;  [facility];  Service: Urology    MO KNEE SCOPE,MED/LAT MENISECTOMY Left 4/4/2016    Procedure: KNEE ARTHROSCOPIC PARTIAL MEDIAL MENISCECTOMY ;  Surgeon: Jamie Rollins MD;  [facility];  Service: Orthopedics    REMOVAL URETERAL STENT Left 11/29/2016    Procedure: REMOVAL STENT URETERAL;  Surgeon: Carlota Davalos MD;  [facility];  Service:    Darinel Hoops TONSILLECTOMY        Family History:     Family History   Problem Relation Age of Onset    Heart attack Mother     Heart disease Mother     ALS Father     Diabetes Maternal Grandfather     Lung cancer Paternal Grandfather     Heart disease Maternal Grandmother     No Known Problems Daughter     No Known Problems Daughter     No Known Problems Paternal Aunt     No Known Problems Paternal Aunt     No Known Problems Paternal Aunt     No Known Problems Paternal Aunt     No Known Problems Paternal Aunt     Breast cancer Neg Hx       Social History:        Social History     Socioeconomic History    Marital status: /Civil Union     Spouse name: Not on file    Number of children: 2    Years of education: Not on file    Highest education level: Not on file   Occupational History    Occupation: RETIRED   Social Needs    Financial resource strain: Not on file    Food insecurity     Worry: Not on file Inability: Not on file    Transportation needs     Medical: Not on file     Non-medical: Not on file   Tobacco Use    Smoking status: Former Smoker     Packs/day:  00     Years: 20 00     Pack years: 20 00     Types: Cigarettes     Quit date: 200     Years since quittin 2    Smokeless tobacco: Never Used   Substance and Sexual Activity    Alcohol use: No    Drug use: No    Sexual activity: Not Currently     Partners: Male     Birth control/protection: Post-menopausal   Lifestyle    Physical activity     Days per week: Not on file     Minutes per session: Not on file    Stress: Not on file   Relationships    Social connections     Talks on phone: Not on file     Gets together: Not on file     Attends Hindu service: Not on file     Active member of club or organization: Not on file     Attends meetings of clubs or organizations: Not on file     Relationship status: Not on file    Intimate partner violence     Fear of current or ex partner: Not on file     Emotionally abused: Not on file     Physically abused: Not on file     Forced sexual activity: Not on file   Other Topics Concern    Not on file   Social History Narrative    Not on file      Medications and Allergies:     Current Outpatient Medications   Medication Sig Dispense Refill    amLODIPine (NORVASC) 5 mg tablet Take 1 tablet (5 mg total) by mouth daily 30 tablet 5    clonazePAM (KlonoPIN) 0 5 mg tablet Take 1 tablet (0 5 mg total) by mouth daily at bedtime 30 tablet 0    Cyanocobalamin 1000 MCG CAPS Take 1 capsule by mouth daily        famotidine (PEPCID) 20 mg tablet Take 1 tablet (20 mg total) by mouth daily (Patient taking differently: Take 20 mg by mouth daily As needed) 90 tablet 0    fenofibrate (TRICOR) 145 mg tablet TAKE 1 TABLET BY MOUTH EVERY DAY 90 tablet 1    ibuprofen (MOTRIN) 200 mg tablet Take 200 mg by mouth every 6 (six) hours as needed for mild pain      insulin glargine (Lantus SoloStar) 100 units/mL injection pen Inject 30 Units under the skin daily 10 mL 1    Insulin Pen Needle (BD Pen Needle Shanti U/F) 32G X 4 MM MISC Once daily 100 each 1    Januvia 25 MG tablet TAKE 1 TABLET BY MOUTH EVERY DAY 90 tablet 1    levothyroxine 50 mcg tablet TAKE 1 TABLET BY MOUTH EVERY DAY 90 tablet 1    metFORMIN (GLUCOPHAGE) 500 mg tablet TAKE 2 TABLETS BY MOUTH TWICE A DAY WITH FOOD 360 tablet 1    metoprolol tartrate (LOPRESSOR) 25 mg tablet Take 1 tablet (25 mg total) by mouth every 12 (twelve) hours 180 tablet 1    ONETOUCH VERIO test strip Test 3 times daily 300 each 1    Probiotic Product (PROBIOTIC-10) CAPS Take 1 capsule by mouth daily      simvastatin (ZOCOR) 40 mg tablet Take 1 tablet (40 mg total) by mouth daily at bedtime 90 tablet 1    [START ON 3/13/2021] zolpidem (AMBIEN) 10 mg tablet Take 1 tablet (10 mg total) by mouth daily at bedtime 30 tablet 0    fluticasone (FLONASE) 50 mcg/act nasal spray 1 spray into each nostril daily (Patient not taking: Reported on 3/10/2021) 1 Bottle 0    mometasone (ELOCON) 0 1 % cream Apply topically daily       No current facility-administered medications for this visit        No Known Allergies   Immunizations:     Immunization History   Administered Date(s) Administered    INFLUENZA 01/10/2018, 11/10/2019, 10/09/2020    Influenza Split High Dose Preservative Free IM 01/10/2018, 10/29/2019    Influenza, high dose seasonal 0 7 mL 10/09/2020    Influenza, seasonal, injectable 12/13/2010, 12/13/2011, 12/09/2013, 11/10/2019    Pneumococcal Conjugate 13-Valent 01/14/2020    Pneumococcal Polysaccharide PPV23 11/17/2015    SARS-CoV-2 / COVID-19 mRNA IM (Brenda Walter) 02/04/2021, 03/04/2021    Td (adult), adsorbed 12/13/2010    Zoster 08/01/2013      Health Maintenance:         Topic Date Due    MAMMOGRAM  08/03/2021    Colorectal Cancer Screening  02/27/2028    Hepatitis C Screening  Completed         Topic Date Due    DTaP,Tdap,and Td Vaccines (1 - Tdap) 07/30/1971 Medicare Health Risk Assessment:     /70 (BP Location: Left arm, Patient Position: Sitting, Cuff Size: Adult)   Pulse 79   Temp 97 8 °F (36 6 °C) (Temporal)   Ht 5' 2" (1 575 m)   Wt 73 3 kg (161 lb 9 6 oz)   SpO2 98%   BMI 29 56 kg/m²      Mandeep Bravo is here for her Subsequent Wellness visit  Health Risk Assessment:   Patient rates overall health as good  Patient feels that their physical health rating is same  Eyesight was rated as same  Hearing was rated as same  Patient feels that their emotional and mental health rating is same  Pain experienced in the last 7 days has been none  Patient states that she has experienced weight loss or gain in last 6 months  Depression Screening:   PHQ-2 Score: 0  PHQ-9 Score: 3      Fall Risk Screening: In the past year, patient has experienced: no history of falling in past year      Urinary Incontinence Screening:   Patient has leaked urine accidently in the last six months  Home Safety:  Patient does not have trouble with stairs inside or outside of their home  Patient has working smoke alarms and has no working carbon monoxide detector  Home safety hazards include: none  Nutrition:   Current diet is Regular  Medications:   Patient is currently taking over-the-counter supplements  OTC medications include: see medication list  Patient is able to manage medications  Activities of Daily Living (ADLs)/Instrumental Activities of Daily Living (IADLs):   Walk and transfer into and out of bed and chair?: Yes  Dress and groom yourself?: Yes    Bathe or shower yourself?: Yes    Feed yourself? Yes  Do your laundry/housekeeping?: Yes  Manage your money, pay your bills and track your expenses?: Yes  Make your own meals?: Yes    Do your own shopping?: Yes    Previous Hospitalizations:   Any hospitalizations or ED visits within the last 12 months?: No      Advance Care Planning:   Living will: Yes    Durable POA for healthcare:  Yes    Advanced directive: Yes    Five wishes given: Yes      Cognitive Screening:   Provider or family/friend/caregiver concerned regarding cognition?: No    PREVENTIVE SCREENINGS      Cardiovascular Screening:    General: Screening Not Indicated and History Lipid Disorder      Diabetes Screening:     General: Screening Not Indicated and History Diabetes      Colorectal Cancer Screening:     General: Screening Current      Breast Cancer Screening:     General: Screening Current      Cervical Cancer Screening:    General: Screening Not Indicated      Osteoporosis Screening:    General: Screening Not Indicated and History Osteoporosis      Abdominal Aortic Aneurysm (AAA) Screening:        General: Screening Not Indicated      Lung Cancer Screening:     General: Screening Not Indicated      Hepatitis C Screening:    General: Screening Current    Screening, Brief Intervention, and Referral to Treatment (SBIRT)    Screening  Typical number of drinks in a day: 0  Typical number of drinks in a week: 0      Linnette Kenney PA-C

## 2021-03-10 NOTE — PROGRESS NOTES
Assessment/Plan:         Diagnoses and all orders for this visit:    Type 2 diabetes mellitus without complication, with long-term current use of insulin ([facility])  Comments:  discussed diet and avoiding nighttime eating  urged to go for labs asap   continue lantus 30 units for now   consider increase dose to this and [place] [facility] [place]   Orders:  -     Microalbumin / creatinine urine ratio  -     insulin glargine (Lantus SoloStar) 100 units/mL injection pen; Inject 30 Units under the skin daily  -     CBC and differential; Future  -     Comprehensive metabolic panel; Future  -     Lipid panel; Future  -     HEMOGLOBIN A1C W/ EAG ESTIMATION; Future    Type 2 diabetes mellitus without complication, with long-term current use of insulin (HCC)  -     Microalbumin / creatinine urine ratio  -     insulin glargine (Lantus SoloStar) 100 units/mL injection pen; Inject 30 Units under the skin daily  -     CBC and differential; Future  -     Comprehensive metabolic panel; Future  -     Lipid panel; Future  -     HEMOGLOBIN A1C W/ EAG ESTIMATION; Future    Depression with anxiety  -     zolpidem (AMBIEN) 10 mg tablet; Take 1 tablet (10 mg total) by mouth daily at bedtime  -     CBC and differential; Future  -     Comprehensive metabolic panel; Future  -     Lipid panel; Future  -     HEMOGLOBIN A1C W/ EAG ESTIMATION; Future    Malignant neoplasm of urinary bladder, unspecified site (HCC)    Seizures (HCC)    SIADH (syndrome of inappropriate ADH production) ([facility])  -     CBC and differential; Future  -     Comprehensive metabolic panel; Future  -     Lipid panel; Future  -     HEMOGLOBIN A1C W/ EAG ESTIMATION; Future    Encounter for annual wellness exam in Medicare patient    Hypercholesterolemia  -     CBC and differential; Future  -     Comprehensive metabolic panel; Future  -     Lipid panel; Future  -     HEMOGLOBIN A1C W/ EAG ESTIMATION;  Future    Essential hypertension  -     CBC and differential; Future  -     Comprehensive metabolic panel; Future  -     Lipid panel; Future  -     HEMOGLOBIN A1C W/ EAG ESTIMATION; Future          Subjective:      Patient ID: Rinku Michaud is a 69 y o  female  70 y/o f/u for dm, htn, dyslipidemia, anxiety, insomnia  Pt denies new complaints   She cares for her grandkids daily and is busy with this  She is not sure why her BS improved so much, she has been trying to avoid snacking at night       The following portions of the patient's history were reviewed and updated as appropriate: allergies, current medications, past family history, past medical history, past social history, past surgical history and problem list     Review of Systems   Constitutional: Negative for appetite change, chills, diaphoresis, fatigue and fever  HENT: Negative for congestion and sore throat  Eyes: Negative for pain and redness  Respiratory: Negative for cough and shortness of breath  Cardiovascular: Negative for chest pain and leg swelling  Gastrointestinal: Negative for abdominal pain, constipation, diarrhea and nausea  Musculoskeletal: Negative for arthralgias and gait problem  Skin: Negative for rash  Neurological: Negative for dizziness and headaches  Psychiatric/Behavioral: Positive for sleep disturbance (takes [employer] with relief)  The patient is not nervous/anxious            Past Medical History:   Diagnosis Date    Anxiety     Arthritis     Bladder carcinoma ([facility] ) 2016    Bladder mass     Depression     Diabetes mellitus ([facility] )     Disease of thyroid gland     thyroid nodules-not treating at this time    Dysuria     Last assessed 17    GERD (gastroesophageal reflux disease)      2 para 2     HLD (hyperlipidemia)     HTN (hypertension)     Hypercholesterolemia     Hypertension     Knee pain     Lichen sclerosus 8289    [facility] )     Mild aortic regurgitation with left ventricular dilation by prior echocardiogram     Papanicolaou smear 2017    NEG    PONV (postoperative nausea and vomiting)          Current Outpatient Medications:     amLODIPine (NORVASC) 5 mg tablet, Take 1 tablet (5 mg total) by mouth daily, Disp: 30 tablet, Rfl: 5    clonazePAM (KlonoPIN) 0 5 mg tablet, Take 1 tablet (0 5 mg total) by mouth daily at bedtime, Disp: 30 tablet, Rfl: 0    Cyanocobalamin 1000 MCG CAPS, Take 1 capsule by mouth daily  , Disp: , Rfl:     famotidine (PEPCID) 20 mg tablet, Take 1 tablet (20 mg total) by mouth daily (Patient taking differently: Take 20 mg by mouth daily As needed), Disp: 90 tablet, Rfl: 0    fenofibrate (TRICOR) 145 mg tablet, TAKE 1 TABLET BY MOUTH EVERY DAY, Disp: 90 tablet, Rfl: 1    ibuprofen (MOTRIN) 200 mg tablet, Take 200 mg by mouth every 6 (six) hours as needed for mild pain, Disp: , Rfl:     insulin glargine (Lantus SoloStar) 100 units/mL injection pen, Inject 30 Units under the skin daily, Disp: 10 mL, Rfl: 1    Insulin Pen Needle (BD Pen Needle Shanti U/F) 32G X 4 MM MISC, Once daily, Disp: 100 each, Rfl: 1    Januvia 25 MG tablet, TAKE 1 TABLET BY MOUTH EVERY DAY, Disp: 90 tablet, Rfl: 1    levothyroxine 50 mcg tablet, TAKE 1 TABLET BY MOUTH EVERY DAY, Disp: 90 tablet, Rfl: 1    metFORMIN (GLUCOPHAGE) 500 mg tablet, TAKE 2 TABLETS BY MOUTH TWICE A DAY WITH FOOD, Disp: 360 tablet, Rfl: 1    metoprolol tartrate (LOPRESSOR) 25 mg tablet, Take 1 tablet (25 mg total) by mouth every 12 (twelve) hours, Disp: 180 tablet, Rfl: 1    ONETOUCH VERIO test strip, Test 3 times daily, Disp: 300 each, Rfl: 1    Probiotic Product (PROBIOTIC-10) CAPS, Take 1 capsule by mouth daily, Disp: , Rfl:     simvastatin (ZOCOR) 40 mg tablet, Take 1 tablet (40 mg total) by mouth daily at bedtime, Disp: 90 tablet, Rfl: 1    [START ON 3/13/2021] zolpidem (AMBIEN) 10 mg tablet, Take 1 tablet (10 mg total) by mouth daily at bedtime, Disp: 30 tablet, Rfl: 0    fluticasone (FLONASE) 50 mcg/act nasal spray, 1 spray into each nostril daily (Patient not taking: Reported on 3/10/2021), Disp: 1 Bottle, Rfl: 0    mometasone (ELOCON) 0 1 % cream, Apply topically daily, Disp: , Rfl:     No Known Allergies    Social History   Past Surgical History:   Procedure Laterality Date    CARPAL TUNNEL RELEASE Right      SECTION      x2    CHOLECYSTECTOMY      CYSTOSCOPY N/A 2016    Procedure: CYSTOSCOPY WITH BIOPSIES;  Surgeon: Arely Taylor MD;  [facility];  Service:    Micheal Steinberg N/A 2016    Procedure: CYSTOSCOPY;  Surgeon: Arely Taylor MD;  [facility];  Service:     CYSTOSCOPY  2020    HERNIA REPAIR      TX CYSTO/URETERO W/LITHOTRIPSY &INDWELL STENT INSRT  2016    Procedure: CYSTOSCOPY URETEROSCOPY WITH LITHOTRIPSY HOLMIUM LASER RIGHT, RETROGRADE PYELOGRAM BILATERAL: AND INSERTION STENT URETERAL Right ;  Surgeon: Arely Taylor MD;  [facility];  Service: Urology    TX CYSTO/URETERO/PYELOSCOPY, DX Right 2017    Procedure: URETEROSCOPY;  Surgeon: Arely Taylor MD;  [facility];  Service: Urology    TX CYSTOSCOPY,INSERT URETERAL STENT Left 2016    Procedure: URETERAL STENTS;  Surgeon: Arely Taylor MD;  [facility];  Service: Urology    TX CYSTOURETHROSCOPY,FULGUR <0 5 CM LESN N/A 2016    Procedure: TRANSURETHRAL RESECTION OF BLADDER TUMOR (TURBT);   Surgeon: Arely Taylor MD;  [facility];  Service: Urology    TX CYSTOURETHROSCOPY,FULGUR <0 5 CM LESN N/A 2016    Procedure: Jairo Padgett; TUR BLADDER TUMOR ;  Surgeon: Arely Taylor MD;  [facility];  Service: Urology    TX CYSTOURETHROSCOPY,FULGUR <0 5 CM LESN N/A 2016    Procedure: TUR BLADDER TUMOR ;  Surgeon: Arely Taylor MD;  [facility];  Service: Urology    TX CYSTOURETHROSCOPY,FULGUR <0 5 CM LESN Bilateral 2017    Procedure: Earnestine Bernard, ;  Surgeon: Arely Taylor MD;  [facility];  Service: Urology    TX Azeb Boys CATHETER Bilateral 9/29/2016    Procedure: RETROGRADE PYELOGRAM ;  Surgeon: Deena Bai MD;  [facility];  Service: Urology    [facility] Bilateral 5/9/2017    Procedure: RETROGRADE PYELOGRAM WITH STENT EXCHANGE, RIGHT;  Surgeon: Deena Bai MD;  [facility];  Service: Urology    MI INSTILL ANTICANCER AGENT IN BLADDER N/A 11/29/2016    Procedure: Lovenia Coryell;  Surgeon: Deena Bai MD;  [facility];  Service: Urology    MI KNEE SCOPE,MED/LAT MENISECTOMY Left 4/4/2016    Procedure: KNEE ARTHROSCOPIC PARTIAL MEDIAL MENISCECTOMY ;  Surgeon: Denise Ashley MD;  [facility];  Service: Orthopedics    REMOVAL URETERAL STENT Left 11/29/2016    Procedure: REMOVAL STENT URETERAL;  Surgeon: Deena Bai MD;  [facility];  Service:     TONSILLECTOMY       Family History   Problem Relation Age of Onset    Heart attack Mother     Heart disease Mother     ALS Father     Diabetes Maternal Grandfather     Lung cancer Paternal Grandfather     Heart disease Maternal Grandmother     No Known Problems Daughter     No Known Problems Daughter     No Known Problems Paternal Aunt     No Known Problems Paternal Aunt     No Known Problems Paternal Aunt     No Known Problems Paternal Aunt     No Known Problems Paternal Aunt     Breast cancer Neg Hx        Objective:  /70 (BP Location: Left arm, Patient Position: Sitting, Cuff Size: Adult)   Pulse 79   Temp 97 8 °F (36 6 °C) (Temporal)   Ht 5' 2" (1 575 m)   Wt 73 3 kg (161 lb 9 6 oz)   SpO2 98%   BMI 29 56 kg/m²        Physical Exam  Vitals signs reviewed  Constitutional:       General: She is not in acute distress  HENT:      Head: Normocephalic and atraumatic  Right Ear: Tympanic membrane, ear canal and external ear normal       Left Ear: Tympanic membrane, ear canal and external ear normal    Eyes:      General:         Right eye: No discharge           Left eye: No discharge  Extraocular Movements: Extraocular movements intact  Conjunctiva/sclera: Conjunctivae normal       Pupils: Pupils are equal, round, and reactive to light  Neck:      Musculoskeletal: Normal range of motion  Cardiovascular:      Rate and Rhythm: Normal rate and regular rhythm  Pulmonary:      Effort: Pulmonary effort is normal       Breath sounds: Normal breath sounds  No wheezing, rhonchi or rales  Abdominal:      General: Bowel sounds are normal  There is no distension  Musculoskeletal: Normal range of motion  Right lower leg: No edema  Left lower leg: No edema  Lymphadenopathy:      Cervical: No cervical adenopathy  Skin:     Findings: No rash  Neurological:      General: No focal deficit present  Mental Status: She is alert and oriented to person, place, and time     Psychiatric:         Mood and Affect: Mood normal          Behavior: Behavior normal

## 2021-03-30 DIAGNOSIS — F41.8 DEPRESSION WITH ANXIETY: ICD-10-CM

## 2021-03-31 RX ORDER — CLONAZEPAM 0.5 MG/1
0.5 TABLET ORAL
Qty: 30 TABLET | Refills: 0 | Status: SHIPPED | OUTPATIENT
Start: 2021-03-31 | End: 2021-05-05 | Stop reason: SDUPTHER

## 2021-04-06 DIAGNOSIS — I10 ESSENTIAL HYPERTENSION: ICD-10-CM

## 2021-04-06 DIAGNOSIS — R00.0 TACHYCARDIA: ICD-10-CM

## 2021-04-12 DIAGNOSIS — F41.8 DEPRESSION WITH ANXIETY: ICD-10-CM

## 2021-04-12 RX ORDER — ZOLPIDEM TARTRATE 10 MG/1
10 TABLET ORAL
Qty: 30 TABLET | Refills: 0 | Status: SHIPPED | OUTPATIENT
Start: 2021-04-12 | End: 2021-05-13 | Stop reason: SDUPTHER

## 2021-04-21 DIAGNOSIS — E13.9 DIABETES 1.5, MANAGED AS TYPE 2 (HCC): ICD-10-CM

## 2021-04-28 RX ORDER — SITAGLIPTIN 25 MG/1
TABLET, FILM COATED ORAL
Qty: 90 TABLET | Refills: 1 | Status: SHIPPED | OUTPATIENT
Start: 2021-04-28 | End: 2021-11-19 | Stop reason: SDUPTHER

## 2021-05-05 DIAGNOSIS — F41.8 DEPRESSION WITH ANXIETY: ICD-10-CM

## 2021-05-05 RX ORDER — CLONAZEPAM 0.5 MG/1
0.5 TABLET ORAL
Qty: 30 TABLET | Refills: 0 | Status: SHIPPED | OUTPATIENT
Start: 2021-05-05 | End: 2021-06-08 | Stop reason: SDUPTHER

## 2021-05-13 DIAGNOSIS — F41.8 DEPRESSION WITH ANXIETY: ICD-10-CM

## 2021-05-13 RX ORDER — ZOLPIDEM TARTRATE 10 MG/1
10 TABLET ORAL
Qty: 30 TABLET | Refills: 0 | Status: SHIPPED | OUTPATIENT
Start: 2021-05-13 | End: 2021-06-15 | Stop reason: SDUPTHER

## 2021-05-25 NOTE — TELEPHONE ENCOUNTER
Last O/V: 3/10/21  Next O/V: 6/30/21    Requesting refill for clonazepam to early, soonest refill is 6/2/21

## 2021-06-08 DIAGNOSIS — F41.8 DEPRESSION WITH ANXIETY: ICD-10-CM

## 2021-06-09 RX ORDER — CLONAZEPAM 0.5 MG/1
0.5 TABLET ORAL
Qty: 30 TABLET | Refills: 0 | Status: SHIPPED | OUTPATIENT
Start: 2021-06-09 | End: 2021-07-08 | Stop reason: SDUPTHER

## 2021-06-15 DIAGNOSIS — F41.8 DEPRESSION WITH ANXIETY: ICD-10-CM

## 2021-06-15 NOTE — TELEPHONE ENCOUNTER
LS 3/10/21, NA 6/30/21, LF  On 5/13/21 amt 30/0, checked the PDMP  - Ezequiel Del Rio is out on 2 week vacation

## 2021-06-17 RX ORDER — ZOLPIDEM TARTRATE 10 MG/1
10 TABLET ORAL
Qty: 30 TABLET | Refills: 0 | Status: SHIPPED | OUTPATIENT
Start: 2021-06-17 | End: 2021-07-15 | Stop reason: SDUPTHER

## 2021-06-19 DIAGNOSIS — E78.00 HYPERCHOLESTEROLEMIA: ICD-10-CM

## 2021-06-19 DIAGNOSIS — E03.9 HYPOTHYROIDISM, UNSPECIFIED TYPE: ICD-10-CM

## 2021-06-23 RX ORDER — FENOFIBRATE 145 MG/1
TABLET, COATED ORAL
Qty: 90 TABLET | Refills: 1 | Status: SHIPPED | OUTPATIENT
Start: 2021-06-23 | End: 2022-01-28 | Stop reason: SDUPTHER

## 2021-06-23 RX ORDER — LEVOTHYROXINE SODIUM 0.05 MG/1
TABLET ORAL
Qty: 90 TABLET | Refills: 1 | Status: SHIPPED | OUTPATIENT
Start: 2021-06-23 | End: 2022-04-13 | Stop reason: SDUPTHER

## 2021-07-08 DIAGNOSIS — F41.8 DEPRESSION WITH ANXIETY: ICD-10-CM

## 2021-07-08 DIAGNOSIS — I10 ESSENTIAL HYPERTENSION: ICD-10-CM

## 2021-07-08 RX ORDER — AMLODIPINE BESYLATE 5 MG/1
5 TABLET ORAL DAILY
Qty: 30 TABLET | Refills: 5 | Status: SHIPPED | OUTPATIENT
Start: 2021-07-08 | End: 2022-01-06 | Stop reason: SDUPTHER

## 2021-07-08 RX ORDER — CLONAZEPAM 0.5 MG/1
0.5 TABLET ORAL
Qty: 30 TABLET | Refills: 0 | Status: SHIPPED | OUTPATIENT
Start: 2021-07-08 | End: 2021-08-09 | Stop reason: SDUPTHER

## 2021-07-15 ENCOUNTER — TELEPHONE (OUTPATIENT)
Dept: UROLOGY | Facility: HOSPITAL | Age: 71
End: 2021-07-15

## 2021-07-15 DIAGNOSIS — R31.0 GROSS HEMATURIA: ICD-10-CM

## 2021-07-15 DIAGNOSIS — C67.9 MALIGNANT NEOPLASM OF URINARY BLADDER, UNSPECIFIED SITE (HCC): Primary | ICD-10-CM

## 2021-07-16 NOTE — TELEPHONE ENCOUNTER
Left message making patient aware that the script is in the system and she can get it done one week prior to appointment

## 2021-07-22 DIAGNOSIS — I10 ESSENTIAL HYPERTENSION: ICD-10-CM

## 2021-07-22 DIAGNOSIS — R00.0 TACHYCARDIA: ICD-10-CM

## 2021-07-22 DIAGNOSIS — E11.8 TYPE 2 DIABETES MELLITUS WITH COMPLICATION, WITHOUT LONG-TERM CURRENT USE OF INSULIN (HCC): ICD-10-CM

## 2021-07-23 RX ORDER — PEN NEEDLE, DIABETIC 32 GX 1/4"
NEEDLE, DISPOSABLE MISCELLANEOUS
Qty: 100 EACH | Refills: 1 | Status: SHIPPED | OUTPATIENT
Start: 2021-07-23 | End: 2021-10-28 | Stop reason: SDUPTHER

## 2021-08-02 ENCOUNTER — APPOINTMENT (OUTPATIENT)
Dept: LAB | Facility: MEDICAL CENTER | Age: 71
End: 2021-08-02
Payer: MEDICARE

## 2021-08-02 DIAGNOSIS — E11.9 TYPE 2 DIABETES MELLITUS WITHOUT COMPLICATION, WITH LONG-TERM CURRENT USE OF INSULIN (HCC): ICD-10-CM

## 2021-08-02 DIAGNOSIS — F41.8 DEPRESSION WITH ANXIETY: ICD-10-CM

## 2021-08-02 DIAGNOSIS — E78.00 HYPERCHOLESTEROLEMIA: ICD-10-CM

## 2021-08-02 DIAGNOSIS — Z79.4 TYPE 2 DIABETES MELLITUS WITHOUT COMPLICATION, WITH LONG-TERM CURRENT USE OF INSULIN (HCC): ICD-10-CM

## 2021-08-02 DIAGNOSIS — I10 ESSENTIAL HYPERTENSION: ICD-10-CM

## 2021-08-02 DIAGNOSIS — E22.2 SIADH (SYNDROME OF INAPPROPRIATE ADH PRODUCTION) (HCC): ICD-10-CM

## 2021-08-02 LAB
ALBUMIN SERPL BCP-MCNC: 4.2 G/DL (ref 3.5–5)
ALP SERPL-CCNC: 48 U/L (ref 46–116)
ALT SERPL W P-5'-P-CCNC: 37 U/L (ref 12–78)
ANION GAP SERPL CALCULATED.3IONS-SCNC: 10 MMOL/L (ref 4–13)
AST SERPL W P-5'-P-CCNC: 22 U/L (ref 5–45)
BASOPHILS # BLD AUTO: 0.08 THOUSANDS/ΜL (ref 0–0.1)
BASOPHILS NFR BLD AUTO: 1 % (ref 0–1)
BILIRUB SERPL-MCNC: 0.41 MG/DL (ref 0.2–1)
BUN SERPL-MCNC: 15 MG/DL (ref 5–25)
CALCIUM SERPL-MCNC: 10.5 MG/DL (ref 8.3–10.1)
CHLORIDE SERPL-SCNC: 105 MMOL/L (ref 100–108)
CHOLEST SERPL-MCNC: 160 MG/DL (ref 50–200)
CO2 SERPL-SCNC: 23 MMOL/L (ref 21–32)
CREAT SERPL-MCNC: 0.82 MG/DL (ref 0.6–1.3)
EOSINOPHIL # BLD AUTO: 0.34 THOUSAND/ΜL (ref 0–0.61)
EOSINOPHIL NFR BLD AUTO: 6 % (ref 0–6)
ERYTHROCYTE [DISTWIDTH] IN BLOOD BY AUTOMATED COUNT: 13.8 % (ref 11.6–15.1)
EST. AVERAGE GLUCOSE BLD GHB EST-MCNC: 146 MG/DL
GFR SERPL CREATININE-BSD FRML MDRD: 72 ML/MIN/1.73SQ M
GLUCOSE P FAST SERPL-MCNC: 136 MG/DL (ref 65–99)
HBA1C MFR BLD: 6.7 %
HCT VFR BLD AUTO: 41.4 % (ref 34.8–46.1)
HDLC SERPL-MCNC: 28 MG/DL
HGB BLD-MCNC: 13.5 G/DL (ref 11.5–15.4)
IMM GRANULOCYTES # BLD AUTO: 0.03 THOUSAND/UL (ref 0–0.2)
IMM GRANULOCYTES NFR BLD AUTO: 1 % (ref 0–2)
LYMPHOCYTES # BLD AUTO: 1.32 THOUSANDS/ΜL (ref 0.6–4.47)
LYMPHOCYTES NFR BLD AUTO: 22 % (ref 14–44)
MCH RBC QN AUTO: 30.5 PG (ref 26.8–34.3)
MCHC RBC AUTO-ENTMCNC: 32.6 G/DL (ref 31.4–37.4)
MCV RBC AUTO: 94 FL (ref 82–98)
MONOCYTES # BLD AUTO: 0.57 THOUSAND/ΜL (ref 0.17–1.22)
MONOCYTES NFR BLD AUTO: 9 % (ref 4–12)
NEUTROPHILS # BLD AUTO: 3.72 THOUSANDS/ΜL (ref 1.85–7.62)
NEUTS SEG NFR BLD AUTO: 61 % (ref 43–75)
NONHDLC SERPL-MCNC: 132 MG/DL
NRBC BLD AUTO-RTO: 0 /100 WBCS
PLATELET # BLD AUTO: 230 THOUSANDS/UL (ref 149–390)
PMV BLD AUTO: 10.8 FL (ref 8.9–12.7)
POTASSIUM SERPL-SCNC: 4.3 MMOL/L (ref 3.5–5.3)
PROT SERPL-MCNC: 7.4 G/DL (ref 6.4–8.2)
RBC # BLD AUTO: 4.43 MILLION/UL (ref 3.81–5.12)
SODIUM SERPL-SCNC: 138 MMOL/L (ref 136–145)
TRIGL SERPL-MCNC: 412 MG/DL
WBC # BLD AUTO: 6.06 THOUSAND/UL (ref 4.31–10.16)

## 2021-08-02 PROCEDURE — 36415 COLL VENOUS BLD VENIPUNCTURE: CPT

## 2021-08-02 PROCEDURE — 85025 COMPLETE CBC W/AUTO DIFF WBC: CPT

## 2021-08-02 PROCEDURE — 83036 HEMOGLOBIN GLYCOSYLATED A1C: CPT

## 2021-08-02 PROCEDURE — 80053 COMPREHEN METABOLIC PANEL: CPT

## 2021-08-02 PROCEDURE — 80061 LIPID PANEL: CPT

## 2021-08-05 ENCOUNTER — OFFICE VISIT (OUTPATIENT)
Dept: INTERNAL MEDICINE CLINIC | Age: 71
End: 2021-08-05
Payer: MEDICARE

## 2021-08-05 VITALS
SYSTOLIC BLOOD PRESSURE: 138 MMHG | DIASTOLIC BLOOD PRESSURE: 78 MMHG | HEIGHT: 62 IN | WEIGHT: 167 LBS | TEMPERATURE: 97.5 F | HEART RATE: 87 BPM | BODY MASS INDEX: 30.73 KG/M2

## 2021-08-05 DIAGNOSIS — I10 ESSENTIAL HYPERTENSION: ICD-10-CM

## 2021-08-05 DIAGNOSIS — Z12.31 ENCOUNTER FOR SCREENING MAMMOGRAM FOR MALIGNANT NEOPLASM OF BREAST: ICD-10-CM

## 2021-08-05 DIAGNOSIS — E78.1 HYPERTRIGLYCERIDEMIA: ICD-10-CM

## 2021-08-05 DIAGNOSIS — E83.52 HYPERCALCEMIA: ICD-10-CM

## 2021-08-05 DIAGNOSIS — Z79.4 TYPE 2 DIABETES MELLITUS WITHOUT COMPLICATION, WITH LONG-TERM CURRENT USE OF INSULIN (HCC): Primary | ICD-10-CM

## 2021-08-05 DIAGNOSIS — E11.9 TYPE 2 DIABETES MELLITUS WITHOUT COMPLICATION, WITH LONG-TERM CURRENT USE OF INSULIN (HCC): Primary | ICD-10-CM

## 2021-08-05 PROBLEM — IMO0002 UNCONTROLLED DIABETES MELLITUS WITH COMPLICATIONS: Status: ACTIVE | Noted: 2021-08-05

## 2021-08-05 PROCEDURE — 99214 OFFICE O/P EST MOD 30 MIN: CPT | Performed by: PHYSICIAN ASSISTANT

## 2021-08-05 NOTE — PROGRESS NOTES
Diabetic Foot Exam    Patient's shoes and socks removed  Right Foot/Ankle   Right Foot Inspection  Skin Exam: skin normal, skin intact and dry skin no warmth, no callus, no erythema, no maceration, no abnormal color, no pre-ulcer, no ulcer and no callus                          Toe Exam: ROM and strength within normal limitsno swelling and erythema  Sensory   Vibration: intact    Monofilament testing: intact      Left Foot/Ankle  Left Foot Inspection  Skin Exam: skin normal, skin intact and dry skinno warmth, no erythema, no maceration, normal color, no pre-ulcer, no ulcer and no callus                         Toe Exam: ROM and strength within normal limitsno swelling and no erythema                   Sensory   Vibration: intact    Monofilament: intact    Assign Risk Category:  No deformity present; No loss of protective sensation;  No weak pulses       Risk: 0

## 2021-08-05 NOTE — PROGRESS NOTES
Assessment/Plan:         Diagnoses and all orders for this visit:    Type 2 diabetes mellitus without complication, with long-term current use of insulin (HCC)  Comments:  improved, discussed decreased carb diet     Encounter for screening mammogram for malignant neoplasm of breast  -     Mammo screening bilateral w 3d & cad; Future    Essential hypertension  -     PTH, intact; Future  -     CALCIUM & CALCIUM IONIZED; Future  -     TSH, 3rd generation; Future    Hypercalcemia  Comments:  d/c tums   avoid mvi or ca+ supplements  increase water intake  check labs 3-4 weeks (pth, ca+, ionized ca+)  Orders:  -     PTH, intact; Future  -     CALCIUM & CALCIUM IONIZED; Future  -     TSH, 3rd generation; Future    Hypertriglyceridemia  -     Omega-3 Fatty Acids (Fish Oil) 1200 MG CPDR; Take by mouth daily          Subjective:      Patient ID: Odalis Hannah is a 66 y o  female  65 y/o female with hx of dm, htn, hypothyroidism, dyslipidemia, insomnia   Pt is anxious over concerns for her husbands current health issues  She reports she is "stress eating"  Discussed FBS and hga1c (6 7)  Also ca+ mildly elevated at 10 5  Taking tums prn  Denies taking extra ca+ supplement         The following portions of the patient's history were reviewed and updated as appropriate: allergies, current medications, past family history, past medical history, past social history, past surgical history and problem list     Review of Systems   Constitutional: Negative for appetite change, chills, diaphoresis, fatigue and fever  HENT: Negative for congestion and sore throat  Respiratory: Negative for cough, shortness of breath and wheezing  Cardiovascular: Negative for chest pain and leg swelling  Gastrointestinal: Negative for abdominal pain, constipation, diarrhea and nausea  Musculoskeletal: Positive for arthralgias  Skin: Negative for rash  Neurological: Negative for dizziness, light-headedness and headaches  Hematological: Does not bruise/bleed easily  Psychiatric/Behavioral: Positive for sleep disturbance           Past Medical History:   Diagnosis Date    Anxiety     Arthritis     Bladder carcinoma ([facility] ) 2016    Bladder mass     Depression     Diabetes mellitus ([facility] )     Disease of thyroid gland     thyroid nodules-not treating at this time    Dysuria     Last assessed 17    GERD (gastroesophageal reflux disease)      2 para 2     HLD (hyperlipidemia)     HTN (hypertension)     Hypercholesterolemia     Hypertension     Knee pain     Lichen sclerosus 5207    Melanoma ([place] [address] )     Mild aortic regurgitation with left ventricular dilation by prior echocardiogram     Papanicolaou smear 2017    NEG    PONV (postoperative nausea and vomiting)          Current Outpatient Medications:     amLODIPine (NORVASC) 5 mg tablet, Take 1 tablet (5 mg total) by mouth daily, Disp: 30 tablet, Rfl: 5    clonazePAM (KlonoPIN) 0 5 mg tablet, Take 1 tablet (0 5 mg total) by mouth daily at bedtime, Disp: 30 tablet, Rfl: 0    Cyanocobalamin 1000 MCG CAPS, Take 1 capsule by mouth daily  , Disp: , Rfl:     famotidine (PEPCID) 20 mg tablet, Take 1 tablet (20 mg total) by mouth daily (Patient taking differently: Take 20 mg by mouth daily As needed), Disp: 90 tablet, Rfl: 0    fenofibrate (TRICOR) 145 mg tablet, TAKE 1 TABLET BY MOUTH EVERY DAY, Disp: 90 tablet, Rfl: 1    fluticasone (FLONASE) 50 mcg/act nasal spray, 1 spray into each nostril daily, Disp: 1 Bottle, Rfl: 0    ibuprofen (MOTRIN) 200 mg tablet, Take 200 mg by mouth every 6 (six) hours as needed for mild pain, Disp: , Rfl:     insulin glargine (Lantus SoloStar) 100 units/mL injection pen, Inject 30 Units under the skin daily, Disp: 10 mL, Rfl: 1    Insulin Pen Needle (BD Pen Needle Micro U/F) 32G X 6 MM MISC, Once daily with insulin, Disp: 100 each, Rfl: 1    Januvia 25 MG tablet, TAKE 1 TABLET BY MOUTH EVERY DAY, Disp: 90 tablet, Rfl: 1    levothyroxine 50 mcg tablet, TAKE 1 TABLET BY MOUTH EVERY DAY, Disp: 90 tablet, Rfl: 1    metFORMIN (GLUCOPHAGE) 500 mg tablet, TAKE 2 TABLETS BY MOUTH TWICE A DAY WITH FOOD, Disp: 360 tablet, Rfl: 1    metoprolol tartrate (LOPRESSOR) 25 mg tablet, Take 1 tablet (25 mg total) by mouth every 12 (twelve) hours, Disp: 180 tablet, Rfl: 1    mometasone (ELOCON) 0 1 % cream, Apply topically daily, Disp: , Rfl:     ONETOUCH VERIO test strip, Test 3 times daily, Disp: 300 each, Rfl: 1    Probiotic Product (PROBIOTIC-10) CAPS, Take 1 capsule by mouth daily, Disp: , Rfl:     simvastatin (ZOCOR) 40 mg tablet, Take 1 tablet (40 mg total) by mouth daily at bedtime, Disp: 90 tablet, Rfl: 1    zolpidem (AMBIEN) 10 mg tablet, Take 1 tablet (10 mg total) by mouth daily at bedtime, Disp: 30 tablet, Rfl: 0    Omega-3 Fatty Acids (Fish Oil) 1200 MG CPDR, Take by mouth daily, Disp: 30 capsule, Rfl: 4    No Known Allergies    Social History   Past Surgical History:   Procedure Laterality Date    CARPAL TUNNEL RELEASE Right      SECTION      x2    CHOLECYSTECTOMY      CYSTOSCOPY N/A 2016    Procedure: CYSTOSCOPY WITH BIOPSIES;  Surgeon: Maged Crowe MD;  [facility];  Service:    [place] CYSTOSCOPY N/A 2016    Procedure: Sheryle Lehmann;  Surgeon: Maged Crowe MD;  [facility];  Service:     CYSTOSCOPY  2020    HERNIA REPAIR      OH CYSTO/URETERO W/LITHOTRIPSY &INDWELL STENT INSRT  2016    Procedure: CYSTOSCOPY URETEROSCOPY WITH LITHOTRIPSY HOLMIUM LASER RIGHT, RETROGRADE PYELOGRAM BILATERAL: AND INSERTION STENT URETERAL Right ;  Surgeon: Maged Crowe MD;  [facility];  Service: Urology    OH CYSTO/URETERO/PYELOSCOPY, DX Right 2017    Procedure: URETEROSCOPY;  Surgeon: Maged Crowe MD;  [facility];  Service: Urology    OH CYSTOSCOPY,INSERT URETERAL STENT Left 2016    Procedure: URETERAL STENTS;  Surgeon: Maged Crowe MD;  [facility];  Service: Urology    NE CYSTOURETHROSCOPY,FULGUR <0 5 CM LESN N/A 11/29/2016    Procedure: TRANSURETHRAL RESECTION OF BLADDER TUMOR (TURBT);   Surgeon: Danay Gracia MD;  [facility];  Service: Urology    NE CYSTOURETHROSCOPY,FULGUR <0 5 CM LESN N/A 9/29/2016    Procedure: Dalton Barry; TUR BLADDER TUMOR ;  Surgeon: Danay Gracia MD;  [facility];  Service: Urology    NE CYSTOURETHROSCOPY,FULGUR <0 5 CM LESN N/A 9/1/2016    Procedure: TUR BLADDER TUMOR ;  Surgeon: Danay Gracia MD;  [facility];  Service: Urology    NE CYSTOURETHROSCOPY,FULGUR <0 5 CM LESN Bilateral 5/9/2017    Procedure: CYSTOSCOPY, RIGHT URETERAL WASHINGS, ;  Surgeon: Danay Gracia MD;  [facility];  Service: Urology    NE CYSTOURETHROSCOPY,URETER CATHETER Bilateral 9/29/2016    Procedure: RETROGRADE PYELOGRAM ;  Surgeon: Danay Gracia MD;  [facility];  Service: Urology    NE CYSTOURETHROSCOPY,URETER CATHETER Bilateral 5/9/2017    Procedure: RETROGRADE PYELOGRAM WITH STENT EXCHANGE, RIGHT;  Surgeon: Danay Gracia MD;  [facility];  Service: Urology    NE INSTILL ANTICANCER AGENT IN BLADDER N/A 11/29/2016    Procedure: Jonna [place];  Surgeon: Danay Gracia MD;  [facility];  Service: Urology    NE KNEE SCOPE,MED/LAT MENISECTOMY Left 4/4/2016    Procedure: KNEE ARTHROSCOPIC PARTIAL MEDIAL MENISCECTOMY ;  Surgeon: Monica Tinajero MD;  [facility];  Service: Orthopedics    REMOVAL URETERAL STENT Left 11/29/2016    Procedure: REMOVAL STENT URETERAL;  Surgeon: Danay Gracia MD;  [facility];  Service:     TONSILLECTOMY       Family History   Problem Relation Age of Onset    Heart attack Mother     Heart disease Mother     ALS Father     Diabetes Maternal Grandfather     Lung cancer Paternal Grandfather     Heart disease Maternal Grandmother     No Known Problems Daughter     No Known Problems Daughter     No Known Problems Paternal Aunt     No Known Problems Paternal Aunt     No Known Problems Paternal Aunt     No Known Problems Paternal Aunt     No Known Problems Paternal Aunt     Breast cancer Neg Hx        Objective:  /78 (BP Location: Left arm, Patient Position: Sitting, Cuff Size: Standard)   Pulse 87   Temp 97 5 °F (36 4 °C) (Temporal)   Ht 5' 1 85" (1 571 m)   Wt 75 8 kg (167 lb)   BMI 30 69 kg/m²        Physical Exam  Vitals reviewed  Constitutional:       General: She is not in acute distress  HENT:      Head: Normocephalic and atraumatic  Right Ear: Tympanic membrane, ear canal and external ear normal       Left Ear: Tympanic membrane, ear canal and external ear normal       Mouth/Throat:      Mouth: Mucous membranes are moist    Eyes:      General:         Right eye: No discharge  Left eye: No discharge  Extraocular Movements: Extraocular movements intact  Conjunctiva/sclera: Conjunctivae normal       Pupils: Pupils are equal, round, and reactive to light  Cardiovascular:      Rate and Rhythm: Normal rate and regular rhythm  Pulmonary:      Effort: Pulmonary effort is normal       Breath sounds: No wheezing or rales  Abdominal:      General: Bowel sounds are normal    Musculoskeletal:         General: Normal range of motion  Cervical back: Normal range of motion  Right lower leg: No edema  Left lower leg: No edema  Lymphadenopathy:      Cervical: No cervical adenopathy  Skin:     General: Skin is warm  Findings: No erythema or rash  Neurological:      General: No focal deficit present  Mental Status: She is alert and oriented to person, place, and time

## 2021-08-09 DIAGNOSIS — F41.8 DEPRESSION WITH ANXIETY: ICD-10-CM

## 2021-08-11 RX ORDER — CLONAZEPAM 0.5 MG/1
0.5 TABLET ORAL
Qty: 30 TABLET | Refills: 0 | Status: SHIPPED | OUTPATIENT
Start: 2021-08-11 | End: 2021-09-09 | Stop reason: SDUPTHER

## 2021-08-13 DIAGNOSIS — F41.8 DEPRESSION WITH ANXIETY: ICD-10-CM

## 2021-08-16 RX ORDER — ZOLPIDEM TARTRATE 10 MG/1
10 TABLET ORAL
Qty: 30 TABLET | Refills: 0 | Status: SHIPPED | OUTPATIENT
Start: 2021-08-16 | End: 2021-09-14 | Stop reason: SDUPTHER

## 2021-08-17 ENCOUNTER — PROCEDURE VISIT (OUTPATIENT)
Dept: UROLOGY | Facility: AMBULATORY SURGERY CENTER | Age: 71
End: 2021-08-17
Payer: MEDICARE

## 2021-08-17 VITALS
SYSTOLIC BLOOD PRESSURE: 118 MMHG | HEART RATE: 83 BPM | HEIGHT: 62 IN | WEIGHT: 165 LBS | DIASTOLIC BLOOD PRESSURE: 78 MMHG | BODY MASS INDEX: 30.36 KG/M2

## 2021-08-17 DIAGNOSIS — C67.9 MALIGNANT NEOPLASM OF URINARY BLADDER, UNSPECIFIED SITE (HCC): Primary | ICD-10-CM

## 2021-08-17 LAB
SL AMB  POCT GLUCOSE, UA: NORMAL
SL AMB LEUKOCYTE ESTERASE,UA: NORMAL
SL AMB POCT BILIRUBIN,UA: NORMAL
SL AMB POCT BLOOD,UA: NORMAL
SL AMB POCT CLARITY,UA: CLEAR
SL AMB POCT COLOR,UA: YELLOW
SL AMB POCT KETONES,UA: NORMAL
SL AMB POCT NITRITE,UA: NORMAL
SL AMB POCT PH,UA: 5
SL AMB POCT SPECIFIC GRAVITY,UA: 1.02
SL AMB POCT URINE PROTEIN: NORMAL
SL AMB POCT UROBILINOGEN: 0.2

## 2021-08-17 PROCEDURE — 81002 URINALYSIS NONAUTO W/O SCOPE: CPT | Performed by: UROLOGY

## 2021-08-17 PROCEDURE — 52000 CYSTOURETHROSCOPY: CPT | Performed by: UROLOGY

## 2021-08-17 NOTE — PROGRESS NOTES
Cystoscopy     Date/Time 8/17/2021 9:29 AM     Performed by  Braden Randolph MD     Authorized by Braden Randolph MD            Colby Paniagua is a 67-year-old female who has a history of urothelial carcinoma the bladder dating back to 2016  Pathology revealed TA/ T1 disease  She then had a repeat resection which showed a small amount of residual tumor at the bladder neck  She received 2 years total of BCG induction and maintenance  She has not had a recurrence since that time  Her last cystoscopy was 1 year ago  She returns today for surveillance cystoscopy  Urine cytology was performed prior to today's office visit in August 2021 and is negative for high-grade urothelial carcinoma  Her last upper tract imaging was a CT scan of the abdomen pelvis performed in the fall of 2019 with IV contrast which does not reveal significant urologic pathology  Postsurgical changes were noted at the right UVJ but no hydronephrosis was appreciated  Cystoscopy Procedure note    Risk and benefits of flexible cystoscopy were discussed  Informed consent was obtained  A urine dip is adequate for cystoscopy  The patient was placed into the modified supine position  Her genitalia was prepped and draped in a sterile fashion  Viscous lidocaine was instilled into the urethra  Flexible cystoscopy was then performed  The bladder was thoroughly inspected  Both ureteral orifices were visualized with clear efflux of urine  The bladder mucosa was thoroughly inspected  There was no evidence of mucosal abnormalities, stones, or lesions  Retroflexion was normal   Overall this was a negative cystoscopy  A female office staff member was present throughout the entire procedure  Impression: History of low-grade superficial bladder cancer without recurrence     Plan:  I provided the patient and her  reassurance that there is no sign of recurrence in cytology is negative    Follow-up is recommended in 1 year with cystoscopy and cytology  Patient was instructed to call sooner if she were   To see gross hematuria

## 2021-08-31 DIAGNOSIS — E11.8 TYPE 2 DIABETES MELLITUS WITH COMPLICATION, WITHOUT LONG-TERM CURRENT USE OF INSULIN (HCC): ICD-10-CM

## 2021-09-03 ENCOUNTER — OFFICE VISIT (OUTPATIENT)
Dept: URGENT CARE | Facility: MEDICAL CENTER | Age: 71
End: 2021-09-03
Payer: MEDICARE

## 2021-09-03 VITALS — HEIGHT: 62 IN | WEIGHT: 165 LBS | BODY MASS INDEX: 30.36 KG/M2

## 2021-09-03 DIAGNOSIS — J01.10 ACUTE NON-RECURRENT FRONTAL SINUSITIS: Primary | ICD-10-CM

## 2021-09-03 PROCEDURE — 99213 OFFICE O/P EST LOW 20 MIN: CPT | Performed by: PHYSICIAN ASSISTANT

## 2021-09-03 PROCEDURE — G0463 HOSPITAL OUTPT CLINIC VISIT: HCPCS | Performed by: PHYSICIAN ASSISTANT

## 2021-09-03 PROCEDURE — U0003 INFECTIOUS AGENT DETECTION BY NUCLEIC ACID (DNA OR RNA); SEVERE ACUTE RESPIRATORY SYNDROME CORONAVIRUS 2 (SARS-COV-2) (CORONAVIRUS DISEASE [COVID-19]), AMPLIFIED PROBE TECHNIQUE, MAKING USE OF HIGH THROUGHPUT TECHNOLOGIES AS DESCRIBED BY CMS-2020-01-R: HCPCS | Performed by: PHYSICIAN ASSISTANT

## 2021-09-03 PROCEDURE — U0005 INFEC AGEN DETEC AMPLI PROBE: HCPCS | Performed by: PHYSICIAN ASSISTANT

## 2021-09-03 RX ORDER — AMOXICILLIN AND CLAVULANATE POTASSIUM 875; 125 MG/1; MG/1
1 TABLET, FILM COATED ORAL EVERY 12 HOURS SCHEDULED
Qty: 14 TABLET | Refills: 0 | Status: SHIPPED | OUTPATIENT
Start: 2021-09-03 | End: 2021-09-10

## 2021-09-04 LAB — SARS-COV-2 RNA RESP QL NAA+PROBE: NEGATIVE

## 2021-09-04 NOTE — PROGRESS NOTES
[email or web address]        NAME: Marycruz Gamboa is a 66 y o  female  : 1950    MRN: [id number]  DATE: September 3, 2021  TIME: 8:12 PM    Assessment and Plan   Acute non-recurrent frontal sinusitis [J01 10]  1  Acute non-recurrent frontal sinusitis  Novel Coronavirus (Covid-19),PCR [facility] - Office Collection         Patient Instructions     Sinusitis  augmentin twice daily  Follow up with PCP in 3-5 days  Proceed to  ER if symptoms worsen  Chief Complaint     Chief Complaint   Patient presents with    Cold Like Symptoms     sinus issues         History of Present Illness       65 y/o female presents c/o congestion, runny nose, sinus pressure and pain x 7 days  Denies fever, chills, cough, SOB  Has used over the counter medications with no relief  Fully vaccinated      Review of Systems   Review of Systems   Constitutional: Negative for activity change, appetite change, chills, diaphoresis, fatigue and fever  HENT: Positive for congestion, rhinorrhea, sinus pressure and sinus pain  Negative for ear discharge, ear pain, facial swelling, hearing loss, mouth sores, nosebleeds, postnasal drip, sneezing, sore throat and voice change  Respiratory: Positive for cough  Negative for apnea, choking, chest tightness, shortness of breath, wheezing and stridor  Cardiovascular: Negative            Current Medications       Current Outpatient Medications:     amLODIPine (NORVASC) 5 mg tablet, Take 1 tablet (5 mg total) by mouth daily, Disp: 30 tablet, Rfl: 5    clonazePAM (KlonoPIN) 0 5 mg tablet, Take 1 tablet (0 5 mg total) by mouth daily at bedtime, Disp: 30 tablet, Rfl: 0    Cyanocobalamin 1000 MCG CAPS, Take 1 capsule by mouth daily  , Disp: , Rfl:     famotidine (PEPCID) 20 mg tablet, Take 1 tablet (20 mg total) by mouth daily (Patient taking differently: Take 20 mg by mouth daily As needed), Disp: 90 tablet, Rfl: 0    fenofibrate (TRICOR) 145 mg tablet, TAKE 1 TABLET BY MOUTH EVERY DAY, Disp: 90 tablet, Rfl: 1    fluticasone (FLONASE) 50 mcg/act nasal spray, 1 spray into each nostril daily, Disp: 1 Bottle, Rfl: 0    ibuprofen (MOTRIN) 200 mg tablet, Take 200 mg by mouth every 6 (six) hours as needed for mild pain, Disp: , Rfl:     insulin glargine (Lantus SoloStar) 100 units/mL injection pen, Inject 30 Units under the skin daily, Disp: 10 mL, Rfl: 1    Insulin Pen Needle (BD Pen Needle Micro U/F) 32G X 6 MM MISC, Once daily with insulin, Disp: 100 each, Rfl: 1    Januvia 25 MG tablet, TAKE 1 TABLET BY MOUTH EVERY DAY, Disp: 90 tablet, Rfl: 1    levothyroxine 50 mcg tablet, TAKE 1 TABLET BY MOUTH EVERY DAY, Disp: 90 tablet, Rfl: 1    metFORMIN (GLUCOPHAGE) 500 mg tablet, Take 2 tablets (1,000 mg total) by mouth 2 (two) times a day with meals, Disp: 360 tablet, Rfl: 1    metoprolol tartrate (LOPRESSOR) 25 mg tablet, Take 1 tablet (25 mg total) by mouth every 12 (twelve) hours, Disp: 180 tablet, Rfl: 1    mometasone (ELOCON) 0 1 % cream, Apply topically daily, Disp: , Rfl:     ONETOUCH VERIO test strip, Test 3 times daily, Disp: 300 each, Rfl: 1    Probiotic Product (PROBIOTIC-10) CAPS, Take 1 capsule by mouth daily, Disp: , Rfl:     simvastatin (ZOCOR) 40 mg tablet, Take 1 tablet (40 mg total) by mouth daily at bedtime, Disp: 90 tablet, Rfl: 1    zolpidem (AMBIEN) 10 mg tablet, Take 1 tablet (10 mg total) by mouth daily at bedtime, Disp: 30 tablet, Rfl: 0    Omega-3 Fatty Acids (Fish Oil) 1200 MG CPDR, Take by mouth daily (Patient not taking: Reported on 9/3/2021), Disp: 30 capsule, Rfl: 4    Current Allergies     Allergies as of 09/03/2021    (No Known Allergies)            The following portions of the patient's history were reviewed and updated as appropriate: allergies, current medications, past family history, past medical history, past social history, past surgical history and problem list      Past Medical History:   Diagnosis Date    Anxiety     Arthritis     Bladder carcinoma ([facility] [address] ) 2016    Bladder mass     Depression     Diabetes mellitus ([facility] [address] )     Disease of thyroid gland     thyroid nodules-not treating at this time    Dysuria     Last assessed 17    GERD (gastroesophageal reflux disease)      2 para 2     HLD (hyperlipidemia)     HTN (hypertension)     Hypercholesterolemia     Hypertension     Knee pain     Lichen sclerosus 165    Melanoma ([facility] [address] )     Mild aortic regurgitation with left ventricular dilation by prior echocardiogram     Papanicolaou smear 2017    NEG    PONV (postoperative nausea and vomiting)        Past Surgical History:   Procedure Laterality Date    CARPAL TUNNEL RELEASE Right      SECTION      x2    CHOLECYSTECTOMY      CYSTOSCOPY N/A 2016    Procedure: CYSTOSCOPY WITH BIOPSIES;  Surgeon: Js Rivera MD;  [facility];  Service:    Kishore Hero CYSTOSCOPY N/A 2016    Procedure: CYSTOSCOPY;  Surgeon: Js Rivera MD;  [facility];  Service:     CYSTOSCOPY  2020    HERNIA REPAIR      MO CYSTO/URETERO W/LITHOTRIPSY &INDWELL STENT INSRT  2016    Procedure: CYSTOSCOPY URETEROSCOPY WITH LITHOTRIPSY HOLMIUM LASER RIGHT, RETROGRADE PYELOGRAM BILATERAL: AND INSERTION STENT URETERAL Right ;  Surgeon: Js Rivera MD;  [facility];  Service: Urology    MO CYSTO/URETERO/PYELOSCOPY, DX Right 2017    Procedure: URETEROSCOPY;  Surgeon: Js Rivera MD;  [facility];  Service: Urology    MO CYSTOSCOPY,INSERT URETERAL STENT Left 2016    Procedure: URETERAL STENTS;  Surgeon: Js Rivera MD;  [facility];  Service: Urology    MO CYSTOURETHROSCOPY,FULGUR <0 5 CM LESN N/A 2016    Procedure: TRANSURETHRAL RESECTION OF BLADDER TUMOR (TURBT);   Surgeon: Js Rivera MD;  [facility];  Service: Urology    MO CYSTOURETHROSCOPY,FULGUR <0 5 CM LESN N/A 2016    Procedure: Thurnell Livers; TUR BLADDER TUMOR ;  Surgeon: Js Rivera MD; [facility];  Service: Urology    WA CYSTOURETHROSCOPY,FULGUR <0 5 CM LESN N/A 9/1/2016    Procedure: TUR BLADDER TUMOR ;  Surgeon: Gilmer Olivarez MD;  [facility];  Service: Urology    WA CYSTOURETHROSCOPY,FULGUR <0 5 CM LESN Bilateral 5/9/2017    Procedure: Kali Blevins, ;  Surgeon: Gilmer Olivarez MD;  [facility];  Service: Urology    WA CYSTOURETHROSCOPY,URETER CATHETER Bilateral 9/29/2016    Procedure: Lisa Dove ;  Surgeon: Gilmer Olivarez MD;  [facility];  Service: Urology    [address] [place] [address],B-1 Bilateral 5/9/2017    Procedure: RETROGRADE PYELOGRAM WITH STENT EXCHANGE, RIGHT;  Surgeon: Gilmer Olivarez MD;  [facility];  Service: Urology    WA INSTILL ANTICANCER AGENT IN BLADDER N/A 11/29/2016    Procedure: Dm Chicho;  Surgeon: Gilmer Olivarez MD;  [facility];  Service: Urology    WA KNEE SCOPE,MED/LAT MENISECTOMY Left 4/4/2016    Procedure: KNEE ARTHROSCOPIC PARTIAL MEDIAL MENISCECTOMY ;  Surgeon: Pj Calix MD;  [facility];  Service: Orthopedics    REMOVAL URETERAL STENT Left 11/29/2016    Procedure: REMOVAL STENT URETERAL;  Surgeon: Gilmer Olivarez MD;  [facility];  Service:     TONSILLECTOMY         Family History   Problem Relation Age of Onset    Heart attack Mother     Heart disease Mother     ALS Father     Diabetes Maternal Grandfather     Lung cancer Paternal Grandfather     Heart disease Maternal Grandmother     No Known Problems Daughter     No Known Problems Daughter     No Known Problems Paternal Aunt     No Known Problems Paternal Aunt     No Known Problems Paternal Aunt     No Known Problems Paternal Aunt     No Known Problems Paternal Aunt     Breast cancer Neg Hx          Medications have been verified          Objective   Ht 5' 2" (1 575 m)   Wt 74 8 kg (165 lb)   BMI 30 18 kg/m²        Physical Exam     Physical Exam  Constitutional:       General: She is not in acute distress  Appearance: She is well-developed and normal weight  She is not diaphoretic  HENT:      Head: Normocephalic and atraumatic  Right Ear: Hearing, tympanic membrane, ear canal and external ear normal       Left Ear: Hearing, tympanic membrane, ear canal and external ear normal       Nose: Rhinorrhea present  Right Sinus: Frontal sinus tenderness present  Left Sinus: Frontal sinus tenderness present  Mouth/Throat:      Pharynx: Uvula midline  Cardiovascular:      Rate and Rhythm: Normal rate and regular rhythm  Heart sounds: Normal heart sounds  Pulmonary:      Effort: Pulmonary effort is normal  No respiratory distress  Breath sounds: Normal breath sounds  No stridor  No wheezing, rhonchi or rales  Chest:      Chest wall: No tenderness  Musculoskeletal:      Cervical back: Normal range of motion and neck supple  Lymphadenopathy:      Cervical: Cervical adenopathy present  Neurological:      Mental Status: She is alert

## 2021-09-04 NOTE — PATIENT INSTRUCTIONS
Sinusitis  augmentin twice daily  Follow up with PCP in 3-5 days  Proceed to  ER if symptoms worsen  Rhinosinusitis   WHAT YOU NEED TO KNOW:   Rhinosinusitis (RS) is inflammation of your nose and sinuses  It commonly begins as a virus, often as a common cold  Viruses usually last 7 to 10 days and do not need treatment  When the virus does not get better on its own, you may have bacterial RS  This means that bacteria have begun to grow inside your sinuses  Acute RS lasts less than 4 weeks  Chronic RS lasts 12 weeks or more  Recurrent RS is when you have 4 or more episodes of RS in one year  DISCHARGE INSTRUCTIONS:   Return to the emergency department if:   · Your eye and eyelid are red, swollen, and painful  · You cannot open your eye  · You have double vision or you cannot see  · Your eyeball bulges out or you cannot move your eye  · You are more sleepy than normal or you notice changes in your ability to think, move, or talk  · You have a stiff neck, a fever, or a bad headache  · You have swelling of your forehead or scalp  Contact your healthcare provider if:   · Your symptoms are worse or do not improve after 3 to 5 days of treatment  · You have questions or concerns about your condition or care  Medicines: You may need any of the following:  · Acetaminophen  decreases pain and fever  It is available without a doctor's order  Ask how much to take and how often to take it  Follow directions  Acetaminophen can cause liver damage if not taken correctly  · NSAIDs , such as ibuprofen, help decrease swelling, pain, and fever  This medicine is available with or without a doctor's order  NSAIDs can cause stomach bleeding or kidney problems in certain people  If you take blood thinner medicine, always ask your healthcare provider if NSAIDs are safe for you  Always read the medicine label and follow directions      · Nasal steroid sprays  decrease inflammation in your nose and sinuses  · Decongestants  reduce swelling and drain mucus in the nose and sinuses  They may help you breathe easier  · Antihistamines  dry mucus in the nose and relieve sneezing  · Antibiotics  treat a bacterial infection and may be needed if your symptoms do not improve or they get worse  · Take your medicine as directed  Contact your healthcare provider if you think your medicine is not helping or if you have side effects  Tell him or her if you are allergic to any medicine  Keep a list of the medicines, vitamins, and herbs you take  Include the amounts, and when and why you take them  Bring the list or the pill bottles to follow-up visits  Carry your medicine list with you in case of an emergency  Self-care:   · Rinse your sinuses  Use a sinus rinse device to rinse your nasal passages with a saline (salt water) solution  This will help thin the mucus in your nose and rinse away pollen and dirt  It will also help reduce swelling so you can breathe normally  Ask your healthcare provider how often to do this  · Breathe in steam   Heat a bowl of water until you see steam  Lean over the bowl and make a tent over your head with a large towel  Breathe deeply for about 20 minutes  Be careful not to get too close to the steam or burn yourself  Do this 3 times a day  You can also breathe deeply when you take a hot shower  · Sleep with your head elevated  Place an extra pillow under your head before you go to sleep to help your sinuses drain  · Drink liquids as directed  Ask your healthcare provider how much liquid to drink each day and which liquids are best for you  Liquids will thin the mucus in your nose and help it drain  Avoid drinks that contain alcohol or caffeine  · Do not smoke, and avoid secondhand smoke  Nicotine and other chemicals in cigarettes and cigars can make your symptoms worse   Ask your healthcare provider for information if you currently smoke and need help to quit  E-cigarettes or smokeless tobacco still contain nicotine  Talk to your healthcare provider before you use these products  Follow up with your healthcare provider as directed: Follow up if your symptoms are worse or not better after 3 to 5 days of treatment  Write down your questions so you remember to ask them during your visits  © [employer] 2021 Information is for End User's use only and may not be sold, redistributed or otherwise used for commercial purposes  All illustrations and images included in CareNotes® are the copyrighted property of [employer]  or [facility] Anni Nelson   The above information is an  only  It is not intended as medical advice for individual conditions or treatments  Talk to your doctor, nurse or pharmacist before following any medical regimen to see if it is safe and effective for you

## 2021-09-08 ENCOUNTER — APPOINTMENT (OUTPATIENT)
Dept: LAB | Facility: MEDICAL CENTER | Age: 71
End: 2021-09-08
Payer: MEDICARE

## 2021-09-08 DIAGNOSIS — C67.9 MALIGNANT NEOPLASM OF URINARY BLADDER, UNSPECIFIED SITE (HCC): ICD-10-CM

## 2021-09-08 DIAGNOSIS — E83.52 HYPERCALCEMIA: ICD-10-CM

## 2021-09-08 DIAGNOSIS — I10 ESSENTIAL HYPERTENSION: ICD-10-CM

## 2021-09-08 LAB
CA-I BLD-SCNC: 1.17 MMOL/L (ref 1.12–1.32)
CALCIUM SERPL-MCNC: 9.3 MG/DL (ref 8.3–10.1)
PTH-INTACT SERPL-MCNC: 36.4 PG/ML (ref 18.4–80.1)
TSH SERPL DL<=0.05 MIU/L-ACNC: 3.69 UIU/ML (ref 0.36–3.74)

## 2021-09-08 PROCEDURE — 84443 ASSAY THYROID STIM HORMONE: CPT

## 2021-09-08 PROCEDURE — 88112 CYTOPATH CELL ENHANCE TECH: CPT | Performed by: PATHOLOGY

## 2021-09-08 PROCEDURE — 82330 ASSAY OF CALCIUM: CPT

## 2021-09-08 PROCEDURE — 82310 ASSAY OF CALCIUM: CPT

## 2021-09-08 PROCEDURE — 36415 COLL VENOUS BLD VENIPUNCTURE: CPT

## 2021-09-08 PROCEDURE — 83970 ASSAY OF PARATHORMONE: CPT

## 2021-09-09 DIAGNOSIS — E11.9 TYPE 2 DIABETES MELLITUS WITHOUT COMPLICATION, WITH LONG-TERM CURRENT USE OF INSULIN (HCC): ICD-10-CM

## 2021-09-09 DIAGNOSIS — F41.8 DEPRESSION WITH ANXIETY: ICD-10-CM

## 2021-09-09 DIAGNOSIS — Z79.4 TYPE 2 DIABETES MELLITUS WITHOUT COMPLICATION, WITH LONG-TERM CURRENT USE OF INSULIN (HCC): ICD-10-CM

## 2021-09-09 DIAGNOSIS — E78.00 HYPERCHOLESTEROLEMIA: ICD-10-CM

## 2021-09-09 RX ORDER — SIMVASTATIN 40 MG
40 TABLET ORAL
Qty: 90 TABLET | Refills: 1 | Status: SHIPPED | OUTPATIENT
Start: 2021-09-09 | End: 2022-03-03

## 2021-09-09 RX ORDER — INSULIN GLARGINE 100 [IU]/ML
30 INJECTION, SOLUTION SUBCUTANEOUS DAILY
Qty: 10 ML | Refills: 1 | Status: SHIPPED | OUTPATIENT
Start: 2021-09-09 | End: 2021-12-16 | Stop reason: SDUPTHER

## 2021-09-09 RX ORDER — CLONAZEPAM 0.5 MG/1
0.5 TABLET ORAL
Qty: 30 TABLET | Refills: 0 | Status: SHIPPED | OUTPATIENT
Start: 2021-09-09 | End: 2021-10-08 | Stop reason: SDUPTHER

## 2021-09-14 DIAGNOSIS — F41.8 DEPRESSION WITH ANXIETY: ICD-10-CM

## 2021-09-15 RX ORDER — ZOLPIDEM TARTRATE 10 MG/1
10 TABLET ORAL
Qty: 30 TABLET | Refills: 0 | Status: SHIPPED | OUTPATIENT
Start: 2021-09-15 | End: 2021-10-15 | Stop reason: SDUPTHER

## 2021-10-08 DIAGNOSIS — F41.8 DEPRESSION WITH ANXIETY: ICD-10-CM

## 2021-10-08 RX ORDER — CLONAZEPAM 0.5 MG/1
0.5 TABLET ORAL
Qty: 30 TABLET | Refills: 0 | Status: SHIPPED | OUTPATIENT
Start: 2021-10-08 | End: 2021-11-04 | Stop reason: SDUPTHER

## 2021-10-15 DIAGNOSIS — F41.8 DEPRESSION WITH ANXIETY: ICD-10-CM

## 2021-10-15 RX ORDER — ZOLPIDEM TARTRATE 10 MG/1
10 TABLET ORAL
Qty: 30 TABLET | Refills: 0 | Status: SHIPPED | OUTPATIENT
Start: 2021-10-15 | End: 2021-11-15 | Stop reason: SDUPTHER

## 2021-10-28 DIAGNOSIS — E11.8 TYPE 2 DIABETES MELLITUS WITH COMPLICATION, WITHOUT LONG-TERM CURRENT USE OF INSULIN (HCC): ICD-10-CM

## 2021-10-28 RX ORDER — PEN NEEDLE, DIABETIC 32 GX 1/4"
NEEDLE, DISPOSABLE MISCELLANEOUS
Qty: 100 EACH | Refills: 1 | Status: SHIPPED | OUTPATIENT
Start: 2021-10-28 | End: 2022-05-09 | Stop reason: SDUPTHER

## 2021-11-04 DIAGNOSIS — F41.8 DEPRESSION WITH ANXIETY: ICD-10-CM

## 2021-11-04 RX ORDER — CLONAZEPAM 0.5 MG/1
0.5 TABLET ORAL
Qty: 30 TABLET | Refills: 0 | Status: SHIPPED | OUTPATIENT
Start: 2021-11-04 | End: 2021-12-16 | Stop reason: SDUPTHER

## 2021-11-10 ENCOUNTER — ANNUAL EXAM (OUTPATIENT)
Dept: OBGYN CLINIC | Facility: CLINIC | Age: 71
End: 2021-11-10
Payer: MEDICARE

## 2021-11-10 VITALS
SYSTOLIC BLOOD PRESSURE: 132 MMHG | DIASTOLIC BLOOD PRESSURE: 80 MMHG | WEIGHT: 166 LBS | HEIGHT: 63 IN | BODY MASS INDEX: 29.41 KG/M2

## 2021-11-10 DIAGNOSIS — N95.2 VAGINAL ATROPHY: ICD-10-CM

## 2021-11-10 DIAGNOSIS — L90.0 LICHEN SCLEROSUS ET ATROPHICUS: ICD-10-CM

## 2021-11-10 DIAGNOSIS — Q52.5 LABIAL FUSION: ICD-10-CM

## 2021-11-10 DIAGNOSIS — R35.0 URINARY FREQUENCY: Primary | ICD-10-CM

## 2021-11-10 DIAGNOSIS — Z85.51 HISTORY OF BLADDER CANCER: ICD-10-CM

## 2021-11-10 LAB
SL AMB  POCT GLUCOSE, UA: NEGATIVE
SL AMB LEUKOCYTE ESTERASE,UA: NEGATIVE
SL AMB POCT BILIRUBIN,UA: NEGATIVE
SL AMB POCT BLOOD,UA: NEGATIVE
SL AMB POCT CLARITY,UA: NORMAL
SL AMB POCT COLOR,UA: YELLOW
SL AMB POCT KETONES,UA: NORMAL
SL AMB POCT NITRITE,UA: NEGATIVE
SL AMB POCT PH,UA: 5
SL AMB POCT SPECIFIC GRAVITY,UA: 1070
SL AMB POCT URINE PROTEIN: NEGATIVE
SL AMB POCT UROBILINOGEN: NORMAL

## 2021-11-10 PROCEDURE — 99214 OFFICE O/P EST MOD 30 MIN: CPT | Performed by: OBSTETRICS & GYNECOLOGY

## 2021-11-10 PROCEDURE — 81002 URINALYSIS NONAUTO W/O SCOPE: CPT | Performed by: OBSTETRICS & GYNECOLOGY

## 2021-11-10 RX ORDER — MOMETASONE FUROATE 1 MG/G
CREAM TOPICAL WEEKLY
Qty: 45 G | Refills: 4 | Status: SHIPPED | OUTPATIENT
Start: 2021-11-10

## 2021-11-10 RX ORDER — ESTRADIOL 0.1 MG/G
1 CREAM VAGINAL 2 TIMES WEEKLY
Qty: 45 G | Refills: 3 | Status: SHIPPED | OUTPATIENT
Start: 2021-11-11 | End: 2022-05-09

## 2021-11-15 DIAGNOSIS — F41.8 DEPRESSION WITH ANXIETY: ICD-10-CM

## 2021-11-15 RX ORDER — ZOLPIDEM TARTRATE 10 MG/1
10 TABLET ORAL
Qty: 30 TABLET | Refills: 0 | Status: SHIPPED | OUTPATIENT
Start: 2021-11-15 | End: 2021-12-16 | Stop reason: SDUPTHER

## 2021-11-19 DIAGNOSIS — E13.9 DIABETES 1.5, MANAGED AS TYPE 2 (HCC): ICD-10-CM

## 2021-12-05 ENCOUNTER — OFFICE VISIT (OUTPATIENT)
Dept: URGENT CARE | Age: 71
End: 2021-12-05
Payer: MEDICARE

## 2021-12-05 VITALS
TEMPERATURE: 97.6 F | DIASTOLIC BLOOD PRESSURE: 86 MMHG | OXYGEN SATURATION: 96 % | SYSTOLIC BLOOD PRESSURE: 160 MMHG | RESPIRATION RATE: 20 BRPM | HEART RATE: 76 BPM

## 2021-12-05 DIAGNOSIS — M62.838 NECK MUSCLE SPASM: Primary | ICD-10-CM

## 2021-12-05 PROCEDURE — G0463 HOSPITAL OUTPT CLINIC VISIT: HCPCS | Performed by: STUDENT IN AN ORGANIZED HEALTH CARE EDUCATION/TRAINING PROGRAM

## 2021-12-05 PROCEDURE — 99213 OFFICE O/P EST LOW 20 MIN: CPT | Performed by: STUDENT IN AN ORGANIZED HEALTH CARE EDUCATION/TRAINING PROGRAM

## 2021-12-05 RX ORDER — TIZANIDINE HYDROCHLORIDE 4 MG/1
4 CAPSULE, GELATIN COATED ORAL 3 TIMES DAILY
Qty: 30 CAPSULE | Refills: 0 | Status: SHIPPED | OUTPATIENT
Start: 2021-12-05 | End: 2021-12-16

## 2021-12-05 RX ORDER — CYCLOBENZAPRINE HCL 10 MG
10 TABLET ORAL 3 TIMES DAILY PRN
Qty: 30 TABLET | Refills: 0 | Status: SHIPPED | OUTPATIENT
Start: 2021-12-05 | End: 2021-12-05 | Stop reason: CLARIF

## 2021-12-15 ENCOUNTER — TELEPHONE (OUTPATIENT)
Dept: INTERNAL MEDICINE CLINIC | Age: 71
End: 2021-12-15

## 2021-12-15 DIAGNOSIS — F41.8 DEPRESSION WITH ANXIETY: ICD-10-CM

## 2021-12-15 RX ORDER — ZOLPIDEM TARTRATE 10 MG/1
10 TABLET ORAL
Qty: 30 TABLET | Refills: 0 | Status: CANCELLED | OUTPATIENT
Start: 2021-12-15

## 2021-12-15 RX ORDER — CLONAZEPAM 0.5 MG/1
0.5 TABLET ORAL
Qty: 30 TABLET | Refills: 0 | Status: CANCELLED | OUTPATIENT
Start: 2021-12-15

## 2021-12-16 ENCOUNTER — CONSULT (OUTPATIENT)
Dept: INTERNAL MEDICINE CLINIC | Age: 71
End: 2021-12-16
Payer: MEDICARE

## 2021-12-16 VITALS
WEIGHT: 164 LBS | SYSTOLIC BLOOD PRESSURE: 128 MMHG | HEART RATE: 82 BPM | OXYGEN SATURATION: 97 % | HEIGHT: 63 IN | DIASTOLIC BLOOD PRESSURE: 70 MMHG | TEMPERATURE: 98.1 F | BODY MASS INDEX: 29.06 KG/M2

## 2021-12-16 DIAGNOSIS — E11.9 TYPE 2 DIABETES MELLITUS WITHOUT COMPLICATION, WITH LONG-TERM CURRENT USE OF INSULIN (HCC): ICD-10-CM

## 2021-12-16 DIAGNOSIS — Z01.818 PREOP EXAMINATION: Primary | ICD-10-CM

## 2021-12-16 DIAGNOSIS — J01.10 ACUTE NON-RECURRENT FRONTAL SINUSITIS: ICD-10-CM

## 2021-12-16 DIAGNOSIS — IMO0002 UNCONTROLLED DIABETES MELLITUS WITH COMPLICATIONS: ICD-10-CM

## 2021-12-16 DIAGNOSIS — Z23 NEED FOR INFLUENZA VACCINATION: ICD-10-CM

## 2021-12-16 DIAGNOSIS — Z79.4 TYPE 2 DIABETES MELLITUS WITHOUT COMPLICATION, WITH LONG-TERM CURRENT USE OF INSULIN (HCC): ICD-10-CM

## 2021-12-16 DIAGNOSIS — E03.9 ACQUIRED HYPOTHYROIDISM: ICD-10-CM

## 2021-12-16 DIAGNOSIS — F41.8 DEPRESSION WITH ANXIETY: ICD-10-CM

## 2021-12-16 DIAGNOSIS — J06.9 ACUTE URI: ICD-10-CM

## 2021-12-16 DIAGNOSIS — M17.12 PRIMARY LOCALIZED OSTEOARTHRITIS OF LEFT KNEE: ICD-10-CM

## 2021-12-16 DIAGNOSIS — E13.9 DIABETES 1.5, MANAGED AS TYPE 2 (HCC): ICD-10-CM

## 2021-12-16 DIAGNOSIS — I10 PRIMARY HYPERTENSION: ICD-10-CM

## 2021-12-16 PROCEDURE — 99214 OFFICE O/P EST MOD 30 MIN: CPT | Performed by: PHYSICIAN ASSISTANT

## 2021-12-16 PROCEDURE — G0008 ADMIN INFLUENZA VIRUS VAC: HCPCS

## 2021-12-16 PROCEDURE — 90662 IIV NO PRSV INCREASED AG IM: CPT

## 2021-12-16 RX ORDER — INSULIN GLARGINE 100 [IU]/ML
33 INJECTION, SOLUTION SUBCUTANEOUS DAILY
Qty: 10 ML | Refills: 3 | Status: SHIPPED | OUTPATIENT
Start: 2021-12-16 | End: 2022-06-29 | Stop reason: SDUPTHER

## 2021-12-16 RX ORDER — FLUTICASONE PROPIONATE 50 MCG
1 SPRAY, SUSPENSION (ML) NASAL DAILY
Qty: 16 G | Refills: 3 | Status: SHIPPED | OUTPATIENT
Start: 2021-12-16 | End: 2022-03-24

## 2021-12-16 RX ORDER — CLONAZEPAM 0.5 MG/1
0.5 TABLET ORAL
Qty: 30 TABLET | Refills: 2 | Status: SHIPPED | OUTPATIENT
Start: 2021-12-16 | End: 2022-03-24 | Stop reason: SDUPTHER

## 2021-12-16 RX ORDER — ZOLPIDEM TARTRATE 10 MG/1
10 TABLET ORAL
Qty: 30 TABLET | Refills: 0 | Status: SHIPPED | OUTPATIENT
Start: 2021-12-16 | End: 2022-01-05

## 2021-12-16 RX ORDER — AZITHROMYCIN 250 MG/1
TABLET, FILM COATED ORAL
Qty: 6 TABLET | Refills: 0 | Status: SHIPPED | OUTPATIENT
Start: 2021-12-16 | End: 2021-12-21

## 2021-12-21 ENCOUNTER — TELEPHONE (OUTPATIENT)
Dept: INTERNAL MEDICINE CLINIC | Age: 71
End: 2021-12-21

## 2022-01-04 DIAGNOSIS — F41.8 DEPRESSION WITH ANXIETY: ICD-10-CM

## 2022-01-05 RX ORDER — ZOLPIDEM TARTRATE 10 MG/1
TABLET ORAL
Qty: 30 TABLET | Refills: 0 | Status: SHIPPED | OUTPATIENT
Start: 2022-01-05 | End: 2022-02-09 | Stop reason: SDUPTHER

## 2022-01-06 ENCOUNTER — TELEPHONE (OUTPATIENT)
Dept: INTERNAL MEDICINE CLINIC | Age: 72
End: 2022-01-06

## 2022-01-06 DIAGNOSIS — I10 ESSENTIAL HYPERTENSION: ICD-10-CM

## 2022-01-06 RX ORDER — AMLODIPINE BESYLATE 5 MG/1
5 TABLET ORAL DAILY
Qty: 30 TABLET | Refills: 5 | Status: SHIPPED | OUTPATIENT
Start: 2022-01-06 | End: 2022-07-05 | Stop reason: SDUPTHER

## 2022-01-06 NOTE — TELEPHONE ENCOUNTER
LMOM for pt  To call back to see if she had mammogram done this year of if she has it scheduled  If she does not have it scheduled please offer to assit pt       Please send back to me     Thank you

## 2022-01-28 DIAGNOSIS — E78.00 HYPERCHOLESTEROLEMIA: ICD-10-CM

## 2022-01-28 RX ORDER — FENOFIBRATE 145 MG/1
145 TABLET, COATED ORAL DAILY
Qty: 90 TABLET | Refills: 1 | Status: SHIPPED | OUTPATIENT
Start: 2022-01-28 | End: 2022-07-27 | Stop reason: SDUPTHER

## 2022-02-09 DIAGNOSIS — F41.8 DEPRESSION WITH ANXIETY: ICD-10-CM

## 2022-02-10 RX ORDER — ZOLPIDEM TARTRATE 10 MG/1
10 TABLET ORAL
Qty: 30 TABLET | Refills: 0 | Status: SHIPPED | OUTPATIENT
Start: 2022-02-10 | End: 2022-03-09 | Stop reason: SDUPTHER

## 2022-02-28 ENCOUNTER — TELEPHONE (OUTPATIENT)
Dept: ADMINISTRATIVE | Facility: OTHER | Age: 72
End: 2022-02-28

## 2022-02-28 NOTE — TELEPHONE ENCOUNTER
----- Message from Conner Sierra MA sent at 2/25/2022  1:11 PM EST -----  Regarding: DM eye exam  02/25/22 1:12 PM    Hello, our patient Mary Santos has had Diabetic Eye Exam completed/performed  Please assist in updating the patient chart by making an External outreach to [facility]  facility located in [place], [place]  The date of service is unknown      Thank you,  Conner Sierra MA  [facility]

## 2022-02-28 NOTE — LETTER
Diabetic Eye Exam Form    Date Requested: 22  Patient: Em Akins  Patient : 1950   Referring Provider: Sherrie Gregg MD      DIABETIC Eye Exam Date _______________________________    Type of Exam MUST be documented for Diabetic Eye Exams  Please CHECK ONE  Dilated Retinal Exam      Optomap-Iris Exam      Fundus Photography Completed       Left Eye - Please check Retinopathy or No Retinopathy AND Type      Exam did show retinopathy    Exam did not show retinopathy         Mild     Proliferative           Moderate    Severe            None         Right Eye - Please check Retinopathy or No Retinopathy AND Type      Exam did show retinopathy    Exam did not show retinopathy         Mild     Proliferative        Moderate    Severe        None       Comments __________________________________________________________    Practice Providing Exam ______________________________________________    Exam Performed By (print name) _______________________________________      Provider Signature ___________________________________________________    These reports are needed for  compliance  Please fax this completed form and a copy of the Diabetic Eye Exam report to our office located at [place] [address] as soon as possible via [phone] annelise Gay: Phone [phone]    We thank you for your assistance in treating our mutual patient

## 2022-03-01 NOTE — TELEPHONE ENCOUNTER
Upon review of the In Basket request and the patient's chart, initial outreach has been made via fax, please see Contacts section for details       Thank you  Kyle Chavarria

## 2022-03-02 NOTE — TELEPHONE ENCOUNTER
Upon review of the In Basket request we have found as a result of outreach that patient did not have the requested item(s) completed  Per office was last seen 2/13/2020  She had an appointment scheduled 2/15/21, but did not show for appointment  Any additional questions or concerns should be emailed to the Practice Liaisons via [email or web address] email, please do not reply via In Basket      Thank you  Dimple Kc

## 2022-03-03 DIAGNOSIS — E78.00 HYPERCHOLESTEROLEMIA: ICD-10-CM

## 2022-03-03 RX ORDER — SIMVASTATIN 40 MG
TABLET ORAL
Qty: 90 TABLET | Refills: 1 | Status: SHIPPED | OUTPATIENT
Start: 2022-03-03 | End: 2022-05-09

## 2022-03-04 DIAGNOSIS — E11.8 TYPE 2 DIABETES MELLITUS WITH COMPLICATION, WITHOUT LONG-TERM CURRENT USE OF INSULIN (HCC): ICD-10-CM

## 2022-03-09 DIAGNOSIS — F41.8 DEPRESSION WITH ANXIETY: ICD-10-CM

## 2022-03-10 RX ORDER — ZOLPIDEM TARTRATE 10 MG/1
10 TABLET ORAL
Qty: 30 TABLET | Refills: 0 | Status: SHIPPED | OUTPATIENT
Start: 2022-03-10 | End: 2022-04-13 | Stop reason: SDUPTHER

## 2022-03-16 ENCOUNTER — RA CDI HCC (OUTPATIENT)
Dept: OTHER | Facility: HOSPITAL | Age: 72
End: 2022-03-16

## 2022-03-16 NOTE — PROGRESS NOTES
[id number]  [facility]  coding opportunities          Chart Reviewed number of suggestions sent to Provider: 1     Patients Insurance     Medicare Insurance: [employer]

## 2022-03-24 ENCOUNTER — OFFICE VISIT (OUTPATIENT)
Dept: INTERNAL MEDICINE CLINIC | Age: 72
End: 2022-03-24
Payer: MEDICARE

## 2022-03-24 VITALS
WEIGHT: 152 LBS | HEIGHT: 61 IN | OXYGEN SATURATION: 98 % | SYSTOLIC BLOOD PRESSURE: 122 MMHG | BODY MASS INDEX: 28.7 KG/M2 | HEART RATE: 65 BPM | DIASTOLIC BLOOD PRESSURE: 60 MMHG | TEMPERATURE: 97.7 F

## 2022-03-24 DIAGNOSIS — E22.2 SIADH (SYNDROME OF INAPPROPRIATE ADH PRODUCTION) (HCC): ICD-10-CM

## 2022-03-24 DIAGNOSIS — M81.0 AGE-RELATED OSTEOPOROSIS WITHOUT CURRENT PATHOLOGICAL FRACTURE: ICD-10-CM

## 2022-03-24 DIAGNOSIS — C67.9 MALIGNANT NEOPLASM OF URINARY BLADDER, UNSPECIFIED SITE (HCC): ICD-10-CM

## 2022-03-24 DIAGNOSIS — Z79.4 TYPE 2 DIABETES MELLITUS WITHOUT COMPLICATION, WITH LONG-TERM CURRENT USE OF INSULIN (HCC): Primary | ICD-10-CM

## 2022-03-24 DIAGNOSIS — E11.9 TYPE 2 DIABETES MELLITUS WITHOUT COMPLICATION, WITH LONG-TERM CURRENT USE OF INSULIN (HCC): Primary | ICD-10-CM

## 2022-03-24 DIAGNOSIS — F41.8 DEPRESSION WITH ANXIETY: ICD-10-CM

## 2022-03-24 DIAGNOSIS — Z12.31 ENCOUNTER FOR SCREENING MAMMOGRAM FOR MALIGNANT NEOPLASM OF BREAST: ICD-10-CM

## 2022-03-24 DIAGNOSIS — IMO0002 UNCONTROLLED DIABETES MELLITUS WITH COMPLICATIONS: ICD-10-CM

## 2022-03-24 DIAGNOSIS — Z00.00 ENCOUNTER FOR ANNUAL WELLNESS VISIT (AWV) IN MEDICARE PATIENT: ICD-10-CM

## 2022-03-24 PROBLEM — R56.9 SEIZURES (HCC): Status: RESOLVED | Noted: 2019-09-19 | Resolved: 2022-03-24

## 2022-03-24 PROCEDURE — 1123F ACP DISCUSS/DSCN MKR DOCD: CPT | Performed by: PHYSICIAN ASSISTANT

## 2022-03-24 PROCEDURE — G0438 PPPS, INITIAL VISIT: HCPCS | Performed by: PHYSICIAN ASSISTANT

## 2022-03-24 PROCEDURE — 99214 OFFICE O/P EST MOD 30 MIN: CPT | Performed by: PHYSICIAN ASSISTANT

## 2022-03-24 RX ORDER — CLONAZEPAM 0.5 MG/1
0.5 TABLET ORAL
Qty: 30 TABLET | Refills: 3 | Status: SHIPPED | OUTPATIENT
Start: 2022-03-24 | End: 2022-05-16 | Stop reason: SDUPTHER

## 2022-03-24 NOTE — PROGRESS NOTES
Assessment and Plan:     Problem List Items Addressed This Visit        Endocrine    Type 2 diabetes mellitus without complication, with long-term current use of insulin ([place] [address] ) - Primary    Relevant Medications    Insulin Pen Needle 32G X 4 MM MISC    Other Relevant Orders    Ambulatory Referral to Ophthalmology    CBC and differential    Comprehensive metabolic panel    HEMOGLOBIN A1C W/ EAG ESTIMATION    SIADH (syndrome of inappropriate ADH production) ([place] [address] )    Uncontrolled diabetes mellitus with complications (HCC)    Relevant Medications    Insulin Pen Needle 32G X 4 MM MISC       Musculoskeletal and Integument    Osteoporosis    Relevant Orders    DXA bone density spine hip and pelvis       Other    Seizures ([place] [address] )      Other Visit Diagnoses     Depression with anxiety        Relevant Medications    clonazePAM (KlonoPIN) 0 5 mg tablet    Other Relevant Orders    CBC and differential    Comprehensive metabolic panel    HEMOGLOBIN A1C W/ EAG ESTIMATION    Encounter for screening mammogram for malignant neoplasm of breast        Relevant Orders    Mammo screening bilateral w 3d & cad    Encounter for annual wellness visit (AWV) in Medicare patient        Malignant neoplasm of urinary bladder, unspecified site ([place] [address] )            BMI Counseling: Body mass index is 28 33 kg/m²  The BMI is above normal  Nutrition recommendations include decreasing portion sizes, encouraging healthy choices of fruits and vegetables, decreasing fast food intake and moderation in carbohydrate intake  Exercise recommendations include exercising 3-5 times per week  Rationale for BMI follow-up plan is due to patient being overweight or obese  Preventive health issues were discussed with patient, and age appropriate screening tests were ordered as noted in patient's After Visit Summary    Personalized health advice and appropriate referrals for health education or preventive services given if needed, as noted in patient's After Visit Summary       History of Present Illness:     Patient presents for Medicare Annual Wellness visit    Patient Care Team:  Giovana West MD as PCP - General  MD Fay Gillespie MD Edmonia Nephew, MD     Problem List:     Patient Active Problem List   Diagnosis    Tear of medial meniscus of left knee    Abnormal EKG    Acquired hypothyroidism    Acute pain of left knee    Acute pain of right shoulder    Anxiety    Asthma    Neoplasm of bladder    Abdominal pain    Bursitis of shoulder    Chronic cough    Dental abscess    Hyponatremia    Fibromyositis    Ganglion and cyst of synovium, tendon and bursa    Mixed hyperlipidemia    Hypertension    Malignant neoplasm of lateral wall of urinary bladder ([facility] )    Type 2 diabetes mellitus without complication, with long-term current use of insulin ([facility]    Nontoxic single thyroid nodule    Obesity    Osteoporosis    Primary localized osteoarthritis of left knee    Other fatigue    Acute non-recurrent frontal sinusitis    Seizures ([place] [address] )    Lichenoid dermatitis    SIADH (syndrome of inappropriate ADH production) ([place] [address] )    Irritant contact dermatitis    Uncontrolled diabetes mellitus with complications ([place] [address] )      Past Medical and Surgical History:     Past Medical History:   Diagnosis Date    Anxiety     Arthritis     Bladder carcinoma ([place] [address] ) 2016    Bladder mass     Depression     Diabetes mellitus ([place] [address] )     Disease of thyroid gland     thyroid nodules-not treating at this time    Dysuria     Last assessed 17    GERD (gastroesophageal reflux disease)      2 para 2     HLD (hyperlipidemia)     HTN (hypertension)     Hypercholesterolemia     Hypertension     Knee pain     Lichen sclerosus 0348    [facility]     [facility]     Mild aortic regurgitation with left ventricular dilation by prior echocardiogram     Papanicolaou smear 2017    NEG    PONV (postoperative nausea and vomiting)      Past Surgical History:   Procedure Laterality Date    CARPAL TUNNEL RELEASE Right      SECTION      x2    CHOLECYSTECTOMY      CYSTOSCOPY N/A 2016    Procedure: CYSTOSCOPY WITH BIOPSIES;  Surgeon: Sarah Simms MD;  [facility];  Service:    Shanon Presume N/A 2016    Procedure: Marcey Prader;  Surgeon: Sarah Simms MD;  [facility];  Service:     CYSTOSCOPY  2020    HERNIA REPAIR      ND CYSTO/URETERO W/LITHOTRIPSY &INDWELL STENT INSRT  2016    Procedure: CYSTOSCOPY URETEROSCOPY WITH LITHOTRIPSY HOLMIUM LASER RIGHT, RETROGRADE PYELOGRAM BILATERAL: AND INSERTION STENT URETERAL Right ;  Surgeon: Sarah Simms MD;  [facility];  Service: Urology    ND CYSTO/URETERO/PYELOSCOPY, DX Right 2017    Procedure: URETEROSCOPY;  Surgeon: Sarah Simms MD;  [facility];  Service: Urology    ND CYSTOSCOPY,INSERT URETERAL STENT Left 2016    Procedure: URETERAL STENTS;  Surgeon: Sarah Simms MD;  [facility];  Service: Urology    ND CYSTOURETHROSCOPY,FULGUR <0 5 CM LESN N/A 2016    Procedure: TRANSURETHRAL RESECTION OF BLADDER TUMOR (TURBT);   Surgeon: Sarah Simms MD;  [facility];  Service: Urology    ND CYSTOURETHROSCOPY,FULGUR <0 5 CM LESN N/A 2016    Procedure: Marcey Prader; TUR BLADDER TUMOR ;  Surgeon: Sarah Simms MD;  [facility];  Service: Urology    ND CYSTOURETHROSCOPY,FULGUR <0 5 CM LESN N/A 2016    Procedure: TUR BLADDER TUMOR ;  Surgeon: Sarah Simms MD;  [facility];  Service: Urology    ND CYSTOURETHROSCOPY,FULGUR <0 5 CM LESN Bilateral 2017    Procedure: Stefania Norton, ;  Surgeon: Sarah Simms MD;  [facility];  Service: Urology    ND CYSTOURETHROSCOPY,URETER CATHETER Bilateral 2016    Procedure: RETROGRADE PYELOGRAM ;  Surgeon: Sarha Simms MD;  [facility];  Service: Urology    ND CYSTOURETHROSCOPY,URETER CATHETER Bilateral 2017    Procedure: RETROGRADE PYELOGRAM WITH STENT EXCHANGE, RIGHT;  Surgeon: Justa Calix MD;  [facility];  Service: Urology    OK INSTILL ANTICANCER AGENT IN BLADDER N/A 2016    Procedure: Diamond Bowles;  Surgeon: Justa Calix MD;  [facility];  Service: Urology    OK KNEE SCOPE,MED/LAT MENISECTOMY Left 2016    Procedure: KNEE ARTHROSCOPIC PARTIAL MEDIAL MENISCECTOMY ;  Surgeon: Namita Benton MD;  [facility];  Service: Orthopedics    REMOVAL URETERAL STENT Left 2016    Procedure: REMOVAL STENT URETERAL;  Surgeon: Justa Calix MD;  [facility];  Service:     SKIN CANCER EXCISION      right arm    TONSILLECTOMY        Family History:     Family History   Problem Relation Age of Onset    Heart attack Mother     Heart disease Mother     ALS Father     Diabetes Maternal Grandfather     Lung cancer Paternal Grandfather     Heart disease Maternal Grandmother     No Known Problems Daughter     No Known Problems Daughter     No Known Problems Paternal Aunt     No Known Problems Paternal Aunt     No Known Problems Paternal Aunt     No Known Problems Paternal Aunt     No Known Problems Paternal Aunt     Breast cancer Neg Hx       Social History:     Social History     Socioeconomic History    Marital status: /Civil Union     Spouse name: None    Number of children: 2    Years of education: None    Highest education level: None   Occupational History    Occupation: RETIRED   Tobacco Use    Smoking status: Former Smoker     Packs/day: 1 00     Years: 20 00     Pack years: 20 00     Types: Cigarettes     Quit date:      Years since quittin 2    Smokeless tobacco: Never Used   Vaping Use    Vaping Use: Never used   Substance and Sexual Activity    Alcohol use: No    Drug use: No    Sexual activity: Not Currently     Partners: Male     Birth control/protection: Post-menopausal   Other Topics Concern    None   Social History Narrative    None     Social Determinants of Health     Financial Resource Strain: Not on file   Food Insecurity: Not on file   Transportation Needs: Not on file   Physical Activity: Not on file   Stress: Not on file   Social Connections: Not on file   Intimate Partner Violence: Not on file   Housing Stability: Not on file      Medications and Allergies:     Current Outpatient Medications   Medication Sig Dispense Refill    amLODIPine (NORVASC) 5 mg tablet Take 1 tablet (5 mg total) by mouth daily 30 tablet 5    clonazePAM (KlonoPIN) 0 5 mg tablet Take 1 tablet (0 5 mg total) by mouth daily at bedtime 30 tablet 3    Cyanocobalamin 1000 MCG CAPS Take 1 capsule by mouth daily        estradiol (ESTRACE VAGINAL) 0 1 mg/g vaginal cream Insert 1 g into the vagina 2 (two) times a week 45 g 3    famotidine (PEPCID) 20 mg tablet Take 1 tablet (20 mg total) by mouth daily (Patient taking differently: Take 20 mg by mouth daily As needed) 90 tablet 0    fenofibrate (TRICOR) 145 mg tablet Take 1 tablet (145 mg total) by mouth daily 90 tablet 1    fluticasone (FLONASE) 50 mcg/act nasal spray 1 spray into each nostril daily 1 Bottle 0    ibuprofen (MOTRIN) 200 mg tablet Take 200 mg by mouth every 6 (six) hours as needed for mild pain      insulin glargine (Lantus SoloStar) 100 units/mL injection pen Inject 33 Units under the skin daily 10 mL 3    Insulin Pen Needle 32G X 4 MM MISC Use in the morning 100 each 3    levothyroxine 50 mcg tablet TAKE 1 TABLET BY MOUTH EVERY DAY 90 tablet 1    metFORMIN (GLUCOPHAGE) 500 mg tablet Take 2 tablets (1,000 mg total) by mouth 2 (two) times a day with meals 360 tablet 1    metoprolol tartrate (LOPRESSOR) 25 mg tablet Take 1 tablet (25 mg total) by mouth every 12 (twelve) hours 180 tablet 1    mometasone (ELOCON) 0 1 % cream Apply topically once a week 45 g 4    ONETOUCH VERIO test strip Test 3 times daily 300 each 1    Probiotic Product (PROBIOTIC-10) CAPS Take 1 capsule by mouth daily      simvastatin (ZOCOR) 40 mg tablet TAKE 1 TABLET BY MOUTH DAILY AT BEDTIME 90 tablet 1    sitaGLIPtin (Januvia) 50 mg tablet Take 1 tablet (50 mg total) by mouth daily 90 tablet 1    zolpidem (AMBIEN) 10 mg tablet Take 1 tablet (10 mg total) by mouth daily at bedtime 30 tablet 0    Insulin Pen Needle (BD Pen Needle Micro U/F) 32G X 6 MM MISC Once daily with insulin (Patient not taking: Reported on 3/24/2022 ) 100 each 1     No current facility-administered medications for this visit  Allergies   Allergen Reactions    Nickel Swelling     Swelling, irritation, lumps to ears      Immunizations:     Immunization History   Administered Date(s) Administered    COVID-19 MODERNA VACC 0 5 ML IM 02/04/2021, 03/04/2021, 11/17/2021    INFLUENZA 01/10/2018, 11/10/2019, 10/09/2020, 12/16/2021    Influenza Split High Dose Preservative Free IM 01/10/2018, 10/29/2019, 10/09/2020    Influenza, high dose seasonal 0 7 mL 10/09/2020, 12/16/2021    Influenza, seasonal, injectable 12/13/2010, 12/13/2011, 12/09/2013, 11/10/2019    Pneumococcal Conjugate 13-Valent 01/14/2020    Pneumococcal Polysaccharide PPV23 11/17/2015    Td (adult), adsorbed 12/13/2010    Zoster 08/01/2013      Health Maintenance:         Topic Date Due    Breast Cancer Screening: Mammogram  08/03/2021    Colorectal Cancer Screening  02/27/2028    Hepatitis C Screening  Completed         Topic Date Due    DTaP,Tdap,and Td Vaccines (1 - Tdap) 12/14/2010      Medicare Health Risk Assessment:     /60   Pulse 65   Temp 97 7 °F (36 5 °C) (Temporal)   Ht 5' 1 42" (1 56 m) Comment: shoes off  Wt 68 9 kg (152 lb)   SpO2 98%   BMI 28 33 kg/m²      George Alfonso is here for her Subsequent Wellness visit  Health Risk Assessment:   Patient rates overall health as good  Patient feels that their physical health rating is same  Patient is very satisfied with their life   Eyesight was rated as same  Hearing was rated as same  Patient feels that their emotional and mental health rating is same  Patients states they are never, rarely angry  Patient states they are sometimes unusually tired/fatigued  Pain experienced in the last 7 days has been some  Patient's pain rating has been 4/10  Patient states that she has experienced no weight loss or gain in last 6 months  Depression Screening:   PHQ-9 Score: 2      Fall Risk Screening: In the past year, patient has experienced: history of falling in past year    Number of falls: 1  Injured during fall?: No    Feels unsteady when standing or walking?: Yes    Worried about falling?: No      Urinary Incontinence Screening:   Patient has leaked urine accidently in the last six months  Home Safety:  Patient does not have trouble with stairs inside or outside of their home  Patient has working smoke alarms and has no working carbon monoxide detector  Home safety hazards include: unfamiliar surroundings  Nutrition:   Current diet is Diabetic  Medications:   Patient is currently taking over-the-counter supplements  OTC medications include: see medication list  Patient is able to manage medications  Activities of Daily Living (ADLs)/Instrumental Activities of Daily Living (IADLs):   Walk and transfer into and out of bed and chair?: Yes  Dress and groom yourself?: Yes    Bathe or shower yourself?: Yes    Feed yourself?  Yes  Do your laundry/housekeeping?: Yes  Manage your money, pay your bills and track your expenses?: Yes  Make your own meals?: Yes    Do your own shopping?: Yes    Previous Hospitalizations:   Any hospitalizations or ED visits within the last 12 months?: Yes    How many hospitalizations have you had in the last year?: 1-2    Advance Care Planning:   Living will: Yes    Advanced directive: Yes      Cognitive Screening:   Provider or family/friend/caregiver concerned regarding cognition?: No    PREVENTIVE SCREENINGS Cardiovascular Screening:    General: Screening Not Indicated and History Lipid Disorder      Diabetes Screening:     General: Screening Not Indicated and History Diabetes      Colorectal Cancer Screening:     General: Screening Current      Breast Cancer Screening:     General: Screening Current      Cervical Cancer Screening:    General: Screening Not Indicated      Osteoporosis Screening:    General: Screening Not Indicated and History Osteoporosis      Abdominal Aortic Aneurysm (AAA) Screening:        General: Screening Not Indicated      Lung Cancer Screening:     General: Screening Not Indicated      Hepatitis C Screening:    General: Screening Current    Screening, Brief Intervention, and Referral to Treatment (SBIRT)    Screening      Single Item Drug Screening:  How often have you used an illegal drug (including marijuana) or a prescription medication for non-medical reasons in the past year? never    Single Item Drug Screen Score: 0  Interpretation: Negative screen for possible drug use disorder    Brief Intervention  Alcohol & drug use screenings were reviewed  No concerns regarding substance use disorder identified       Review of Current Opioid Use    Opioid Risk Tool (ORT) Interpretation: Complete Opioid Risk Tool (ORT)      Kashmir Whittington PA-C

## 2022-03-24 NOTE — PATIENT INSTRUCTIONS
Medicare Preventive Visit Patient Instructions  Thank you for completing your Welcome to Medicare Visit or Medicare Annual Wellness Visit today  Your next wellness visit will be due in one year (3/25/2023)  The screening/preventive services that you may require over the next 5-10 years are detailed below  Some tests may not apply to you based off risk factors and/or age  Screening tests ordered at today's visit but not completed yet may show as past due  Also, please note that scanned in results may not display below  Preventive Screenings:  Service Recommendations Previous Testing/Comments   Colorectal Cancer Screening  * Colonoscopy    * Fecal Occult Blood Test (FOBT)/Fecal Immunochemical Test (FIT)  * Fecal DNA/Cologuard Test  * Flexible Sigmoidoscopy Age: 54-65 years old   Colonoscopy: every 10 years (may be performed more frequently if at higher risk)  OR  FOBT/FIT: every 1 year  OR  Cologuard: every 3 years  OR  Sigmoidoscopy: every 5 years  Screening may be recommended earlier than age 48 if at higher risk for colorectal cancer  Also, an individualized decision between you and your healthcare provider will decide whether screening between the ages of 74-80 would be appropriate  Colonoscopy: 02/27/2018  FOBT/FIT: Not on file  Cologuard: Not on file  Sigmoidoscopy: Not on file    Screening Current     Breast Cancer Screening Age: 36 years old  Frequency: every 1-2 years  Not required if history of left and right mastectomy Mammogram: 08/03/2020    Screening Current   Cervical Cancer Screening Between the ages of 21-29, pap smear recommended once every 3 years  Between the ages of 33-67, can perform pap smear with HPV co-testing every 5 years     Recommendations may differ for women with a history of total hysterectomy, cervical cancer, or abnormal pap smears in past  Pap Smear: Not on file    Screening Not Indicated   Hepatitis C Screening Once for adults born between 1945 and 1965  More frequently in patients at high risk for Hepatitis C Hep C Antibody: 02/22/2018    Screening Current   Diabetes Screening 1-2 times per year if you're at risk for diabetes or have pre-diabetes Fasting glucose: 136 mg/dL   A1C: 6 7 %    Screening Not Indicated  History Diabetes   Cholesterol Screening Once every 5 years if you don't have a lipid disorder  May order more often based on risk factors  Lipid panel: 08/02/2021    Screening Not Indicated  History Lipid Disorder     Other Preventive Screenings Covered by Medicare:  1  Abdominal Aortic Aneurysm (AAA) Screening: covered once if your at risk  You're considered to be at risk if you have a family history of AAA  2  Lung Cancer Screening: covers low dose CT scan once per year if you meet all of the following conditions: (1) Age 50-69; (2) No signs or symptoms of lung cancer; (3) Current smoker or have quit smoking within the last 15 years; (4) You have a tobacco smoking history of at least 30 pack years (packs per day multiplied by number of years you smoked); (5) You get a written order from a healthcare provider  3  Glaucoma Screening: covered annually if you're considered high risk: (1) You have diabetes OR (2) Family history of glaucoma OR (3)  aged 48 and older OR (3)  American aged 72 and older  3  Osteoporosis Screening: covered every 2 years if you meet one of the following conditions: (1) You're estrogen deficient and at risk for osteoporosis based off medical history and other findings; (2) Have a vertebral abnormality; (3) On glucocorticoid therapy for more than 3 months; (4) Have primary hyperparathyroidism; (5) On osteoporosis medications and need to assess response to drug therapy  · Last bone density test (DXA Scan): Not on file  5  HIV Screening: covered annually if you're between the age of 12-76  Also covered annually if you are younger than 13 and older than 72 with risk factors for HIV infection   For pregnant patients, it is covered up to 3 times per pregnancy  Immunizations:  Immunization Recommendations   Influenza Vaccine Annual influenza vaccination during flu season is recommended for all persons aged >= 6 months who do not have contraindications   Pneumococcal Vaccine (Prevnar and Pneumovax)  * Prevnar = PCV13  * Pneumovax = PPSV23   Adults 25-60 years old: 1-3 doses may be recommended based on certain risk factors  Adults 72 years old: Prevnar (PCV13) vaccine recommended followed by Pneumovax (PPSV23) vaccine  If already received PPSV23 since turning 65, then PCV13 recommended at least one year after PPSV23 dose  Hepatitis B Vaccine 3 dose series if at intermediate or high risk (ex: diabetes, end stage renal disease, liver disease)   Tetanus (Td) Vaccine - COST NOT COVERED BY MEDICARE PART B Following completion of primary series, a booster dose should be given every 10 years to maintain immunity against tetanus  Td may also be given as tetanus wound prophylaxis  Tdap Vaccine - COST NOT COVERED BY MEDICARE PART B Recommended at least once for all adults  For pregnant patients, recommended with each pregnancy  Shingles Vaccine (Shingrix) - COST NOT COVERED BY MEDICARE PART B  2 shot series recommended in those aged 48 and above     Health Maintenance Due:      Topic Date Due    Breast Cancer Screening: Mammogram  08/03/2021    Colorectal Cancer Screening  02/27/2028    Hepatitis C Screening  Completed     Immunizations Due:      Topic Date Due    DTaP,Tdap,and Td Vaccines (1 - Tdap) 12/14/2010    COVID-19 Vaccine (3 - Booster for Oscar Graven series) 08/04/2021     Advance Directives   What are advance directives? Advance directives are legal documents that state your wishes and plans for medical care  These plans are made ahead of time in case you lose your ability to make decisions for yourself  Advance directives can apply to any medical decision, such as the treatments you want, and if you want to donate organs  What are the types of advance directives? There are many types of advance directives, and each state has rules about how to use them  You may choose a combination of any of the following:  · Living will: This is a written record of the treatment you want  You can also choose which treatments you do not want, which to limit, and which to stop at a certain time  This includes surgery, medicine, IV fluid, and tube feedings  · Durable power of  for healthcare [facility]): This is a written record that states who you want to make healthcare choices for you when you are unable to make them for yourself  This person, called a proxy, is usually a family member or a friend  You may choose more than 1 proxy  · Do not resuscitate (DNR) order:  A DNR order is used in case your heart stops beating or you stop breathing  It is a request not to have certain forms of treatment, such as CPR  A DNR order may be included in other types of advance directives  · Medical directive: This covers the care that you want if you are in a coma, near death, or unable to make decisions for yourself  You can list the treatments you want for each condition  Treatment may include pain medicine, surgery, blood transfusions, dialysis, IV or tube feedings, and a ventilator (breathing machine)  · Values history: This document has questions about your views, beliefs, and how you feel and think about life  This information can help others choose the care that you would choose  Why are advance directives important? An advance directive helps you control your care  Although spoken wishes may be used, it is better to have your wishes written down  Spoken wishes can be misunderstood, or not followed  Treatments may be given even if you do not want them  An advance directive may make it easier for your family to make difficult choices about your care  Fall Prevention    Fall prevention  includes ways to make your home and other areas safer  It also includes ways you can move more carefully to prevent a fall  Health conditions that cause changes in your blood pressure, vision, or muscle strength and coordination may increase your risk for falls  Medicines may also increase your risk for falls if they make you dizzy, weak, or sleepy  Fall prevention tips:   · Stand or sit up slowly  · Use assistive devices as directed  · Wear shoes that fit well and have soles that   · Wear a personal alarm  · Stay active  · Manage your medical conditions  Home Safety Tips:  · Add items to prevent falls in the bathroom  · Keep paths clear  · Install bright lights in your home  · Keep items you use often on shelves within reach  · Paint or place reflective tape on the edges of your stairs  Urinary Incontinence   Urinary incontinence (UI)  is when you lose control of your bladder  UI develops because your bladder cannot store or empty urine properly  The 3 most common types of UI are stress incontinence, urge incontinence, or both  Medicines:   · May be given to help strengthen your bladder control  Report any side effects of medication to your healthcare provider  Do pelvic muscle exercises often:  Your pelvic muscles help you stop urinating  Squeeze these muscles tight for 5 seconds, then relax for 5 seconds  Gradually work up to squeezing for 10 seconds  Do 3 sets of 15 repetitions a day, or as directed  This will help strengthen your pelvic muscles and improve bladder control  Train your bladder:  Go to the bathroom at set times, such as every 2 hours, even if you do not feel the urge to go  You can also try to hold your urine when you feel the urge to go  For example, hold your urine for 5 minutes when you feel the urge to go  As that becomes easier, hold your urine for 10 minutes  Self-care:   · Keep a UI record  Write down how often you leak urine and how much you leak   Make a note of what you were doing when you leaked urine   · Drink liquids as directed  You may need to limit the amount of liquid you drink to help control your urine leakage  Do not drink any liquid right before you go to bed  Limit or do not have drinks that contain caffeine or alcohol  · Prevent constipation  Eat a variety of high-fiber foods  Good examples are high-fiber cereals, beans, vegetables, and whole-grain breads  Walking is the best way to trigger your intestines to have a bowel movement  · Exercise regularly and maintain a healthy weight  Weight loss and exercise will decrease pressure on your bladder and help you control your leakage  · Use a catheter as directed  to help empty your bladder  A catheter is a tiny, plastic tube that is put into your bladder to drain your urine  · Go to behavior therapy as directed  Behavior therapy may be used to help you learn to control your urge to urinate  Weight Management   Why it is important to manage your weight:  Being overweight increases your risk of health conditions such as heart disease, high blood pressure, type 2 diabetes, and certain types of cancer  It can also increase your risk for osteoarthritis, sleep apnea, and other respiratory problems  Aim for a slow, steady weight loss  Even a small amount of weight loss can lower your risk of health problems  How to lose weight safely:  A safe and healthy way to lose weight is to eat fewer calories and get regular exercise  You can lose up about 1 pound a week by decreasing the number of calories you eat by 500 calories each day  Healthy meal plan for weight management:  A healthy meal plan includes a variety of foods, contains fewer calories, and helps you stay healthy  A healthy meal plan includes the following:  · Eat whole-grain foods more often  A healthy meal plan should contain fiber  Fiber is the part of grains, fruits, and vegetables that is not broken down by your body   Whole-grain foods are healthy and provide extra fiber in your diet  Some examples of whole-grain foods are whole-wheat breads and pastas, oatmeal, brown rice, and bulgur  · Eat a variety of vegetables every day  Include dark, leafy greens such as spinach, kale, gal greens, and mustard greens  Eat yellow and orange vegetables such as carrots, sweet potatoes, and winter squash  · Eat a variety of fruits every day  Choose fresh or canned fruit (canned in its own juice or light syrup) instead of juice  Fruit juice has very little or no fiber  · Eat low-fat dairy foods  Drink fat-free (skim) milk or 1% milk  Eat fat-free yogurt and low-fat cottage cheese  Try low-fat cheeses such as mozzarella and other reduced-fat cheeses  · Choose meat and other protein foods that are low in fat  Choose beans or other legumes such as split peas or lentils  Choose fish, skinless poultry (chicken or turkey), or lean cuts of red meat (beef or pork)  Before you cook meat or poultry, cut off any visible fat  · Use less fat and oil  Try baking foods instead of frying them  Add less fat, such as margarine, sour cream, regular salad dressing and mayonnaise to foods  Eat fewer high-fat foods  Some examples of high-fat foods include french fries, doughnuts, ice cream, and cakes  · Eat fewer sweets  Limit foods and drinks that are high in sugar  This includes candy, cookies, regular soda, and sweetened drinks  Exercise:  Exercise at least 30 minutes per day on most days of the week  Some examples of exercise include walking, biking, dancing, and swimming  You can also fit in more physical activity by taking the stairs instead of the elevator or parking farther away from stores  Ask your healthcare provider about the best exercise plan for you     Narcotic (Opioid) Safety    Use narcotics safely:  · Take prescribed narcotics exactly as directed  · Do not give narcotics to others or take narcotics that belong to someone else  · Do not mix narcotics without medicines or alcohol  · Do not drive or operate heavy machinery after you take the narcotic  · Monitor for side effects and notify your healthcare provider if you experienced side effects such as nausea, sleepiness, itching, or trouble thinking clearly  Manage constipation:    Constipation is the most common side effect of narcotic medicine  Constipation is when you have hard, dry bowel movements, or you go longer than usual between bowel movements  Tell your healthcare provider about all changes in your bowel movements while you are taking narcotics  He or she may recommend laxative medicine to help you have a bowel movement  He or she may also change the kind of narcotic you are taking, or change when you take it  The following are more ways you can prevent or relieve constipation:    · Drink liquids as directed  You may need to drink extra liquids to help soften and move your bowels  Ask how much liquid to drink each day and which liquids are best for you  · Eat high-fiber foods  This may help decrease constipation by adding bulk to your bowel movements  High-fiber foods include fruits, vegetables, whole-grain breads and cereals, and beans  Your healthcare provider or dietitian can help you create a high-fiber meal plan  Your provider may also recommend a fiber supplement if you cannot get enough fiber from food  · Exercise regularly  Regular physical activity can help stimulate your intestines  Walking is a good exercise to prevent or relieve constipation  Ask which exercises are best for you  · Schedule a time each day to have a bowel movement  This may help train your body to have regular bowel movements  Bend forward while you are on the toilet to help move the bowel movement out  Sit on the toilet for at least 10 minutes, even if you do not have a bowel movement  Store narcotics safely:   · Store narcotics where others cannot easily get them  Keep them in a locked cabinet or secure area   Do not  keep them in a purse or other bag you carry with you  A person may be looking for something else and find the narcotics  · Make sure narcotics are stored out of the reach of children  A child can easily overdose on narcotics  Narcotics may look like candy to a small child  The best way to dispose of narcotics: The laws vary by country and area  In the [place], the best way is to return the narcotics through a take-back program  This program is offered by the [employer])  The following are options for using the program:  · Take the narcotics to a M[employer]IE collection site  The site is often a law enforcement center  Call your local law enforcement center for scheduled take-back days in your area  You will be given information on where to go if the collection site is in a different location  · Take the narcotics to an approved pharmacy or hospital   A pharmacy or hospital may be set up as a collection site  You will need to ask if it is a MADDIE collection site if you were not directed there  A pharmacy or doctor's office may not be able to take back narcotics unless it is a MADDIE site  · Use a mail-back system  This means you are given containers to put the narcotics into  You will then mail them in the containers  · Use a take-back drop box  This is a place to leave the narcotics at any time  People and animals will not be able to get into the box  Your local law enforcement agency can tell you where to find a drop box in your area  Other ways to manage pain:   · Ask your healthcare provider about non-narcotic medicines to control pain  Nonprescription medicines include NSAIDs (such as ibuprofen) and acetaminophen  Prescription medicines include muscle relaxers, antidepressants, and steroids  · Pain may be managed without any medicines  Some ways to relieve pain include massage, aromatherapy, or meditation  Physical or occupational therapy may also help      For more information:   · Drug Enforcement Administration  [address] [facility]  [address]  Phone: 9- 425 - [phone]  Web Address: [facility]/drug_[email or web address]    · [address]  and Drug Administration  [place]  [place] , [address]  Phone: 5- 398 - [phone]  Web Address: http://[employer]/     © Copyright [employer] 2018 Information is for End User's use only and may not be sold, redistributed or otherwise used for commercial purposes   All illustrations and images included in CareNotes® are the copyrighted property of [employer]  or [address] [place], [place] [address]maco

## 2022-03-24 NOTE — PROGRESS NOTES
Assessment/Plan:         Diagnoses and all orders for this visit:    Type 2 diabetes mellitus without complication, with long-term current use of insulin ([facility] )  Comments:  low carb diet  check labs    Orders:  -     Ambulatory Referral to Ophthalmology; Future  -     Insulin Pen Needle 32G X 4 MM MISC; Use in the morning  -     CBC and differential; Future  -     Comprehensive metabolic panel; Future  -     HEMOGLOBIN A1C W/ EAG ESTIMATION; Future  -     Microalbumin / creatinine urine ratio    Depression with anxiety  -     clonazePAM (KlonoPIN) 0 5 mg tablet; Take 1 tablet (0 5 mg total) by mouth daily at bedtime  -     CBC and differential; Future  -     Comprehensive metabolic panel; Future  -     HEMOGLOBIN A1C W/ EAG ESTIMATION; Future    Encounter for screening mammogram for malignant neoplasm of breast  -     Mammo screening bilateral w 3d & cad; Future    Encounter for annual wellness visit (AWV) in Medicare patient    Age-related osteoporosis without current pathological fracture  -     DXA bone density spine hip and pelvis; Future    Malignant neoplasm of urinary bladder, unspecified site [facility])    Uncontrolled diabetes mellitus with complications ([facility] )  Comments:  has been improved of late  f/u labs ordered    SIADH (syndrome of inappropriate ADH production) ([facility] )    Other orders  -     Discontinue: Insulin Pen Needle 32G X 4 MM MISC; Use in the morning          Subjective:      Patient ID: Cheryle Mires is a 66 y o  female      65 y/o female with hx of dm, recent L tkr, oa, htn presents for f/u and awv  Pt reports increased stress with home life, using clonazepam for anxiety and ambien for sleep  She is 3 months out from L tkr, reports continued knee pain but does not typically take anything for the pain she is still going to PT 2 times / week  bp well controlled   Has not been checking BBG at home       The following portions of the patient's history were reviewed and updated as appropriate: allergies, current medications, past family history, past medical history, past social history, past surgical history and problem list     Review of Systems   Constitutional: Negative for activity change, appetite change, chills, diaphoresis, fatigue and fever  HENT: Negative for congestion and sore throat  Eyes: Negative for pain and redness  Respiratory: Negative for cough and shortness of breath  Cardiovascular: Negative for chest pain and leg swelling  Gastrointestinal: Negative for abdominal pain, constipation, diarrhea and nausea  Musculoskeletal: Positive for arthralgias (L knee pain )  Skin: Negative for rash  Neurological: Negative for dizziness, light-headedness and headaches  Psychiatric/Behavioral: Positive for dysphoric mood and sleep disturbance  The patient is nervous/anxious            Past Medical History:   Diagnosis Date    Anxiety     Arthritis     Bladder carcinoma ([place] [address] ) 2016    Bladder mass     Depression     Diabetes mellitus ([place] [address] )     Disease of thyroid gland     thyroid nodules-not treating at this time    Dysuria     Last assessed 17    GERD (gastroesophageal reflux disease)      2 para 2     HLD (hyperlipidemia)     HTN (hypertension)     Hypercholesterolemia     Hypertension     Knee pain     Lichen sclerosus 7534    Melanoma ([place] [address] )     Melanoma ([place] [address] )     Mild aortic regurgitation with left ventricular dilation by prior echocardiogram     Papanicolaou smear 2017    NEG    PONV (postoperative nausea and vomiting)          Current Outpatient Medications:     amLODIPine (NORVASC) 5 mg tablet, Take 1 tablet (5 mg total) by mouth daily, Disp: 30 tablet, Rfl: 5    clonazePAM (KlonoPIN) 0 5 mg tablet, Take 1 tablet (0 5 mg total) by mouth daily at bedtime, Disp: 30 tablet, Rfl: 3    Cyanocobalamin 1000 MCG CAPS, Take 1 capsule by mouth daily  , Disp: , Rfl:     estradiol (ESTRACE VAGINAL) 0 1 mg/g vaginal cream, Insert 1 g into the vagina 2 (two) times a week, Disp: 45 g, Rfl: 3    famotidine (PEPCID) 20 mg tablet, Take 1 tablet (20 mg total) by mouth daily (Patient taking differently: Take 20 mg by mouth daily As needed), Disp: 90 tablet, Rfl: 0    fenofibrate (TRICOR) 145 mg tablet, Take 1 tablet (145 mg total) by mouth daily, Disp: 90 tablet, Rfl: 1    fluticasone (FLONASE) 50 mcg/act nasal spray, 1 spray into each nostril daily, Disp: 1 Bottle, Rfl: 0    ibuprofen (MOTRIN) 200 mg tablet, Take 200 mg by mouth every 6 (six) hours as needed for mild pain, Disp: , Rfl:     insulin glargine (Lantus SoloStar) 100 units/mL injection pen, Inject 33 Units under the skin daily, Disp: 10 mL, Rfl: 3    Insulin Pen Needle 32G X 4 MM MISC, Use in the morning, Disp: 100 each, Rfl: 3    levothyroxine 50 mcg tablet, TAKE 1 TABLET BY MOUTH EVERY DAY, Disp: 90 tablet, Rfl: 1    metFORMIN (GLUCOPHAGE) 500 mg tablet, Take 2 tablets (1,000 mg total) by mouth 2 (two) times a day with meals, Disp: 360 tablet, Rfl: 1    metoprolol tartrate (LOPRESSOR) 25 mg tablet, Take 1 tablet (25 mg total) by mouth every 12 (twelve) hours, Disp: 180 tablet, Rfl: 1    mometasone (ELOCON) 0 1 % cream, Apply topically once a week, Disp: 45 g, Rfl: 4    ONETOUCH VERIO test strip, Test 3 times daily, Disp: 300 each, Rfl: 1    Probiotic Product (PROBIOTIC-10) CAPS, Take 1 capsule by mouth daily, Disp: , Rfl:     simvastatin (ZOCOR) 40 mg tablet, TAKE 1 TABLET BY MOUTH DAILY AT BEDTIME, Disp: 90 tablet, Rfl: 1    sitaGLIPtin (Januvia) 50 mg tablet, Take 1 tablet (50 mg total) by mouth daily, Disp: 90 tablet, Rfl: 1    zolpidem (AMBIEN) 10 mg tablet, Take 1 tablet (10 mg total) by mouth daily at bedtime, Disp: 30 tablet, Rfl: 0    Insulin Pen Needle (BD Pen Needle Micro U/F) 32G X 6 MM MISC, Once daily with insulin (Patient not taking: Reported on 3/24/2022 ), Disp: 100 each, Rfl: 1    Allergies   Allergen Reactions    Nickel Swelling     Swelling, irritation, lumps to ears Social History   Past Surgical History:   Procedure Laterality Date    CARPAL TUNNEL RELEASE Right      SECTION      x2    CHOLECYSTECTOMY      CYSTOSCOPY N/A 2016    Procedure: CYSTOSCOPY WITH BIOPSIES;  Surgeon: Solis Gonzalez MD;  [facility];  Service:    [place] N/A 2016    Procedure: CYSTOSCOPY;  Surgeon: Solis Gonzalez MD;  [facility];  Service:     CYSTOSCOPY  2020    HERNIA REPAIR      IA CYSTO/URETERO W/LITHOTRIPSY &INDWELL STENT INSRT  2016    Procedure: CYSTOSCOPY URETEROSCOPY WITH LITHOTRIPSY HOLMIUM LASER RIGHT, RETROGRADE PYELOGRAM BILATERAL: AND INSERTION STENT URETERAL Right ;  Surgeon: Solis Gonzalez MD;  [facility];  Service: Urology    IA CYSTO/URETERO/PYELOSCOPY, DX Right 2017    Procedure: URETEROSCOPY;  Surgeon: Solis Gonzalez MD;  [facility];  Service: Urology    IA CYSTOSCOPY,INSERT URETERAL STENT Left 2016    Procedure: URETERAL STENTS;  Surgeon: Solis Gonzalez MD;  [facility];  Service: Urology    IA CYSTOURETHROSCOPY,FULGUR <0 5 CM LESN N/A 2016    Procedure: TRANSURETHRAL RESECTION OF BLADDER TUMOR (TURBT);   Surgeon: Solis Gonzalez MD;  [facility];  Service: Urology    IA CYSTOURETHROSCOPY,FULGUR <0 5 CM LESN N/A 2016    Procedure: Annamarie Prader; TUR BLADDER TUMOR ;  Surgeon: Solis Gonzalez MD;  [facility];  Service: Urology    IA CYSTOURETHROSCOPY,FULGUR <0 5 CM LESN N/A 2016    Procedure: TUR BLADDER TUMOR ;  Surgeon: Solis Gonzalez MD;  [facility];  Service: Urology    IA CYSTOURETHROSCOPY,FULGUR <0 5 CM LESN Bilateral 2017    Procedure: Nick Bolden, ;  Surgeon: Solis Gonzalez MD;  [facility];  Service: Urology    IA CYSTOURETHROSCOPY,URETER CATHETER Bilateral 2016    Procedure: RETROGRADE PYELOGRAM ;  Surgeon: Solis Gonzalez MD;  [facility];  Service: Urology    IA CYSTOURETHROSCOPY,URETER CATHETER Bilateral 5/9/2017    Procedure: RETROGRADE PYELOGRAM WITH STENT EXCHANGE, RIGHT;  Surgeon: Syeda Fofana MD;  [facility];  Service: Urology    RI INSTILL ANTICANCER AGENT IN BLADDER N/A 11/29/2016    Procedure: Makayla Black;  Surgeon: Syeda Fofana MD;  [facility];  Service: Urology    RI KNEE SCOPE,MED/LAT MENISECTOMY Left 4/4/2016    Procedure: KNEE ARTHROSCOPIC PARTIAL MEDIAL MENISCECTOMY ;  Surgeon: Ivory Marshall MD;  [facility];  Service: Orthopedics    REMOVAL URETERAL STENT Left 11/29/2016    Procedure: REMOVAL STENT URETERAL;  Surgeon: Syeda Fofana MD;  [facility];  Service:     SKIN CANCER EXCISION  2021    right arm    TONSILLECTOMY       Family History   Problem Relation Age of Onset    Heart attack Mother     Heart disease Mother     ALS Father     Diabetes Maternal Grandfather     Lung cancer Paternal Grandfather     Heart disease Maternal Grandmother     No Known Problems Daughter     No Known Problems Daughter     No Known Problems Paternal Aunt     No Known Problems Paternal Aunt     No Known Problems Paternal Aunt     No Known Problems Paternal Aunt     No Known Problems Paternal Aunt     Breast cancer Neg Hx        Objective:  /60   Pulse 65   Temp 97 7 °F (36 5 °C) (Temporal)   Ht 5' 1 42" (1 56 m) Comment: shoes off  Wt 68 9 kg (152 lb)   SpO2 98%   BMI 28 33 kg/m²        Physical Exam  Vitals reviewed  Constitutional:       General: She is not in acute distress  HENT:      Head: Normocephalic and atraumatic  Mouth/Throat:      Mouth: Mucous membranes are moist    Eyes:      General:         Right eye: No discharge  Left eye: No discharge  Extraocular Movements: Extraocular movements intact  Conjunctiva/sclera: Conjunctivae normal       Pupils: Pupils are equal, round, and reactive to light     Cardiovascular:      Rate and Rhythm: Normal rate and regular rhythm  Pulmonary:      Effort: Pulmonary effort is normal  No respiratory distress  Breath sounds: Normal breath sounds  No wheezing or rales  Musculoskeletal:         General: No swelling (L knee no sig edema, incision well healed)  Normal range of motion  Cervical back: Normal range of motion  Right lower leg: No edema  Left lower leg: No edema  Lymphadenopathy:      Cervical: No cervical adenopathy  Skin:     Findings: No rash  Neurological:      General: No focal deficit present  Mental Status: She is alert and oriented to person, place, and time     Psychiatric:         Mood and Affect: Mood normal

## 2022-04-08 ENCOUNTER — OFFICE VISIT (OUTPATIENT)
Dept: URGENT CARE | Facility: MEDICAL CENTER | Age: 72
End: 2022-04-08
Payer: MEDICARE

## 2022-04-08 VITALS
HEART RATE: 80 BPM | HEIGHT: 61 IN | OXYGEN SATURATION: 100 % | BODY MASS INDEX: 29.07 KG/M2 | TEMPERATURE: 97.9 F | WEIGHT: 154 LBS | RESPIRATION RATE: 18 BRPM

## 2022-04-08 DIAGNOSIS — H66.91 RIGHT OTITIS MEDIA, UNSPECIFIED OTITIS MEDIA TYPE: Primary | ICD-10-CM

## 2022-04-08 PROCEDURE — 99213 OFFICE O/P EST LOW 20 MIN: CPT | Performed by: PHYSICIAN ASSISTANT

## 2022-04-08 PROCEDURE — G0463 HOSPITAL OUTPT CLINIC VISIT: HCPCS | Performed by: PHYSICIAN ASSISTANT

## 2022-04-08 RX ORDER — AMOXICILLIN 500 MG/1
500 CAPSULE ORAL EVERY 8 HOURS SCHEDULED
Qty: 21 CAPSULE | Refills: 0 | Status: SHIPPED | OUTPATIENT
Start: 2022-04-08 | End: 2022-04-15

## 2022-04-08 RX ORDER — OFLOXACIN 3 MG/ML
10 SOLUTION AURICULAR (OTIC) 2 TIMES DAILY
Qty: 5 ML | Refills: 0 | Status: SHIPPED | OUTPATIENT
Start: 2022-04-08 | End: 2022-04-15

## 2022-04-08 NOTE — PROGRESS NOTES
[address] [facility]        NAME: Jack Victor is a 66 y o  female  : 1950    MRN: [id number]  DATE: 2022  TIME: 4:17 PM    Assessment and Plan   Right otitis media, unspecified otitis media type [H66 91]  1  Right otitis media, unspecified otitis media type  amoxicillin (AMOXIL) 500 mg capsule    ofloxacin (FLOXIN) 0 3 % otic solution         Patient Instructions     Right otitis media   Amoxicillin as directed  Follow up with PCP in 3-5 days  Proceed to  ER if symptoms worsen  Chief Complaint     Chief Complaint   Patient presents with    Earache     patient states she has right ear pain x1 month; hx of ear infections in the same ear          History of Present Illness       19-year-old female who presents complaining of right ear pain and pressure times several weeks  States she has tried over-the-counter medications with no relief  States the pain has increased significantly over the last 3 days  Review of Systems   Review of Systems   Constitutional: Negative for activity change, appetite change, chills, diaphoresis, fatigue and fever  HENT: Positive for ear pain  Negative for congestion, ear discharge, facial swelling, hearing loss, mouth sores, nosebleeds, postnasal drip, rhinorrhea, sinus pressure, sinus pain, sneezing, sore throat and voice change  Respiratory: Negative for apnea, cough, choking, chest tightness, shortness of breath, wheezing and stridor  Cardiovascular: Negative            Current Medications       Current Outpatient Medications:     amLODIPine (NORVASC) 5 mg tablet, Take 1 tablet (5 mg total) by mouth daily, Disp: 30 tablet, Rfl: 5    clonazePAM (KlonoPIN) 0 5 mg tablet, Take 1 tablet (0 5 mg total) by mouth daily at bedtime, Disp: 30 tablet, Rfl: 3    Cyanocobalamin 1000 MCG CAPS, Take 1 capsule by mouth daily  , Disp: , Rfl:     famotidine (PEPCID) 20 mg tablet, Take 1 tablet (20 mg total) by mouth daily (Patient taking differently: Take 20 mg by mouth daily As needed), Disp: 90 tablet, Rfl: 0    fenofibrate (TRICOR) 145 mg tablet, Take 1 tablet (145 mg total) by mouth daily, Disp: 90 tablet, Rfl: 1    fluticasone (FLONASE) 50 mcg/act nasal spray, 1 spray into each nostril daily, Disp: 1 Bottle, Rfl: 0    insulin glargine (Lantus SoloStar) 100 units/mL injection pen, Inject 33 Units under the skin daily, Disp: 10 mL, Rfl: 3    Insulin Pen Needle 32G X 4 MM MISC, Use in the morning, Disp: 100 each, Rfl: 3    levothyroxine 50 mcg tablet, TAKE 1 TABLET BY MOUTH EVERY DAY, Disp: 90 tablet, Rfl: 1    metFORMIN (GLUCOPHAGE) 500 mg tablet, Take 2 tablets (1,000 mg total) by mouth 2 (two) times a day with meals, Disp: 360 tablet, Rfl: 1    metoprolol tartrate (LOPRESSOR) 25 mg tablet, Take 1 tablet (25 mg total) by mouth every 12 (twelve) hours, Disp: 180 tablet, Rfl: 1    mometasone (ELOCON) 0 1 % cream, Apply topically once a week, Disp: 45 g, Rfl: 4    ONETOUCH VERIO test strip, Test 3 times daily, Disp: 300 each, Rfl: 1    Probiotic Product (PROBIOTIC-10) CAPS, Take 1 capsule by mouth daily, Disp: , Rfl:     simvastatin (ZOCOR) 40 mg tablet, TAKE 1 TABLET BY MOUTH DAILY AT BEDTIME, Disp: 90 tablet, Rfl: 1    sitaGLIPtin (Januvia) 50 mg tablet, Take 1 tablet (50 mg total) by mouth daily, Disp: 90 tablet, Rfl: 1    zolpidem (AMBIEN) 10 mg tablet, Take 1 tablet (10 mg total) by mouth daily at bedtime, Disp: 30 tablet, Rfl: 0    amoxicillin (AMOXIL) 500 mg capsule, Take 1 capsule (500 mg total) by mouth every 8 (eight) hours for 7 days, Disp: 21 capsule, Rfl: 0    estradiol (ESTRACE VAGINAL) 0 1 mg/g vaginal cream, Insert 1 g into the vagina 2 (two) times a week, Disp: 45 g, Rfl: 3    ibuprofen (MOTRIN) 200 mg tablet, Take 200 mg by mouth every 6 (six) hours as needed for mild pain, Disp: , Rfl:     Insulin Pen Needle (BD Pen Needle Micro U/F) 32G X 6 MM MISC, Once daily with insulin (Patient not taking: Reported on 3/24/2022 ), Disp: 100 each, Rfl: 1    ofloxacin (FLOXIN) 0 3 % otic solution, Administer 10 drops to the right ear 2 (two) times a day for 7 days, Disp: 5 mL, Rfl: 0    Current Allergies     Allergies as of 2022 - Reviewed 2022   Allergen Reaction Noted    Nickel Swelling 2021            The following portions of the patient's history were reviewed and updated as appropriate: allergies, current medications, past family history, past medical history, past social history, past surgical history and problem list      Past Medical History:   Diagnosis Date    Anxiety     Arthritis     Bladder carcinoma ([facility] ) 2016    Bladder mass     Depression     Diabetes mellitus ([facility] )     Disease of thyroid gland     thyroid nodules-not treating at this time    Dysuria     Last assessed 17    GERD (gastroesophageal reflux disease)      2 para 2     HLD (hyperlipidemia)     HTN (hypertension)     Hypercholesterolemia     Hypertension     Knee pain     Lichen sclerosus     Melanoma ([facility] )     Melanoma ([place] [address] )     Mild aortic regurgitation with left ventricular dilation by prior echocardiogram     Papanicolaou smear 2017    NEG    PONV (postoperative nausea and vomiting)        Past Surgical History:   Procedure Laterality Date    CARPAL TUNNEL RELEASE Right      SECTION      x2    CHOLECYSTECTOMY      CYSTOSCOPY N/A 2016    Procedure: CYSTOSCOPY WITH BIOPSIES;  Surgeon: Purvi Ziegler MD;  [facility];  Service:    Ina Evans CYSTOSCOPY N/A 2016    Procedure: CYSTOSCOPY;  Surgeon: Purvi Ziegler MD;  [facility];  Service:     CYSTOSCOPY  2020    HERNIA REPAIR      NC CYSTO/URETERO W/LITHOTRIPSY &INDWELL STENT INSRT  2016    Procedure: CYSTOSCOPY URETEROSCOPY WITH LITHOTRIPSY HOLMIUM LASER RIGHT, RETROGRADE PYELOGRAM BILATERAL: AND INSERTION STENT URETERAL Right ;  Surgeon: Purvi Ziegler MD;  [facility];  Service: Urology    NC CYSTO/URETERO/PYELOSCOPY, DX Right 5/9/2017    Procedure: URETEROSCOPY;  Surgeon: Arely Taylor MD;  [facility];  Service: Urology    KS CYSTOSCOPY,INSERT URETERAL STENT Left 9/29/2016    Procedure: URETERAL STENTS;  Surgeon: Arely Taylor MD;  [facility];  Service: Urology    KS CYSTOURETHROSCOPY,FULGUR <0 5 CM LESN N/A 11/29/2016    Procedure: TRANSURETHRAL RESECTION OF BLADDER TUMOR (TURBT);   Surgeon: Arely Taylor MD;  [facility];  Service: Urology    KS CYSTOURETHROSCOPY,FULGUR <0 5 CM LESN N/A 9/29/2016    Procedure: Jairo Padgett; TUR BLADDER TUMOR ;  Surgeon: Arely Taylor MD;  [facility];  Service: Urology    KS CYSTOURETHROSCOPY,FULGUR <0 5 CM LESN N/A 9/1/2016    Procedure: TUR BLADDER TUMOR ;  Surgeon: Arely Taylor MD;  [facility];  Service: Urology    KS CYSTOURETHROSCOPY,FULGUR <0 5 CM LESN Bilateral 5/9/2017    Procedure: CYSTOSCOPY, RIGHT URETERAL WASHINGS, ;  Surgeon: Arely Taylor MD;  [facility];  Service: Urology    KS CYSTOURETHROSCOPY,URETER CATHETER Bilateral 9/29/2016    Procedure: RETROGRADE PYELOGRAM ;  Surgeon: Arely Taylor MD;  [facility];  Service: Urology    KS CYSTOURETHROSCOPY,URETER CATHETER Bilateral 5/9/2017    Procedure: RETROGRADE PYELOGRAM WITH STENT EXCHANGE, RIGHT;  Surgeon: Arely Taylor MD;  [facility];  Service: Urology    KS INSTILL ANTICANCER AGENT IN BLADDER N/A 11/29/2016    Procedure: [place];  Surgeon: Arely Taylor MD;  [facility];  Service: Urology    KS KNEE SCOPE,MED/LAT MENISECTOMY Left 4/4/2016    Procedure: KNEE ARTHROSCOPIC PARTIAL MEDIAL MENISCECTOMY ;  Surgeon: Nusrat Rodriges MD;  [facility];  Service: Orthopedics    REMOVAL URETERAL STENT Left 11/29/2016    Procedure: REMOVAL STENT URETERAL;  Surgeon: Areyl Taylor MD;  [facility];  Service:     SKIN CANCER EXCISION  2021    right arm    TONSILLECTOMY Family History   Problem Relation Age of Onset    Heart attack Mother     Heart disease Mother     ALS Father     Diabetes Maternal Grandfather     Lung cancer Paternal Grandfather     Heart disease Maternal Grandmother     No Known Problems Daughter     No Known Problems Daughter     No Known Problems Paternal Aunt     No Known Problems Paternal Aunt     No Known Problems Paternal Aunt     No Known Problems Paternal Aunt     No Known Problems Paternal Aunt     Breast cancer Neg Hx          Medications have been verified  Objective   Pulse 80   Temp 97 9 °F (36 6 °C)   Resp 18   Ht 5' 1" (1 549 m)   Wt 69 9 kg (154 lb)   SpO2 100%   BMI 29 10 kg/m²        Physical Exam     Physical Exam  Constitutional:       Appearance: Normal appearance  She is well-developed  HENT:      Head: Normocephalic and atraumatic  Right Ear: External ear normal       Left Ear: External ear normal       Nose: Nose normal       Mouth/Throat:      Pharynx: No oropharyngeal exudate  Cardiovascular:      Rate and Rhythm: Normal rate and regular rhythm  Heart sounds: Normal heart sounds  Pulmonary:      Effort: Pulmonary effort is normal  No respiratory distress  Breath sounds: Normal breath sounds  No wheezing or rales  Chest:      Chest wall: No tenderness  Musculoskeletal:      Cervical back: Normal range of motion and neck supple  Lymphadenopathy:      Cervical: No cervical adenopathy  Neurological:      Mental Status: She is alert

## 2022-04-08 NOTE — PATIENT INSTRUCTIONS
Right otitis media   Amoxicillin as directed  Follow up with PCP in 3-5 days  Proceed to  ER if symptoms worsenEar Infection   WHAT YOU NEED TO KNOW:   An ear infection is also called otitis media  Blocked or swollen eustachian tubes can cause an infection  Eustachian tubes connect the middle ear to the back of the nose and throat  They drain fluid from the middle ear  You may have a buildup of fluid in your ear  Germs build up in the fluid and infection develops  DISCHARGE INSTRUCTIONS:   Return to the emergency department if:   · You have clear fluid coming from your ear  · You have a stiff neck, headache, and a fever  Call your doctor if:   · You see blood or pus draining from your ear  · Your ear pain gets worse or does not go away, even after treatment  · The outside of your ear is red or swollen  · You are vomiting or have diarrhea  · You have questions or concerns about your condition or care  Medicines: You may  need any of the following:  · Acetaminophen  decreases pain and fever  It is available without a doctor's order  Ask how much to take and how often to take it  Follow directions  Read the labels of all other medicines you are using to see if they also contain acetaminophen, or ask your doctor or pharmacist  Acetaminophen can cause liver damage if not taken correctly  Do not use more than 4 grams (4,000 milligrams) total of acetaminophen in one day  · NSAIDs , such as ibuprofen, help decrease swelling, pain, and fever  This medicine is available with or without a doctor's order  NSAIDs can cause stomach bleeding or kidney problems in certain people  If you take blood thinner medicine, always ask your healthcare provider if NSAIDs are safe for you  Always read the medicine label and follow directions  · Ear drops  may contain medicine to decrease pain and inflammation  · Antibiotics  help treat a bacterial infection  · Take your medicine as directed    Contact your healthcare provider if you think your medicine is not helping or if you have side effects  Tell him or her if you are allergic to any medicine  Keep a list of the medicines, vitamins, and herbs you take  Include the amounts, and when and why you take them  Bring the list or the pill bottles to follow-up visits  Carry your medicine list with you in case of an emergency  Self-care:   · Apply heat  on your ear for 15 to 20 minutes, 3 to 4 times a day or as directed  You can apply heat with an electric heating pad, hot water bottle, or warm compress  Always put a cloth between your skin and the heat pack to prevent burns  Heat helps decrease pain  · Apply ice  on your ear for 15 to 20 minutes, 3 to 4 times a day for 2 days or as directed  Use an ice pack, or put crushed ice in a plastic bag  Cover it with a towel before you apply it to your ear  Ice decreases swelling and pain  Prevent an ear infection:   · Wash your hands often  to help prevent the spread of germs  Ask everyone in your house to wash their hands with soap and water  Ask them to wash after they use the bathroom or change a diaper  Remind them to wash before they prepare or eat food  · Stay away from people who are ill  Some germs spread easily and quickly through contact  Follow up with your doctor as directed:  Write down your questions so you remember to ask them during your visits  © Copyright [employer] 2022 Information is for End User's use only and may not be sold, redistributed or otherwise used for commercial purposes  All illustrations and images included in CareNotes® are the copyrighted property of A [employer]  or Lelo Caballero  The above information is an  only  It is not intended as medical advice for individual conditions or treatments  Talk to your doctor, nurse or pharmacist before following any medical regimen to see if it is safe and effective for you

## 2022-04-13 DIAGNOSIS — E03.9 HYPOTHYROIDISM, UNSPECIFIED TYPE: ICD-10-CM

## 2022-04-13 DIAGNOSIS — F41.8 DEPRESSION WITH ANXIETY: ICD-10-CM

## 2022-04-13 RX ORDER — LEVOTHYROXINE SODIUM 0.05 MG/1
50 TABLET ORAL DAILY
Qty: 90 TABLET | Refills: 1 | Status: SHIPPED | OUTPATIENT
Start: 2022-04-13

## 2022-04-13 RX ORDER — ZOLPIDEM TARTRATE 10 MG/1
10 TABLET ORAL
Qty: 30 TABLET | Refills: 0 | Status: SHIPPED | OUTPATIENT
Start: 2022-04-13 | End: 2022-05-16 | Stop reason: SDUPTHER

## 2022-04-19 ENCOUNTER — TELEPHONE (OUTPATIENT)
Dept: UROLOGY | Facility: MEDICAL CENTER | Age: 72
End: 2022-04-19

## 2022-04-19 DIAGNOSIS — C67.9 MALIGNANT NEOPLASM OF URINARY BLADDER, UNSPECIFIED SITE (HCC): Primary | ICD-10-CM

## 2022-04-19 NOTE — TELEPHONE ENCOUNTER
Patient of Dr Ranjit Dixon at [facility]    Patient called and scheduled her yearly cysto with Dr Ranjit Dixon and she needs an order for cytology to do prior to appointment in August

## 2022-05-09 ENCOUNTER — TELEMEDICINE (OUTPATIENT)
Dept: INTERNAL MEDICINE CLINIC | Facility: CLINIC | Age: 72
End: 2022-05-09
Payer: MEDICARE

## 2022-05-09 VITALS — DIASTOLIC BLOOD PRESSURE: 77 MMHG | TEMPERATURE: 98 F | HEART RATE: 65 BPM | SYSTOLIC BLOOD PRESSURE: 142 MMHG

## 2022-05-09 DIAGNOSIS — U07.1 COVID-19: ICD-10-CM

## 2022-05-09 DIAGNOSIS — U07.1 ACUTE COVID-19: Primary | ICD-10-CM

## 2022-05-09 PROCEDURE — 99213 OFFICE O/P EST LOW 20 MIN: CPT | Performed by: INTERNAL MEDICINE

## 2022-05-09 RX ORDER — ACETAMINOPHEN 325 MG/1
650 TABLET ORAL 4 TIMES DAILY PRN
COMMUNITY

## 2022-05-09 NOTE — PROGRESS NOTES
COVID-19 Outpatient Progress Note    Assessment/Plan:    Problem List Items Addressed This Visit     None         Disposition:     Patient is fully vaccinated and has received their booster shot  According to CDC guidelines, quarantine is not required after close contact exposure and they were advised to wear a mask for 10 days after the exposure  If patient were to develop symptoms, they should immediately self isolate and call our office for further guidance  Discussed symptom directed medication options with patient  Patient meets criteria for PAXLOVID and they have been counseled appropriately according to EUA documentation released by the FDA  After discussion, patient agrees to treatment  Emiliana Pall is an investigational medicine used to treat mild-to-moderate COVID-19 in adults and children (15years of age and older weighing at least 80 pounds (40 kg)) with positive results of direct SARS-CoV-2 viral testing, and who are at high risk for progression to severe COVID-19, including hospitalization or death  PAXLOVID is investigational because it is still being studied  There is limited information about the safety and effectiveness of using PAXLOVID to treat people with mild-to-moderate COVID-19  The FDA has authorized the emergency use of PAXLOVID for the treatment of mild-tomoderate COVID-19 in adults and children (15years of age and older weighing at least 80 pounds (40 kg)) with a positive test for the virus that causes COVID-19, and who are at high risk for progression to severe COVID-19, including hospitalization or death, under an EUA  What should I tell my healthcare provider before I take PAXLOVID?     Tell your healthcare provider if you:  - Have any allergies  - Have liver or kidney disease  - Are pregnant or plan to become pregnant  - Are breastfeeding a child  - Have any serious illnesses    Tell your healthcare provider about all the medicines you take, including prescription and over-the-counter medicines, vitamins, and herbal supplements  Some medicines may interact with PAXLOVID and may cause serious side effects  Keep a list of your medicines to show your healthcare provider and pharmacist when you get a new medicine  You can ask your healthcare provider or pharmacist for a list of medicines that interact with PAXLOVID  Do not start taking a new medicine without telling your healthcare provider  Your healthcare provider can tell you if it is safe to take PAXLOVID with other medicines  Tell your healthcare provider if you are taking combined hormonal contraceptive  PAXLOVID may affect how your birth control pills work  Females who are able to become pregnant should use another effective alternative form of contraception or an additional barrier method of contraception  Talk to your healthcare provider if you have any questions about contraceptive methods that might be right for you  How do I take PAXLOVID? PAXLOVID consists of 2 medicines: nirmatrelvir and ritonavir  - Take 2 pink tablets of nirmatrelvir with 1 white tablet of ritonavir by mouth 2 times each day (in the morning and in the evening) for 5 days  For each dose, take all 3 tablets at the same time  - If you have kidney disease, talk to your healthcare provider  You may need a different dose  - Swallow the tablets whole  Do not chew, break, or crush the tablets  - Take PAXLOVID with or without food  - Do not stop taking PAXLOVID without talking to your healthcare provider, even if you feel better  - If you miss a dose of PAXLOVID within 8 hours of the time it is usually taken, take it as soon as you remember  If you miss a dose by more than 8 hours, skip the missed dose and take the next dose at your regular time  Do not take 2 doses of PAXLOVID at the same time  - If you take too much PAXLOVID, call your healthcare provider or go to the nearest hospital emergency room right away    - If you are taking a ritonavir- or cobicistat-containing medicine to treat hepatitis C or Human Immunodeficiency Virus (HIV), you should continue to take your medicine as prescribed by your healthcare provider   - Talk to your healthcare provider if you do not feel better or if you feel worse after 5 days  Who should generally not take PAXLOVID? Do not take PAXLOVID if:  You are allergic to nirmatrelvir, ritonavir, or any of the ingredients in PAXLOVID  You are taking any of the following medicines:  - Alfuzosin  - Pethidine, piroxicam, propoxyphene  - Ranolazine  - Amiodarone, dronedarone, flecainide, propafenone, quinidine  - Colchicine  - Lurasidone, pimozide, clozapine  - Dihydroergotamine, ergotamine, methylergonovine  - Lovastatin, simvastatin  - Sildenafil (Revatio®) for pulmonary arterial hypertension (PAH)  - Triazolam, oral midazolam  - Apalutamide  - Carbamazepine, phenobarbital, phenytoin  - Rifampin  - St  Vlads Wort (hypericum perforatum)    What are the important possible side effects of PAXLOVID? Possible side effects of PAXLOVID are:  - Liver Problems  Tell your healthcare provider right away if you have any of these signs and symptoms of liver problems: loss of appetite, yellowing of your skin and the whites of eyes (jaundice), dark-colored urine, pale colored stools and itchy skin, stomach area (abdominal) pain  - Resistance to HIV Medicines  If you have untreated HIV infection, PAXLOVID may lead to some HIV medicines not working as well in the future  - Other possible side effects include: altered sense of taste, diarrhea, high blood pressure, or muscle aches    These are not all the possible side effects of PAXLOVID  Not many people have taken PAXLOVID  Serious and unexpected side effects may happen  Lady Lucas is still being studied, so it is possible that all of the risks are not known at this time  What other treatment choices are there?   Like Dea Book may allow for the emergency use of other medicines to treat people with COVID-19  Go to https://[employer]/ for information on the emergency use of other medicines that are authorized by FDA to treat people with COVID-19  Your healthcare provider may talk with you about clinical trials for which you may be eligible  It is your choice to be treated or not to be treated with PAXLOVID  Should you decide not to receive it or for your child not to receive it, it will not change your standard medical care  What if I am pregnant or breastfeeding? There is no experience treating pregnant women or breastfeeding mothers with PAXLOVID  For a mother and unborn baby, the benefit of taking PAXLOVID may be greater than the risk from the treatment  If you are pregnant, discuss your options and specific situation with your healthcare provider  It is recommended that you use effective barrier contraception or do not have sexual activity while taking PAXLOVID  If you are breastfeeding, discuss your options and specific situation with your healthcare provider  How do I report side effects with PAXLOVID? Contact your healthcare provider if you have any side effects that bother you or do not go away  Report side effects to FDA MedWatch at www fda gov/medwatch or call 0-343-ALF9653 or you can report side effects to [facility]  at the contact information provided below  Website Fax number Telephone number   [employer] [phone]     How should I store [address]? Store PAXLOVID tablets at room temperature between 68°F to 77°F (20°C to 25°C)  Full fact sheet for patients, parents, and caregivers can be found at: [employer]    I have spent 10 minutes directly with the patient   Greater than 50% of this time was spent in counseling/coordination of care regarding: diagnostic results, risks and benefits of treatment options, instructions for management and importance of treatment compliance  Encounter provider Inge Dakins, MD    Provider located at [address] [address]    Recent Visits  No visits were found meeting these conditions  Showing recent visits within past 7 days and meeting all other requirements  Today's Visits  Date Type Provider Dept   05/09/22 [address], [address] today's visits and meeting all other requirements  Future Appointments  No visits were found meeting these conditions  Showing future appointments within next 150 days and meeting all other requirements     This virtual check-in was done via [employer] and patient was informed that this is a secure, HIPAA-compliant platform  She agrees to proceed  Patient agrees to participate in a virtual check in via telephone or video visit instead of presenting to the office to address urgent/immediate medical needs  Patient is aware this is a billable service  After connecting through [facility], the patient was identified by name and date of birth  Cuauhtemoc Caldwell was informed that this was a telemedicine visit and that the exam was being conducted confidentially over secure lines  My office door was closed  No one else was in the room  Cuauhtemoc Caldwell acknowledged consent and understanding of privacy and security of the telemedicine visit  I informed the patient that I have reviewed her record in Epic and presented the opportunity for her to ask any questions regarding the visit today  The patient agreed to participate  Verification of patient location:  Patient is located in the following state in which I hold an active license: [place]    Subjective:   Cuauhtemoc Caldwell is a 66 y o  female who is concerned about COVID-19  Patient's symptoms include chills, fatigue, malaise, nasal congestion, rhinorrhea, cough, myalgias and headache (Mild)  Patient denies sore throat, anosmia, loss of taste, abdominal pain, nausea, vomiting and diarrhea       - Date of symptom onset: 2022  - Date of exposure: 2022      COVID-19 vaccination status: Fully vaccinated with booster    Exposure:   Contact with a person who is under investigation (PUI) for or who is positive for COVID-19 within the last 14 days?: No    Hospitalized recently for fever and/or lower respiratory symptoms?: No      Currently a healthcare worker that is involved in direct patient care?: No      Works in a special setting where the risk of COVID-19 transmission may be high? (this may include long-term care, correctional and shelter facilities; homeless shelters; assisted-living facilities and group homes ): No      Resident in a special setting where the risk of COVID-19 transmission may be high? (this may include long-term care, correctional and shelter facilities; homeless shelters; assisted-living facilities and group homes ): No      Lab Results   Component Value Date    SARSCOV2 Negative 2021     Past Medical History:   Diagnosis Date    Anxiety     Arthritis     [facility]    Bladder mass     Depression     [facility]     Disease of thyroid gland     thyroid nodules-not treating at this time    Dysuria     Last assessed 17    GERD (gastroesophageal reflux disease)      2 para 2     HLD (hyperlipidemia)     HTN (hypertension)     Hypercholesterolemia     Hypertension     Knee pain     Lichen sclerosus 1602    [facility]     [facility]     Mild aortic regurgitation with left ventricular dilation by prior echocardiogram     Papanicolaou smear 2017    NEG    PONV (postoperative nausea and vomiting)      Past Surgical History:   Procedure Laterality Date    CARPAL TUNNEL RELEASE Right      SECTION      x2    CHOLECYSTECTOMY      CYSTOSCOPY N/A 2016    Procedure: CYSTOSCOPY WITH BIOPSIES;  Surgeon: Pat Rodriguez MD;  [facility];  Service:    Adonica Austin N/A 2016    Procedure: Marlena Blakely;  Surgeon: Pat Rodriguez MD;  [facility];  Service:     CYSTOSCOPY  2020    HERNIA REPAIR      CA CYSTO/URETERO W/LITHOTRIPSY &INDWELL STENT INSRT  2016    Procedure: CYSTOSCOPY URETEROSCOPY WITH LITHOTRIPSY HOLMIUM LASER RIGHT, RETROGRADE PYELOGRAM BILATERAL: AND INSERTION STENT URETERAL Right ;  Surgeon: Pat Rodriguez MD;  [facility];  Service: Urology    CA CYSTO/URETERO/PYELOSCOPY, DX Right 2017    Procedure: URETEROSCOPY;  Surgeon: Pat Rodriguez MD;  [facility];  Service: Urology    CA CYSTOSCOPY,INSERT URETERAL STENT Left 2016    Procedure: URETERAL STENTS;  Surgeon: Pat Rodriguez MD;  [facility];  Service: Urology    CA CYSTOURETHROSCOPY,FULGUR <0 5 CM LESN N/A 2016    Procedure: TRANSURETHRAL RESECTION OF BLADDER TUMOR (TURBT);   Surgeon: Pat Rodriguez MD;  [facility];  Service: Urology    CA CYSTOURETHROSCOPY,FULGUR <0 5 CM LESN N/A 2016    Procedure: Marlena Blakely; TUR BLADDER TUMOR ;  Surgeon: Pat Rodriguez MD;  [facility];  Service: Urology    CA CYSTOURETHROSCOPY,FULGUR <0 5 CM LESN N/A 2016    Procedure: TUR BLADDER TUMOR ;  Surgeon: Pat Rodriguez MD;  [facility];  Service: Urology    CA CYSTOURETHROSCOPY,FULGUR <0 5 CM LESN Bilateral 2017    Procedure: Chencho Salinas, ;  Surgeon: Pat Rodriguez MD;  [facility];  Service: Urology    CA CYSTOURETHROSCOPY,URETER CATHETER Bilateral 2016    Procedure: RETROGRADE PYELOGRAM ;  Surgeon: Pat Rodriguez MD;  [facility];  Service: Urology    CA CYSTOURETHROSCOPY,URETER CATHETER Bilateral 2017    Procedure: RETROGRADE PYELOGRAM WITH STENT EXCHANGE, RIGHT;  Surgeon: Karl Marlow MD;  [facility];  Service: Urology    AK INSTILL ANTICANCER AGENT IN BLADDER N/A 11/29/2016    Procedure: Hernandez Breath;  Surgeon: Karl Marlow MD;  [facility];  Service: Urology    AK KNEE SCOPE,MED/LAT MENISECTOMY Left 4/4/2016    Procedure: KNEE ARTHROSCOPIC PARTIAL MEDIAL MENISCECTOMY ;  Surgeon: Kathleen Young MD;  [facility];  Service: Orthopedics    REMOVAL URETERAL STENT Left 11/29/2016    Procedure: REMOVAL STENT URETERAL;  Surgeon: Karl Marlow MD;  [facility];  Service:    [facility] SKIN CANCER EXCISION  2021    right arm    TONSILLECTOMY       Current Outpatient Medications   Medication Sig Dispense Refill    acetaminophen (TYLENOL) 325 mg tablet Take 650 mg by mouth 4 (four) times a day as needed for mild pain      amLODIPine (NORVASC) 5 mg tablet Take 1 tablet (5 mg total) by mouth daily 30 tablet 5    clonazePAM (KlonoPIN) 0 5 mg tablet Take 1 tablet (0 5 mg total) by mouth daily at bedtime 30 tablet 3    Cyanocobalamin 1000 MCG CAPS Take 1 capsule by mouth daily        famotidine (PEPCID) 20 mg tablet Take 1 tablet (20 mg total) by mouth daily (Patient taking differently: Take 20 mg by mouth daily As needed) 90 tablet 0    fenofibrate (TRICOR) 145 mg tablet Take 1 tablet (145 mg total) by mouth daily 90 tablet 1    fluticasone (FLONASE) 50 mcg/act nasal spray 1 spray into each nostril daily 1 Bottle 0    insulin glargine (Lantus SoloStar) 100 units/mL injection pen Inject 33 Units under the skin daily 10 mL 3    Insulin Pen Needle 32G X 4 MM MISC Use in the morning 100 each 3    levothyroxine 50 mcg tablet Take 1 tablet (50 mcg total) by mouth daily 90 tablet 1    metFORMIN (GLUCOPHAGE) 500 mg tablet Take 2 tablets (1,000 mg total) by mouth 2 (two) times a day with meals 360 tablet 1    metoprolol tartrate (LOPRESSOR) 25 mg tablet Take 1 tablet (25 mg total) by mouth every 12 (twelve) hours 180 tablet 1    mometasone (ELOCON) 0 1 % cream Apply topically once a week 45 g 4    ONETOUCH VERIO test strip Test 3 times daily 300 each 1    Probiotic Product (PROBIOTIC-10) CAPS Take 1 capsule by mouth daily      simvastatin (ZOCOR) 40 mg tablet TAKE 1 TABLET BY MOUTH DAILY AT BEDTIME 90 tablet 1    sitaGLIPtin (Januvia) 50 mg tablet Take 1 tablet (50 mg total) by mouth daily 90 tablet 1    zolpidem (AMBIEN) 10 mg tablet Take 1 tablet (10 mg total) by mouth daily at bedtime 30 tablet 0    estradiol (ESTRACE VAGINAL) 0 1 mg/g vaginal cream Insert 1 g into the vagina 2 (two) times a week (Patient not taking: Reported on 5/9/2022 ) 45 g 3    ibuprofen (MOTRIN) 200 mg tablet Take 200 mg by mouth every 6 (six) hours as needed for mild pain      Insulin Pen Needle (BD Pen Needle Micro U/F) 32G X 6 MM MISC Once daily with insulin (Patient not taking: Reported on 3/24/2022 ) 100 each 1     No current facility-administered medications for this visit  Allergies   Allergen Reactions    Nickel Swelling     Swelling, irritation, lumps to ears       Review of Systems   Constitutional: Positive for chills and fatigue  HENT: Positive for congestion and rhinorrhea  Negative for sore throat  Eyes: Negative  Respiratory: Positive for cough  Gastrointestinal: Negative for abdominal pain, diarrhea, nausea and vomiting  Endocrine: Negative  Genitourinary: Negative  Musculoskeletal: Positive for myalgias  Skin: Negative  Neurological: Positive for headaches (Mild)  Hematological: Negative  Psychiatric/Behavioral: Negative  Objective:    Vitals:    05/09/22 1503   BP: 142/77   BP Location: Left arm   Patient Position: Sitting   Cuff Size: Standard   Pulse: 65   Temp: 98 °F (36 7 °C)   TempSrc: Temporal       Physical Exam  Vitals reviewed  Constitutional:       General: She is not in acute distress  Appearance: Normal appearance  She is normal weight   She is not ill-appearing, toxic-appearing or diaphoretic  HENT:      Head: Normocephalic and atraumatic  Right Ear: External ear normal       Left Ear: External ear normal    Eyes:      General: No scleral icterus  Conjunctiva/sclera: Conjunctivae normal       Pupils: Pupils are equal, round, and reactive to light  Neck:      Vascular: No JVD  Trachea: No tracheal deviation  Pulmonary:      Effort: Pulmonary effort is normal  No respiratory distress  Abdominal:      General: Abdomen is flat  There is no distension  Musculoskeletal:      Cervical back: Neck supple  Right lower leg: No edema  Left lower leg: No edema  Skin:     Coloration: Skin is not jaundiced  Findings: No bruising, erythema or rash  Neurological:      General: No focal deficit present  Mental Status: She is alert and oriented to person, place, and time  Mental status is at baseline  Psychiatric:         Mood and Affect: Mood normal          Behavior: Behavior normal          VIRTUAL VISIT DISCLAIMER    Jan Hall verbally agrees to participate in [employer]  Pt is aware that [employer] could be limited without vital signs or the ability to perform a full hands-on physical exam  Shellie Werner understands she or the provider may request at any time to terminate the video visit and request the patient to seek care or treatment in person

## 2022-05-16 DIAGNOSIS — F41.8 DEPRESSION WITH ANXIETY: ICD-10-CM

## 2022-05-18 RX ORDER — CLONAZEPAM 0.5 MG/1
0.5 TABLET ORAL
Qty: 30 TABLET | Refills: 3 | Status: SHIPPED | OUTPATIENT
Start: 2022-05-18

## 2022-05-18 RX ORDER — ZOLPIDEM TARTRATE 10 MG/1
10 TABLET ORAL
Qty: 30 TABLET | Refills: 0 | Status: SHIPPED | OUTPATIENT
Start: 2022-05-18 | End: 2022-06-17 | Stop reason: SDUPTHER

## 2022-05-27 ENCOUNTER — HOSPITAL ENCOUNTER (OUTPATIENT)
Dept: MAMMOGRAPHY | Facility: CLINIC | Age: 72
Discharge: HOME | End: 2022-05-27
Payer: MEDICARE

## 2022-05-27 ENCOUNTER — OFFICE VISIT (OUTPATIENT)
Dept: INTERNAL MEDICINE CLINIC | Age: 72
End: 2022-05-27
Payer: MEDICARE

## 2022-05-27 VITALS — WEIGHT: 154.1 LBS | HEIGHT: 61 IN | BODY MASS INDEX: 29.09 KG/M2

## 2022-05-27 VITALS
BODY MASS INDEX: 29.04 KG/M2 | HEART RATE: 54 BPM | TEMPERATURE: 97.6 F | SYSTOLIC BLOOD PRESSURE: 142 MMHG | DIASTOLIC BLOOD PRESSURE: 80 MMHG | OXYGEN SATURATION: 97 % | HEIGHT: 61 IN | WEIGHT: 153.8 LBS

## 2022-05-27 DIAGNOSIS — Z12.31 ENCOUNTER FOR SCREENING MAMMOGRAM FOR MALIGNANT NEOPLASM OF BREAST: ICD-10-CM

## 2022-05-27 DIAGNOSIS — J01.00 ACUTE NON-RECURRENT MAXILLARY SINUSITIS: Primary | ICD-10-CM

## 2022-05-27 DIAGNOSIS — J06.9 ACUTE URI: ICD-10-CM

## 2022-05-27 PROCEDURE — 99213 OFFICE O/P EST LOW 20 MIN: CPT | Performed by: PHYSICIAN ASSISTANT

## 2022-05-27 PROCEDURE — 77067 SCR MAMMO BI INCL CAD: CPT

## 2022-05-27 PROCEDURE — 77063 BREAST TOMOSYNTHESIS BI: CPT

## 2022-05-27 RX ORDER — AMOXICILLIN AND CLAVULANATE POTASSIUM 875; 125 MG/1; MG/1
1 TABLET, FILM COATED ORAL EVERY 12 HOURS SCHEDULED
Qty: 14 TABLET | Refills: 0 | Status: SHIPPED | OUTPATIENT
Start: 2022-05-27 | End: 2022-06-03

## 2022-05-27 RX ORDER — FLUTICASONE PROPIONATE 50 MCG
1 SPRAY, SUSPENSION (ML) NASAL 2 TIMES DAILY
Qty: 15.8 G | Refills: 0 | Status: SHIPPED | OUTPATIENT
Start: 2022-05-27

## 2022-05-27 RX ORDER — LORATADINE 10 MG/1
10 TABLET ORAL DAILY
Qty: 14 TABLET | Refills: 0 | Status: SHIPPED | OUTPATIENT
Start: 2022-05-27

## 2022-05-27 NOTE — PROGRESS NOTES
Tarsha Ontiveros7 [facility]  Standard Office Visit  Patient ID: Earnest Celis    : 1950  Age/Gender: 66 y o  female     DATE: 2022      Assessment/Plan:    Acute non-recurrent maxillary sinusitis  Patient was COVID positive several weeks ago however has persistent sinus symptoms  On further history sinus symptoms have been for greater than 1 month and refractory to treatment with partial course of amoxicillin  Start Augmentin 875 mg PO BID  Start Claritin 10 mg daily  Start Flonase 1 spray to each nostril twice daily  Ocean nasal spray as needed  Discussed importance of taking course as directed  Take antibiotic with food  Report back if sx worsen or do not fully resolve  Diagnoses and all orders for this visit:    Acute non-recurrent maxillary sinusitis  -     loratadine (CLARITIN) 10 mg tablet; Take 1 tablet (10 mg total) by mouth daily  -     amoxicillin-clavulanate (Augmentin) 875-125 mg per tablet; Take 1 tablet by mouth every 12 (twelve) hours for 7 days    Acute URI  -     loratadine (CLARITIN) 10 mg tablet; Take 1 tablet (10 mg total) by mouth daily  -     amoxicillin-clavulanate (Augmentin) 875-125 mg per tablet; Take 1 tablet by mouth every 12 (twelve) hours for 7 days  -     fluticasone (FLONASE) 50 mcg/act nasal spray; 1 spray into each nostril 2 (two) times a day                Subjective:   Chief Complaint   Patient presents with    Ear Fullness     Bilateral ear pressure and sinus congestion  L ear is worse  Pt  Was COVID positive 22  Earnest Celis is a 66 y o  female who presents to the office on 2022 for     76-year-old female with a history of hypertension, sinusitis and suspected seasonal allergies presents to the office for evaluation of sinus symptoms  Patient reports she was seen and evaluated in an urgent care and was treated for sinusitis in April prior to going to [place] for a trip    She was treated with amoxicillin but reports that she never completed the course and took the medications sporadically  Her sinus symptoms have persisted and have started to worsen  Previously her right ear felt blocked but her left ear was normal and now congestion has moved on to her left ear as well  She has sinus pain and pressure over her forehead as well as her cheek bones  She did travel to [place] and notes that she returned home and developed worsening of cold symptoms  She tested positive for COVID on May 9th  She had a virtual visit and was treated with Paxlovid and the majority of her COVID symptoms resolved  She reports that despite resolution of the majority of her COVID symptoms she continues to have persistent sinus symptoms  Nasal congestion  She has not been using her Flonase  She does have sinus pain and pressure over her cheek bones and forehead  Sinusitis  This is a recurrent problem  The current episode started 1 to 4 weeks ago  The problem has been gradually worsening since onset  There has been no fever  The pain is moderate  Associated symptoms include congestion, ear pain ( bilateral left greater than right) and sinus pressure  Pertinent negatives include no chills, coughing (Resolved since COVID), headaches, neck pain, shortness of breath or sore throat ( improved since COVID)  Past treatments include nothing  The following portions of the patient's history were reviewed and updated as appropriate: allergies, current medications, past family history, past medical history, past social history, past surgical history and problem list     Review of Systems   Constitutional: Negative for chills  HENT: Positive for congestion, ear pain ( bilateral left greater than right) and sinus pressure  Negative for sore throat ( improved since COVID)  Respiratory: Negative for cough (Resolved since COVID) and shortness of breath      Cardiovascular: Negative for chest pain, palpitations and leg swelling  Gastrointestinal: Negative for abdominal pain, diarrhea, nausea and vomiting  Musculoskeletal: Negative for neck pain  Neurological: Negative for dizziness, light-headedness and headaches           Patient Active Problem List   Diagnosis    Tear of medial meniscus of left knee    Abnormal EKG    Acquired hypothyroidism    Chronic pain of left knee    Acute pain of right shoulder    Anxiety    Asthma    Neoplasm of bladder    Abdominal pain    Bursitis of shoulder    Chronic cough    Dental abscess    Hyponatremia    Fibromyositis    Ganglion and cyst of synovium, tendon and bursa    Mixed hyperlipidemia    Essential hypertension    Malignant neoplasm of lateral wall of urinary bladder [facility])    Type 2 diabetes mellitus without complication, with long-term current use of insulin ([facility])    Nontoxic single thyroid nodule    Obesity    Osteoporosis    Primary osteoarthritis of left knee    Other fatigue    Acute non-recurrent frontal sinusitis    Lichenoid dermatitis    SIADH (syndrome of inappropriate ADH production) ([facility])    Irritant contact dermatitis    Uncontrolled diabetes mellitus with complications    Acute non-recurrent maxillary sinusitis       Past Medical History:   Diagnosis Date    Anxiety     Arthritis     Bladder carcinoma ([facility] [address] ) 2016    Bladder mass     Depression     Diabetes mellitus ([facility] [address] [facility]     Disease of thyroid gland     thyroid nodules-not treating at this time    Dysuria     Last assessed 17    GERD (gastroesophageal reflux disease)      2 para 2     HLD (hyperlipidemia)     HTN (hypertension)     Hypercholesterolemia     Hypertension     Knee pain     Lichen sclerosus 175    Melanoma ([facility]     Melanoma ([facility] [facility]     Mild aortic regurgitation with left ventricular dilation by prior echocardiogram     Papanicolaou smear 2017    NEG    PONV (postoperative nausea and vomiting)        Past Surgical History: Procedure Laterality Date    CARPAL TUNNEL RELEASE Right      SECTION      x2    CHOLECYSTECTOMY      CYSTOSCOPY N/A 2016    Procedure: CYSTOSCOPY WITH BIOPSIES;  Surgeon: Jennifer Berger MD;  [facility];  Service:    Ashley Batter N/A 2016    Procedure: CYSTOSCOPY;  Surgeon: Jennifer Berger MD;  [facility];  Service:     CYSTOSCOPY  2020    HERNIA REPAIR      WA CYSTO/URETERO W/LITHOTRIPSY &INDWELL STENT INSRT  2016    Procedure: CYSTOSCOPY URETEROSCOPY WITH LITHOTRIPSY HOLMIUM LASER RIGHT, RETROGRADE PYELOGRAM BILATERAL: AND INSERTION STENT URETERAL Right ;  Surgeon: Jennifer Berger MD;  [facility];  Service: Urology    WA CYSTO/URETERO/PYELOSCOPY, DX Right 2017    Procedure: URETEROSCOPY;  Surgeon: Jennifer Berger MD;  [facility];  Service: Urology    WA CYSTOSCOPY,INSERT URETERAL STENT Left 2016    Procedure: URETERAL STENTS;  Surgeon: Jennifer Berger MD;  [facility];  Service: Urology    WA CYSTOURETHROSCOPY,FULGUR <0 5 CM LESN N/A 2016    Procedure: TRANSURETHRAL RESECTION OF BLADDER TUMOR (TURBT);   Surgeon: Jennifer Berger MD;  [facility];  Service: Urology    WA CYSTOURETHROSCOPY,FULGUR <0 5 CM LESN N/A 2016    Procedure: Toya Fore; TUR BLADDER TUMOR ;  Surgeon: Jennifer Berger MD;  [facility];  Service: Urology    WA CYSTOURETHROSCOPY,FULGUR <0 5 CM LESN N/A 2016    Procedure: TUR BLADDER TUMOR ;  Surgeon: Jennifer Berger MD;  [facility];  Service: Urology    WA CYSTOURETHROSCOPY,FULGUR <0 5 CM LESN Bilateral 2017    Procedure: Vaughn Hora, ;  Surgeon: Jennifer Berger MD;  [facility];  Service: Urology    WA CYSTOURETHROSCOPY,URETER CATHETER Bilateral 2016    Procedure: RETROGRADE PYELOGRAM ;  Surgeon: Jennifer Berger MD;  [facility];  Service: Urology    WA CYSTOURETHROSCOPY,URETER CATHETER Bilateral 5/9/2017    Procedure: RETROGRADE PYELOGRAM WITH STENT EXCHANGE, RIGHT;  Surgeon: Gabriela Daniels MD;  [facility];  Service: Urology    NH INSTILL ANTICANCER AGENT IN BLADDER N/A 11/29/2016    Procedure: Nelson Websternick;  Surgeon: Gabriela Daniels MD;  [facility];  Service: Urology    NH KNEE SCOPE,MED/LAT MENISECTOMY Left 4/4/2016    Procedure: KNEE ARTHROSCOPIC PARTIAL MEDIAL MENISCECTOMY ;  Surgeon: Chaz Mclean MD;  [facility];  Service: Orthopedics    REMOVAL URETERAL STENT Left 11/29/2016    Procedure: REMOVAL STENT URETERAL;  Surgeon: Gabriela Daniels MD;  [facility];  Service:     SKIN CANCER EXCISION  2021    right arm    TONSILLECTOMY           Current Outpatient Medications:     acetaminophen (TYLENOL) 325 mg tablet, Take 650 mg by mouth 4 (four) times a day as needed for mild pain, Disp: , Rfl:     amLODIPine (NORVASC) 5 mg tablet, Take 1 tablet (5 mg total) by mouth daily, Disp: 30 tablet, Rfl: 5    amoxicillin-clavulanate (Augmentin) 875-125 mg per tablet, Take 1 tablet by mouth every 12 (twelve) hours for 7 days, Disp: 14 tablet, Rfl: 0    clonazePAM (KlonoPIN) 0 5 mg tablet, Take 1 tablet (0 5 mg total) by mouth daily at bedtime, Disp: 30 tablet, Rfl: 3    Cyanocobalamin 1000 MCG CAPS, Take 1 capsule by mouth daily  , Disp: , Rfl:     famotidine (PEPCID) 20 mg tablet, Take 1 tablet (20 mg total) by mouth daily (Patient taking differently: Take 20 mg by mouth daily As needed), Disp: 90 tablet, Rfl: 0    fenofibrate (TRICOR) 145 mg tablet, Take 1 tablet (145 mg total) by mouth daily, Disp: 90 tablet, Rfl: 1    fluticasone (FLONASE) 50 mcg/act nasal spray, 1 spray into each nostril 2 (two) times a day, Disp: 15 8 g, Rfl: 0    insulin glargine (Lantus SoloStar) 100 units/mL injection pen, Inject 33 Units under the skin daily, Disp: 10 mL, Rfl: 3    Insulin Pen Needle 32G X 4 MM MISC, Use in the morning, Disp: 100 each, Rfl: 3    levothyroxine 50 mcg tablet, Take 1 tablet (50 mcg total) by mouth daily, Disp: 90 tablet, Rfl: 1    loratadine (CLARITIN) 10 mg tablet, Take 1 tablet (10 mg total) by mouth daily, Disp: 14 tablet, Rfl: 0    metFORMIN (GLUCOPHAGE) 500 mg tablet, Take 2 tablets (1,000 mg total) by mouth 2 (two) times a day with meals, Disp: 360 tablet, Rfl: 1    metoprolol tartrate (LOPRESSOR) 25 mg tablet, Take 1 tablet (25 mg total) by mouth every 12 (twelve) hours, Disp: 180 tablet, Rfl: 1    mometasone (ELOCON) 0 1 % cream, Apply topically once a week, Disp: 45 g, Rfl: 4    ONETOUCH VERIO test strip, Test 3 times daily, Disp: 300 each, Rfl: 1    Probiotic Product (PROBIOTIC-10) CAPS, Take 1 capsule by mouth daily, Disp: , Rfl:     sitaGLIPtin (Januvia) 50 mg tablet, Take 1 tablet (50 mg total) by mouth daily, Disp: 90 tablet, Rfl: 1    zolpidem (AMBIEN) 10 mg tablet, Take 1 tablet (10 mg total) by mouth daily at bedtime, Disp: 30 tablet, Rfl: 0    Allergies   Allergen Reactions    Nickel Swelling     Swelling, irritation, lumps to ears       Social History     Socioeconomic History    Marital status: /Civil Union     Spouse name: None    Number of children: 2    Years of education: None    Highest education level: None   Occupational History    Occupation: RETIRED   Tobacco Use    Smoking status: Former Smoker     Packs/day: 1 00     Years: 20 00     Pack years: 20 00     Types: Cigarettes     Quit date:      Years since quittin 4    Smokeless tobacco: Never Used   Vaping Use    Vaping Use: Never used   Substance and Sexual Activity    Alcohol use: No    Drug use: No    Sexual activity: Not Currently     Partners: Male     Birth control/protection: Post-menopausal   Other Topics Concern    None   Social History Narrative    None     Social Determinants of Health     Financial Resource Strain: Not on file   Food Insecurity: Not on file   Transportation Needs: Not on file   Physical Activity: Not on file Stress: Not on file   Social Connections: Not on file   Intimate Partner Violence: Not on file   Housing Stability: Not on file       Family History   Problem Relation Age of Onset    Heart attack Mother     Heart disease Mother     ALS Father     Diabetes Maternal Grandfather     Lung cancer Paternal Grandfather     Heart disease Maternal Grandmother     No Known Problems Daughter     No Known Problems Daughter     No Known Problems Paternal Aunt     No Known Problems Paternal Aunt     No Known Problems Paternal Aunt     No Known Problems Paternal Aunt     No Known Problems Paternal Aunt     Breast cancer Neg Hx        PHQ-2/9 Depression Screening           Health Maintenance   Topic Date Due    Osteoporosis Screening  Never done    DTaP,Tdap,and Td Vaccines (1 - Tdap) 12/14/2010    Breast Cancer Screening: Mammogram  08/03/2021    HEMOGLOBIN A1C  11/02/2021    URINE MICROALBUMIN  03/10/2022    COVID-19 Vaccine (4 - Booster for Moderna series) 03/17/2022    Diabetic Foot Exam  08/05/2022    Fall Risk  03/24/2023    Depression Screening  03/24/2023    Medicare Annual Wellness Visit (AWV)  03/24/2023    BMI: Followup Plan  03/24/2023    BMI: Adult  05/27/2023    DM Eye Exam  03/25/2024    Colorectal Cancer Screening  02/27/2028    Hepatitis C Screening  Completed    Pneumococcal Vaccine: 65+ Years  Completed    Influenza Vaccine  Completed    HIB Vaccine  Aged Out    Hepatitis B Vaccine  Aged Out    IPV Vaccine  Aged Out    Hepatitis A Vaccine  Aged Out    Meningococcal ACWY Vaccine  Aged Out    HPV Vaccine  Aged Out       Immunization History   Administered Date(s) Administered    COVID-19 MODERNA VACC 0 5 ML IM 02/04/2021, 03/04/2021, 11/17/2021    INFLUENZA 01/10/2018, 11/10/2019, 10/09/2020, 12/16/2021    Influenza Split High Dose Preservative Free IM 01/10/2018, 10/29/2019, 10/09/2020    Influenza, high dose seasonal 0 7 mL 10/09/2020, 12/16/2021    Influenza, seasonal, injectable 12/13/2010, 12/13/2011, 12/09/2013, 11/10/2019    Pneumococcal Conjugate 13-Valent 01/14/2020    Pneumococcal Polysaccharide PPV23 11/17/2015    Td (adult), adsorbed 12/13/2010    Zoster 08/01/2013        Objective:  Vitals:    05/27/22 1157   BP: 142/80   BP Location: Left arm   Patient Position: Sitting   Cuff Size: Standard   Pulse: (!) 54   Temp: 97 6 °F (36 4 °C)   TempSrc: Temporal   SpO2: 97%   Weight: 69 8 kg (153 lb 12 8 oz)   Height: 5' 1" (1 549 m)     Wt Readings from Last 3 Encounters:   05/27/22 69 8 kg (153 lb 12 8 oz)   05/27/22 69 9 kg (154 lb 1 6 oz)   04/08/22 69 9 kg (154 lb)     Body mass index is 29 06 kg/m²  No exam data present       Physical Exam  Vitals and nursing note reviewed  Constitutional:       General: She is not in acute distress  Appearance: She is well-developed  She is not diaphoretic  Comments: Alert pleasant cooperative  Seated in room in no acute distress   HENT:      Head: Normocephalic and atraumatic  Right Ear: Ear canal and external ear normal  There is no impacted cerumen  No mastoid tenderness  Tympanic membrane is injected and erythematous  Tympanic membrane is not perforated  Left Ear: Ear canal and external ear normal  There is no impacted cerumen  No mastoid tenderness  Tympanic membrane is injected and erythematous  Tympanic membrane is not perforated  Ears:        Nose:      Right Nostril: No epistaxis  Left Nostril: No epistaxis  Right Turbinates: Swollen  Left Turbinates: Swollen  Right Sinus: Maxillary sinus tenderness and frontal sinus tenderness present  Left Sinus: Maxillary sinus tenderness and frontal sinus tenderness present  Mouth/Throat:      Mouth: Mucous membranes are moist       Pharynx: No oropharyngeal exudate or posterior oropharyngeal erythema  Comments: No postnasal drip  No erythema or exudates  Uvula midline  Eyes:      General: No scleral icterus  Right eye: No discharge  Left eye: No discharge  Pupils: Pupils are equal, round, and reactive to light  Cardiovascular:      Rate and Rhythm: Normal rate and regular rhythm  Heart sounds: Normal heart sounds  No murmur heard  Pulmonary:      Effort: Pulmonary effort is normal  No respiratory distress  Breath sounds: Normal breath sounds  No wheezing or rales  Abdominal:      General: Bowel sounds are normal  There is no distension  Palpations: Abdomen is soft  Tenderness: There is no abdominal tenderness  There is no guarding  Musculoskeletal:      Cervical back: Neck supple  Lymphadenopathy:      Cervical: No cervical adenopathy  Skin:     General: Skin is warm and dry  Findings: No rash  Neurological:      Mental Status: She is alert and oriented to person, place, and time  Psychiatric:         Behavior: Behavior normal          Thought Content: Thought content normal              Future Appointments   Date Time Provider Cinda Pantoja   7/27/2022  8:30 AM Kaushal Erazo PA-C [facility]   8/19/2022  9:30 AM Marizol Lancaster MD [facility]   11/14/2022 11:00 AM Mana Michaud MD [facility] [place]  [address] [facility]    Patient Care Team:  Mai Rosen MD as PCP - General  MD Nusrat Albrecht MD Lennice Ready, MD    This note was completed in part utilizing M-Modal Fluency Direct Software  Grammatical errors, random word insertions, spelling mistakes, and incomplete sentences can be an occasional consequence of this system secondary to software limitations, ambient noise, and hardware issues  If you have any questions or concerns about the content, text, or information contained within the body of this dictation, please contact the provider for clarification

## 2022-05-27 NOTE — PATIENT INSTRUCTIONS
Acute non-recurrent maxillary sinusitis  Patient was COVID positive several weeks ago however has persistent sinus symptoms  On further history sinus symptoms have been for greater than 1 month and refractory to treatment with partial course of amoxicillin  Start Augmentin 875 mg PO BID  Start Claritin 10 mg daily  Start Flonase 1 spray to each nostril twice daily  Ocean nasal spray as needed  Discussed importance of taking course as directed  Take antibiotic with food  Report back if sx worsen or do not fully resolve

## 2022-05-27 NOTE — ASSESSMENT & PLAN NOTE
Patient was COVID positive several weeks ago however has persistent sinus symptoms  On further history sinus symptoms have been for greater than 1 month and refractory to treatment with partial course of amoxicillin  Start Augmentin 875 mg PO BID  Start Claritin 10 mg daily  Start Flonase 1 spray to each nostril twice daily  Ocean nasal spray as needed  Discussed importance of taking course as directed  Take antibiotic with food  Report back if sx worsen or do not fully resolve

## 2022-06-17 DIAGNOSIS — F41.8 DEPRESSION WITH ANXIETY: ICD-10-CM

## 2022-06-17 RX ORDER — ZOLPIDEM TARTRATE 10 MG/1
10 TABLET ORAL
Qty: 30 TABLET | Refills: 0 | Status: SHIPPED | OUTPATIENT
Start: 2022-06-17 | End: 2022-07-21 | Stop reason: SDUPTHER

## 2022-06-17 NOTE — TELEPHONE ENCOUNTER
PROVIDERS: PLEASE ADDRESS SINCE [address] IS NOT IN THE OFFICE            NOV 07/27/2022  LAST ASSESSED 03/10/2021

## 2022-06-26 ENCOUNTER — HOSPITAL ENCOUNTER (OUTPATIENT)
Dept: RADIOLOGY | Facility: MEDICAL CENTER | Age: 72
Discharge: HOME | End: 2022-06-26
Payer: MEDICARE

## 2022-06-26 DIAGNOSIS — Z13.820 SCREENING FOR OSTEOPOROSIS: ICD-10-CM

## 2022-06-26 DIAGNOSIS — M81.0 AGE-RELATED OSTEOPOROSIS WITHOUT CURRENT PATHOLOGICAL FRACTURE: ICD-10-CM

## 2022-06-26 PROCEDURE — 77080 DXA BONE DENSITY AXIAL: CPT

## 2022-06-29 ENCOUNTER — OFFICE VISIT (OUTPATIENT)
Dept: OBGYN CLINIC | Facility: CLINIC | Age: 72
End: 2022-06-29
Payer: MEDICARE

## 2022-06-29 VITALS
BODY MASS INDEX: 28.32 KG/M2 | HEIGHT: 61 IN | DIASTOLIC BLOOD PRESSURE: 80 MMHG | SYSTOLIC BLOOD PRESSURE: 142 MMHG | WEIGHT: 150 LBS

## 2022-06-29 DIAGNOSIS — E11.9 TYPE 2 DIABETES MELLITUS WITHOUT COMPLICATION, WITH LONG-TERM CURRENT USE OF INSULIN (HCC): ICD-10-CM

## 2022-06-29 DIAGNOSIS — Q52.5 LABIAL FUSION: ICD-10-CM

## 2022-06-29 DIAGNOSIS — Z85.51 HISTORY OF BLADDER CANCER: ICD-10-CM

## 2022-06-29 DIAGNOSIS — N95.2 VAGINAL ATROPHY: Primary | ICD-10-CM

## 2022-06-29 DIAGNOSIS — R35.0 URINARY FREQUENCY: ICD-10-CM

## 2022-06-29 DIAGNOSIS — Z79.4 TYPE 2 DIABETES MELLITUS WITHOUT COMPLICATION, WITH LONG-TERM CURRENT USE OF INSULIN (HCC): ICD-10-CM

## 2022-06-29 LAB
SL AMB  POCT GLUCOSE, UA: NEGATIVE
SL AMB LEUKOCYTE ESTERASE,UA: ABNORMAL
SL AMB POCT BILIRUBIN,UA: NEGATIVE
SL AMB POCT BLOOD,UA: NEGATIVE
SL AMB POCT CLARITY,UA: ABNORMAL
SL AMB POCT COLOR,UA: YELLOW
SL AMB POCT KETONES,UA: NEGATIVE
SL AMB POCT NITRITE,UA: NEGATIVE
SL AMB POCT PH,UA: 5
SL AMB POCT SPECIFIC GRAVITY,UA: 1000
SL AMB POCT URINE PROTEIN: NEGATIVE
SL AMB POCT UROBILINOGEN: NORMAL

## 2022-06-29 PROCEDURE — 99213 OFFICE O/P EST LOW 20 MIN: CPT | Performed by: OBSTETRICS & GYNECOLOGY

## 2022-06-29 PROCEDURE — 87086 URINE CULTURE/COLONY COUNT: CPT | Performed by: OBSTETRICS & GYNECOLOGY

## 2022-06-29 PROCEDURE — 87077 CULTURE AEROBIC IDENTIFY: CPT | Performed by: OBSTETRICS & GYNECOLOGY

## 2022-06-29 PROCEDURE — 87186 SC STD MICRODIL/AGAR DIL: CPT | Performed by: OBSTETRICS & GYNECOLOGY

## 2022-06-29 RX ORDER — INSULIN GLARGINE 100 [IU]/ML
33 INJECTION, SOLUTION SUBCUTANEOUS DAILY
Qty: 10 ML | Refills: 3 | Status: SHIPPED | OUTPATIENT
Start: 2022-06-29

## 2022-06-29 NOTE — PROGRESS NOTES
Patient presents to the office complaining of labial fusion  She was seen in November of 2021 where she was diagnosed  Her labia was  manually and was instructed to use estrogen vaginal cream twice a week to the area  She underwent a new replacement December 2021 and stopped using the cream during her recovery  She states that she noticed soreness yesterday in the area of the labia when she urinated  Denies any vaginal bleeding  Physical exam:  External genitalia atrophic  The upper labia was fused  The vagina was clear  Her lichen sclerosis appeared stable  Her labia was manually  without difficulty  Estrogen cream was placed over the site  Impression:  Recurrent mild labial fusion  Lichen sclerosis (stable)    Plan:  Continue steroid cream for lichen sclerosis once a week  Check UA C&S  Use estrogen cream to the labial fused area twice a week    Return to November 2022 for annual exam

## 2022-07-02 LAB — BACTERIA UR CULT: ABNORMAL

## 2022-07-05 DIAGNOSIS — I10 ESSENTIAL HYPERTENSION: ICD-10-CM

## 2022-07-05 RX ORDER — AMLODIPINE BESYLATE 5 MG/1
5 TABLET ORAL DAILY
Qty: 30 TABLET | Refills: 5 | Status: SHIPPED | OUTPATIENT
Start: 2022-07-05 | End: 2022-08-22 | Stop reason: SDUPTHER

## 2022-07-08 DIAGNOSIS — A49.8 E COLI INFECTION: Primary | ICD-10-CM

## 2022-07-08 RX ORDER — CIPROFLOXACIN 250 MG/1
250 TABLET, FILM COATED ORAL EVERY 12 HOURS SCHEDULED
Qty: 14 TABLET | Refills: 0 | Status: SHIPPED | OUTPATIENT
Start: 2022-07-08 | End: 2022-07-15

## 2022-07-08 NOTE — TELEPHONE ENCOUNTER
I advised patient of urine culture results  Please sign off on cipro  Patient says she is having burning around the area of her labial fusion, but she says she didn't know to use the estrogen cream  Patient advised to use estrogen cream twice weekly

## 2022-07-19 DIAGNOSIS — R00.0 TACHYCARDIA: ICD-10-CM

## 2022-07-19 DIAGNOSIS — I10 ESSENTIAL HYPERTENSION: ICD-10-CM

## 2022-07-21 ENCOUNTER — TELEPHONE (OUTPATIENT)
Dept: INTERNAL MEDICINE CLINIC | Age: 72
End: 2022-07-21

## 2022-07-21 DIAGNOSIS — F41.8 DEPRESSION WITH ANXIETY: ICD-10-CM

## 2022-07-21 RX ORDER — ZOLPIDEM TARTRATE 10 MG/1
10 TABLET ORAL
Qty: 30 TABLET | Refills: 0 | Status: SHIPPED | OUTPATIENT
Start: 2022-07-21 | End: 2022-08-22 | Stop reason: SDUPTHER

## 2022-07-21 NOTE — TELEPHONE ENCOUNTER
Patient is requesting the Zolpidem 10 mg once a day tablet and will need this ASAP because of leaving on a long vacation    [facility] [place]

## 2022-07-26 ENCOUNTER — APPOINTMENT (OUTPATIENT)
Dept: LAB | Facility: MEDICAL CENTER | Age: 72
End: 2022-07-26
Payer: MEDICARE

## 2022-07-26 DIAGNOSIS — Z79.4 TYPE 2 DIABETES MELLITUS WITHOUT COMPLICATION, WITH LONG-TERM CURRENT USE OF INSULIN (HCC): ICD-10-CM

## 2022-07-26 DIAGNOSIS — E11.9 TYPE 2 DIABETES MELLITUS WITHOUT COMPLICATION, WITH LONG-TERM CURRENT USE OF INSULIN (HCC): ICD-10-CM

## 2022-07-26 DIAGNOSIS — F41.8 DEPRESSION WITH ANXIETY: ICD-10-CM

## 2022-07-26 LAB
ALBUMIN SERPL BCP-MCNC: 4.3 G/DL (ref 3.5–5)
ALP SERPL-CCNC: 45 U/L (ref 46–116)
ALT SERPL W P-5'-P-CCNC: 35 U/L (ref 12–78)
ANION GAP SERPL CALCULATED.3IONS-SCNC: 3 MMOL/L (ref 4–13)
AST SERPL W P-5'-P-CCNC: 32 U/L (ref 5–45)
BASOPHILS # BLD AUTO: 0.05 THOUSANDS/ΜL (ref 0–0.1)
BASOPHILS NFR BLD AUTO: 1 % (ref 0–1)
BILIRUB SERPL-MCNC: 0.43 MG/DL (ref 0.2–1)
BUN SERPL-MCNC: 23 MG/DL (ref 5–25)
CALCIUM SERPL-MCNC: 10.4 MG/DL (ref 8.3–10.1)
CHLORIDE SERPL-SCNC: 107 MMOL/L (ref 96–108)
CO2 SERPL-SCNC: 27 MMOL/L (ref 21–32)
CREAT SERPL-MCNC: 0.89 MG/DL (ref 0.6–1.3)
CREAT UR-MCNC: 133 MG/DL
EOSINOPHIL # BLD AUTO: 0.19 THOUSAND/ΜL (ref 0–0.61)
EOSINOPHIL NFR BLD AUTO: 3 % (ref 0–6)
ERYTHROCYTE [DISTWIDTH] IN BLOOD BY AUTOMATED COUNT: 13.2 % (ref 11.6–15.1)
GFR SERPL CREATININE-BSD FRML MDRD: 64 ML/MIN/1.73SQ M
GLUCOSE P FAST SERPL-MCNC: 117 MG/DL (ref 65–99)
HCT VFR BLD AUTO: 42.6 % (ref 34.8–46.1)
HGB BLD-MCNC: 13.2 G/DL (ref 11.5–15.4)
IMM GRANULOCYTES # BLD AUTO: 0.02 THOUSAND/UL (ref 0–0.2)
IMM GRANULOCYTES NFR BLD AUTO: 0 % (ref 0–2)
LYMPHOCYTES # BLD AUTO: 1.57 THOUSANDS/ΜL (ref 0.6–4.47)
LYMPHOCYTES NFR BLD AUTO: 25 % (ref 14–44)
MCH RBC QN AUTO: 28.9 PG (ref 26.8–34.3)
MCHC RBC AUTO-ENTMCNC: 31 G/DL (ref 31.4–37.4)
MCV RBC AUTO: 93 FL (ref 82–98)
MICROALBUMIN UR-MCNC: 31.2 MG/L (ref 0–20)
MICROALBUMIN/CREAT 24H UR: 23 MG/G CREATININE (ref 0–30)
MONOCYTES # BLD AUTO: 0.5 THOUSAND/ΜL (ref 0.17–1.22)
MONOCYTES NFR BLD AUTO: 8 % (ref 4–12)
NEUTROPHILS # BLD AUTO: 3.85 THOUSANDS/ΜL (ref 1.85–7.62)
NEUTS SEG NFR BLD AUTO: 63 % (ref 43–75)
NRBC BLD AUTO-RTO: 0 /100 WBCS
PLATELET # BLD AUTO: 223 THOUSANDS/UL (ref 149–390)
PMV BLD AUTO: 10.7 FL (ref 8.9–12.7)
POTASSIUM SERPL-SCNC: 5 MMOL/L (ref 3.5–5.3)
PROT SERPL-MCNC: 7.8 G/DL (ref 6.4–8.4)
RBC # BLD AUTO: 4.56 MILLION/UL (ref 3.81–5.12)
SODIUM SERPL-SCNC: 137 MMOL/L (ref 135–147)
WBC # BLD AUTO: 6.18 THOUSAND/UL (ref 4.31–10.16)

## 2022-07-26 PROCEDURE — 85025 COMPLETE CBC W/AUTO DIFF WBC: CPT

## 2022-07-26 PROCEDURE — 80053 COMPREHEN METABOLIC PANEL: CPT

## 2022-07-26 PROCEDURE — 82043 UR ALBUMIN QUANTITATIVE: CPT | Performed by: PHYSICIAN ASSISTANT

## 2022-07-26 PROCEDURE — 82570 ASSAY OF URINE CREATININE: CPT | Performed by: PHYSICIAN ASSISTANT

## 2022-07-26 PROCEDURE — 83036 HEMOGLOBIN GLYCOSYLATED A1C: CPT

## 2022-07-26 PROCEDURE — 36415 COLL VENOUS BLD VENIPUNCTURE: CPT

## 2022-07-27 ENCOUNTER — OFFICE VISIT (OUTPATIENT)
Dept: INTERNAL MEDICINE CLINIC | Age: 72
End: 2022-07-27
Payer: MEDICARE

## 2022-07-27 VITALS
HEART RATE: 74 BPM | HEIGHT: 61 IN | BODY MASS INDEX: 28.7 KG/M2 | WEIGHT: 152 LBS | SYSTOLIC BLOOD PRESSURE: 132 MMHG | DIASTOLIC BLOOD PRESSURE: 80 MMHG | TEMPERATURE: 97.5 F | OXYGEN SATURATION: 97 %

## 2022-07-27 DIAGNOSIS — E03.9 ACQUIRED HYPOTHYROIDISM: ICD-10-CM

## 2022-07-27 DIAGNOSIS — E78.00 HYPERCHOLESTEROLEMIA: ICD-10-CM

## 2022-07-27 DIAGNOSIS — I10 ESSENTIAL HYPERTENSION: Primary | ICD-10-CM

## 2022-07-27 DIAGNOSIS — E11.9 TYPE 2 DIABETES MELLITUS WITHOUT COMPLICATION, WITHOUT LONG-TERM CURRENT USE OF INSULIN (HCC): ICD-10-CM

## 2022-07-27 LAB
EST. AVERAGE GLUCOSE BLD GHB EST-MCNC: 128 MG/DL
HBA1C MFR BLD: 6.1 %

## 2022-07-27 PROCEDURE — 99214 OFFICE O/P EST MOD 30 MIN: CPT | Performed by: PHYSICIAN ASSISTANT

## 2022-07-27 RX ORDER — LOSARTAN POTASSIUM 50 MG/1
50 TABLET ORAL DAILY
Qty: 90 TABLET | Refills: 1 | Status: SHIPPED | OUTPATIENT
Start: 2022-07-27 | End: 2022-10-07 | Stop reason: SDUPTHER

## 2022-07-27 RX ORDER — FENOFIBRATE 145 MG/1
145 TABLET, COATED ORAL DAILY
Qty: 90 TABLET | Refills: 1 | Status: SHIPPED | OUTPATIENT
Start: 2022-07-27

## 2022-07-27 RX ORDER — SIMVASTATIN 20 MG
20 TABLET ORAL
Qty: 90 TABLET | Refills: 1
Start: 2022-07-27

## 2022-07-27 NOTE — PROGRESS NOTES
Assessment/Plan:         Diagnoses and all orders for this visit:    Essential hypertension  Comments:  +losartan   +urine microalb  f u labs in 3-4 weeks  Orders:  -     losartan (COZAAR) 50 mg tablet; Take 1 tablet (50 mg total) by mouth daily  -     Comprehensive metabolic panel; Future  -     Lipid panel; Future    Hypercholesterolemia  Comments:  low chol diet  continue statin, tolerating well     Orders:  -     fenofibrate (TRICOR) 145 mg tablet; Take 1 tablet (145 mg total) by mouth daily  -     simvastatin (ZOCOR) 20 mg tablet; Take 1 tablet (20 mg total) by mouth daily at bedtime  -     Comprehensive metabolic panel; Future  -     Lipid panel; Future    Type 2 diabetes mellitus without complication, without long-term current use of insulin (HCC)  Comments:  discussed diet and exercise  continue [place] and [place]  +arb    Orders:  -     sitaGLIPtin (Januvia) 50 mg tablet; Take 1 tablet (50 mg total) by mouth daily  -     simvastatin (ZOCOR) 20 mg tablet; Take 1 tablet (20 mg total) by mouth daily at bedtime  -     losartan (COZAAR) 50 mg tablet; Take 1 tablet (50 mg total) by mouth daily  -     Comprehensive metabolic panel; Future  -     Lipid panel; Future    Acquired hypothyroidism  Comments:  continue levothyroxine  Orders:  -     Comprehensive metabolic panel; Future  -     Lipid panel; Future          Subjective:      Patient ID: Candida Hay is a 66 y o  female      65 y/o female with hx of dm, htn, hypothyroidism, anxiety d/o, osteoporosis presents for f/u  Pt had covid in may and reports she tolerated paxlovid well  Her ear was painful and "full' for 3 months following this, now resolved and hearing normal  Pt was following with ent for this    Labs reviewed although hga1c pending  fbs 117  Urine +microalb    Pt with insomnia, reviewed pdmp      The following portions of the patient's history were reviewed and updated as appropriate: allergies, current medications, past family history, past medical history, past social history, past surgical history and problem list     Review of Systems   Constitutional: Negative for activity change, appetite change, chills, diaphoresis, fatigue and fever  HENT: Negative for congestion and sore throat  Eyes: Negative for redness  Respiratory: Negative for cough and shortness of breath  Cardiovascular: Negative for chest pain and leg swelling  Gastrointestinal: Negative for abdominal pain, constipation, diarrhea and nausea  Musculoskeletal: Negative for arthralgias, back pain and gait problem  Skin: Negative for rash  Neurological: Negative for dizziness, light-headedness, numbness and headaches  Psychiatric/Behavioral: Negative for sleep disturbance  The patient is not nervous/anxious            Past Medical History:   Diagnosis Date    Anxiety     Arthritis     Bladder [place] [address] ) 2016    Bladder mass     Depression     Diabetes mellitus ([place] [address] )     Disease of thyroid gland     thyroid nodules-not treating at this time    Dysuria     Last assessed 17    GERD (gastroesophageal reflux disease)      2 para 2     HL (hearing loss)     HLD (hyperlipidemia)     HTN (hypertension)     Hypercholesterolemia     Hypertension     Knee pain     Lichen sclerosus 3214    Melanoma ([place] [address] )     Melanoma ([place] [address] )     Mild aortic regurgitation with left ventricular dilation by prior echocardiogram     Papanicolaou smear 2017    NEG    PONV (postoperative nausea and vomiting)     Tinnitus          Current Outpatient Medications:     acetaminophen (TYLENOL) 325 mg tablet, Take 650 mg by mouth 4 (four) times a day as needed for mild pain, Disp: , Rfl:     amLODIPine (NORVASC) 5 mg tablet, Take 1 tablet (5 mg total) by mouth daily, Disp: 30 tablet, Rfl: 5    clonazePAM (KlonoPIN) 0 5 mg tablet, Take 1 tablet (0 5 mg total) by mouth daily at bedtime, Disp: 30 tablet, Rfl: 3    famotidine (PEPCID) 20 mg tablet, Take 1 tablet (20 mg total) by mouth daily (Patient taking differently: Take 20 mg by mouth daily As needed), Disp: 90 tablet, Rfl: 0    fenofibrate (TRICOR) 145 mg tablet, Take 1 tablet (145 mg total) by mouth daily, Disp: 90 tablet, Rfl: 1    fluticasone (FLONASE) 50 mcg/act nasal spray, 1 spray into each nostril 2 (two) times a day (Patient taking differently: 1 spray into each nostril if needed), Disp: 15 8 g, Rfl: 0    insulin glargine (Lantus SoloStar) 100 units/mL injection pen, Inject 33 Units under the skin daily, Disp: 10 mL, Rfl: 3    Insulin Pen Needle 32G X 4 MM MISC, Use in the morning, Disp: 100 each, Rfl: 3    levothyroxine 50 mcg tablet, Take 1 tablet (50 mcg total) by mouth daily, Disp: 90 tablet, Rfl: 1    loratadine (CLARITIN) 10 mg tablet, Take 1 tablet (10 mg total) by mouth daily, Disp: 14 tablet, Rfl: 0    losartan (COZAAR) 50 mg tablet, Take 1 tablet (50 mg total) by mouth daily, Disp: 90 tablet, Rfl: 1    metFORMIN (GLUCOPHAGE) 500 mg tablet, Take 2 tablets (1,000 mg total) by mouth 2 (two) times a day with meals, Disp: 360 tablet, Rfl: 1    metoprolol tartrate (LOPRESSOR) 25 mg tablet, Take 1 tablet (25 mg total) by mouth every 12 (twelve) hours, Disp: 180 tablet, Rfl: 1    mometasone (ELOCON) 0 1 % cream, Apply topically once a week, Disp: 45 g, Rfl: 4    ONETOUCH VERIO test strip, Test 3 times daily, Disp: 300 each, Rfl: 1    Probiotic Product (PROBIOTIC-10) CAPS, Take 1 capsule by mouth daily, Disp: , Rfl:     simvastatin (ZOCOR) 20 mg tablet, Take 1 tablet (20 mg total) by mouth daily at bedtime, Disp: 90 tablet, Rfl: 1    sitaGLIPtin (Januvia) 50 mg tablet, Take 1 tablet (50 mg total) by mouth daily, Disp: 90 tablet, Rfl: 1    zolpidem (AMBIEN) 10 mg tablet, Take 1 tablet (10 mg total) by mouth daily at bedtime, Disp: 30 tablet, Rfl: 0    Cyanocobalamin 1000 MCG CAPS, Take 1 capsule by mouth daily   (Patient not taking: Reported on 7/27/2022), Disp: , Rfl:     Allergies   Allergen Reactions    Nickel Swelling     Swelling, irritation, lumps to ears       Social History   Past Surgical History:   Procedure Laterality Date    CARPAL TUNNEL RELEASE Right      SECTION      x2    CHOLECYSTECTOMY      CYSTOSCOPY N/A 2016    Procedure: CYSTOSCOPY WITH BIOPSIES;  Surgeon: Paty Smith MD;  [facility];  Service:    Tammy Peace N/A 2016    Procedure: Vishnu Johnson;  Surgeon: Paty Smith MD;  [facility];  Service:     CYSTOSCOPY  2020    HERNIA REPAIR      MS CYSTO/URETERO W/LITHOTRIPSY &INDWELL STENT INSRT  2016    Procedure: CYSTOSCOPY URETEROSCOPY WITH LITHOTRIPSY HOLMIUM LASER RIGHT, RETROGRADE PYELOGRAM BILATERAL: AND INSERTION STENT URETERAL Right ;  Surgeon: Paty Smith MD;  [facility];  Service: Urology    MS CYSTO/URETERO/PYELOSCOPY, DX Right 2017    Procedure: URETEROSCOPY;  Surgeon: Paty Smith MD;  [facility];  Service: Urology    MS CYSTOSCOPY,INSERT URETERAL STENT Left 2016    Procedure: URETERAL STENTS;  Surgeon: Paty Smith MD;  [facility];  Service: Urology    MS CYSTOURETHROSCOPY,FULGUR <0 5 CM LESN N/A 2016    Procedure: TRANSURETHRAL RESECTION OF BLADDER TUMOR (TURBT);   Surgeon: Paty Smith MD;  [facility];  Service: Urology    MS CYSTOURETHROSCOPY,FULGUR <0 5 CM LESN N/A 2016    Procedure: Vishnu Johnson; TUR BLADDER TUMOR ;  Surgeon: Paty Smith MD;  [facility];  Service: Urology    MS CYSTOURETHROSCOPY,FULGUR <0 5 CM LESN N/A 2016    Procedure: TUR BLADDER TUMOR ;  Surgeon: Paty Smith MD;  [facility];  Service: Urology    MS CYSTOURETHROSCOPY,FULGUR <0 5 CM LESN Bilateral 2017    Procedure: Lisseth Ayala, ;  Surgeon: Paty Smith MD;  [facility];  Service: Urology    MS CYSTOURETHROSCOPY,URETER CATHETER Bilateral 2016    Procedure: Lorie Noe ;  Surgeon: Grupo Washburn MD;  [facility];  Service: Urology    WI CYSTOURETHROSCOPY,URETER CATHETER Bilateral 05/09/2017    Procedure: RETROGRADE PYELOGRAM WITH STENT EXCHANGE, RIGHT;  Surgeon: Grupo Washburn MD;  [facility];  Service: Urology    WI INSTILL ANTICANCER AGENT IN BLADDER N/A 11/29/2016    Procedure: Outlook Medal;  Surgeon: Grupo Washburn MD;  [facility];  Service: Urology    WI KNEE SCOPE,MED/LAT MENISECTOMY Left 04/04/2016    Procedure: KNEE ARTHROSCOPIC PARTIAL MEDIAL MENISCECTOMY ;  Surgeon: Clara Kennedy MD;  [facility];  Service: Orthopedics    REMOVAL URETERAL STENT Left 11/29/2016    Procedure: REMOVAL STENT URETERAL;  Surgeon: Grupo Washburn MD;  [facility];  Service:     REPLACEMENT TOTAL KNEE Left 12/2021    SKIN CANCER EXCISION  2021    right arm    TONSILLECTOMY       Family History   Problem Relation Age of Onset    Heart attack Mother     Heart disease Mother     ALS Father     Diabetes Maternal Grandfather     Lung cancer Paternal Grandfather     Heart disease Maternal Grandmother     No Known Problems Daughter     No Known Problems Daughter     No Known Problems Paternal Aunt     No Known Problems Paternal Aunt     No Known Problems Paternal Aunt     No Known Problems Paternal Aunt     No Known Problems Paternal Aunt     Breast cancer Neg Hx        Objective:  /80 (BP Location: Left arm, Patient Position: Sitting, Cuff Size: Standard)   Pulse 74   Temp 97 5 °F (36 4 °C) (Temporal)   Ht 5' 1" (1 549 m)   Wt 68 9 kg (152 lb)   LMP  (LMP Unknown)   SpO2 97% Comment: room air  BMI 28 72 kg/m²        Physical Exam  Vitals reviewed  Constitutional:       General: She is not in acute distress  HENT:      Head: Normocephalic and atraumatic        Right Ear: Tympanic membrane, ear canal and external ear normal       Left Ear: Tympanic membrane, ear canal and external ear normal    Eyes:      General: Right eye: No discharge  Left eye: No discharge  Extraocular Movements: Extraocular movements intact  Conjunctiva/sclera: Conjunctivae normal       Pupils: Pupils are equal, round, and reactive to light  Cardiovascular:      Rate and Rhythm: Normal rate and regular rhythm  Pulmonary:      Effort: Pulmonary effort is normal  No respiratory distress  Breath sounds: Normal breath sounds  No wheezing or rales  Musculoskeletal:         General: Normal range of motion  Cervical back: Normal range of motion  Right lower leg: No edema  Left lower leg: No edema  Lymphadenopathy:      Cervical: No cervical adenopathy  Skin:     General: Skin is warm  Findings: No erythema or rash  Neurological:      General: No focal deficit present  Mental Status: She is alert and oriented to person, place, and time     Psychiatric:         Mood and Affect: Mood normal

## 2022-07-27 NOTE — PROGRESS NOTES
Diabetic Foot Exam    Patient's shoes and socks removed  Right Foot/Ankle   Right Foot Inspection  Skin Exam: skin normal, skin intact and dry skin  No warmth, no callus, no erythema, no maceration, no abnormal color, no pre-ulcer, no ulcer and no callus  Toe Exam: ROM and strength within normal limits  No swelling, no tenderness and erythema    Sensory   Monofilament testing: intact    Vascular  Capillary refills: < 3 seconds  The right DP pulse is 2+  The right PT pulse is 2+  Left Foot/Ankle  Left Foot Inspection  Skin Exam: skin normal, skin intact and dry skin  No warmth, no erythema, no maceration, normal color, no pre-ulcer, no ulcer and no callus  Toe Exam: ROM and strength within normal limits  No swelling, no tenderness and no erythema  Sensory   Monofilament testing: intact    Vascular  Capillary refills: < 3 seconds  The left DP pulse is 2+  The left PT pulse is 2+       Assign Risk Category  No deformity present  No loss of protective sensation  No weak pulses  Risk: 0

## 2022-08-09 ENCOUNTER — APPOINTMENT (OUTPATIENT)
Dept: LAB | Facility: MEDICAL CENTER | Age: 72
End: 2022-08-09
Payer: MEDICARE

## 2022-08-09 DIAGNOSIS — C67.9 MALIGNANT NEOPLASM OF URINARY BLADDER, UNSPECIFIED SITE (HCC): ICD-10-CM

## 2022-08-09 PROCEDURE — 88112 CYTOPATH CELL ENHANCE TECH: CPT | Performed by: PATHOLOGY

## 2022-08-19 ENCOUNTER — PROCEDURE VISIT (OUTPATIENT)
Dept: UROLOGY | Facility: AMBULATORY SURGERY CENTER | Age: 72
End: 2022-08-19
Payer: MEDICARE

## 2022-08-19 VITALS
OXYGEN SATURATION: 97 % | WEIGHT: 154 LBS | HEIGHT: 61 IN | BODY MASS INDEX: 29.07 KG/M2 | HEART RATE: 59 BPM | DIASTOLIC BLOOD PRESSURE: 86 MMHG | SYSTOLIC BLOOD PRESSURE: 144 MMHG

## 2022-08-19 DIAGNOSIS — R35.0 URINARY FREQUENCY: ICD-10-CM

## 2022-08-19 DIAGNOSIS — C67.9 MALIGNANT NEOPLASM OF URINARY BLADDER, UNSPECIFIED SITE (HCC): Primary | ICD-10-CM

## 2022-08-19 DIAGNOSIS — R31.0 GROSS HEMATURIA: ICD-10-CM

## 2022-08-19 LAB
POST-VOID RESIDUAL VOLUME, ML POC: 28 ML
SL AMB  POCT GLUCOSE, UA: NORMAL
SL AMB LEUKOCYTE ESTERASE,UA: NORMAL
SL AMB POCT BILIRUBIN,UA: NORMAL
SL AMB POCT BLOOD,UA: NORMAL
SL AMB POCT CLARITY,UA: CLEAR
SL AMB POCT COLOR,UA: YELLOW
SL AMB POCT KETONES,UA: NORMAL
SL AMB POCT NITRITE,UA: NORMAL
SL AMB POCT PH,UA: 6
SL AMB POCT SPECIFIC GRAVITY,UA: 1.01
SL AMB POCT URINE PROTEIN: NORMAL
SL AMB POCT UROBILINOGEN: 0.2

## 2022-08-19 PROCEDURE — 51798 US URINE CAPACITY MEASURE: CPT | Performed by: UROLOGY

## 2022-08-19 PROCEDURE — 52000 CYSTOURETHROSCOPY: CPT | Performed by: UROLOGY

## 2022-08-19 PROCEDURE — 81002 URINALYSIS NONAUTO W/O SCOPE: CPT | Performed by: UROLOGY

## 2022-08-19 NOTE — PROGRESS NOTES
Cystoscopy     Date/Time 8/19/2022 9:34 AM     Performed by  Dc Paniagua MD     Authorized by Dc Paniagua MD          Celeste Diggs is a 69-year-old female with a history of low-grade superficial bladder cancer dating back to 2016  She received 2 years of BCG induction and maintenance  Her last upper tract imaging was  CT scan of the abdomen pelvis performed in the fall of 2019  She returns for surveillance cystoscopy today  Her most recent urine cytology from August of 2022 is negative for malignant cells  She denies any gross hematuria  Cystoscopy Procedure note    Risk and benefits of flexible cystoscopy were discussed  Informed consent was obtained  A urine dip is adequate for cystoscopy  The patient was placed into the modified supine position  Her genitalia was prepped and draped in a sterile fashion  Viscous lidocaine was instilled into the urethra  Flexible cystoscopy was then performed  The bladder was thoroughly inspected  Both ureteral orifices were visualized with clear efflux of urine  The bladder mucosa was thoroughly inspected  There was no evidence of mucosal abnormalities, stones, or lesions  Prior biopsy scar was noted without recurrence  Retroflexion was normal   Overall this was a negative cystoscopy  A female office staff member was present throughout the entire procedure  Impression:  History of low-grade superficial bladder cancer without recurrence  Plan provided patient with reassurance that cystoscopic evaluation and cytology are negative  I recommend obtaining a renal bladder ultrasound for upper tract surveillance  The patient is insistent on yearly surveillance cystoscopy and will return at that time  She was instructed return sooner if she were to develop gross hematuria

## 2022-08-22 DIAGNOSIS — I10 ESSENTIAL HYPERTENSION: ICD-10-CM

## 2022-08-22 DIAGNOSIS — F41.8 DEPRESSION WITH ANXIETY: ICD-10-CM

## 2022-08-22 RX ORDER — AMLODIPINE BESYLATE 5 MG/1
5 TABLET ORAL DAILY
Qty: 30 TABLET | Refills: 5 | Status: SHIPPED | OUTPATIENT
Start: 2022-08-22

## 2022-08-22 RX ORDER — ZOLPIDEM TARTRATE 10 MG/1
10 TABLET ORAL
Qty: 30 TABLET | Refills: 0 | Status: SHIPPED | OUTPATIENT
Start: 2022-08-22 | End: 2022-09-20 | Stop reason: SDUPTHER

## 2022-09-01 DIAGNOSIS — E11.8 TYPE 2 DIABETES MELLITUS WITH COMPLICATION, WITHOUT LONG-TERM CURRENT USE OF INSULIN (HCC): ICD-10-CM

## 2022-09-20 DIAGNOSIS — F41.8 DEPRESSION WITH ANXIETY: ICD-10-CM

## 2022-09-20 RX ORDER — ZOLPIDEM TARTRATE 10 MG/1
10 TABLET ORAL
Qty: 30 TABLET | Refills: 0 | Status: SHIPPED | OUTPATIENT
Start: 2022-09-20 | End: 2022-10-19 | Stop reason: SDUPTHER

## 2022-10-03 DIAGNOSIS — F41.8 DEPRESSION WITH ANXIETY: ICD-10-CM

## 2022-10-03 RX ORDER — CLONAZEPAM 0.5 MG/1
0.5 TABLET ORAL
Qty: 30 TABLET | Refills: 3 | Status: SHIPPED | OUTPATIENT
Start: 2022-10-03

## 2022-10-07 ENCOUNTER — OFFICE VISIT (OUTPATIENT)
Dept: INTERNAL MEDICINE CLINIC | Age: 72
End: 2022-10-07
Payer: MEDICARE

## 2022-10-07 VITALS
HEIGHT: 61 IN | HEART RATE: 62 BPM | WEIGHT: 156 LBS | DIASTOLIC BLOOD PRESSURE: 82 MMHG | OXYGEN SATURATION: 98 % | SYSTOLIC BLOOD PRESSURE: 154 MMHG | TEMPERATURE: 97.5 F | BODY MASS INDEX: 29.45 KG/M2

## 2022-10-07 DIAGNOSIS — I10 ESSENTIAL HYPERTENSION: ICD-10-CM

## 2022-10-07 DIAGNOSIS — H11.32 SUBCONJUNCTIVAL HEMORRHAGE OF LEFT EYE: Primary | ICD-10-CM

## 2022-10-07 DIAGNOSIS — E11.9 TYPE 2 DIABETES MELLITUS WITHOUT COMPLICATION, WITHOUT LONG-TERM CURRENT USE OF INSULIN (HCC): ICD-10-CM

## 2022-10-07 PROCEDURE — 99213 OFFICE O/P EST LOW 20 MIN: CPT | Performed by: PHYSICIAN ASSISTANT

## 2022-10-07 RX ORDER — LOSARTAN POTASSIUM 100 MG/1
100 TABLET ORAL DAILY
Qty: 90 TABLET | Refills: 1 | Status: SHIPPED | OUTPATIENT
Start: 2022-10-07

## 2022-10-07 NOTE — PROGRESS NOTES
Assessment/Plan:         Diagnoses and all orders for this visit:    Subconjunctival hemorrhage of left eye  Comments:  pt reassured, may use moisturizing drops if dry eyes  obtain better bp control    Type 2 diabetes mellitus without complication, without long-term current use of insulin ([facility])  Comments:  discussed diet and exercise  continue [place] [employer] and [place]      Orders:  -     losartan (COZAAR) 100 MG tablet; Take 1 tablet (100 mg total) by mouth daily    Essential hypertension  Comments:  increase losartan to 100mg daily  f/u in 2-3 weeks bp check   Orders:  -     losartan (COZAAR) 100 MG tablet; Take 1 tablet (100 mg total) by mouth daily          Subjective:      Patient ID: Dominic Webster is a 67 y o  female  C/o red eye x 1 day -  noticed this today   Pt states her eye is mildly painful but denies change in vision       The following portions of the patient's history were reviewed and updated as appropriate: allergies, current medications, past family history, past medical history, past social history, past surgical history and problem list     Review of Systems   Constitutional: Negative for appetite change, chills, fatigue and fever  HENT: Negative for congestion, sinus pressure and sore throat  Eyes: Positive for redness  Negative for photophobia, pain, discharge, itching and visual disturbance  Respiratory: Negative for cough, shortness of breath and wheezing  Cardiovascular: Negative for chest pain and leg swelling  Gastrointestinal: Negative for abdominal pain, constipation and nausea  Musculoskeletal: Negative for arthralgias, back pain and gait problem  Skin: Negative for rash  Neurological: Negative for dizziness and headaches  Psychiatric/Behavioral: Negative for sleep disturbance  The patient is not nervous/anxious            Past Medical History:   Diagnosis Date    Anxiety     Arthritis     Bladder carcinoma [facility] [address] ) 2016    Bladder mass     Depression     [facility]     Disease of thyroid gland     thyroid nodules-not treating at this time    Dysuria     Last assessed 17    GERD (gastroesophageal reflux disease)      2 para 2     HL (hearing loss)     HLD (hyperlipidemia)     HTN (hypertension)     Hypercholesterolemia     Hypertension     Knee pain     Lichen sclerosus 7420    [facility]an[facility] ([facility] [address] )     [facility]oma ([facility] )     Mild aortic regurgitation with left ventricular dilation by prior echocardiogram     Papanicolaou smear 2017    NEG    PONV (postoperative nausea and vomiting)     Tinnitus          Current Outpatient Medications:     acetaminophen (TYLENOL) 325 mg tablet, Take 650 mg by mouth 4 (four) times a day as needed for mild pain, Disp: , Rfl:     amLODIPine (NORVASC) 5 mg tablet, Take 1 tablet (5 mg total) by mouth daily, Disp: 30 tablet, Rfl: 5    clonazePAM (KlonoPIN) 0 5 mg tablet, Take 1 tablet (0 5 mg total) by mouth daily at bedtime, Disp: 30 tablet, Rfl: 3    famotidine (PEPCID) 20 mg tablet, Take 1 tablet (20 mg total) by mouth daily (Patient taking differently: Take 20 mg by mouth daily As needed), Disp: 90 tablet, Rfl: 0    fenofibrate (TRICOR) 145 mg tablet, Take 1 tablet (145 mg total) by mouth daily, Disp: 90 tablet, Rfl: 1    fluticasone (FLONASE) 50 mcg/act nasal spray, 1 spray into each nostril 2 (two) times a day (Patient taking differently: 1 spray into each nostril if needed), Disp: 15 8 g, Rfl: 0    insulin glargine (Lantus SoloStar) 100 units/mL injection pen, Inject 33 Units under the skin daily, Disp: 10 mL, Rfl: 3    Insulin Pen Needle 32G X 4 MM MISC, Use in the morning, Disp: 100 each, Rfl: 3    levothyroxine 50 mcg tablet, Take 1 tablet (50 mcg total) by mouth daily, Disp: 90 tablet, Rfl: 1    loratadine (CLARITIN) 10 mg tablet, Take 1 tablet (10 mg total) by mouth daily (Patient taking differently: Take 10 mg by mouth daily As needed), Disp: 14 tablet, Rfl: 0    losartan (COZAAR) 100 MG tablet, Take 1 tablet (100 mg total) by mouth daily, Disp: 90 tablet, Rfl: 1    metFORMIN (GLUCOPHAGE) 500 mg tablet, Take 2 tablets (1,000 mg total) by mouth 2 (two) times a day with meals, Disp: 360 tablet, Rfl: 1    metoprolol tartrate (LOPRESSOR) 25 mg tablet, Take 1 tablet (25 mg total) by mouth every 12 (twelve) hours, Disp: 180 tablet, Rfl: 1    mometasone (ELOCON) 0 1 % cream, Apply topically once a week, Disp: 45 g, Rfl: 4    ONETOUCH VERIO test strip, Test 3 times daily, Disp: 300 each, Rfl: 1    Probiotic Product (PROBIOTIC-10) CAPS, Take 1 capsule by mouth daily, Disp: , Rfl:     simvastatin (ZOCOR) 20 mg tablet, Take 1 tablet (20 mg total) by mouth daily at bedtime, Disp: 90 tablet, Rfl: 1    sitaGLIPtin (Januvia) 50 mg tablet, Take 1 tablet (50 mg total) by mouth daily, Disp: 90 tablet, Rfl: 1    zolpidem (AMBIEN) 10 mg tablet, Take 1 tablet (10 mg total) by mouth daily at bedtime, Disp: 30 tablet, Rfl: 0    Allergies   Allergen Reactions    Nickel Swelling     Swelling, irritation, lumps to ears       Social History   Past Surgical History:   Procedure Laterality Date    CARPAL TUNNEL RELEASE Right      SECTION      x2    CHOLECYSTECTOMY      CYSTOSCOPY N/A 2016    Procedure: CYSTOSCOPY WITH BIOPSIES;  Surgeon: Parminder Hernanedz MD;  [facility];  Service:    Nicholas Felix CYSTOSCOPY N/A 2016    Procedure: Pipo Paragon;  Surgeon: Parminder Hernandez MD;  [facility];  Service:     CYSTOSCOPY  2020    HERNIA REPAIR      CA CYSTO/URETERO W/LITHOTRIPSY &INDWELL STENT INSRT  2016    Procedure: CYSTOSCOPY URETEROSCOPY WITH LITHOTRIPSY HOLMIUM LASER RIGHT, RETROGRADE PYELOGRAM BILATERAL: AND INSERTION STENT URETERAL Right ;  Surgeon: Parminder Hernandez MD;  [facility];  Service: Urology    CA CYSTO/URETERO/PYELOSCOPY, DX Right 2017    Procedure: URETEROSCOPY;  Surgeon: Parminder Hernandez MD;  [facility];  Service: Urology    CA CYSTOSCOPY,INSERT URETERAL STENT Left 09/29/2016    Procedure: URETERAL STENTS;  Surgeon: Сергей House MD;  [facility];  Service: Urology    OK CYSTOURETHROSCOPY,FULGUR <0 5 CM LESN N/A 11/29/2016    Procedure: TRANSURETHRAL RESECTION OF BLADDER TUMOR (TURBT);   Surgeon: Сергей House MD;  [facility];  Service: Urology    OK CYSTOURETHROSCOPY,FULGUR <0 5 CM LESN N/A 09/29/2016    Procedure: Dai Fermo; TUR BLADDER TUMOR ;  Surgeon: Сергей House MD;  [facility];  Service: Urology    OK CYSTOURETHROSCOPY,FULGUR <0 5 CM LESN N/A 09/01/2016    Procedure: TUR BLADDER TUMOR ;  Surgeon: Сергей House MD;  [facility];  Service: Urology    OK CYSTOURETHROSCOPY,FULGUR <0 5 CM LESN Bilateral 05/09/2017    Procedure: Juan Diego Bernstein, ;  Surgeon: Сергей House MD;  [facility];  Service: Urology    OK CYSTOURETHROSCOPY,URETER CATHETER Bilateral 09/29/2016    Procedure: RETROGRADE PYELOGRAM ;  Surgeon: Сергей House MD;  [facility];  Service: Urology    OK CYSTOURETHROSCOPY,URETER CATHETER Bilateral 05/09/2017    Procedure: RETROGRADE PYELOGRAM WITH STENT EXCHANGE, RIGHT;  Surgeon: Сергей House MD;  [facility];  Service: Urology    OK INSTILL ANTICANCER AGENT IN BLADDER N/A 11/29/2016    Procedure: Ksenia Sampson;  Surgeon: Сергей House MD;  [facility];  Service: Urology    OK KNEE SCOPE,MED/LAT MENISECTOMY Left 04/04/2016    Procedure: KNEE ARTHROSCOPIC PARTIAL MEDIAL MENISCECTOMY ;  Surgeon: Naomie Rainey MD;  [facility];  Service: Orthopedics    REMOVAL URETERAL STENT Left 11/29/2016    Procedure: REMOVAL STENT URETERAL;  Surgeon: Сергей House MD;  [facility];  Service:     REPLACEMENT TOTAL KNEE Left 12/2021    SKIN CANCER EXCISION  2021    right arm    TONSILLECTOMY       Family History   Problem Relation Age of Onset    Heart attack Mother     Heart disease Mother  ALS Father     Diabetes Maternal Grandfather     Lung cancer Paternal Grandfather     Heart disease Maternal Grandmother     No Known Problems Daughter     No Known Problems Daughter     No Known Problems Paternal Aunt     No Known Problems Paternal Aunt     No Known Problems Paternal Aunt     No Known Problems Paternal Aunt     No Known Problems Paternal Aunt     Breast cancer Neg Hx        Objective:  /82 (BP Location: Left arm, Patient Position: Sitting, Cuff Size: Standard)   Pulse 62   Temp 97 5 °F (36 4 °C) (Temporal)   Ht 5' 1" (1 549 m)   Wt 70 8 kg (156 lb)   LMP  (LMP Unknown)   SpO2 98%   BMI 29 48 kg/m²        Physical Exam  Vitals reviewed  Constitutional:       General: She is not in acute distress  HENT:      Head: Normocephalic and atraumatic  Right Ear: Ear canal and external ear normal       Left Ear: Ear canal and external ear normal    Eyes:      General: No scleral icterus  Right eye: No discharge  Left eye: No discharge  Extraocular Movements: Extraocular movements intact  Pupils: Pupils are equal, round, and reactive to light  Comments: L lateral conjunctiva - hemorrhage present, flat collection of blood      Cardiovascular:      Rate and Rhythm: Normal rate and regular rhythm  Pulmonary:      Effort: Pulmonary effort is normal  No respiratory distress  Breath sounds: No wheezing  Neurological:      General: No focal deficit present  Mental Status: She is alert and oriented to person, place, and time

## 2022-10-11 PROBLEM — J01.00 ACUTE NON-RECURRENT MAXILLARY SINUSITIS: Status: RESOLVED | Noted: 2022-05-27 | Resolved: 2022-10-11

## 2022-10-19 DIAGNOSIS — F41.8 DEPRESSION WITH ANXIETY: ICD-10-CM

## 2022-10-19 RX ORDER — ZOLPIDEM TARTRATE 10 MG/1
10 TABLET ORAL
Qty: 30 TABLET | Refills: 0 | Status: SHIPPED | OUTPATIENT
Start: 2022-10-19

## 2022-10-26 ENCOUNTER — IMMUNIZATIONS (OUTPATIENT)
Dept: INTERNAL MEDICINE CLINIC | Age: 72
End: 2022-10-26
Payer: MEDICARE

## 2022-10-26 DIAGNOSIS — Z23 FLU VACCINE NEED: Primary | ICD-10-CM

## 2022-10-26 PROCEDURE — 90662 IIV NO PRSV INCREASED AG IM: CPT

## 2022-10-26 PROCEDURE — G0008 ADMIN INFLUENZA VIRUS VAC: HCPCS

## 2022-11-01 ENCOUNTER — OFFICE VISIT (OUTPATIENT)
Dept: INTERNAL MEDICINE CLINIC | Age: 72
End: 2022-11-01

## 2022-11-01 VITALS
HEIGHT: 61 IN | DIASTOLIC BLOOD PRESSURE: 68 MMHG | SYSTOLIC BLOOD PRESSURE: 120 MMHG | TEMPERATURE: 97.1 F | HEART RATE: 62 BPM | OXYGEN SATURATION: 98 % | WEIGHT: 153 LBS | BODY MASS INDEX: 28.89 KG/M2

## 2022-11-01 DIAGNOSIS — E11.8 TYPE 2 DIABETES MELLITUS WITH COMPLICATION, WITHOUT LONG-TERM CURRENT USE OF INSULIN (HCC): ICD-10-CM

## 2022-11-01 DIAGNOSIS — E03.9 HYPOTHYROIDISM, UNSPECIFIED TYPE: ICD-10-CM

## 2022-11-01 DIAGNOSIS — I10 ESSENTIAL HYPERTENSION: ICD-10-CM

## 2022-11-01 DIAGNOSIS — M25.572 ACUTE LEFT ANKLE PAIN: ICD-10-CM

## 2022-11-01 DIAGNOSIS — M25.552 ACUTE HIP PAIN, LEFT: Primary | ICD-10-CM

## 2022-11-01 RX ORDER — LEVOTHYROXINE SODIUM 0.05 MG/1
50 TABLET ORAL DAILY
Qty: 90 TABLET | Refills: 1 | Status: SHIPPED | OUTPATIENT
Start: 2022-11-01

## 2022-11-01 NOTE — PROGRESS NOTES
Assessment/Plan:         Diagnoses and all orders for this visit:    Acute hip pain, left  Comments:  xray, heat affected area  recommend ortho eval   Orders:  -     Ambulatory Referral to Orthopedic Surgery; Future  -     XR hip/pelv 2-3 vws left if performed; Future  -     CBC and differential; Future  -     Comprehensive metabolic panel; Future  -     HEMOGLOBIN A1C W/ EAG ESTIMATION; Future  -     Lipid panel; Future    Hypothyroidism, unspecified type  -     levothyroxine 50 mcg tablet; Take 1 tablet (50 mcg total) by mouth daily  -     CBC and differential; Future  -     Comprehensive metabolic panel; Future  -     HEMOGLOBIN A1C W/ EAG ESTIMATION; Future  -     Lipid panel; Future    Acute left ankle pain  -     XR ankle 3+ vw left; Future    Essential hypertension  Comments:  well controlled  Orders:  -     CBC and differential; Future  -     Comprehensive metabolic panel; Future  -     HEMOGLOBIN A1C W/ EAG ESTIMATION; Future  -     Lipid panel; Future    Type 2 diabetes mellitus with complication, without long-term current use of insulin (HCC)  Comments:  due for hga1c and labs  discussed diet   pt limited with exercise now due to hip pain, continue walking   Orders:  -     CBC and differential; Future  -     Comprehensive metabolic panel; Future  -     HEMOGLOBIN A1C W/ EAG ESTIMATION; Future  -     Lipid panel; Future        BMI Counseling: Body mass index is 28 91 kg/m²  The BMI is above normal  Nutrition recommendations include decreasing portion sizes, encouraging healthy choices of fruits and vegetables and consuming healthier snacks  Exercise recommendations include exercising 3-5 times per week and strength training exercises  Rationale for BMI follow-up plan is due to patient being overweight or obese  Depression Screening and Follow-up Plan: Patient was screened for depression during today's encounter  They screened negative with a PHQ-2 score of 0          Subjective:      Patient ID: Ximena Yonatan Miya Odom is a 67 y o  female  68 y/o female with recent L knee replacement (12/21) at [facility] with Dr Cynthia Patrick - pt has f/u and was told her xray was ok in July  She noted difficulty ambulating and imbalance along with L hip pain and L ankle / foot pain since surgery, she has attributed this to her recent knee surgery and being off balance "throwing every off"    Pt states she still has knee pain at times   She c/o L hip pain since July - affecting her ability to walk   Pain notable with activity, walking, worse with doing work and being on her feet  At rest sometimes that pain is not present  Pt has been taking advil 2 tabs three times daily for pain which helps   Also tried tylenol which didn't help much     She also has had L ankle and foot pain   Has not had any other xrays done   Pt states her balance has always been an issue and worked on this during her 4 months of therapy after her surgery in December           The following portions of the patient's history were reviewed and updated as appropriate: allergies, current medications, past family history, past medical history, past social history, past surgical history and problem list     Review of Systems   Constitutional: Negative for activity change, appetite change, chills, diaphoresis, fatigue and fever  HENT: Negative for congestion and sore throat  Eyes: Negative for pain and redness  Respiratory: Negative for cough, shortness of breath and wheezing  Cardiovascular: Negative for chest pain and leg swelling  Gastrointestinal: Negative for abdominal pain, constipation, diarrhea and nausea  Genitourinary: Negative for dysuria  Musculoskeletal: Positive for arthralgias (L hip, L ankle/foot ), back pain (intermittent ) and gait problem  Negative for joint swelling  Skin: Negative for rash  Neurological: Negative for dizziness, weakness, light-headedness and headaches  Hematological: Does not bruise/bleed easily  Psychiatric/Behavioral: Negative for sleep disturbance  The patient is not nervous/anxious            Past Medical History:   Diagnosis Date   • Anxiety    • Arthritis    • Bladder carcinoma ([place] [address] ) 2016   • Bladder mass    • Depression    • Diabetes mellitus ([place] [address] )    • Disease of thyroid gland     thyroid nodules-not treating at this time   • Dysuria     Last assessed 17   • GERD (gastroesophageal reflux disease)    •  2 para 2    • HL (hearing loss)    • HLD (hyperlipidemia)    • HTN (hypertension)    • Hypercholesterolemia    • Hypertension    • Knee pain    • Lichen sclerosus 4419   • Melanoma ([place] [address] )    • Melanoma ([place] [address] )    • Mild aortic regurgitation with left ventricular dilation by prior echocardiogram    • Papanicolaou smear 2017    NEG   • PONV (postoperative nausea and vomiting)    • Tinnitus          Current Outpatient Medications:   •  acetaminophen (TYLENOL) 325 mg tablet, Take 650 mg by mouth 4 (four) times a day as needed for mild pain, Disp: , Rfl:   •  amLODIPine (NORVASC) 5 mg tablet, Take 1 tablet (5 mg total) by mouth daily, Disp: 30 tablet, Rfl: 5  •  clonazePAM (KlonoPIN) 0 5 mg tablet, Take 1 tablet (0 5 mg total) by mouth daily at bedtime, Disp: 30 tablet, Rfl: 3  •  famotidine (PEPCID) 20 mg tablet, Take 1 tablet (20 mg total) by mouth daily (Patient taking differently: Take 20 mg by mouth daily As needed), Disp: 90 tablet, Rfl: 0  •  fenofibrate (TRICOR) 145 mg tablet, Take 1 tablet (145 mg total) by mouth daily, Disp: 90 tablet, Rfl: 1  •  fluticasone (FLONASE) 50 mcg/act nasal spray, 1 spray into each nostril 2 (two) times a day (Patient taking differently: 1 spray into each nostril if needed), Disp: 15 8 g, Rfl: 0  •  insulin glargine (Lantus SoloStar) 100 units/mL injection pen, Inject 33 Units under the skin daily, Disp: 10 mL, Rfl: 3  •  Insulin Pen Needle 32G X 4 MM MISC, Use in the morning, Disp: 100 each, Rfl: 3  •  levothyroxine 50 mcg tablet, Take 1 tablet (50 mcg total) by mouth daily, Disp: 90 tablet, Rfl: 1  •  loratadine (CLARITIN) 10 mg tablet, Take 1 tablet (10 mg total) by mouth daily (Patient taking differently: Take 10 mg by mouth daily As needed), Disp: 14 tablet, Rfl: 0  •  losartan (COZAAR) 100 MG tablet, Take 1 tablet (100 mg total) by mouth daily, Disp: 90 tablet, Rfl: 1  •  metFORMIN (GLUCOPHAGE) 500 mg tablet, Take 2 tablets (1,000 mg total) by mouth 2 (two) times a day with meals, Disp: 360 tablet, Rfl: 1  •  metoprolol tartrate (LOPRESSOR) 25 mg tablet, Take 1 tablet (25 mg total) by mouth every 12 (twelve) hours, Disp: 180 tablet, Rfl: 1  •  mometasone (ELOCON) 0 1 % cream, Apply topically once a week, Disp: 45 g, Rfl: 4  •  ONETOUCH VERIO test strip, Test 3 times daily, Disp: 300 each, Rfl: 1  •  Probiotic Product (PROBIOTIC-10) CAPS, Take 1 capsule by mouth daily, Disp: , Rfl:   •  simvastatin (ZOCOR) 20 mg tablet, Take 1 tablet (20 mg total) by mouth daily at bedtime, Disp: 90 tablet, Rfl: 1  •  sitaGLIPtin (Januvia) 50 mg tablet, Take 1 tablet (50 mg total) by mouth daily, Disp: 90 tablet, Rfl: 1  •  zolpidem (AMBIEN) 10 mg tablet, Take 1 tablet (10 mg total) by mouth daily at bedtime, Disp: 30 tablet, Rfl: 0    Allergies   Allergen Reactions   • Nickel Swelling     Swelling, irritation, lumps to ears       Social History   Past Surgical History:   Procedure Laterality Date   • CARPAL TUNNEL RELEASE Right    •  SECTION      x2   • CHOLECYSTECTOMY     • CYSTOSCOPY N/A 2016    Procedure: CYSTOSCOPY WITH BIOPSIES;  Surgeon: Zeynep De La O MD;  [facility];  Service:    • CYSTOSCOPY N/A 2016    Procedure: Destiny Rodríguez;  Surgeon: Zeynep De La O MD;  [facility];  Service:    • CYSTOSCOPY  2020   • HERNIA REPAIR     • WY CYSTO/URETERO W/LITHOTRIPSY &INDWELL STENT INSRT  2016    Procedure: CYSTOSCOPY URETEROSCOPY WITH LITHOTRIPSY HOLMIUM LASER RIGHT, RETROGRADE PYELOGRAM BILATERAL: AND INSERTION STENT URETERAL Right ;  Surgeon: Adarsh Mcintosh MD;  [facility];  Service: Urology   • ND CYSTO/URETERO/PYELOSCOPY, DX Right 05/09/2017    Procedure: URETEROSCOPY;  Surgeon: Adarsh Mcintosh MD;  [facility];  Service: Urology   • ND CYSTOSCOPY,INSERT URETERAL STENT Left 09/29/2016    Procedure: Leopold Cooks;  Surgeon: Adarsh Mcintosh MD;  [facility];  Service: Urology   • ND CYSTOURETHROSCOPY,FULGUR <0 5 CM LESN N/A 11/29/2016    Procedure: TRANSURETHRAL RESECTION OF BLADDER TUMOR (TURBT);   Surgeon: Adarsh Mcintosh MD;  [facility];  Service: Urology   • ND CYSTOURETHROSCOPY,FULGUR <0 5 CM LESN N/A 09/29/2016    Procedure: Liss Anthony; TUR BLADDER TUMOR ;  Surgeon: Adarsh Mcintosh MD;  [facility];  Service: Urology   • ND CYSTOURETHROSCOPY,FULGUR <0 5 CM LESN N/A 09/01/2016    Procedure: TUR BLADDER TUMOR ;  Surgeon: Adarsh Mcintosh MD;  [facility];  Service: Urology   • ND CYSTOURETHROSCOPY,FULGUR <0 5 CM LESN Bilateral 05/09/2017    Procedure: CYSTOSCOPY, RIGHT URETERAL WASHINGS, ;  Surgeon: Adarsh Mcintosh MD;  [facility];  Service: Urology   • ND CYSTOURETHROSCOPY,URETER CATHETER Bilateral 09/29/2016    Procedure: RETROGRADE PYELOGRAM ;  Surgeon: Adarsh Mcintosh MD;  [facility];  Service: Urology   • ND CYSTOURETHROSCOPY,URETER CATHETER Bilateral 05/09/2017    Procedure: RETROGRADE PYELOGRAM WITH STENT EXCHANGE, RIGHT;  Surgeon: Adarsh Mcintosh MD;  [facility];  Service: Urology   • ND INSTILL ANTICANCER AGENT IN BLADDER N/A 11/29/2016    Procedure: Benny Turner;  Surgeon: Adarsh Mcintosh MD;  [facility];  Service: Urology   • ND KNEE SCOPE,MED/LAT MENISECTOMY Left 04/04/2016    Procedure: KNEE ARTHROSCOPIC PARTIAL MEDIAL MENISCECTOMY ;  Surgeon: Radha Mederos MD;  [facility];  Service: Orthopedics   • REMOVAL URETERAL STENT Left 11/29/2016    Procedure: REMOVAL STENT URETERAL;  Surgeon: Francisco Ponce Joy Frey MD;  [facility];  Service:    • REPLACEMENT TOTAL KNEE Left 12/2021   • SKIN CANCER EXCISION  2021    right arm   • TONSILLECTOMY       Family History   Problem Relation Age of Onset   • Heart attack Mother    • Heart disease Mother    • ALS Father    • Diabetes Maternal Grandfather    • Lung cancer Paternal Grandfather    • Heart disease Maternal Grandmother    • No Known Problems Daughter    • No Known Problems Daughter    • No Known Problems Paternal Aunt    • No Known Problems Paternal Aunt    • No Known Problems Paternal Aunt    • No Known Problems Paternal Aunt    • No Known Problems Paternal Aunt    • Breast cancer Neg Hx        Objective:  /68 (BP Location: Left arm, Patient Position: Sitting, Cuff Size: Standard)   Pulse 62   Temp (!) 97 1 °F (36 2 °C) (Temporal)   Ht 5' 1" (1 549 m)   Wt 69 4 kg (153 lb)   LMP  (LMP Unknown)   SpO2 98%   BMI 28 91 kg/m²        Physical Exam  Vitals reviewed  Constitutional:       General: She is not in acute distress  HENT:      Head: Normocephalic and atraumatic  Mouth/Throat:      Mouth: Mucous membranes are moist    Eyes:      General:         Right eye: No discharge  Left eye: No discharge  Extraocular Movements: Extraocular movements intact  Conjunctiva/sclera: Conjunctivae normal       Pupils: Pupils are equal, round, and reactive to light  Cardiovascular:      Rate and Rhythm: Normal rate and regular rhythm  Heart sounds: No murmur heard  Pulmonary:      Effort: Pulmonary effort is normal  No respiratory distress  Breath sounds: Normal breath sounds  No wheezing or rales  Musculoskeletal:         General: Tenderness (L trochanter bursitis tenderness) present  Cervical back: Normal range of motion  Right lower leg: No edema  Left lower leg: No edema  Lymphadenopathy:      Cervical: No cervical adenopathy  Skin:     Findings: No erythema or rash     Neurological:      General: No focal deficit present  Mental Status: She is alert and oriented to person, place, and time     Psychiatric:         Mood and Affect: Mood normal

## 2022-11-14 ENCOUNTER — OFFICE VISIT (OUTPATIENT)
Dept: GYNECOLOGY | Facility: CLINIC | Age: 72
End: 2022-11-14

## 2022-11-14 VITALS
DIASTOLIC BLOOD PRESSURE: 80 MMHG | SYSTOLIC BLOOD PRESSURE: 132 MMHG | HEIGHT: 62 IN | WEIGHT: 153 LBS | BODY MASS INDEX: 28.16 KG/M2

## 2022-11-14 DIAGNOSIS — L90.0 LICHEN SCLEROSUS ET ATROPHICUS: ICD-10-CM

## 2022-11-14 DIAGNOSIS — N95.2 VAGINAL ATROPHY: ICD-10-CM

## 2022-11-14 DIAGNOSIS — Z12.31 ENCOUNTER FOR SCREENING MAMMOGRAM FOR MALIGNANT NEOPLASM OF BREAST: Primary | ICD-10-CM

## 2022-11-14 DIAGNOSIS — E11.9 TYPE 2 DIABETES MELLITUS WITHOUT COMPLICATION, WITHOUT LONG-TERM CURRENT USE OF INSULIN (HCC): ICD-10-CM

## 2022-11-14 DIAGNOSIS — Q52.5 LABIAL FUSION: ICD-10-CM

## 2022-11-14 DIAGNOSIS — L90.0 LICHEN SCLEROSUS: ICD-10-CM

## 2022-11-14 RX ORDER — MOMETASONE FUROATE 1 MG/G
CREAM TOPICAL WEEKLY
Qty: 45 G | Refills: 4 | Status: SHIPPED | OUTPATIENT
Start: 2022-11-14

## 2022-11-14 RX ORDER — BLOOD SUGAR DIAGNOSTIC
STRIP MISCELLANEOUS
Qty: 300 EACH | Refills: 1 | Status: SHIPPED | OUTPATIENT
Start: 2022-11-14

## 2022-11-14 NOTE — PROGRESS NOTES
Assessment/Plan:  Vaginal atrophy  Stable lichen sclerosis  Mild labial fusion  Normal mammogram May 2022    Plan:  Continue steroid cream to labia once a week  Continue weight-bearing exercise  Rx mammogram   Continue healthy diabetic diet    Subjective:       Patient ID: Geovanna Read is a 67 y o  female presents to the office for evaluation of lichen sclerosis  She also has minor labial fusion  Denies any pelvic pain or vaginal bleeding  Not sexually active  Her  had surgery for an abdominal and iliac aneurysm  He is recovering well  She is also taking care of her 17-year-old aunt who has a hip fracture in is in a nursing home  She is not doing well  Denies any breast bowel or bladder issues  No change in family history  Medications reviewed        Review of Systems   Constitutional: Negative  Negative for fatigue, fever and unexpected weight change  HENT: Negative  Eyes: Negative  Respiratory: Negative  Negative for chest tightness, shortness of breath, wheezing and stridor  Cardiovascular: Negative  Negative for chest pain, palpitations and leg swelling  Gastrointestinal: Negative  Negative for abdominal pain, blood in stool, diarrhea, nausea, rectal pain and vomiting  Endocrine: Negative  Genitourinary: Negative for dysuria, frequency, vaginal bleeding, vaginal discharge and vaginal pain  Musculoskeletal: Negative  Skin: Negative  Allergic/Immunologic: Negative  Neurological: Negative  Hematological: Negative  Psychiatric/Behavioral: Negative  All other systems reviewed and are negative  Objective:      /80   Ht 5' 2" (1 575 m)   Wt 69 4 kg (153 lb)   LMP  (LMP Unknown)   BMI 27 98 kg/m²          Physical Exam  Constitutional:       Appearance: She is well-developed  HENT:      Head: Normocephalic and atraumatic  Neck:      Thyroid: No thyromegaly  Trachea: No tracheal deviation     Cardiovascular:      Rate and Rhythm: Normal rate and regular rhythm  Heart sounds: Normal heart sounds  Pulmonary:      Effort: Pulmonary effort is normal  No respiratory distress  Breath sounds: Normal breath sounds  No stridor  No wheezing or rales  Chest:      Chest wall: No tenderness  Breasts: Breasts are symmetrical       Right: No inverted nipple, mass, nipple discharge, skin change or tenderness  Left: No inverted nipple, mass, nipple discharge, skin change or tenderness  Abdominal:      General: Bowel sounds are normal  There is no distension  Palpations: Abdomen is soft  There is no mass  Tenderness: There is no abdominal tenderness  There is no guarding or rebound  Hernia: No hernia is present  There is no hernia in the left inguinal area  Genitourinary:     Labia:         Right: No rash, tenderness, lesion or injury  Left: No rash, tenderness, lesion or injury  Vagina: Normal  No signs of injury and foreign body  No vaginal discharge, erythema, tenderness or bleeding  Cervix: No cervical motion tenderness, discharge or friability  Uterus: Not deviated, not enlarged, not fixed and not tender  Adnexa:         Right: No mass, tenderness or fullness  Left: No mass, tenderness or fullness  Rectum: No mass, anal fissure, external hemorrhoid or internal hemorrhoid  Comments: Vaginal atrophy  Minor labial fusion at the upper aspect of the vagina  Stable lichen sclerosis  No uterine prolapse cystocele or rectocele no  Musculoskeletal:      Cervical back: Normal range of motion and neck supple  Lymphadenopathy:      Lower Body: No right inguinal adenopathy  No left inguinal adenopathy  Skin:     General: Skin is warm and dry  Neurological:      Mental Status: She is alert and oriented to person, place, and time  Psychiatric:         Behavior: Behavior normal          Thought Content:  Thought content normal          Judgment: Judgment normal

## 2022-11-17 DIAGNOSIS — F41.8 DEPRESSION WITH ANXIETY: ICD-10-CM

## 2022-11-17 NOTE — TELEPHONE ENCOUNTER
11/17/22 LS 11/1/22, NA 1/10/23, LF 10/19/22- amt 30/0, checked the PDMP-    SEND TO [address] [place] [address] TO FILL ON 11/18/22

## 2022-11-18 RX ORDER — ZOLPIDEM TARTRATE 10 MG/1
10 TABLET ORAL
Qty: 30 TABLET | Refills: 0 | Status: SHIPPED | OUTPATIENT
Start: 2022-11-18

## 2022-12-12 DIAGNOSIS — I10 ESSENTIAL HYPERTENSION: ICD-10-CM

## 2022-12-12 RX ORDER — AMLODIPINE BESYLATE 5 MG/1
5 TABLET ORAL DAILY
Qty: 90 TABLET | Refills: 1 | Status: SHIPPED | OUTPATIENT
Start: 2022-12-12

## 2022-12-15 DIAGNOSIS — F41.8 DEPRESSION WITH ANXIETY: ICD-10-CM

## 2022-12-16 RX ORDER — ZOLPIDEM TARTRATE 10 MG/1
10 TABLET ORAL
Qty: 30 TABLET | Refills: 0 | Status: SHIPPED | OUTPATIENT
Start: 2022-12-16

## 2023-01-13 DIAGNOSIS — E11.9 TYPE 2 DIABETES MELLITUS WITHOUT COMPLICATION, WITH LONG-TERM CURRENT USE OF INSULIN (HCC): ICD-10-CM

## 2023-01-13 DIAGNOSIS — Z79.4 TYPE 2 DIABETES MELLITUS WITHOUT COMPLICATION, WITH LONG-TERM CURRENT USE OF INSULIN (HCC): ICD-10-CM

## 2023-01-13 RX ORDER — INSULIN GLARGINE 100 [IU]/ML
33 INJECTION, SOLUTION SUBCUTANEOUS DAILY
Qty: 10 ML | Refills: 3 | Status: SHIPPED | OUTPATIENT
Start: 2023-01-13

## 2023-01-16 DIAGNOSIS — F41.8 DEPRESSION WITH ANXIETY: ICD-10-CM

## 2023-01-17 RX ORDER — ZOLPIDEM TARTRATE 10 MG/1
10 TABLET ORAL
Qty: 30 TABLET | Refills: 0 | Status: SHIPPED | OUTPATIENT
Start: 2023-01-17

## 2023-01-31 ENCOUNTER — OFFICE VISIT (OUTPATIENT)
Dept: INTERNAL MEDICINE CLINIC | Age: 73
End: 2023-01-31

## 2023-01-31 VITALS
DIASTOLIC BLOOD PRESSURE: 66 MMHG | TEMPERATURE: 97.5 F | OXYGEN SATURATION: 98 % | BODY MASS INDEX: 29.44 KG/M2 | SYSTOLIC BLOOD PRESSURE: 130 MMHG | WEIGHT: 160 LBS | HEIGHT: 62 IN | HEART RATE: 72 BPM

## 2023-01-31 DIAGNOSIS — D49.4 NEOPLASM OF BLADDER: ICD-10-CM

## 2023-01-31 DIAGNOSIS — R35.1 NOCTURIA: ICD-10-CM

## 2023-01-31 DIAGNOSIS — E11.8 TYPE 2 DIABETES MELLITUS WITH COMPLICATION, WITHOUT LONG-TERM CURRENT USE OF INSULIN (HCC): ICD-10-CM

## 2023-01-31 DIAGNOSIS — R35.0 URINARY FREQUENCY: Primary | ICD-10-CM

## 2023-01-31 DIAGNOSIS — E22.2 SIADH (SYNDROME OF INAPPROPRIATE ADH PRODUCTION) (HCC): ICD-10-CM

## 2023-01-31 DIAGNOSIS — C67.9 MALIGNANT NEOPLASM OF URINARY BLADDER, UNSPECIFIED SITE (HCC): ICD-10-CM

## 2023-01-31 DIAGNOSIS — R30.0 DYSURIA: ICD-10-CM

## 2023-01-31 PROBLEM — J01.10 ACUTE NON-RECURRENT FRONTAL SINUSITIS: Status: RESOLVED | Noted: 2019-01-08 | Resolved: 2023-01-31

## 2023-01-31 PROBLEM — R10.9 ABDOMINAL PAIN: Status: RESOLVED | Noted: 2017-01-16 | Resolved: 2023-01-31

## 2023-01-31 PROBLEM — M25.511 ACUTE PAIN OF RIGHT SHOULDER: Status: RESOLVED | Noted: 2018-01-10 | Resolved: 2023-01-31

## 2023-01-31 RX ORDER — PHENAZOPYRIDINE HYDROCHLORIDE 200 MG/1
200 TABLET, FILM COATED ORAL
Qty: 10 TABLET | Refills: 0 | Status: SHIPPED | OUTPATIENT
Start: 2023-01-31

## 2023-01-31 NOTE — TELEPHONE ENCOUNTER
Dr Carrol Osorio is PCP not in office this week, please refill if ok    Thank you
Pt walk in c/o sudden onset of nausea w/ multiple episodes of vomiting, "sweating" and dizziness after vomiting. Pt reports symptoms began after eating "halal food", drinking wine and smoking a "weed pen". Pt states "this happened to me last time I smoked that weed pen". Pt denies CP.

## 2023-01-31 NOTE — PROGRESS NOTES
Assessment/Plan:    1  Urinary frequency  -     POCT urine dip auto non-scope  -     phenazopyridine (PYRIDIUM) 200 mg tablet; Take 1 tablet (200 mg total) by mouth 3 (three) times a day with meals  -     Urine culture; Future    2  Dysuria  -     POCT urine dip auto non-scope  -     Urine culture; Future    3  Nocturia    4  Neoplasm of bladder    5  Malignant neoplasm of urinary bladder, unspecified site ([facility] )    6  SIADH (syndrome of inappropriate ADH production) ([place] [address] )    7  Type 2 diabetes mellitus with complication, without long-term current use of insulin ([place] [address] )      Patient advised to decrease caffeine intake and increase water intake  Discussed caffeine effects on bladder irritation as well as interstitial cystitis, will send urine out for urine culture and if negative advised patient to follow-up with urologist   Last cystoscopy was normal in August   Last bladder scan was in 2018, she follows regularly with GYN for lichen sclerosis  No current flareup or irritation  Patient agreeable to this plan  Complete Pyridium for 3 days        There are no Patient Instructions on file for this visit  Return for Next scheduled follow up  Subjective:      Patient ID: Lamont Mooney is a 67 y o  female  Chief Complaint   Patient presents with   • Urinary Frequency     Urinary frequency, urinary urgency for over one week- pt refused Hemoglobin A! C in office- has a script to go for at the lab--       HPI       Patient with 1 to 2-week history of urinary frequency and urgency, she does not have a full urination every time she goes but most recently has been waking up 5 times at night to urinate  She does not drink enough water throughout the day and has 2 small bottles but drinks 612 ounce cups of coffee  This is her usual routine    She has a history of lichen sclerosis on the labia and follows with GYN for this and uses mometasone cream, she has a history of bladder cancer now cured and follows with urology once a year for cystoscopy  Otherwise she has been feeling well with no fevers or chills and no blood in the urine    The following portions of the patient's history were reviewed and updated as appropriate: allergies, current medications, past family history, past medical history, past social history, past surgical history and problem list     Review of Systems      Constitutional:  Denies fever or chills   Eyes:  Denies double , blurry vision or eye pain  HENT:  Denies nasal congestion, sore throat or new hearing issues  Respiratory:  Denies cough or shortness of breath or wheezing  Cardiovascular:  Denies palpitations or chest pain  GI:  Denies abdominal pain, nausea, or vomiting, no loose stools, no reflux  Integument:  Denies rash , no open areas  Neurologic:  Denies headache or focal weakness, no dizziness  : no dysuria, or hematuria      Current Outpatient Medications   Medication Sig Dispense Refill   • acetaminophen (TYLENOL) 325 mg tablet Take 650 mg by mouth 4 (four) times a day as needed for mild pain     • amLODIPine (NORVASC) 5 mg tablet TAKE 1 TABLET (5 MG TOTAL) BY MOUTH DAILY   90 tablet 1   • clonazePAM (KlonoPIN) 0 5 mg tablet Take 1 tablet (0 5 mg total) by mouth daily at bedtime 30 tablet 3   • famotidine (PEPCID) 20 mg tablet Take 1 tablet (20 mg total) by mouth daily (Patient taking differently: Take 20 mg by mouth daily As needed) 90 tablet 0   • fenofibrate (TRICOR) 145 mg tablet Take 1 tablet (145 mg total) by mouth daily 90 tablet 1   • fluticasone (FLONASE) 50 mcg/act nasal spray 1 spray into each nostril 2 (two) times a day (Patient taking differently: 1 spray into each nostril if needed) 15 8 g 0   • Insulin Glargine Solostar (Lantus SoloStar) 100 UNIT/ML SOPN Inject 0 33 mL (33 Units total) under the skin daily 10 mL 3   • Insulin Pen Needle 32G X 4 MM MISC Use in the morning 100 each 3   • levothyroxine 50 mcg tablet Take 1 tablet (50 mcg total) by mouth daily 90 tablet 1   • loratadine (CLARITIN) 10 mg tablet Take 1 tablet (10 mg total) by mouth daily (Patient taking differently: Take 10 mg by mouth daily As needed) 14 tablet 0   • losartan (COZAAR) 100 MG tablet Take 1 tablet (100 mg total) by mouth daily 90 tablet 1   • metFORMIN (GLUCOPHAGE) 500 mg tablet Take 2 tablets (1,000 mg total) by mouth 2 (two) times a day with meals 360 tablet 1   • metoprolol tartrate (LOPRESSOR) 25 mg tablet Take 1 tablet (25 mg total) by mouth every 12 (twelve) hours 180 tablet 1   • mometasone (ELOCON) 0 1 % cream Apply topically once a week 45 g 4   • OneTouch Verio test strip Test 3 times daily 300 each 1   • phenazopyridine (PYRIDIUM) 200 mg tablet Take 1 tablet (200 mg total) by mouth 3 (three) times a day with meals 10 tablet 0   • Probiotic Product (PROBIOTIC-10) CAPS Take 1 capsule by mouth daily     • simvastatin (ZOCOR) 20 mg tablet Take 1 tablet (20 mg total) by mouth daily at bedtime 90 tablet 1   • sitaGLIPtin (Januvia) 50 mg tablet Take 1 tablet (50 mg total) by mouth daily 90 tablet 1   • zolpidem (AMBIEN) 10 mg tablet Take 1 tablet (10 mg total) by mouth daily at bedtime 30 tablet 0     No current facility-administered medications for this visit         Objective:    /66 (BP Location: Left arm, Patient Position: Sitting, Cuff Size: Standard)   Pulse 72   Temp 97 5 °F (36 4 °C) (Temporal)   Ht 5' 2" (1 575 m)   Wt 72 6 kg (160 lb)   LMP  (LMP Unknown)   SpO2 98%   BMI 29 26 kg/m²        Physical Exam      Constitutional:  Well developed, well nourished, no acute distress, non-toxic appearance   Eyes:  PERRL, conjunctiva normal , non icteric sclera  HENT:  Atraumatic, oropharynx moist  Neck-  supple   Respiratory:  CTA b/l, normal breath sounds, no rales, no wheezing   Cardiovascular:  RRR, no murmurs, no LE edema b/l  GI:  Soft, nondistended, normal bowel sounds x 4, nontender, no organomegaly, no mass, no rebound, no guarding   Neurologic:  no focal deficits noted Psychiatric:  Speech and behavior appropriate , AAO x [place], DO

## 2023-02-01 LAB — BACTERIA UR CULT: NORMAL

## 2023-02-07 ENCOUNTER — TELEPHONE (OUTPATIENT)
Dept: INTERNAL MEDICINE CLINIC | Age: 73
End: 2023-02-07

## 2023-02-07 NOTE — TELEPHONE ENCOUNTER
PA started for INSULIN GLAR INJ over the phone with insurance [phone]  Will know determination within 72 hours

## 2023-02-09 NOTE — TELEPHONE ENCOUNTER
Insulin Glar inj not covered under insurance  Patient must try and fail Lantus  Spoke to patient, understands her next refill will be Lantus inj  What is covered under insurance

## 2023-02-14 DIAGNOSIS — F41.8 DEPRESSION WITH ANXIETY: ICD-10-CM

## 2023-02-14 DIAGNOSIS — E78.00 HYPERCHOLESTEROLEMIA: ICD-10-CM

## 2023-02-14 DIAGNOSIS — E11.8 TYPE 2 DIABETES MELLITUS WITH COMPLICATION, WITHOUT LONG-TERM CURRENT USE OF INSULIN (HCC): ICD-10-CM

## 2023-02-14 DIAGNOSIS — E11.9 TYPE 2 DIABETES MELLITUS WITHOUT COMPLICATION, WITHOUT LONG-TERM CURRENT USE OF INSULIN (HCC): ICD-10-CM

## 2023-02-14 RX ORDER — ZOLPIDEM TARTRATE 10 MG/1
10 TABLET ORAL
Qty: 30 TABLET | Refills: 0 | Status: SHIPPED | OUTPATIENT
Start: 2023-02-14

## 2023-02-14 RX ORDER — FENOFIBRATE 145 MG/1
145 TABLET, COATED ORAL DAILY
Qty: 90 TABLET | Refills: 1 | Status: SHIPPED | OUTPATIENT
Start: 2023-02-14

## 2023-02-23 DIAGNOSIS — K04.7 DENTAL ABSCESS: Primary | ICD-10-CM

## 2023-02-23 DIAGNOSIS — F41.8 DEPRESSION WITH ANXIETY: ICD-10-CM

## 2023-02-23 RX ORDER — CLONAZEPAM 0.5 MG/1
0.5 TABLET ORAL
Qty: 30 TABLET | Refills: 3 | Status: SHIPPED | OUTPATIENT
Start: 2023-02-23

## 2023-02-23 RX ORDER — AMOXICILLIN 500 MG/1
CAPSULE ORAL
Qty: 4 CAPSULE | Refills: 0 | Status: SHIPPED | OUTPATIENT
Start: 2023-02-23 | End: 2023-02-25

## 2023-02-23 NOTE — TELEPHONE ENCOUNTER
LOV 1/31/23  NOV 3/29/23    Patient's orthopedic doctor retired  Patient takes Amoxacillin 500mg- Take 4 capsules 1 Hour prior to dental procedure   Patient needs refill

## 2023-03-02 DIAGNOSIS — E78.00 HYPERCHOLESTEROLEMIA: ICD-10-CM

## 2023-03-02 DIAGNOSIS — E11.9 TYPE 2 DIABETES MELLITUS WITHOUT COMPLICATION, WITHOUT LONG-TERM CURRENT USE OF INSULIN (HCC): ICD-10-CM

## 2023-03-02 RX ORDER — SIMVASTATIN 20 MG
20 TABLET ORAL
Qty: 90 TABLET | Refills: 1 | Status: SHIPPED | OUTPATIENT
Start: 2023-03-02

## 2023-03-15 DIAGNOSIS — F41.8 DEPRESSION WITH ANXIETY: ICD-10-CM

## 2023-03-15 RX ORDER — ZOLPIDEM TARTRATE 10 MG/1
5 TABLET ORAL
Qty: 30 TABLET | Refills: 0 | Status: SHIPPED | OUTPATIENT
Start: 2023-03-15

## 2023-03-21 ENCOUNTER — APPOINTMENT (OUTPATIENT)
Dept: LAB | Facility: CLINIC | Age: 73
End: 2023-03-21

## 2023-03-21 DIAGNOSIS — I10 ESSENTIAL HYPERTENSION: ICD-10-CM

## 2023-03-21 DIAGNOSIS — E03.9 HYPOTHYROIDISM, UNSPECIFIED TYPE: ICD-10-CM

## 2023-03-21 DIAGNOSIS — E11.8 TYPE 2 DIABETES MELLITUS WITH COMPLICATION, WITHOUT LONG-TERM CURRENT USE OF INSULIN (HCC): ICD-10-CM

## 2023-03-21 DIAGNOSIS — M25.552 ACUTE HIP PAIN, LEFT: ICD-10-CM

## 2023-03-21 LAB
ALBUMIN SERPL BCP-MCNC: 4.4 G/DL (ref 3.5–5)
ALP SERPL-CCNC: 37 U/L (ref 34–104)
ALT SERPL W P-5'-P-CCNC: 28 U/L (ref 7–52)
ANION GAP SERPL CALCULATED.3IONS-SCNC: 7 MMOL/L (ref 4–13)
AST SERPL W P-5'-P-CCNC: 25 U/L (ref 13–39)
BASOPHILS # BLD AUTO: 0.09 THOUSANDS/ÂΜL (ref 0–0.1)
BASOPHILS NFR BLD AUTO: 1 % (ref 0–1)
BILIRUB SERPL-MCNC: 0.41 MG/DL (ref 0.2–1)
BUN SERPL-MCNC: 21 MG/DL (ref 5–25)
CALCIUM SERPL-MCNC: 9.4 MG/DL (ref 8.4–10.2)
CHLORIDE SERPL-SCNC: 104 MMOL/L (ref 96–108)
CHOLEST SERPL-MCNC: 203 MG/DL
CO2 SERPL-SCNC: 27 MMOL/L (ref 21–32)
CREAT SERPL-MCNC: 0.87 MG/DL (ref 0.6–1.3)
EOSINOPHIL # BLD AUTO: 0.23 THOUSAND/ÂΜL (ref 0–0.61)
EOSINOPHIL NFR BLD AUTO: 3 % (ref 0–6)
ERYTHROCYTE [DISTWIDTH] IN BLOOD BY AUTOMATED COUNT: 13.5 % (ref 11.6–15.1)
GFR SERPL CREATININE-BSD FRML MDRD: 66 ML/MIN/1.73SQ M
GLUCOSE P FAST SERPL-MCNC: 148 MG/DL (ref 65–99)
HCT VFR BLD AUTO: 40.7 % (ref 34.8–46.1)
HDLC SERPL-MCNC: 26 MG/DL
HGB BLD-MCNC: 13.2 G/DL (ref 11.5–15.4)
IMM GRANULOCYTES # BLD AUTO: 0.05 THOUSAND/UL (ref 0–0.2)
IMM GRANULOCYTES NFR BLD AUTO: 1 % (ref 0–2)
LDLC SERPL CALC-MCNC: 98 MG/DL (ref 0–100)
LYMPHOCYTES # BLD AUTO: 1.49 THOUSANDS/ÂΜL (ref 0.6–4.47)
LYMPHOCYTES NFR BLD AUTO: 22 % (ref 14–44)
MCH RBC QN AUTO: 29.3 PG (ref 26.8–34.3)
MCHC RBC AUTO-ENTMCNC: 32.4 G/DL (ref 31.4–37.4)
MCV RBC AUTO: 90 FL (ref 82–98)
MONOCYTES # BLD AUTO: 0.59 THOUSAND/ÂΜL (ref 0.17–1.22)
MONOCYTES NFR BLD AUTO: 9 % (ref 4–12)
NEUTROPHILS # BLD AUTO: 4.3 THOUSANDS/ÂΜL (ref 1.85–7.62)
NEUTS SEG NFR BLD AUTO: 64 % (ref 43–75)
NONHDLC SERPL-MCNC: 177 MG/DL
NRBC BLD AUTO-RTO: 0 /100 WBCS
PLATELET # BLD AUTO: 168 THOUSANDS/UL (ref 149–390)
PMV BLD AUTO: 9.4 FL (ref 8.9–12.7)
POTASSIUM SERPL-SCNC: 4.4 MMOL/L (ref 3.5–5.3)
PROT SERPL-MCNC: 7 G/DL (ref 6.4–8.4)
RBC # BLD AUTO: 4.51 MILLION/UL (ref 3.81–5.12)
SODIUM SERPL-SCNC: 138 MMOL/L (ref 135–147)
TRIGL SERPL-MCNC: 397 MG/DL
WBC # BLD AUTO: 6.75 THOUSAND/UL (ref 4.31–10.16)

## 2023-03-22 LAB
EST. AVERAGE GLUCOSE BLD GHB EST-MCNC: 146 MG/DL
HBA1C MFR BLD: 6.7 %

## 2023-03-29 ENCOUNTER — OFFICE VISIT (OUTPATIENT)
Dept: INTERNAL MEDICINE CLINIC | Age: 73
End: 2023-03-29

## 2023-03-29 VITALS
BODY MASS INDEX: 29.44 KG/M2 | HEIGHT: 62 IN | SYSTOLIC BLOOD PRESSURE: 130 MMHG | WEIGHT: 160 LBS | TEMPERATURE: 97.6 F | DIASTOLIC BLOOD PRESSURE: 68 MMHG | HEART RATE: 66 BPM | OXYGEN SATURATION: 97 %

## 2023-03-29 DIAGNOSIS — Z96.652 HISTORY OF TOTAL LEFT KNEE REPLACEMENT: ICD-10-CM

## 2023-03-29 DIAGNOSIS — E11.9 TYPE 2 DIABETES MELLITUS WITHOUT COMPLICATION, WITH LONG-TERM CURRENT USE OF INSULIN (HCC): Primary | ICD-10-CM

## 2023-03-29 DIAGNOSIS — Z00.00 ENCOUNTER FOR ANNUAL WELLNESS VISIT (AWV) IN MEDICARE PATIENT: ICD-10-CM

## 2023-03-29 DIAGNOSIS — E66.3 OVERWEIGHT (BMI 25.0-29.9): ICD-10-CM

## 2023-03-29 DIAGNOSIS — I10 ESSENTIAL HYPERTENSION: ICD-10-CM

## 2023-03-29 DIAGNOSIS — Z79.4 TYPE 2 DIABETES MELLITUS WITHOUT COMPLICATION, WITH LONG-TERM CURRENT USE OF INSULIN (HCC): Primary | ICD-10-CM

## 2023-03-29 DIAGNOSIS — F41.8 DEPRESSION WITH ANXIETY: ICD-10-CM

## 2023-03-29 DIAGNOSIS — E78.2 MIXED HYPERLIPIDEMIA: ICD-10-CM

## 2023-03-29 RX ORDER — AMOXICILLIN 500 MG/1
CAPSULE ORAL
COMMUNITY
Start: 2023-03-07 | End: 2023-03-29 | Stop reason: SDUPTHER

## 2023-03-29 RX ORDER — AMOXICILLIN 500 MG/1
CAPSULE ORAL
Qty: 12 CAPSULE | Refills: 1 | Status: SHIPPED | OUTPATIENT
Start: 2023-03-29 | End: 2023-03-29

## 2023-03-29 RX ORDER — ZOLPIDEM TARTRATE 10 MG/1
10 TABLET ORAL
Qty: 30 TABLET | Refills: 0
Start: 2023-03-29

## 2023-03-29 NOTE — PROGRESS NOTES
Assessment and Plan:     Problem List Items Addressed This Visit        Endocrine    Type 2 diabetes mellitus without complication, with long-term current use of insulin ([facility] [address] ) - Primary    Relevant Orders    Ambulatory Referral to Weight Management    Comprehensive metabolic panel    CBC and differential    HEMOGLOBIN A1C W/ EAG ESTIMATION       Cardiovascular and Mediastinum    Essential hypertension    Relevant Orders    Comprehensive metabolic panel    CBC and differential    HEMOGLOBIN A1C W/ EAG ESTIMATION       Other    Mixed hyperlipidemia    Relevant Orders    Comprehensive metabolic panel    CBC and differential    HEMOGLOBIN A1C W/ EAG ESTIMATION   Other Visit Diagnoses     History of total left knee replacement        Relevant Medications    amoxicillin (AMOXIL) 500 mg capsule    Overweight (BMI 25 0-29  9)        Relevant Orders    Ambulatory Referral to Weight Management    Depression with anxiety        Relevant Medications    zolpidem (AMBIEN) 10 mg tablet    Other Relevant Orders    Comprehensive metabolic panel    CBC and differential    HEMOGLOBIN A1C W/ EAG ESTIMATION    Encounter for annual wellness visit (AWV) in Medicare patient               Preventive health issues were discussed with patient, and age appropriate screening tests were ordered as noted in patient's After Visit Summary  Personalized health advice and appropriate referrals for health education or preventive services given if needed, as noted in patient's After Visit Summary       History of Present Illness:     Patient presents for a Medicare Wellness Visit    68 y/o female with hx of dm, htn, hld, osteoporosis, hypothyroidism presents for f/u and AWV  Pt is frustrated by her weight gain of 10 lbs over the past year  Labs reviewed, hga1c 6 7  bp controlled  Pt stopped losartan due to SE of edema and feels this has improved after stopping  Pt declines arb tx at this time       Patient Care Team:  Flory Chiu MD as PCP - MD Johann De La Cruz MD Joycie Overly, MD     Review of Systems:     Review of Systems   Constitutional: Negative for activity change, appetite change, chills, fatigue and fever  HENT: Negative for congestion, dental problem and sore throat  Eyes: Negative for pain and visual disturbance  Respiratory: Negative for cough, shortness of breath and wheezing  Cardiovascular: Negative for chest pain and leg swelling  Gastrointestinal: Negative for abdominal pain, constipation, diarrhea and nausea  Genitourinary: Negative for dysuria  Musculoskeletal: Negative for arthralgias, back pain and gait problem  Skin: Negative for rash  Neurological: Negative for dizziness, light-headedness and headaches  Hematological: Does not bruise/bleed easily  Psychiatric/Behavioral: Negative for sleep disturbance  The patient is not nervous/anxious           Problem List:     Patient Active Problem List   Diagnosis   • Tear of medial meniscus of left knee   • Abnormal EKG   • Acquired hypothyroidism   • Chronic pain of left knee   • Anxiety   • Asthma   • Neoplasm of bladder   • Bursitis of shoulder   • Chronic cough   • Dental abscess   • Hyponatremia   • Fibromyositis   • Ganglion and cyst of synovium, tendon and bursa   • Mixed hyperlipidemia   • Essential hypertension   • Malignant neoplasm of lateral wall of urinary bladder ([facility])   • Type 2 diabetes mellitus without complication, with long-term current use of insulin ([facility])   • Nontoxic single thyroid nodule   • Obesity   • Osteoporosis   • Primary osteoarthritis of left knee   • Other fatigue   • Lichenoid dermatitis   • SIADH (syndrome of inappropriate ADH production) ([facility])   • Irritant contact dermatitis   • Type 2 diabetes mellitus with complication, without long-term current use of insulin ([facility])   • Urinary frequency   • Nocturia      Past Medical and Surgical History:     Past Medical History:   Diagnosis Date   • Abdominal pain 2017   • Acute non-recurrent frontal sinusitis 2019   • Acute pain of right shoulder 1/10/2018   • Anxiety    • Arthritis    • Bladder carcinoma ([facility] ) 2016   • Bladder mass    • Depression    • Diabetes mellitus ([facility] )    • Disease of thyroid gland     thyroid nodules-not treating at this time   • Dysuria     Last assessed 17   • GERD (gastroesophageal reflux disease)    •  2 para 2    • HL (hearing loss)    • HLD (hyperlipidemia)    • HTN (hypertension)    • Hypercholesterolemia    • Hypertension    • Knee pain    • Lichen sclerosus 1828   • Melanoma ([facility] )    • Melanoma ([place] [address] )    • Mild aortic regurgitation with left ventricular dilation by prior echocardiogram    • Papanicolaou smear 2017    NEG   • PONV (postoperative nausea and vomiting)    • Tinnitus      Past Surgical History:   Procedure Laterality Date   • CARPAL TUNNEL RELEASE Right    •  SECTION      x2   • CHOLECYSTECTOMY     • CYSTOSCOPY N/A 2016    Procedure: CYSTOSCOPY WITH BIOPSIES;  Surgeon: Winsome Russell MD;  [facility];  Service:    • CYSTOSCOPY N/A 2016    Procedure: [place];  Surgeon: Winsome Russell MD;  [facility];  Service:    • CYSTOSCOPY  2020   • HERNIA REPAIR     • HI ARTHRS KNE SURG W/MENISCECTOMY MED/LAT W/SHVG Left 2016    Procedure: KNEE ARTHROSCOPIC PARTIAL MEDIAL MENISCECTOMY ;  Surgeon: Vanna Ashley MD;  [facility];  Service: Orthopedics   • HI BLADDER INSTILLATION ANTICARCINOGENIC AGENT N/A 2016    Procedure: Rishabh Hooks;  Surgeon: Winsome Russell MD;  [facility];  Service: Urology   • HI CYSTO BLADDER W/URETERAL CATHETERIZATION Bilateral 2016    Procedure: RETROGRADE PYELOGRAM ;  Surgeon: Winsome Russell MD;  [facility];  Service: Urology   • HI CYSTO BLADDER W/URETERAL CATHETERIZATION Bilateral 2017    Procedure: RETROGRADE PYELOGRAM WITH STENT EXCHANGE, RIGHT;  Surgeon: Nando Rivera Susan Barron MD;  [facility];  Service: Urology   • MN CYSTO W/INSERT URETERAL STENT Left 09/29/2016    Procedure: URETERAL STENTS;  Surgeon: Ramon Quintero MD;  [facility];  Service: Urology   • MN CYSTO W/REMOVAL OF LESIONS SMALL N/A 11/29/2016    Procedure: TRANSURETHRAL RESECTION OF BLADDER TUMOR (TURBT);   Surgeon: Ramon Quintero MD;  [facility];  Service: Urology   • MN CYSTO W/REMOVAL OF LESIONS SMALL N/A 09/29/2016    Procedure: Maria T Rosales; TUR BLADDER TUMOR ;  Surgeon: Ramon Quintero MD;  [facility];  Service: Urology   • MN CYSTO W/REMOVAL OF LESIONS SMALL N/A 09/01/2016    Procedure: TUR BLADDER TUMOR ;  Surgeon: Ramon Quintero MD;  [facility];  Service: Urology   • MN CYSTO W/REMOVAL OF LESIONS SMALL Bilateral 05/09/2017    Procedure: CYSTOSCOPY, RIGHT URETERAL WASHINGS, ;  Surgeon: Ramon Quintero MD;  [facility];  Service: Urology   • MN CYSTO W/URTROSCOPY&/PYELOSCOPY DX Right 05/09/2017    Procedure: URETEROSCOPY;  Surgeon: Ramon Quintero MD;  [facility];  Service: Urology   • MN CYSTO/URETERO W/LITHOTRIPSY &INDWELL STENT INSRT  11/29/2016    Procedure: CYSTOSCOPY URETEROSCOPY WITH LITHOTRIPSY HOLMIUM LASER RIGHT, RETROGRADE PYELOGRAM BILATERAL: AND INSERTION STENT URETERAL Right ;  Surgeon: Ramon Quintero MD;  [facility];  Service: Urology   • REMOVAL URETERAL STENT Left 11/29/2016    Procedure: REMOVAL STENT URETERAL;  Surgeon: Ramon Quintero MD;  [facility];  Service:    • REPLACEMENT TOTAL KNEE Left 12/2021   • SKIN CANCER EXCISION  2021    right arm   • TONSILLECTOMY        Family History:     Family History   Problem Relation Age of Onset   • Heart attack Mother    • Heart disease Mother    • ALS Father    • Diabetes Maternal Grandfather    • Lung cancer Paternal Grandfather    • Heart disease Maternal Grandmother    • No Known Problems Daughter    • No Known Problems Daughter    • No Known Problems Paternal Aunt    • No Known Problems Paternal Aunt    • No Known Problems Paternal Aunt    • No Known Problems Paternal Aunt    • No Known Problems Paternal Aunt    • Breast cancer Neg Hx       Social History:     Social History     Socioeconomic History   • Marital status: /Civil Union     Spouse name: None   • Number of children: 2   • Years of education: None   • Highest education level: None   Occupational History   • Occupation: RETIRED   Tobacco Use   • Smoking status: Former     Packs/day: 1 00     Years: 21 00     Pack years: 21 00     Types: Cigarettes     Start date: 1969     Quit date:      Years since quittin 2   • Smokeless tobacco: Never   Vaping Use   • Vaping Use: Never used   Substance and Sexual Activity   • Alcohol use: No   • Drug use: No   • Sexual activity: Not Currently     Partners: Male     Birth control/protection: Post-menopausal   Other Topics Concern   • None   Social History Narrative   • None     Social Determinants of Health     Financial Resource Strain: Low Risk    • Difficulty of Paying Living Expenses: Not very hard   Food Insecurity: Not on file   Transportation Needs: No Transportation Needs   • Lack of Transportation (Medical): No   • Lack of Transportation (Non-Medical): No   Physical Activity: Not on file   Stress: Not on file   Social Connections: Not on file   Intimate Partner Violence: Not on file   Housing Stability: Not on file      Medications and Allergies:     Current Outpatient Medications   Medication Sig Dispense Refill   • acetaminophen (TYLENOL) 325 mg tablet Take 650 mg by mouth 4 (four) times a day as needed for mild pain     • amLODIPine (NORVASC) 5 mg tablet TAKE 1 TABLET (5 MG TOTAL) BY MOUTH DAILY   90 tablet 1   • amoxicillin (AMOXIL) 500 mg capsule Take every 6 hours 12 capsule 1   • clonazePAM (KlonoPIN) 0 5 mg tablet Take 1 tablet (0 5 mg total) by mouth daily at bedtime 30 tablet 3   • famotidine (PEPCID) 20 mg tablet Take 1 tablet (20 mg total) by mouth daily (Patient taking differently: Take 20 mg by mouth daily As needed) 90 tablet 0   • fenofibrate (TRICOR) 145 mg tablet Take 1 tablet (145 mg total) by mouth daily 90 tablet 1   • fluticasone (FLONASE) 50 mcg/act nasal spray 1 spray into each nostril 2 (two) times a day (Patient taking differently: 1 spray into each nostril if needed) 15 8 g 0   • Insulin Glargine Solostar (Lantus SoloStar) 100 UNIT/ML SOPN Inject 0 33 mL (33 Units total) under the skin daily 10 mL 3   • Insulin Pen Needle 32G X 4 MM MISC Use in the morning 100 each 3   • levothyroxine 50 mcg tablet Take 1 tablet (50 mcg total) by mouth daily 90 tablet 1   • loratadine (CLARITIN) 10 mg tablet Take 1 tablet (10 mg total) by mouth daily (Patient taking differently: Take 10 mg by mouth daily As needed) 14 tablet 0   • metFORMIN (GLUCOPHAGE) 500 mg tablet Take 2 tablets (1,000 mg total) by mouth 2 (two) times a day with meals 360 tablet 1   • metoprolol tartrate (LOPRESSOR) 25 mg tablet Take 1 tablet (25 mg total) by mouth every 12 (twelve) hours 180 tablet 1   • mometasone (ELOCON) 0 1 % cream Apply topically once a week 45 g 4   • OneTouch Verio test strip Test 3 times daily 300 each 1   • Probiotic Product (PROBIOTIC-10) CAPS Take 1 capsule by mouth daily     • simvastatin (ZOCOR) 20 mg tablet Take 1 tablet (20 mg total) by mouth daily at bedtime 90 tablet 1   • sitaGLIPtin (Januvia) 50 mg tablet Take 1 tablet (50 mg total) by mouth daily 90 tablet 1   • zolpidem (AMBIEN) 10 mg tablet Take 1 tablet (10 mg total) by mouth daily at bedtime 30 tablet 0     No current facility-administered medications for this visit       Allergies   Allergen Reactions   • Losartan Edema   • Nickel Swelling     Swelling, irritation, lumps to ears      Immunizations:     Immunization History   Administered Date(s) Administered   • COVID-19 MODERNA VACC 0 5 ML IM 02/04/2021, 03/04/2021, 11/17/2021, 12/14/2022   • INFLUENZA 01/10/2018, 11/10/2019, 10/09/2020, 12/16/2021, 10/26/2022   • Influenza Split High Dose Preservative Free IM 01/10/2018, 10/29/2019, 10/09/2020   • Influenza, high dose seasonal 0 7 mL 10/09/2020, 12/16/2021, 10/26/2022   • Influenza, seasonal, injectable 12/13/2010, 12/13/2011, 12/09/2013, 11/10/2019   • Pneumococcal Conjugate 13-Valent 01/14/2020   • Pneumococcal Polysaccharide PPV23 11/17/2015   • Td (adult), adsorbed 12/13/2010   • Zoster 08/01/2013      Health Maintenance:         Topic Date Due   • Breast Cancer Screening: Mammogram  05/27/2023   • Colorectal Cancer Screening  02/27/2028   • Hepatitis C Screening  Completed         Topic Date Due   • COVID-19 Vaccine (5 - Booster for [place] series) 02/08/2023      Medicare Screening Tests and Risk Assessments:     Deandra Nielsen is here for her Subsequent Wellness visit  Health Risk Assessment:   Patient rates overall health as good  Patient feels that their physical health rating is same  Patient is satisfied with their life  Eyesight was rated as same  Hearing was rated as same  Patient feels that their emotional and mental health rating is same  Patients states they are sometimes angry  Patient states they are sometimes unusually tired/fatigued  Pain experienced in the last 7 days has been some  Patient's pain rating has been 3/10  Patient states that she has experienced no weight loss or gain in last 6 months  Depression Screening:   PHQ-2 Score: 1      Fall Risk Screening: In the past year, patient has experienced: no history of falling in past year      Urinary Incontinence Screening:   Patient has leaked urine accidently in the last six months  Home Safety:  Patient does not have trouble with stairs inside or outside of their home  Patient has working smoke alarms and has no working carbon monoxide detector  Home safety hazards include: none  Nutrition:   Current diet is Regular and Frequent junk food       Medications:   Patient is not currently taking any over-the-counter supplements  Patient is able to manage medications  Activities of Daily Living (ADLs)/Instrumental Activities of Daily Living (IADLs):   Walk and transfer into and out of bed and chair?: Yes  Dress and groom yourself?: Yes    Bathe or shower yourself?: Yes    Feed yourself? Yes  Do your laundry/housekeeping?: Yes  Manage your money, pay your bills and track your expenses?: Yes  Make your own meals?: Yes    Do your own shopping?: Yes    Previous Hospitalizations:   Any hospitalizations or ED visits within the last 12 months?: No      Advance Care Planning:   Living will: Yes    Advanced directive: Yes      Cognitive Screening:   Provider or family/friend/caregiver concerned regarding cognition?: No    PREVENTIVE SCREENINGS      Cardiovascular Screening:    General: Screening Not Indicated and History Lipid Disorder      Diabetes Screening:     General: Screening Not Indicated and History Diabetes      Colorectal Cancer Screening:     General: Screening Current      Breast Cancer Screening:     General: Screening Current      Cervical Cancer Screening:    General: Screening Not Indicated      Osteoporosis Screening:    General: Screening Not Indicated and History Osteoporosis      Abdominal Aortic Aneurysm (AAA) Screening:        General: Screening Not Indicated      Lung Cancer Screening:     General: Screening Not Indicated      Hepatitis C Screening:    General: Screening Current    Screening, Brief Intervention, and Referral to Treatment (SBIRT)    Screening      Single Item Drug Screening:  How often have you used an illegal drug (including marijuana) or a prescription medication for non-medical reasons in the past year? never    Single Item Drug Screen Score: 0  Interpretation: Negative screen for possible drug use disorder    Brief Intervention  Alcohol & drug use screenings were reviewed  No concerns regarding substance use disorder identified       Other Counseling Topics:   Regular "weightbearing exercise  No results found  Physical Exam:     /68 (BP Location: Left arm, Patient Position: Sitting, Cuff Size: Standard)   Pulse 66   Temp 97 6 °F (36 4 °C) (Temporal)   Ht 5' 2\" (1 575 m)   Wt 72 6 kg (160 lb)   LMP  (LMP Unknown)   SpO2 97%   BMI 29 26 kg/m²     Physical Exam  Vitals reviewed  Constitutional:       General: She is not in acute distress  Appearance: She is not toxic-appearing  HENT:      Head: Normocephalic and atraumatic  Right Ear: Tympanic membrane, ear canal and external ear normal  There is no impacted cerumen  Left Ear: Tympanic membrane, ear canal and external ear normal  There is no impacted cerumen  Nose: Nose normal       Mouth/Throat:      Mouth: Mucous membranes are moist    Eyes:      General:         Right eye: No discharge  Left eye: No discharge  Extraocular Movements: Extraocular movements intact  Conjunctiva/sclera: Conjunctivae normal       Pupils: Pupils are equal, round, and reactive to light  Cardiovascular:      Rate and Rhythm: Normal rate and regular rhythm  Pulmonary:      Effort: Pulmonary effort is normal  No respiratory distress  Breath sounds: Normal breath sounds  No wheezing or rales  Abdominal:      General: Bowel sounds are normal    Musculoskeletal:      Cervical back: Normal range of motion  Right lower leg: No edema  Left lower leg: No edema  Skin:     General: Skin is warm  Findings: No lesion or rash  Neurological:      General: No focal deficit present  Mental Status: She is alert and oriented to person, place, and time     Psychiatric:         Mood and Affect: Mood normal          Behavior: Behavior normal           Carina Ferris PA-C  "

## 2023-03-29 NOTE — PATIENT INSTRUCTIONS
Medicare Preventive Visit Patient Instructions  Thank you for completing your Welcome to Medicare Visit or Medicare Annual Wellness Visit today  Your next wellness visit will be due in one year (3/29/2024)  The screening/preventive services that you may require over the next 5-10 years are detailed below  Some tests may not apply to you based off risk factors and/or age  Screening tests ordered at today's visit but not completed yet may show as past due  Also, please note that scanned in results may not display below  Preventive Screenings:  Service Recommendations Previous Testing/Comments   Colorectal Cancer Screening  * Colonoscopy    * Fecal Occult Blood Test (FOBT)/Fecal Immunochemical Test (FIT)  * Fecal DNA/Cologuard Test  * Flexible Sigmoidoscopy Age: 39-70 years old   Colonoscopy: every 10 years (may be performed more frequently if at higher risk)  OR  FOBT/FIT: every 1 year  OR  Cologuard: every 3 years  OR  Sigmoidoscopy: every 5 years  Screening may be recommended earlier than age 39 if at higher risk for colorectal cancer  Also, an individualized decision between you and your healthcare provider will decide whether screening between the ages of 74-80 would be appropriate  Colonoscopy: 02/27/2018  FOBT/FIT: Not on file  Cologuard: Not on file  Sigmoidoscopy: Not on file    Screening Current     Breast Cancer Screening Age: 36 years old  Frequency: every 1-2 years  Not required if history of left and right mastectomy Mammogram: 05/27/2022    Screening Current   Cervical Cancer Screening Between the ages of 21-29, pap smear recommended once every 3 years  Between the ages of 33-67, can perform pap smear with HPV co-testing every 5 years     Recommendations may differ for women with a history of total hysterectomy, cervical cancer, or abnormal pap smears in past  Pap Smear: 11/14/2022    Screening Not Indicated   Hepatitis C Screening Once for adults born between 1945 and 1965  More frequently in patients at high risk for Hepatitis C Hep C Antibody: 02/22/2018    Screening Current   Diabetes Screening 1-2 times per year if you're at risk for diabetes or have pre-diabetes Fasting glucose: 148 mg/dL (3/21/2023)  A1C: 6 7 % (3/21/2023)  Screening Not Indicated  History Diabetes   Cholesterol Screening Once every 5 years if you don't have a lipid disorder  May order more often based on risk factors  Lipid panel: 03/21/2023    Screening Not Indicated  History Lipid Disorder     Other Preventive Screenings Covered by Medicare:  1  Abdominal Aortic Aneurysm (AAA) Screening: covered once if your at risk  You're considered to be at risk if you have a family history of AAA  2  Lung Cancer Screening: covers low dose CT scan once per year if you meet all of the following conditions: (1) Age 50-69; (2) No signs or symptoms of lung cancer; (3) Current smoker or have quit smoking within the last 15 years; (4) You have a tobacco smoking history of at least 20 pack years (packs per day multiplied by number of years you smoked); (5) You get a written order from a healthcare provider  3  Glaucoma Screening: covered annually if you're considered high risk: (1) You have diabetes OR (2) Family history of glaucoma OR (3)  aged 48 and older OR (3)  American aged 72 and older  3  Osteoporosis Screening: covered every 2 years if you meet one of the following conditions: (1) You're estrogen deficient and at risk for osteoporosis based off medical history and other findings; (2) Have a vertebral abnormality; (3) On glucocorticoid therapy for more than 3 months; (4) Have primary hyperparathyroidism; (5) On osteoporosis medications and need to assess response to drug therapy  · Last bone density test (DXA Scan): 06/26/2022   5  HIV Screening: covered annually if you're between the age of 15-65  Also covered annually if you are younger than 13 and older than 72 with risk factors for HIV infection   For pregnant patients, it is covered up to 3 times per pregnancy  Immunizations:  Immunization Recommendations   Influenza Vaccine Annual influenza vaccination during flu season is recommended for all persons aged >= 6 months who do not have contraindications   Pneumococcal Vaccine   * Pneumococcal conjugate vaccine = PCV13 (Prevnar 13), PCV15 (Vaxneuvance), PCV20 (Prevnar 20)  * Pneumococcal polysaccharide vaccine = PPSV23 (Pneumovax) Adults 25-60 years old: 1-3 doses may be recommended based on certain risk factors  Adults 72 years old: 1-2 doses may be recommended based off what pneumonia vaccine you previously received   Hepatitis B Vaccine 3 dose series if at intermediate or high risk (ex: diabetes, end stage renal disease, liver disease)   Tetanus (Td) Vaccine - COST NOT COVERED BY MEDICARE PART B Following completion of primary series, a booster dose should be given every 10 years to maintain immunity against tetanus  Td may also be given as tetanus wound prophylaxis  Tdap Vaccine - COST NOT COVERED BY MEDICARE PART B Recommended at least once for all adults  For pregnant patients, recommended with each pregnancy  Shingles Vaccine (Shingrix) - COST NOT COVERED BY MEDICARE PART B  2 shot series recommended in those aged 48 and above     Health Maintenance Due:      Topic Date Due   • Breast Cancer Screening: Mammogram  05/27/2023   • Colorectal Cancer Screening  02/27/2028   • Hepatitis C Screening  Completed     Immunizations Due:      Topic Date Due   • COVID-19 Vaccine (5 - Booster for Margrette Brett series) 02/08/2023     Advance Directives   What are advance directives? Advance directives are legal documents that state your wishes and plans for medical care  These plans are made ahead of time in case you lose your ability to make decisions for yourself  Advance directives can apply to any medical decision, such as the treatments you want, and if you want to donate organs  What are the types of advance directives? There are many types of advance directives, and each state has rules about how to use them  You may choose a combination of any of the following:  · Living will: This is a written record of the treatment you want  You can also choose which treatments you do not want, which to limit, and which to stop at a certain time  This includes surgery, medicine, IV fluid, and tube feedings  · Durable power of  for healthcare [facility]): This is a written record that states who you want to make healthcare choices for you when you are unable to make them for yourself  This person, called a proxy, is usually a family member or a friend  You may choose more than 1 proxy  · Do not resuscitate (DNR) order:  A DNR order is used in case your heart stops beating or you stop breathing  It is a request not to have certain forms of treatment, such as CPR  A DNR order may be included in other types of advance directives  · Medical directive: This covers the care that you want if you are in a coma, near death, or unable to make decisions for yourself  You can list the treatments you want for each condition  Treatment may include pain medicine, surgery, blood transfusions, dialysis, IV or tube feedings, and a ventilator (breathing machine)  · Values history: This document has questions about your views, beliefs, and how you feel and think about life  This information can help others choose the care that you would choose  Why are advance directives important? An advance directive helps you control your care  Although spoken wishes may be used, it is better to have your wishes written down  Spoken wishes can be misunderstood, or not followed  Treatments may be given even if you do not want them  An advance directive may make it easier for your family to make difficult choices about your care  Urinary Incontinence   Urinary incontinence (UI)  is when you lose control of your bladder   UI develops because your bladder cannot store or empty urine properly  The 3 most common types of UI are stress incontinence, urge incontinence, or both  Medicines:   · May be given to help strengthen your bladder control  Report any side effects of medication to your healthcare provider  Do pelvic muscle exercises often:  Your pelvic muscles help you stop urinating  Squeeze these muscles tight for 5 seconds, then relax for 5 seconds  Gradually work up to squeezing for 10 seconds  Do 3 sets of 15 repetitions a day, or as directed  This will help strengthen your pelvic muscles and improve bladder control  Train your bladder:  Go to the bathroom at set times, such as every 2 hours, even if you do not feel the urge to go  You can also try to hold your urine when you feel the urge to go  For example, hold your urine for 5 minutes when you feel the urge to go  As that becomes easier, hold your urine for 10 minutes  Self-care:   · Keep a UI record  Write down how often you leak urine and how much you leak  Make a note of what you were doing when you leaked urine  · Drink liquids as directed  You may need to limit the amount of liquid you drink to help control your urine leakage  Do not drink any liquid right before you go to bed  Limit or do not have drinks that contain caffeine or alcohol  · Prevent constipation  Eat a variety of high-fiber foods  Good examples are high-fiber cereals, beans, vegetables, and whole-grain breads  Walking is the best way to trigger your intestines to have a bowel movement  · Exercise regularly and maintain a healthy weight  Weight loss and exercise will decrease pressure on your bladder and help you control your leakage  · Use a catheter as directed  to help empty your bladder  A catheter is a tiny, plastic tube that is put into your bladder to drain your urine  · Go to behavior therapy as directed  Behavior therapy may be used to help you learn to control your urge to urinate      Weight Management   Why it is important to manage your weight:  Being overweight increases your risk of health conditions such as heart disease, high blood pressure, type 2 diabetes, and certain types of cancer  It can also increase your risk for osteoarthritis, sleep apnea, and other respiratory problems  Aim for a slow, steady weight loss  Even a small amount of weight loss can lower your risk of health problems  How to lose weight safely:  A safe and healthy way to lose weight is to eat fewer calories and get regular exercise  You can lose up about 1 pound a week by decreasing the number of calories you eat by 500 calories each day  Healthy meal plan for weight management:  A healthy meal plan includes a variety of foods, contains fewer calories, and helps you stay healthy  A healthy meal plan includes the following:  · Eat whole-grain foods more often  A healthy meal plan should contain fiber  Fiber is the part of grains, fruits, and vegetables that is not broken down by your body  Whole-grain foods are healthy and provide extra fiber in your diet  Some examples of whole-grain foods are whole-wheat breads and pastas, oatmeal, brown rice, and bulgur  · Eat a variety of vegetables every day  Include dark, leafy greens such as spinach, kale, gal greens, and mustard greens  Eat yellow and orange vegetables such as carrots, sweet potatoes, and winter squash  · Eat a variety of fruits every day  Choose fresh or canned fruit (canned in its own juice or light syrup) instead of juice  Fruit juice has very little or no fiber  · Eat low-fat dairy foods  Drink fat-free (skim) milk or 1% milk  Eat fat-free yogurt and low-fat cottage cheese  Try low-fat cheeses such as mozzarella and other reduced-fat cheeses  · Choose meat and other protein foods that are low in fat  Choose beans or other legumes such as split peas or lentils  Choose fish, skinless poultry (chicken or turkey), or lean cuts of red meat (beef or pork)   Before you cook meat or poultry, cut off any visible fat  · Use less fat and oil  Try baking foods instead of frying them  Add less fat, such as margarine, sour cream, regular salad dressing and mayonnaise to foods  Eat fewer high-fat foods  Some examples of high-fat foods include french fries, doughnuts, ice cream, and cakes  · Eat fewer sweets  Limit foods and drinks that are high in sugar  This includes candy, cookies, regular soda, and sweetened drinks  Exercise:  Exercise at least 30 minutes per day on most days of the week  Some examples of exercise include walking, biking, dancing, and swimming  You can also fit in more physical activity by taking the stairs instead of the elevator or parking farther away from stores  Ask your healthcare provider about the best exercise plan for you  © Copyright [employer] 2018 Information is for End User's use only and may not be sold, redistributed or otherwise used for commercial purposes   All illustrations and images included in CareNotes® are the copyrighted property of [employer]  or [address] [place] [address]

## 2023-05-16 ENCOUNTER — TELEPHONE (OUTPATIENT)
Dept: INTERNAL MEDICINE CLINIC | Age: 73
End: 2023-05-16

## 2023-05-17 DIAGNOSIS — F41.8 DEPRESSION WITH ANXIETY: ICD-10-CM

## 2023-05-17 RX ORDER — ZOLPIDEM TARTRATE 10 MG/1
10 TABLET ORAL
Qty: 30 TABLET | Refills: 0 | Status: SHIPPED | OUTPATIENT
Start: 2023-05-17

## 2023-06-14 ENCOUNTER — OFFICE VISIT (OUTPATIENT)
Dept: GYNECOLOGY | Facility: CLINIC | Age: 73
End: 2023-06-14
Payer: MEDICARE

## 2023-06-14 VITALS
BODY MASS INDEX: 29.44 KG/M2 | SYSTOLIC BLOOD PRESSURE: 132 MMHG | WEIGHT: 160 LBS | HEIGHT: 62 IN | DIASTOLIC BLOOD PRESSURE: 88 MMHG

## 2023-06-14 DIAGNOSIS — N95.2 VAGINAL ATROPHY: Primary | ICD-10-CM

## 2023-06-14 DIAGNOSIS — Q52.5 LABIAL FUSION: ICD-10-CM

## 2023-06-14 PROCEDURE — 99212 OFFICE O/P EST SF 10 MIN: CPT | Performed by: OBSTETRICS & GYNECOLOGY

## 2023-06-14 RX ORDER — ESTRADIOL 0.1 MG/G
CREAM VAGINAL
Qty: 45 G | Refills: 2 | Status: SHIPPED | OUTPATIENT
Start: 2023-06-14

## 2023-06-14 NOTE — PROGRESS NOTES
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

## 2023-06-15 ENCOUNTER — TELEPHONE (OUTPATIENT)
Dept: BARIATRICS | Facility: CLINIC | Age: 73
End: 2023-06-15

## 2023-06-19 DIAGNOSIS — F41.8 DEPRESSION WITH ANXIETY: ICD-10-CM

## 2023-06-19 DIAGNOSIS — I10 ESSENTIAL HYPERTENSION: ICD-10-CM

## 2023-06-19 RX ORDER — AMLODIPINE BESYLATE 5 MG/1
5 TABLET ORAL DAILY
Qty: 90 TABLET | Refills: 1 | Status: SHIPPED | OUTPATIENT
Start: 2023-06-19

## 2023-06-19 RX ORDER — ZOLPIDEM TARTRATE 10 MG/1
10 TABLET ORAL
Qty: 30 TABLET | Refills: 0 | Status: SHIPPED | OUTPATIENT
Start: 2023-06-19

## 2023-06-22 ENCOUNTER — HOSPITAL ENCOUNTER (OUTPATIENT)
Dept: MAMMOGRAPHY | Facility: CLINIC | Age: 73
End: 2023-06-22
Payer: MEDICARE

## 2023-06-22 VITALS — BODY MASS INDEX: 29.44 KG/M2 | WEIGHT: 160 LBS | HEIGHT: 62 IN

## 2023-06-22 DIAGNOSIS — Z12.31 ENCOUNTER FOR SCREENING MAMMOGRAM FOR MALIGNANT NEOPLASM OF BREAST: ICD-10-CM

## 2023-06-22 PROCEDURE — 77063 BREAST TOMOSYNTHESIS BI: CPT

## 2023-06-22 PROCEDURE — 77067 SCR MAMMO BI INCL CAD: CPT

## 2023-06-26 DIAGNOSIS — F41.8 DEPRESSION WITH ANXIETY: ICD-10-CM

## 2023-06-28 RX ORDER — CLONAZEPAM 0.5 MG/1
0.5 TABLET ORAL
Qty: 30 TABLET | Refills: 3 | Status: SHIPPED | OUTPATIENT
Start: 2023-06-28

## 2023-07-13 DIAGNOSIS — F41.8 DEPRESSION WITH ANXIETY: ICD-10-CM

## 2023-07-13 RX ORDER — ZOLPIDEM TARTRATE 10 MG/1
10 TABLET ORAL
Qty: 30 TABLET | Refills: 0 | Status: SHIPPED | OUTPATIENT
Start: 2023-07-13

## 2023-07-17 DIAGNOSIS — E11.9 TYPE 2 DIABETES MELLITUS WITHOUT COMPLICATION, WITH LONG-TERM CURRENT USE OF INSULIN (HCC): ICD-10-CM

## 2023-07-17 DIAGNOSIS — Z79.4 TYPE 2 DIABETES MELLITUS WITHOUT COMPLICATION, WITH LONG-TERM CURRENT USE OF INSULIN (HCC): ICD-10-CM

## 2023-07-17 RX ORDER — INSULIN GLARGINE 100 [IU]/ML
33 INJECTION, SOLUTION SUBCUTANEOUS DAILY
Qty: 29.7 ML | Refills: 1 | Status: SHIPPED | OUTPATIENT
Start: 2023-07-17 | End: 2024-01-13

## 2023-07-27 ENCOUNTER — HOSPITAL ENCOUNTER (OUTPATIENT)
Dept: ULTRASOUND IMAGING | Facility: HOSPITAL | Age: 73
Discharge: HOME | End: 2023-07-27
Payer: MEDICARE

## 2023-07-27 DIAGNOSIS — C67.9 MALIGNANT NEOPLASM OF URINARY BLADDER, UNSPECIFIED SITE (HCC): ICD-10-CM

## 2023-07-27 PROCEDURE — 76775 US EXAM ABDO BACK WALL LIM: CPT

## 2023-07-28 DIAGNOSIS — F41.8 DEPRESSION WITH ANXIETY: ICD-10-CM

## 2023-08-10 ENCOUNTER — APPOINTMENT (OUTPATIENT)
Dept: LAB | Facility: MEDICAL CENTER | Age: 73
End: 2023-08-10
Payer: MEDICARE

## 2023-08-10 DIAGNOSIS — C67.9 MALIGNANT NEOPLASM OF URINARY BLADDER, UNSPECIFIED SITE (HCC): ICD-10-CM

## 2023-08-10 PROCEDURE — 88112 CYTOPATH CELL ENHANCE TECH: CPT | Performed by: STUDENT IN AN ORGANIZED HEALTH CARE EDUCATION/TRAINING PROGRAM

## 2023-08-14 DIAGNOSIS — E78.00 HYPERCHOLESTEROLEMIA: ICD-10-CM

## 2023-08-14 DIAGNOSIS — F41.8 DEPRESSION WITH ANXIETY: ICD-10-CM

## 2023-08-14 DIAGNOSIS — R00.0 TACHYCARDIA: ICD-10-CM

## 2023-08-14 DIAGNOSIS — I10 ESSENTIAL HYPERTENSION: ICD-10-CM

## 2023-08-14 PROCEDURE — 88112 CYTOPATH CELL ENHANCE TECH: CPT | Performed by: STUDENT IN AN ORGANIZED HEALTH CARE EDUCATION/TRAINING PROGRAM

## 2023-08-14 RX ORDER — ZOLPIDEM TARTRATE 10 MG/1
10 TABLET ORAL
Qty: 30 TABLET | Refills: 0 | Status: SHIPPED | OUTPATIENT
Start: 2023-08-14

## 2023-08-14 RX ORDER — FENOFIBRATE 145 MG/1
145 TABLET, COATED ORAL DAILY
Qty: 90 TABLET | Refills: 1 | Status: SHIPPED | OUTPATIENT
Start: 2023-08-14

## 2023-08-23 ENCOUNTER — OFFICE VISIT (OUTPATIENT)
Dept: GYNECOLOGY | Facility: CLINIC | Age: 73
End: 2023-08-23
Payer: MEDICARE

## 2023-08-23 VITALS
SYSTOLIC BLOOD PRESSURE: 144 MMHG | HEIGHT: 62 IN | DIASTOLIC BLOOD PRESSURE: 86 MMHG | BODY MASS INDEX: 29.63 KG/M2 | WEIGHT: 161 LBS

## 2023-08-23 DIAGNOSIS — N95.2 VAGINAL ATROPHY: ICD-10-CM

## 2023-08-23 DIAGNOSIS — Q52.5 LABIAL FUSION: ICD-10-CM

## 2023-08-23 DIAGNOSIS — L90.0 LICHEN SCLEROSUS: Primary | ICD-10-CM

## 2023-08-23 PROCEDURE — 99212 OFFICE O/P EST SF 10 MIN: CPT | Performed by: OBSTETRICS & GYNECOLOGY

## 2023-08-23 NOTE — PROGRESS NOTES
Assessment/Plan:    Lichen sclerosus  Vaginal soreness  Normal bladder cytology August 2023  History of bladder wall carcinoma  Scheduled  for cystoscopy next week. Plan: Use the Methasone cream once a week for lichen sclerosus. May use a small amount of estrogen vaginal cream 2-3 times a week. Follow-up with urology for cystoscopy next week. Return to office November 2023 for annual exam    Subjective:      Patient ID: John Velázquez is a 75 y.o. female presents to the office complaining of mild vaginal soreness. She was seen recently and noted to have upper labial fusion. At that time it was  and bled. She was placed on estrogen vaginal cream which she has been using. She is scheduled for a cystoscopy next week and was concerned that the fusion would interfere with visualization of her urethra. On examination today the labial fusion had resolved. Urethra was easily seen and was without lesions. Lichen sclerosus is well controlled. Mild vaginal atrophy is noted.         Objective:      /86   Ht 5' 2" (1.575 m)   Wt 73 kg (161 lb)   LMP  (LMP Unknown)   BMI 29.45 kg/m²          Physical Exam

## 2023-08-24 DIAGNOSIS — E11.8 TYPE 2 DIABETES MELLITUS WITH COMPLICATION, WITHOUT LONG-TERM CURRENT USE OF INSULIN (HCC): ICD-10-CM

## 2023-08-24 DIAGNOSIS — E11.9 TYPE 2 DIABETES MELLITUS WITHOUT COMPLICATION, WITHOUT LONG-TERM CURRENT USE OF INSULIN (HCC): ICD-10-CM

## 2023-08-25 DIAGNOSIS — E78.00 HYPERCHOLESTEROLEMIA: ICD-10-CM

## 2023-08-25 DIAGNOSIS — E11.9 TYPE 2 DIABETES MELLITUS WITHOUT COMPLICATION, WITHOUT LONG-TERM CURRENT USE OF INSULIN (HCC): ICD-10-CM

## 2023-08-25 RX ORDER — SIMVASTATIN 20 MG
20 TABLET ORAL
Qty: 90 TABLET | Refills: 1 | OUTPATIENT
Start: 2023-08-25

## 2023-08-29 ENCOUNTER — PROCEDURE VISIT (OUTPATIENT)
Dept: UROLOGY | Facility: AMBULATORY SURGERY CENTER | Age: 73
End: 2023-08-29
Payer: MEDICARE

## 2023-08-29 VITALS
HEART RATE: 73 BPM | OXYGEN SATURATION: 97 % | HEIGHT: 62 IN | SYSTOLIC BLOOD PRESSURE: 118 MMHG | WEIGHT: 159.2 LBS | DIASTOLIC BLOOD PRESSURE: 72 MMHG | BODY MASS INDEX: 29.3 KG/M2

## 2023-08-29 DIAGNOSIS — C67.9 MALIGNANT NEOPLASM OF URINARY BLADDER, UNSPECIFIED SITE (HCC): Primary | ICD-10-CM

## 2023-08-29 PROCEDURE — 52000 CYSTOURETHROSCOPY: CPT | Performed by: UROLOGY

## 2023-08-29 PROCEDURE — 81002 URINALYSIS NONAUTO W/O SCOPE: CPT | Performed by: UROLOGY

## 2023-08-29 NOTE — PROGRESS NOTES
Cystoscopy     Date/Time 8/29/2023 8:15 AM     Performed by  Kelly Worthy MD   Authorized by Kelly Worthy MD         Procedure Details:  Procedure type: cystoscopy      Marleni Hollingsworth is a 34-year-old female with a history of high-grade T1 cancer dating back to 2016. (This corrects a prior reported history of low-grade superficial disease). Many years ago she received 2 years of BCG induction and maintenance. Her last upper tract imaging was a renal bladder ultrasound from July 2023 which shows a stable septated right renal cyst but otherwise an unremarkable bladder. She returns for surveillance cystoscopy today. Urine cytology was performed prior to today's office negative and is negative for malignancy. Cystoscopy Procedure note:    Risk and benefits of flexible cystoscopy were discussed. Informed consent was obtained. A urine dip is adequate for cystoscopy. The patient was placed into the modified supine position. Her genitalia was prepped and draped in a sterile fashion. Viscous lidocaine was instilled into the urethra. Flexible cystoscopy was then performed. The bladder was thoroughly inspected. Both ureteral orifices were visualized with clear efflux of urine. The bladder mucosa was thoroughly inspected. There was no evidence of mucosal abnormalities, stones, or lesions. Multiple prior biopsy sites were noted along the posterior wall bladder neck. All were negative for recurrent urothelial carcinoma. Retroflexion was normal. Overall this was a negative cystoscopy. A female office staff member was present throughout the entire procedure.     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 She will return in 1 year. She wishes to hold on cystoscopy and obtain a urine cytology. There is any sign of gross hematuria or abnormality of the cytology cystoscopy will then be recommended. The patient is amenable with this plan.

## 2023-09-10 DIAGNOSIS — E78.00 HYPERCHOLESTEROLEMIA: ICD-10-CM

## 2023-09-10 DIAGNOSIS — E11.9 TYPE 2 DIABETES MELLITUS WITHOUT COMPLICATION, WITHOUT LONG-TERM CURRENT USE OF INSULIN (HCC): ICD-10-CM

## 2023-09-11 RX ORDER — SIMVASTATIN 20 MG
20 TABLET ORAL
Qty: 90 TABLET | Refills: 1 | OUTPATIENT
Start: 2023-09-11

## 2023-09-14 DIAGNOSIS — F41.8 DEPRESSION WITH ANXIETY: ICD-10-CM

## 2023-09-14 RX ORDER — ZOLPIDEM TARTRATE 10 MG/1
10 TABLET ORAL
Qty: 30 TABLET | Refills: 0 | Status: SHIPPED | OUTPATIENT
Start: 2023-09-14

## 2023-09-26 ENCOUNTER — TELEPHONE (OUTPATIENT)
Age: 73
End: 2023-09-26

## 2023-09-26 NOTE — TELEPHONE ENCOUNTER
Caller: patient    Doctor: Yesy Snyder    Reason for call: patient called in stating that [facility] gave them a number to fax a medical record request to for appt for tomorrow    Fax # [phone]    Call back#: [phone]

## 2023-09-26 NOTE — TELEPHONE ENCOUNTER
Called pt to clarify her msg- upon going into her chart and talking to pt she states that she had a tka done two years ago- I informed her that Dr. Dany Lubin does not see other sugeons total replacements. I gave her Dr. Trev Romero name; and she was rather upset as she would really like to see Dr. Dany Lubin. I told her I could reach out to him and ask if he would work her up and go from there but she said that she respects that he does not see other surgeons work. She cancelled her apt and said she would figure out who she would like to see for her tka.

## 2023-09-28 ENCOUNTER — APPOINTMENT (OUTPATIENT)
Dept: LAB | Facility: MEDICAL CENTER | Age: 73
End: 2023-09-28
Payer: MEDICARE

## 2023-09-28 DIAGNOSIS — F41.8 DEPRESSION WITH ANXIETY: ICD-10-CM

## 2023-09-28 DIAGNOSIS — E11.9 TYPE 2 DIABETES MELLITUS WITHOUT COMPLICATION, WITH LONG-TERM CURRENT USE OF INSULIN (HCC): ICD-10-CM

## 2023-09-28 DIAGNOSIS — I10 ESSENTIAL HYPERTENSION: ICD-10-CM

## 2023-09-28 DIAGNOSIS — E78.2 MIXED HYPERLIPIDEMIA: ICD-10-CM

## 2023-09-28 DIAGNOSIS — Z79.4 TYPE 2 DIABETES MELLITUS WITHOUT COMPLICATION, WITH LONG-TERM CURRENT USE OF INSULIN (HCC): ICD-10-CM

## 2023-09-28 LAB
ALBUMIN SERPL BCP-MCNC: 4.5 G/DL (ref 3.5–5)
ALP SERPL-CCNC: 40 U/L (ref 34–104)
ALT SERPL W P-5'-P-CCNC: 36 U/L (ref 7–52)
ANION GAP SERPL CALCULATED.3IONS-SCNC: 12 MMOL/L
AST SERPL W P-5'-P-CCNC: 41 U/L (ref 13–39)
BASOPHILS # BLD AUTO: 0.07 THOUSANDS/ÂΜL (ref 0–0.1)
BASOPHILS NFR BLD AUTO: 1 % (ref 0–1)
BILIRUB SERPL-MCNC: 0.45 MG/DL (ref 0.2–1)
BUN SERPL-MCNC: 17 MG/DL (ref 5–25)
CALCIUM SERPL-MCNC: 9.8 MG/DL (ref 8.4–10.2)
CHLORIDE SERPL-SCNC: 99 MMOL/L (ref 96–108)
CO2 SERPL-SCNC: 24 MMOL/L (ref 21–32)
CREAT SERPL-MCNC: 0.83 MG/DL (ref 0.6–1.3)
EOSINOPHIL # BLD AUTO: 0.27 THOUSAND/ÂΜL (ref 0–0.61)
EOSINOPHIL NFR BLD AUTO: 5 % (ref 0–6)
ERYTHROCYTE [DISTWIDTH] IN BLOOD BY AUTOMATED COUNT: 13.7 % (ref 11.6–15.1)
EST. AVERAGE GLUCOSE BLD GHB EST-MCNC: 171 MG/DL
GFR SERPL CREATININE-BSD FRML MDRD: 69 ML/MIN/1.73SQ M
GLUCOSE P FAST SERPL-MCNC: 142 MG/DL (ref 65–99)
HBA1C MFR BLD: 7.6 %
HCT VFR BLD AUTO: 40.2 % (ref 34.8–46.1)
HGB BLD-MCNC: 13.2 G/DL (ref 11.5–15.4)
IMM GRANULOCYTES # BLD AUTO: 0.03 THOUSAND/UL (ref 0–0.2)
IMM GRANULOCYTES NFR BLD AUTO: 1 % (ref 0–2)
LYMPHOCYTES # BLD AUTO: 1.03 THOUSANDS/ÂΜL (ref 0.6–4.47)
LYMPHOCYTES NFR BLD AUTO: 18 % (ref 14–44)
MCH RBC QN AUTO: 29.9 PG (ref 26.8–34.3)
MCHC RBC AUTO-ENTMCNC: 32.8 G/DL (ref 31.4–37.4)
MCV RBC AUTO: 91 FL (ref 82–98)
MONOCYTES # BLD AUTO: 0.45 THOUSAND/ÂΜL (ref 0.17–1.22)
MONOCYTES NFR BLD AUTO: 8 % (ref 4–12)
NEUTROPHILS # BLD AUTO: 3.94 THOUSANDS/ÂΜL (ref 1.85–7.62)
NEUTS SEG NFR BLD AUTO: 67 % (ref 43–75)
NRBC BLD AUTO-RTO: 0 /100 WBCS
PLATELET # BLD AUTO: 209 THOUSANDS/UL (ref 149–390)
PMV BLD AUTO: 10.4 FL (ref 8.9–12.7)
POTASSIUM SERPL-SCNC: 5.4 MMOL/L (ref 3.5–5.3)
PROT SERPL-MCNC: 7.5 G/DL (ref 6.4–8.4)
RBC # BLD AUTO: 4.42 MILLION/UL (ref 3.81–5.12)
SODIUM SERPL-SCNC: 135 MMOL/L (ref 135–147)
WBC # BLD AUTO: 5.79 THOUSAND/UL (ref 4.31–10.16)

## 2023-09-28 PROCEDURE — 85025 COMPLETE CBC W/AUTO DIFF WBC: CPT

## 2023-09-28 PROCEDURE — 83036 HEMOGLOBIN GLYCOSYLATED A1C: CPT

## 2023-09-28 PROCEDURE — 80053 COMPREHEN METABOLIC PANEL: CPT

## 2023-09-28 PROCEDURE — 36415 COLL VENOUS BLD VENIPUNCTURE: CPT

## 2023-10-03 ENCOUNTER — OFFICE VISIT (OUTPATIENT)
Dept: INTERNAL MEDICINE CLINIC | Age: 73
End: 2023-10-03
Payer: MEDICARE

## 2023-10-03 VITALS
WEIGHT: 158 LBS | HEIGHT: 62 IN | TEMPERATURE: 97.8 F | BODY MASS INDEX: 29.08 KG/M2 | SYSTOLIC BLOOD PRESSURE: 124 MMHG | OXYGEN SATURATION: 95 % | HEART RATE: 83 BPM | DIASTOLIC BLOOD PRESSURE: 64 MMHG

## 2023-10-03 DIAGNOSIS — Z23 NEED FOR INFLUENZA VACCINATION: ICD-10-CM

## 2023-10-03 DIAGNOSIS — E78.00 HYPERCHOLESTEROLEMIA: ICD-10-CM

## 2023-10-03 DIAGNOSIS — Z79.4 TYPE 2 DIABETES MELLITUS WITHOUT COMPLICATION, WITH LONG-TERM CURRENT USE OF INSULIN (HCC): ICD-10-CM

## 2023-10-03 DIAGNOSIS — E87.5 HYPERKALEMIA: Primary | ICD-10-CM

## 2023-10-03 DIAGNOSIS — E11.9 TYPE 2 DIABETES MELLITUS WITHOUT COMPLICATION, WITH LONG-TERM CURRENT USE OF INSULIN (HCC): ICD-10-CM

## 2023-10-03 DIAGNOSIS — I10 ESSENTIAL HYPERTENSION: ICD-10-CM

## 2023-10-03 DIAGNOSIS — E03.9 ACQUIRED HYPOTHYROIDISM: ICD-10-CM

## 2023-10-03 DIAGNOSIS — E11.9 TYPE 2 DIABETES MELLITUS WITHOUT COMPLICATION, WITHOUT LONG-TERM CURRENT USE OF INSULIN (HCC): ICD-10-CM

## 2023-10-03 DIAGNOSIS — E03.9 HYPOTHYROIDISM, UNSPECIFIED TYPE: ICD-10-CM

## 2023-10-03 PROCEDURE — 99214 OFFICE O/P EST MOD 30 MIN: CPT | Performed by: PHYSICIAN ASSISTANT

## 2023-10-03 PROCEDURE — 90662 IIV NO PRSV INCREASED AG IM: CPT

## 2023-10-03 PROCEDURE — G0008 ADMIN INFLUENZA VIRUS VAC: HCPCS

## 2023-10-03 RX ORDER — LEVOTHYROXINE SODIUM 0.05 MG/1
50 TABLET ORAL DAILY
Qty: 90 TABLET | Refills: 1 | Status: SHIPPED | OUTPATIENT
Start: 2023-10-03

## 2023-10-03 RX ORDER — SIMVASTATIN 20 MG
20 TABLET ORAL
Qty: 90 TABLET | Refills: 1 | Status: SHIPPED | OUTPATIENT
Start: 2023-10-03

## 2023-10-03 NOTE — PROGRESS NOTES
Assessment/Plan:         Diagnoses and all orders for this visit:    Hyperkalemia  Comments:  low K+ diet  repeat lytes  increase water intake   Orders:  -     Electrolyte panel; Future  -     CBC and differential; Future  -     Comprehensive metabolic panel; Future  -     HEMOGLOBIN A1C W/ EAG ESTIMATION; Future  -     Albumin / creatinine urine ratio; Future    Hypercholesterolemia  Comments:  low chol diet  continue statin, tolerating well     Orders:  -     simvastatin (ZOCOR) 20 mg tablet; Take 1 tablet (20 mg total) by mouth daily at bedtime    Type 2 diabetes mellitus without complication, without long-term current use of insulin ([facility])  Comments:  discussed diet and exercise  continue [place] and [place]  +arb    Orders:  -     simvastatin (ZOCOR) 20 mg tablet; Take 1 tablet (20 mg total) by mouth daily at bedtime  -     sitaGLIPtin (Januvia) 100 mg tablet; Take 1 tablet (100 mg total) by mouth daily  -     CBC and differential; Future  -     Comprehensive metabolic panel; Future  -     HEMOGLOBIN A1C W/ EAG ESTIMATION; Future  -     Albumin / creatinine urine ratio; Future    Hypothyroidism, unspecified type  -     levothyroxine 50 mcg tablet; Take 1 tablet (50 mcg total) by mouth daily  -     CBC and differential; Future  -     Comprehensive metabolic panel; Future  -     HEMOGLOBIN A1C W/ EAG ESTIMATION; Future  -     Albumin / creatinine urine ratio; Future    Type 2 diabetes mellitus without complication, with long-term current use of insulin (HCC)  -     CBC and differential; Future  -     Comprehensive metabolic panel; Future  -     HEMOGLOBIN A1C W/ EAG ESTIMATION; Future  -     Albumin / creatinine urine ratio; Future    Acquired hypothyroidism  Comments:  continue levothyroxine    Essential hypertension  Comments:  well controlled  Orders:  -     CBC and differential; Future  -     Comprehensive metabolic panel; Future  -     HEMOGLOBIN A1C W/ EAG ESTIMATION;  Future  -     Albumin / creatinine urine ratio; Future    Need for influenza vaccination  -     influenza vaccine, high-dose, PF 0.7 mL (FLUZONE HIGH-DOSE)          Subjective:      Patient ID: Kaleb Garibay is a 75 y.o. female. 76 y/o female with hx of melanoma, htn, hld, dm, hypothyroidism presents for f/u   Pt had labs done showing elevated K+  States she has been eating a lot of tomatoes and eating potato chips  Pt states she is probably not drinking enough water and we discussed this     Pt saw dermatology for lesion on ear ([facility]) sent for pathology, still pending     DM hga1c increased from previous  Pt reports she has not been watching diet as well and eating lots of chips and unhealthy foods  Discussed daily aerobic exercise goal           The following portions of the patient's history were reviewed and updated as appropriate: allergies, current medications, past family history, past medical history, past social history, past surgical history and problem list.    Review of Systems   Constitutional: Negative for activity change, appetite change, chills, diaphoresis, fatigue and fever. HENT: Negative for congestion and sore throat. Eyes: Negative for pain and redness. Respiratory: Negative for cough and shortness of breath. Cardiovascular: Negative for chest pain, palpitations and leg swelling. Gastrointestinal: Negative for abdominal pain, constipation, diarrhea and nausea. Genitourinary: Negative for dysuria and frequency. Musculoskeletal: Positive for arthralgias and gait problem. Skin: Negative for rash. Neurological: Negative for dizziness, light-headedness and headaches. Psychiatric/Behavioral: Negative for sleep disturbance. The patient is not nervous/anxious.           Past Medical History:   Diagnosis Date   • Abdominal pain 1/16/2017   • Acute non-recurrent frontal sinusitis 1/8/2019   • Acute pain of right shoulder 1/10/2018   • Anxiety    • Arthritis    • Bladder carcinoma ([address]) 2016   • Bladder mass    • Depression • Diabetes mellitus ([address])    • Disease of thyroid gland     thyroid nodules-not treating at this time   • Dysuria     Last assessed 17   • GERD (gastroesophageal reflux disease)    •  2 para 2    • HL (hearing loss)    • HLD (hyperlipidemia)    • HTN (hypertension)    • Hypercholesterolemia    • Hypertension    • Knee pain    • Lichen sclerosus 0587   • Melanoma ([address])    • Melanoma ([address])    • Mild aortic regurgitation with left ventricular dilation by prior echocardiogram    • Papanicolaou smear 2017    NEG   • PONV (postoperative nausea and vomiting)    • Tinnitus          Current Outpatient Medications:   •  acetaminophen (TYLENOL) 325 mg tablet, Take 650 mg by mouth 4 (four) times a day as needed for mild pain, Disp: , Rfl:   •  amLODIPine (NORVASC) 5 mg tablet, Take 1 tablet (5 mg total) by mouth daily, Disp: 90 tablet, Rfl: 1  •  clonazePAM (KlonoPIN) 0.5 mg tablet, Take 1 tablet (0.5 mg total) by mouth daily at bedtime, Disp: 30 tablet, Rfl: 3  •  estradiol (ESTRACE VAGINAL) 0.1 mg/g vaginal cream, Apply small amount on fingertip.  Apply twice a week., Disp: 45 g, Rfl: 2  •  famotidine (PEPCID) 20 mg tablet, Take 1 tablet (20 mg total) by mouth daily (Patient taking differently: Take 20 mg by mouth daily As needed), Disp: 90 tablet, Rfl: 0  •  fenofibrate (TRICOR) 145 mg tablet, Take 1 tablet (145 mg total) by mouth daily, Disp: 90 tablet, Rfl: 1  •  fluticasone (FLONASE) 50 mcg/act nasal spray, 1 spray into each nostril 2 (two) times a day (Patient taking differently: 1 spray into each nostril if needed), Disp: 15.8 g, Rfl: 0  •  Ibuprofen (ADVIL PO), Take 1 tablet by mouth if needed, Disp: , Rfl:   •  Insulin Glargine Solostar (Lantus SoloStar) 100 UNIT/ML SOPN, Inject 0.33 mL (33 Units total) under the skin daily, Disp: 29.7 mL, Rfl: 1  •  Insulin Pen Needle 32G X 4 MM MISC, Use in the morning, Disp: 100 each, Rfl: 3  •  levothyroxine 50 mcg tablet, Take 1 tablet (50 mcg total) by mouth daily, Disp: 90 tablet, Rfl: 1  •  loratadine (CLARITIN) 10 mg tablet, Take 1 tablet (10 mg total) by mouth daily (Patient taking differently: Take 10 mg by mouth daily As needed), Disp: 14 tablet, Rfl: 0  •  metFORMIN (GLUCOPHAGE) 500 mg tablet, Take 2 tablets (1,000 mg total) by mouth 2 (two) times a day with meals, Disp: 360 tablet, Rfl: 1  •  metoprolol tartrate (LOPRESSOR) 25 mg tablet, Take 1 tablet (25 mg total) by mouth every 12 (twelve) hours, Disp: 180 tablet, Rfl: 1  •  mometasone (ELOCON) 0.1 % cream, Apply topically once a week, Disp: 45 g, Rfl: 4  •  OneTouch Verio test strip, Test 3 times daily, Disp: 300 each, Rfl: 1  •  Probiotic Product (PROBIOTIC-10) CAPS, Take 1 capsule by mouth daily, Disp: , Rfl:   •  simvastatin (ZOCOR) 20 mg tablet, Take 1 tablet (20 mg total) by mouth daily at bedtime, Disp: 90 tablet, Rfl: 1  •  sitaGLIPtin (Januvia) 100 mg tablet, Take 1 tablet (100 mg total) by mouth daily, Disp: 90 tablet, Rfl: 1  •  zolpidem (AMBIEN) 10 mg tablet, Take 1 tablet (10 mg total) by mouth daily at bedtime, Disp: 30 tablet, Rfl: 0    Allergies   Allergen Reactions   • Losartan Edema   • Nickel Swelling     Swelling, irritation, lumps to ears       Social History   Past Surgical History:   Procedure Laterality Date   • CARPAL TUNNEL RELEASE Right    •  SECTION      x2   • CHOLECYSTECTOMY     • CYSTOSCOPY N/A 2016    Procedure: CYSTOSCOPY WITH BIOPSIES;  Surgeon: Jesse Lozano MD;  [facility];  Service:    • CYSTOSCOPY N/A 2016    Procedure: Helio Haney;  Surgeon: Jesse Lozano MD;  [facility];  Service:    • CYSTOSCOPY  2020   • HERNIA REPAIR     • NY ARTHRS KNE SURG W/MENISCECTOMY MED/LAT W/SHVG Left 2016    Procedure: KNEE ARTHROSCOPIC PARTIAL MEDIAL MENISCECTOMY ;  Surgeon: Victor Hugo Gavin MD;  [facility];  Service: Orthopedics   • NY BLADDER INSTILLATION ANTICARCINOGENIC AGENT N/A 2016    Procedure: Madhu Fong MYTOMYCIN;  Surgeon: Toby Sy MD;  [facility];  Service: Urology   • MD CYSTO BLADDER W/URETERAL CATHETERIZATION Bilateral 09/29/2016    Procedure: RETROGRADE PYELOGRAM ;  Surgeon: Toby Sy MD;  [facility];  Service: Urology   • MD CYSTO BLADDER W/URETERAL CATHETERIZATION Bilateral 05/09/2017    Procedure: RETROGRADE PYELOGRAM WITH STENT EXCHANGE, RIGHT;  Surgeon: Toby Sy MD;  [facility];  Service: Urology   • MD CYSTO W/INSERT URETERAL STENT Left 09/29/2016    Procedure: URETERAL STENTS;  Surgeon: Toby Sy MD;  [facility];  Service: Urology   • MD CYSTO W/REMOVAL OF LESIONS SMALL N/A 11/29/2016    Procedure: TRANSURETHRAL RESECTION OF BLADDER TUMOR (TURBT);   Surgeon: Toby Sy MD;  [facility];  Service: Urology   • MD CYSTO W/REMOVAL OF LESIONS SMALL N/A 09/29/2016    Procedure: Dominguez Epps; TUR BLADDER TUMOR ;  Surgeon: Toby Sy MD;  [facility];  Service: Urology   • MD CYSTO W/REMOVAL OF LESIONS SMALL N/A 09/01/2016    Procedure: TUR BLADDER TUMOR ;  Surgeon: Toby Sy MD;  [facility];  Service: Urology   • MD CYSTO W/REMOVAL OF LESIONS SMALL Bilateral 05/09/2017    Procedure: CYSTOSCOPY, RIGHT URETERAL WASHINGS, ;  Surgeon: Toby Sy MD;  [facility];  Service: Urology   • MD CYSTO W/URTROSCOPY&/PYELOSCOPY DX Right 05/09/2017    Procedure: URETEROSCOPY;  Surgeon: Toby Sy MD;  [facility];  Service: Urology   • MD CYSTO/URETERO W/LITHOTRIPSY &INDWELL STENT INSRT  11/29/2016    Procedure: CYSTOSCOPY URETEROSCOPY WITH LITHOTRIPSY HOLMIUM LASER RIGHT, RETROGRADE PYELOGRAM BILATERAL: AND INSERTION STENT URETERAL Right ;  Surgeon: Toby Sy MD;  [facility];  Service: Urology   • REMOVAL URETERAL STENT Left 11/29/2016    Procedure: REMOVAL STENT URETERAL;  Surgeon: Toby Sy MD;  [facility];  Service:    • REPLACEMENT TOTAL KNEE Left 12/2021 • SKIN CANCER EXCISION  2021    right arm   • TONSILLECTOMY       Family History   Problem Relation Age of Onset   • Heart attack Mother    • Heart disease Mother    • ALS Father    • Diabetes Maternal Grandfather    • Lung cancer Paternal Grandfather    • Heart disease Maternal Grandmother    • No Known Problems Daughter    • No Known Problems Daughter    • No Known Problems Paternal Aunt    • No Known Problems Paternal Aunt    • No Known Problems Paternal Aunt    • No Known Problems Paternal Aunt    • No Known Problems Paternal Aunt    • Breast cancer Neg Hx        Objective:  /64 (BP Location: Left arm, Patient Position: Sitting, Cuff Size: Standard)   Pulse 83   Temp 97.8 °F (36.6 °C) (Temporal)   Ht 5' 2" (1.575 m)   Wt 71.7 kg (158 lb)   LMP  (LMP Unknown)   SpO2 95%   BMI 28.90 kg/m²        Physical Exam  Vitals reviewed. Constitutional:       General: She is not in acute distress. HENT:      Head: Normocephalic and atraumatic. Right Ear: Tympanic membrane, ear canal and external ear normal.      Left Ear: Tympanic membrane, ear canal and external ear normal.      Nose: Nose normal.      Mouth/Throat:      Mouth: Mucous membranes are moist.   Eyes:      General:         Right eye: No discharge. Left eye: No discharge. Extraocular Movements: Extraocular movements intact. Conjunctiva/sclera: Conjunctivae normal.      Pupils: Pupils are equal, round, and reactive to light. Cardiovascular:      Rate and Rhythm: Normal rate and regular rhythm. Pulmonary:      Effort: Pulmonary effort is normal. No respiratory distress. Breath sounds: Normal breath sounds. No wheezing. Abdominal:      General: Bowel sounds are normal. There is no distension. Musculoskeletal:      Cervical back: Normal range of motion. Right lower leg: No edema. Left lower leg: No edema. Skin:     Findings: No rash. Neurological:      General: No focal deficit present.       Mental Status: She is alert and oriented to person, place, and time.    Psychiatric:         Mood and Affect: Mood normal.

## 2023-10-03 NOTE — PROGRESS NOTES
Diabetic Foot Exam    Patient's shoes and socks removed. Right Foot/Ankle   Right Foot Inspection  Skin Exam: skin normal, skin intact and dry skin. No warmth, no callus, no erythema, no maceration, no abnormal color, no pre-ulcer, no ulcer and no callus. Toe Exam: ROM and strength within normal limits. No swelling, no tenderness and erythema    Sensory   Monofilament testing: intact    Vascular  The right DP pulse is 2+. The right PT pulse is 2+. Left Foot/Ankle  Left Foot Inspection  Skin Exam: skin normal, skin intact and dry skin. No warmth, no erythema, no maceration, normal color, no pre-ulcer, no ulcer and no callus. Toe Exam: ROM and strength within normal limits. No swelling, no tenderness and no erythema. Sensory   Monofilament testing: intact    Vascular  The left DP pulse is 2+. The left PT pulse is 2+.      Assign Risk Category  No deformity present  No loss of protective sensation  No weak pulses  Risk: 0

## 2023-10-13 DIAGNOSIS — F41.8 DEPRESSION WITH ANXIETY: ICD-10-CM

## 2023-10-13 RX ORDER — ZOLPIDEM TARTRATE 10 MG/1
10 TABLET ORAL
Qty: 30 TABLET | Refills: 0 | Status: SHIPPED | OUTPATIENT
Start: 2023-10-13

## 2023-10-27 ENCOUNTER — OFFICE VISIT (OUTPATIENT)
Age: 73
End: 2023-10-27
Payer: MEDICARE

## 2023-10-27 VITALS
TEMPERATURE: 97.3 F | HEART RATE: 82 BPM | BODY MASS INDEX: 29.4 KG/M2 | HEIGHT: 62 IN | WEIGHT: 159.8 LBS | OXYGEN SATURATION: 98 % | SYSTOLIC BLOOD PRESSURE: 150 MMHG | DIASTOLIC BLOOD PRESSURE: 80 MMHG

## 2023-10-27 DIAGNOSIS — I10 ESSENTIAL HYPERTENSION: ICD-10-CM

## 2023-10-27 DIAGNOSIS — E87.5 HYPERKALEMIA: ICD-10-CM

## 2023-10-27 DIAGNOSIS — K52.9 CHRONIC DIARRHEA: ICD-10-CM

## 2023-10-27 DIAGNOSIS — R19.7 DIARRHEA, UNSPECIFIED TYPE: Primary | ICD-10-CM

## 2023-10-27 DIAGNOSIS — Z79.4 TYPE 2 DIABETES MELLITUS WITHOUT COMPLICATION, WITH LONG-TERM CURRENT USE OF INSULIN (HCC): ICD-10-CM

## 2023-10-27 DIAGNOSIS — E11.9 TYPE 2 DIABETES MELLITUS WITHOUT COMPLICATION, WITH LONG-TERM CURRENT USE OF INSULIN (HCC): ICD-10-CM

## 2023-10-27 PROBLEM — K04.7 DENTAL ABSCESS: Status: RESOLVED | Noted: 2017-11-17 | Resolved: 2023-10-27

## 2023-10-27 PROCEDURE — 99214 OFFICE O/P EST MOD 30 MIN: CPT | Performed by: INTERNAL MEDICINE

## 2023-10-27 NOTE — ASSESSMENT & PLAN NOTE
Most recent A1c is 7.6 which is a little higher than the patient's usual values. She admits to some dietary noncompliance. Encouraged the patient to have a diabetic eye exam.  Renal function is stable most recent EGFR was 69.   No significant proteinuria noted on urine studies  Lab Results   Component Value Date    HGBA1C 7.6 (H) 09/28/2023

## 2023-10-27 NOTE — ASSESSMENT & PLAN NOTE
Systolic blood pressure is mildly elevated. Will defer any adjustment of her medications at this time.

## 2023-10-27 NOTE — ASSESSMENT & PLAN NOTE
Possibly consequent to elevated potassium levels. We will recheck potassium referred to gastroenterology. It has been 5 years since her most recent colonoscopy at which time a polyp was noted. We will temporarily decrease her metformin by 50% and monitor the effect on her diarrhea.

## 2023-10-27 NOTE — ASSESSMENT & PLAN NOTE
We will recheck a BMP along with a plasma potassium level. If elevated we will initiate additional studies including serum aldosterone, urinary potassium.

## 2023-10-27 NOTE — PROGRESS NOTES
Name: Catalina Doe      : 1950      MRN: [id number]  Encounter Provider: Lavonne Shelby MD  Encounter Date: 10/27/2023   Encounter department: [facility]     1. Diarrhea, unspecified type  -     Ambulatory Referral to Gastroenterology; Future  -     Potassium, Plasma; Future  -     Basic metabolic panel; Future    2. Hyperkalemia  Assessment & Plan: We will recheck a BMP along with a plasma potassium level. If elevated we will initiate additional studies including serum aldosterone, urinary potassium. Orders:  -     Potassium, Plasma; Future  -     Basic metabolic panel; Future    3. Chronic diarrhea  Assessment & Plan:  Possibly consequent to elevated potassium levels. We will recheck potassium referred to gastroenterology. It has been 5 years since her most recent colonoscopy at which time a polyp was noted. We will temporarily decrease her metformin by 50% and monitor the effect on her diarrhea. 4. Essential hypertension  Assessment & Plan:  Systolic blood pressure is mildly elevated. Will defer any adjustment of her medications at this time. 5. Type 2 diabetes mellitus without complication, with long-term current use of insulin ([address])  Assessment & Plan:  Most recent A1c is 7.6 which is a little higher than the patient's usual values. She admits to some dietary noncompliance. Encouraged the patient to have a diabetic eye exam.  Renal function is stable most recent EGFR was 69. No significant proteinuria noted on urine studies  Lab Results   Component Value Date    HGBA1C 7.6 (H) 2023             Depression Screening and Follow-up Plan: Patient was screened for depression during today's encounter. They screened negative with a PHQ-2 score of 0. Subjective      Patient presents to the office because of a history of loose bowel movements/diarrhea for the past 2 months that has been pretty consistent.   She does not experience nocturnal diarrhea. She experiences occasional cramping associated with her bowel movements but not always. Denies any bowel incontinence. She describes to painful nodules outside of her rectum which by description or probably some inflamed perirectal furuncles which opened and drained spontaneously. She states the diarrheal movements are sometimes accompanied by mucus but no blood other than from wiping. Occasionally the bowel movements have been very dark in frequently. Her most recent labs showed a very slight elevation in serum potassium of 5.4. Hemoglobin A1c was 7.6% CBC was within normal limits. Fasting glucose was elevated at 142, AST was elevated at 41. The remainder of her hepatic function studies were normal.      Review of Systems   Constitutional: Negative. HENT: Negative. Eyes: Negative. Respiratory: Negative. Cardiovascular: Negative. Gastrointestinal:  Positive for diarrhea. Negative for anal bleeding, blood in stool, constipation, nausea, rectal pain and vomiting. Endocrine: Negative. Genitourinary: Negative. Musculoskeletal: Negative. Skin: Negative. Allergic/Immunologic: Negative. Neurological: Negative. Hematological: Negative. Psychiatric/Behavioral: Negative. Current Outpatient Medications on File Prior to Visit   Medication Sig    acetaminophen (TYLENOL) 325 mg tablet Take 650 mg by mouth 4 (four) times a day as needed for mild pain    amLODIPine (NORVASC) 5 mg tablet Take 1 tablet (5 mg total) by mouth daily    clonazePAM (KlonoPIN) 0.5 mg tablet Take 1 tablet (0.5 mg total) by mouth daily at bedtime    estradiol (ESTRACE VAGINAL) 0.1 mg/g vaginal cream Apply small amount on fingertip. Apply twice a week.     famotidine (PEPCID) 20 mg tablet Take 1 tablet (20 mg total) by mouth daily (Patient taking differently: Take 20 mg by mouth daily As needed)    fenofibrate (TRICOR) 145 mg tablet Take 1 tablet (145 mg total) by mouth daily    fluticasone (FLONASE) 50 mcg/act nasal spray 1 spray into each nostril 2 (two) times a day (Patient taking differently: 1 spray into each nostril if needed)    Ibuprofen (ADVIL PO) Take 1 tablet by mouth if needed    Insulin Glargine Solostar (Lantus SoloStar) 100 UNIT/ML SOPN Inject 0.33 mL (33 Units total) under the skin daily    Insulin Pen Needle 32G X 4 MM MISC Use in the morning    levothyroxine 50 mcg tablet Take 1 tablet (50 mcg total) by mouth daily    loratadine (CLARITIN) 10 mg tablet Take 1 tablet (10 mg total) by mouth daily (Patient taking differently: Take 10 mg by mouth daily As needed)    metFORMIN (GLUCOPHAGE) 500 mg tablet Take 2 tablets (1,000 mg total) by mouth 2 (two) times a day with meals    metoprolol tartrate (LOPRESSOR) 25 mg tablet Take 1 tablet (25 mg total) by mouth every 12 (twelve) hours    mometasone (ELOCON) 0.1 % cream Apply topically once a week    OneTouch Verio test strip Test 3 times daily    Probiotic Product (PROBIOTIC-10) CAPS Take 1 capsule by mouth daily    simvastatin (ZOCOR) 20 mg tablet Take 1 tablet (20 mg total) by mouth daily at bedtime    sitaGLIPtin (Januvia) 100 mg tablet Take 1 tablet (100 mg total) by mouth daily    zolpidem (AMBIEN) 10 mg tablet Take 1 tablet (10 mg total) by mouth daily at bedtime       Objective     /80 (BP Location: Left arm, Patient Position: Sitting, Cuff Size: Standard)   Pulse 82   Temp (!) 97.3 °F (36.3 °C) (Temporal)   Ht 5' 2.05" (1.576 m)   Wt 72.5 kg (159 lb 12.8 oz)   LMP  (LMP Unknown)   SpO2 98%   BMI 29.18 kg/m²     Physical Exam  Vitals reviewed. Constitutional:       General: She is not in acute distress. Appearance: Normal appearance. She is normal weight. She is not ill-appearing, toxic-appearing or diaphoretic. HENT:      Head: Normocephalic and atraumatic.       Right Ear: External ear normal.      Left Ear: External ear normal.      Nose: Nose normal.   Eyes:      Conjunctiva/sclera: Conjunctivae normal.      Pupils: Pupils are equal, round, and reactive to light. Cardiovascular:      Rate and Rhythm: Normal rate and regular rhythm. Pulmonary:      Effort: Pulmonary effort is normal. No respiratory distress. Abdominal:      General: Abdomen is flat. Bowel sounds are normal. There is no distension. Palpations: Abdomen is soft. There is no mass. Tenderness: There is abdominal tenderness (Mild tenderness in the lower quadrants and suprapubic area). There is no guarding or rebound. Hernia: No hernia is present. Comments: Bowel sounds are positive throughout   Genitourinary:     Comments: Patient refused a rectal examination visual inspection at this time  Musculoskeletal:         General: No swelling. Cervical back: Neck supple. No rigidity. Right lower leg: No edema. Left lower leg: No edema. Skin:     Coloration: Skin is not jaundiced. Findings: No bruising, erythema or rash. Neurological:      General: No focal deficit present. Mental Status: She is alert and oriented to person, place, and time. Mental status is at baseline.    Psychiatric:         Mood and Affect: Mood normal.         Behavior: Behavior normal.       Bernadette Christianson MD

## 2023-11-01 ENCOUNTER — CONSULT (OUTPATIENT)
Dept: GASTROENTEROLOGY | Facility: CLINIC | Age: 73
End: 2023-11-01

## 2023-11-01 VITALS
DIASTOLIC BLOOD PRESSURE: 80 MMHG | BODY MASS INDEX: 29.26 KG/M2 | SYSTOLIC BLOOD PRESSURE: 140 MMHG | HEIGHT: 62 IN | WEIGHT: 159 LBS | TEMPERATURE: 98.9 F

## 2023-11-01 DIAGNOSIS — R19.7 DIARRHEA, UNSPECIFIED TYPE: ICD-10-CM

## 2023-11-01 DIAGNOSIS — Z86.010 HISTORY OF COLON POLYPS: ICD-10-CM

## 2023-11-01 DIAGNOSIS — K76.0 HEPATIC STEATOSIS: Primary | ICD-10-CM

## 2023-11-01 NOTE — PROGRESS NOTES
[facility] - Outpatient Consultation  Cassi Baird 75 y.o. female MRN: [id number]  Encounter: [id number]        ASSESSMENT AND PLAN       74 yo F with history of T2DM, hypothyroidism, asthma, osteoporosis and bladder cancer now referred to GI clinic for new consult due to chronic diarrhea with 3-4 weeks of loose, foul smelling stool that has been improving likely in the setting of oral diabetic medications vs NSAIDs vs infectious diarrhea vs microscopic colitis. 1. Chronic diarrhea  - etiology includes medication induced due to metformin (recently decreased by 50% to rule out as cause), sitagliptin, microscopic colitis, T2DM with recent Al1c of 7.6  - symptoms began about a week after increase sitagliptin dose from 50 mg to 100 mg  - new PCP has recently halved metformin   - recommended follow up with PCP to consider decreasing Januvia as well and finding alternative oral DM meds   - recommend decreasing NSAID use given side effect of diarrhea and possible cause of microscopic colitis  - will send stool studies including enteric pathogen panel, C diff, fecal fat, pancreatic elastase, O&P  - will plan for colonoscopy with Miralax/Dulcolax prep for CRC screening as well as consideration of random biopsies to rule out microscopic colitis      2. Hepatic steatosis   - seen on CT abd/pel from 2019   - RUQ US from 3/2020 showing enlarged liver with smooth contour and evidence of hepatic steatosis   - ALT slightly elevated at 41 on 9/28  - continue to monitor liver enzymes with consideration of elastography if worsen    3.  History of colon polyps  - previous screening colonoscopy in 2018 showing a single tubular adenoma   - will plan for colonoscopy for CRC screening and given recent diarrhea to evaluate for microscopic colitis     HISTORY OF PRESENT ILLNESS     Cassi Baird is our 74 yo F with history of IDDM, SIADH, hypothyroidism, asthma, HTN, osteoporosis and bladder cancer now referred to GI clinic for new consult due to chronic diarrhea. She reports loose stools for the past 2 months without nocturnal symptoms with occasional cramping. She reports 5-6 mushy BM's with a foul odor each day sometimes with mucus. Now stool is loose, but is decreasing in frequency to the point where she has only had one episode today. She went to the [place] and went to [facility] in July. She reports feeling a cyst on her buttocks twice now over the past few months. One that improved with some drainage, but returned and improved on its own. She noticed some drainage and moderate pain. She still has some discomfort on her bottom when she sits, but it is improving. She saw her PCP on 10/27 who halved her metformin dose. Last time she took abx was at least 7 months ago when she took amoxicillin prior to dental procedure given hx of knee replacement. She takes Advil 2 pills twice every other day for knee pain and other arthritis. A week prior to onset of symptoms Januvia dose was doubled. No FH of IBD or CRC. Colonoscopy 2018 showing large internal hemorrhoids, sigmoid diverticulosis and a single 3 mm sessile descending colon polyp removed with cold snare. Pathology revealed tubular adenoma. REVIEW OF SYSTEMS     CONSTITUTIONAL: Denies any fever, chills, rigors, and weight loss. HEENT: No earache or tinnitus. Denies hearing loss or visual disturbances. CARDIOVASCULAR: No chest pain or palpitations. RESPIRATORY: Denies any cough, hemoptysis, shortness of breath or dyspnea on exertion. GASTROINTESTINAL: As noted in the History of Present Illness. GENITOURINARY: No problems with urination. Denies any hematuria or dysuria. NEUROLOGIC: No dizziness or vertigo, denies headaches. MUSCULOSKELETAL: Denies any muscle or joint pain. SKIN: Denies skin rashes or itching. ENDOCRINE: Denies excessive thirst. Denies intolerance to heat or cold. PSYCHOSOCIAL: Denies depression or anxiety.  Denies any recent memory loss.        Historical information     Past Medical History:   Diagnosis Date    Abdominal pain 2017    Acute non-recurrent frontal sinusitis 2019    Acute pain of right shoulder 01/10/2018    Anxiety     Arthritis     Bladder carcinoma ([address]) 2016    Bladder mass     Dental abscess 2017    Depression     Diabetes mellitus ([address])     Disease of thyroid gland     thyroid nodules-not treating at this time    Dysuria     Last assessed 17    GERD (gastroesophageal reflux disease)      2 para 2     HL (hearing loss)     HLD (hyperlipidemia)     HTN (hypertension)     Hypercholesterolemia     Hypertension     Knee pain     Lichen sclerosus 6734    Melanoma ([address]) 2014    Melanoma ([address])     Mild aortic regurgitation with left ventricular dilation by prior echocardiogram     Papanicolaou smear 2017    NEG    PONV (postoperative nausea and vomiting)     Tinnitus      Past Surgical History:   Procedure Laterality Date    CARPAL TUNNEL RELEASE Right      SECTION      x2    CHOLECYSTECTOMY      CYSTOSCOPY N/A 2016    Procedure: CYSTOSCOPY WITH BIOPSIES;  Surgeon: Manuelito Urias MD;  [facility];  Service:     CYSTOSCOPY N/A 2016    Procedure: [place];  Surgeon: Manuelito Urias MD;  [facility];  Service:     CYSTOSCOPY  2020    HERNIA REPAIR      ND ARTHRS KNE SURG W/MENISCECTOMY MED/LAT W/SHVG Left 2016    Procedure: KNEE ARTHROSCOPIC PARTIAL MEDIAL MENISCECTOMY ;  Surgeon: Christina Funk MD;  [facility];  Service: Orthopedics    ND BLADDER INSTILLATION ANTICARCINOGENIC AGENT N/A 2016    Procedure: Tara Sales;  Surgeon: Manuelito Urias MD;  [facility];  Service: Urology    ND CYSTO BLADDER W/URETERAL CATHETERIZATION Bilateral 2016    Procedure: RETROGRADE PYELOGRAM ;  Surgeon: Manuelito Urias MD;  [facility];  Service: Urology    ND CYSTO BLADDER W/URETERAL CATHETERIZATION Bilateral 05/09/2017    Procedure: RETROGRADE PYELOGRAM WITH STENT EXCHANGE, RIGHT;  Surgeon: Claudette Lopez MD;  [facility];  Service: Urology    NJ CYSTO W/INSERT URETERAL STENT Left 09/29/2016    Procedure: URETERAL STENTS;  Surgeon: Claudette Lopez MD;  [facility];  Service: Urology    NJ CYSTO W/REMOVAL OF LESIONS SMALL N/A 11/29/2016    Procedure: TRANSURETHRAL RESECTION OF BLADDER TUMOR (TURBT);   Surgeon: Claudette Lopez MD;  [facility];  Service: Urology    NJ CYSTO W/REMOVAL OF LESIONS SMALL N/A 09/29/2016    Procedure: Beula Sloan; TUR BLADDER TUMOR ;  Surgeon: Claudette Lopez MD;  [facility];  Service: Urology    NJ CYSTO W/REMOVAL OF LESIONS SMALL N/A 09/01/2016    Procedure: TUR BLADDER TUMOR ;  Surgeon: Claudette Lopez MD;  [facility];  Service: Urology    NJ CYSTO W/REMOVAL OF LESIONS SMALL Bilateral 05/09/2017    Procedure: Beula Hector, RIGHT URETERAL WASHINGS, ;  Surgeon: Claudette Lopez MD;  [facility];  Service: Urology    NJ CYSTO W/URTROSCOPY&/PYELOSCOPY DX Right 05/09/2017    Procedure: URETEROSCOPY;  Surgeon: Claudette Lopez MD;  [facility];  Service: Urology    NJ CYSTO/URETERO W/LITHOTRIPSY &INDWELL STENT INSRT  11/29/2016    Procedure: CYSTOSCOPY URETEROSCOPY WITH LITHOTRIPSY HOLMIUM LASER RIGHT, RETROGRADE PYELOGRAM BILATERAL: AND INSERTION STENT URETERAL Right ;  Surgeon: Claudette Lopez MD;  [facility];  Service: Urology    REMOVAL URETERAL STENT Left 11/29/2016    Procedure: REMOVAL STENT URETERAL;  Surgeon: Claudette Lopez MD;  [facility];  Service:     REPLACEMENT TOTAL KNEE Left 12/2021    SKIN CANCER EXCISION  2021    right arm    TONSILLECTOMY       Social History   Social History     Substance and Sexual Activity   Alcohol Use No     Social History     Substance and Sexual Activity   Drug Use No     Social History     Tobacco Use   Smoking Status Former    Packs/day: 1.00    Years: 21.00 Total pack years: 21.00    Types: Cigarettes    Start date: 1969    Quit date:     Years since quittin.8   Smokeless Tobacco Never     Family History   Problem Relation Age of Onset    Heart attack Mother     Heart disease Mother     ALS Father     Diabetes Maternal Grandfather     Lung cancer Paternal Grandfather     Heart disease Maternal Grandmother     No Known Problems Daughter     No Known Problems Daughter     No Known Problems Paternal Aunt     No Known Problems Paternal Aunt     No Known Problems Paternal Aunt     No Known Problems Paternal Aunt     No Known Problems Paternal Aunt     Breast cancer Neg Hx        Allergies       Current Outpatient Medications:     acetaminophen (TYLENOL) 325 mg tablet    amLODIPine (NORVASC) 5 mg tablet    clonazePAM (KlonoPIN) 0.5 mg tablet    estradiol (ESTRACE VAGINAL) 0.1 mg/g vaginal cream    famotidine (PEPCID) 20 mg tablet    fenofibrate (TRICOR) 145 mg tablet    fluticasone (FLONASE) 50 mcg/act nasal spray    Ibuprofen (ADVIL PO)    Insulin Glargine Solostar (Lantus SoloStar) 100 UNIT/ML SOPN    Insulin Pen Needle 32G X 4 MM MISC    levothyroxine 50 mcg tablet    loratadine (CLARITIN) 10 mg tablet    metFORMIN (GLUCOPHAGE) 500 mg tablet    metoprolol tartrate (LOPRESSOR) 25 mg tablet    mometasone (ELOCON) 0.1 % cream    OneTouch Verio test strip    Probiotic Product (PROBIOTIC-10) CAPS    simvastatin (ZOCOR) 20 mg tablet    sitaGLIPtin (Januvia) 100 mg tablet    zolpidem (AMBIEN) 10 mg tablet    Allergies   Allergen Reactions    Losartan Edema    Nickel Swelling     Swelling, irritation, lumps to ears           Objective assessment       not currently breastfeeding. There is no height or weight on file to calculate BMI.         PHYSICAL EXAM:         Physical Exam:   GENERAL: NAD  HEENT:  NC/AT, MMM, no scleral icterus  CARDIAC:  Regular rate and rhythm  PULMONARY:  No respiratory distress, no wheezing/rales/rhonci, non-labored breathing  ABDOMEN:  Soft, NT/ND, +BS, no rebound/guarding/rigidity, rectal exam showing external hemorrhoids without cyst or drainage   Extremities:  No edema, cyanosis, or clubbing  NEUROLOGIC:  Alert/oriented x3. SKIN:  No rashes or erythema      Lab Results:      No visits with results within 1 Day(s) from this visit. Latest known visit with results is:   Appointment on 09/28/2023   Component Date Value    Sodium 09/28/2023 135     Potassium 09/28/2023 5.4 (H)     Chloride 09/28/2023 99     CO2 09/28/2023 24     ANION GAP 09/28/2023 12     BUN 09/28/2023 17     Creatinine 09/28/2023 0.83     Glucose, Fasting 09/28/2023 142 (H)     Calcium 09/28/2023 9.8     AST 09/28/2023 41 (H)     ALT 09/28/2023 36     Alkaline Phosphatase 09/28/2023 40     Total Protein 09/28/2023 7.5     Albumin 09/28/2023 4.5     Total Bilirubin 09/28/2023 0.45     eGFR 09/28/2023 69     WBC 09/28/2023 5.79     RBC 09/28/2023 4.42     Hemoglobin 09/28/2023 13.2     Hematocrit 09/28/2023 40.2     MCV 09/28/2023 91     MCH 09/28/2023 29.9     MCHC 09/28/2023 32.8     RDW 09/28/2023 13.7     MPV 09/28/2023 10.4     Platelets 54/69/8821 209     nRBC 09/28/2023 0     Neutrophils Relative 09/28/2023 67     Immat GRANS % 09/28/2023 1     Lymphocytes Relative 09/28/2023 18     Monocytes Relative 09/28/2023 8     Eosinophils Relative 09/28/2023 5     Basophils Relative 09/28/2023 1     Neutrophils Absolute 09/28/2023 3.94     Immature Grans Absolute 09/28/2023 0.03     Lymphocytes Absolute 09/28/2023 1.03     Monocytes Absolute 09/28/2023 0.45     Eosinophils Absolute 09/28/2023 0.27     Basophils Absolute 09/28/2023 0.07     Hemoglobin A1C 09/28/2023 7.6 (H)     EAG 09/28/2023 171          Radiology Results:      No results found.       Akilah Awan MD  [facility]

## 2023-11-07 ENCOUNTER — APPOINTMENT (OUTPATIENT)
Dept: LAB | Facility: MEDICAL CENTER | Age: 73
End: 2023-11-07
Payer: MEDICARE

## 2023-11-07 DIAGNOSIS — E11.9 TYPE 2 DIABETES MELLITUS WITHOUT COMPLICATION, WITHOUT LONG-TERM CURRENT USE OF INSULIN (HCC): ICD-10-CM

## 2023-11-07 DIAGNOSIS — E87.5 HYPERKALEMIA: ICD-10-CM

## 2023-11-07 DIAGNOSIS — R19.7 DIARRHEA, UNSPECIFIED TYPE: ICD-10-CM

## 2023-11-07 DIAGNOSIS — E11.9 TYPE 2 DIABETES MELLITUS WITHOUT COMPLICATION, WITH LONG-TERM CURRENT USE OF INSULIN (HCC): ICD-10-CM

## 2023-11-07 DIAGNOSIS — I10 ESSENTIAL HYPERTENSION: ICD-10-CM

## 2023-11-07 DIAGNOSIS — F41.8 DEPRESSION WITH ANXIETY: ICD-10-CM

## 2023-11-07 DIAGNOSIS — Z79.4 TYPE 2 DIABETES MELLITUS WITHOUT COMPLICATION, WITH LONG-TERM CURRENT USE OF INSULIN (HCC): ICD-10-CM

## 2023-11-07 DIAGNOSIS — E03.9 HYPOTHYROIDISM, UNSPECIFIED TYPE: ICD-10-CM

## 2023-11-07 LAB
ALBUMIN SERPL BCP-MCNC: 4.5 G/DL (ref 3.5–5)
ALP SERPL-CCNC: 41 U/L (ref 34–104)
ALT SERPL W P-5'-P-CCNC: 35 U/L (ref 7–52)
ANION GAP SERPL CALCULATED.3IONS-SCNC: 11 MMOL/L
AST SERPL W P-5'-P-CCNC: 34 U/L (ref 13–39)
BASOPHILS # BLD AUTO: 0.05 THOUSANDS/ÂΜL (ref 0–0.1)
BASOPHILS NFR BLD AUTO: 1 % (ref 0–1)
BILIRUB SERPL-MCNC: 0.44 MG/DL (ref 0.2–1)
BUN SERPL-MCNC: 17 MG/DL (ref 5–25)
CALCIUM SERPL-MCNC: 9.7 MG/DL (ref 8.4–10.2)
CHLORIDE SERPL-SCNC: 101 MMOL/L (ref 96–108)
CO2 SERPL-SCNC: 27 MMOL/L (ref 21–32)
CREAT SERPL-MCNC: 0.82 MG/DL (ref 0.6–1.3)
CREAT UR-MCNC: 87.3 MG/DL
EOSINOPHIL # BLD AUTO: 0.2 THOUSAND/ÂΜL (ref 0–0.61)
EOSINOPHIL NFR BLD AUTO: 4 % (ref 0–6)
ERYTHROCYTE [DISTWIDTH] IN BLOOD BY AUTOMATED COUNT: 13.4 % (ref 11.6–15.1)
GFR SERPL CREATININE-BSD FRML MDRD: 70 ML/MIN/1.73SQ M
GLUCOSE P FAST SERPL-MCNC: 146 MG/DL (ref 65–99)
HCT VFR BLD AUTO: 40.4 % (ref 34.8–46.1)
HGB BLD-MCNC: 13 G/DL (ref 11.5–15.4)
IMM GRANULOCYTES # BLD AUTO: 0.03 THOUSAND/UL (ref 0–0.2)
IMM GRANULOCYTES NFR BLD AUTO: 1 % (ref 0–2)
LYMPHOCYTES # BLD AUTO: 1.25 THOUSANDS/ÂΜL (ref 0.6–4.47)
LYMPHOCYTES NFR BLD AUTO: 23 % (ref 14–44)
MCH RBC QN AUTO: 30 PG (ref 26.8–34.3)
MCHC RBC AUTO-ENTMCNC: 32.2 G/DL (ref 31.4–37.4)
MCV RBC AUTO: 93 FL (ref 82–98)
MICROALBUMIN UR-MCNC: 14.5 MG/L
MICROALBUMIN/CREAT 24H UR: 17 MG/G CREATININE (ref 0–30)
MONOCYTES # BLD AUTO: 0.51 THOUSAND/ÂΜL (ref 0.17–1.22)
MONOCYTES NFR BLD AUTO: 10 % (ref 4–12)
NEUTROPHILS # BLD AUTO: 3.35 THOUSANDS/ÂΜL (ref 1.85–7.62)
NEUTS SEG NFR BLD AUTO: 61 % (ref 43–75)
NRBC BLD AUTO-RTO: 0 /100 WBCS
PLATELET # BLD AUTO: 198 THOUSANDS/UL (ref 149–390)
PMV BLD AUTO: 10.2 FL (ref 8.9–12.7)
POTASSIUM SERPL-SCNC: 4.5 MMOL/L (ref 3.5–5.3)
PROT SERPL-MCNC: 7.1 G/DL (ref 6.4–8.4)
RBC # BLD AUTO: 4.34 MILLION/UL (ref 3.81–5.12)
SODIUM SERPL-SCNC: 139 MMOL/L (ref 135–147)
WBC # BLD AUTO: 5.39 THOUSAND/UL (ref 4.31–10.16)

## 2023-11-07 PROCEDURE — 36415 COLL VENOUS BLD VENIPUNCTURE: CPT

## 2023-11-07 PROCEDURE — 82570 ASSAY OF URINE CREATININE: CPT

## 2023-11-07 PROCEDURE — 85025 COMPLETE CBC W/AUTO DIFF WBC: CPT

## 2023-11-07 PROCEDURE — 80053 COMPREHEN METABOLIC PANEL: CPT

## 2023-11-07 PROCEDURE — 82043 UR ALBUMIN QUANTITATIVE: CPT

## 2023-11-07 RX ORDER — CLONAZEPAM 0.5 MG/1
0.5 TABLET ORAL
Qty: 30 TABLET | Refills: 3 | Status: SHIPPED | OUTPATIENT
Start: 2023-11-07 | End: 2023-12-22

## 2023-11-08 ENCOUNTER — APPOINTMENT (OUTPATIENT)
Dept: LAB | Facility: MEDICAL CENTER | Age: 73
End: 2023-11-08
Payer: MEDICARE

## 2023-11-08 DIAGNOSIS — R19.7 DIARRHEA, UNSPECIFIED TYPE: ICD-10-CM

## 2023-11-08 PROCEDURE — 87505 NFCT AGENT DETECTION GI: CPT

## 2023-11-08 PROCEDURE — 87209 SMEAR COMPLEX STAIN: CPT

## 2023-11-08 PROCEDURE — 82705 FATS/LIPIDS FECES QUAL: CPT

## 2023-11-08 PROCEDURE — 87177 OVA AND PARASITES SMEARS: CPT

## 2023-11-08 PROCEDURE — 82653 EL-1 FECAL QUANTITATIVE: CPT

## 2023-11-09 LAB
C DIFF TOX GENS STL QL NAA+PROBE: NEGATIVE
CAMPYLOBACTER DNA SPEC NAA+PROBE: NORMAL
SALMONELLA DNA SPEC QL NAA+PROBE: NORMAL
SHIGA TOXIN STX GENE SPEC NAA+PROBE: NORMAL
SHIGELLA DNA SPEC QL NAA+PROBE: NORMAL

## 2023-11-10 LAB
G LAMBLIA AG STL QL IA: NEGATIVE
O+P STL CONC: NORMAL

## 2023-11-11 LAB — ELASTASE PANC STL-MCNT: 470 UG ELAST./G

## 2023-11-13 LAB
FAT STL QL: NORMAL
NEUTRAL FAT STL QL: NORMAL

## 2023-11-15 DIAGNOSIS — F41.8 DEPRESSION WITH ANXIETY: ICD-10-CM

## 2023-11-15 RX ORDER — ZOLPIDEM TARTRATE 10 MG/1
10 TABLET ORAL
Qty: 30 TABLET | Refills: 0 | Status: SHIPPED | OUTPATIENT
Start: 2023-11-15

## 2023-11-29 ENCOUNTER — ANNUAL EXAM (OUTPATIENT)
Dept: GYNECOLOGY | Facility: CLINIC | Age: 73
End: 2023-11-29
Payer: MEDICARE

## 2023-11-29 VITALS
SYSTOLIC BLOOD PRESSURE: 122 MMHG | DIASTOLIC BLOOD PRESSURE: 90 MMHG | BODY MASS INDEX: 28.34 KG/M2 | WEIGHT: 154 LBS | HEIGHT: 62 IN

## 2023-11-29 DIAGNOSIS — Q52.5 LABIAL FUSION: ICD-10-CM

## 2023-11-29 DIAGNOSIS — L90.0 LICHEN SCLEROSUS ET ATROPHICUS: ICD-10-CM

## 2023-11-29 DIAGNOSIS — Z12.31 SCREENING MAMMOGRAM FOR BREAST CANCER: Primary | ICD-10-CM

## 2023-11-29 DIAGNOSIS — N95.2 VAGINAL ATROPHY: ICD-10-CM

## 2023-11-29 PROCEDURE — G0101 CA SCREEN;PELVIC/BREAST EXAM: HCPCS | Performed by: OBSTETRICS & GYNECOLOGY

## 2023-11-29 RX ORDER — MOMETASONE FUROATE 1 MG/G
CREAM TOPICAL WEEKLY
Qty: 45 G | Refills: 4 | Status: SHIPPED | OUTPATIENT
Start: 2023-11-29

## 2023-11-29 RX ORDER — ESTRADIOL 0.1 MG/G
CREAM VAGINAL
Qty: 45 G | Refills: 2 | Status: SHIPPED | OUTPATIENT
Start: 2023-11-29

## 2023-11-29 NOTE — PROGRESS NOTES
Assessment:  Normal breast exam  Vaginal atrophy  History of labial fusion  Lichen sclerosis  History of bladder carcinoma  Normal mammogram 2023    Plan: Rx Elocon and estrogen vaginal cream.  Rx mammogram.  Continue healthy diet weightbearing and balancing exercises. Subjective:      Patient ID: Kaleb Garibay is a 75 y.o. female to the office for annual exam with no complaints. Has a history of labial fusion and felt her labia is stuck together again. Has been using estrogen vaginal cream 2 times a week and steroid cream once a week for lichen sclerosus. Denies any vaginal bleeding or pelvic pain. Not sexually active. Denies any breast or bladder issues. Denies any bowel issues. History of bladder carcinoma followed by urology. Objective:      /90   Ht 5' 2" (1.575 m)   Wt 69.9 kg (154 lb)   LMP  (LMP Unknown)   BMI 28.17 kg/m²          Physical Exam  Constitutional:       Appearance: She is well-developed. HENT:      Head: Normocephalic and atraumatic. Neck:      Thyroid: No thyromegaly. Trachea: No tracheal deviation. Cardiovascular:      Rate and Rhythm: Normal rate and regular rhythm. Heart sounds: Normal heart sounds. Pulmonary:      Effort: Pulmonary effort is normal. No respiratory distress. Breath sounds: Normal breath sounds. No stridor. No wheezing or rales. Chest:      Chest wall: No tenderness. Breasts:     Breasts are symmetrical.      Right: No inverted nipple, mass, nipple discharge, skin change or tenderness. Left: No inverted nipple, mass, nipple discharge, skin change or tenderness. Abdominal:      General: Bowel sounds are normal. There is no distension. Palpations: Abdomen is soft. There is no mass. Tenderness: There is no abdominal tenderness. There is no guarding or rebound. Hernia: No hernia is present. There is no hernia in the left inguinal area.    Genitourinary:     Labia:         Right: No rash, tenderness, lesion or injury. Left: No rash, tenderness, lesion or injury. Vagina: Normal. No signs of injury and foreign body. No vaginal discharge, erythema, tenderness or bleeding. Cervix: No cervical motion tenderness, discharge or friability. Uterus: Not deviated, not enlarged, not fixed and not tender. Adnexa:         Right: No mass, tenderness or fullness. Left: No mass, tenderness or fullness. Rectum: No mass, anal fissure, external hemorrhoid or internal hemorrhoid. Comments: Vaginal atrophy. Slight flat white area (stable lichen sclerosus). Normal urethra and Emelle's glands. No uterine prolapse cystocele or rectocele. Musculoskeletal:      Cervical back: Normal range of motion and neck supple. Lymphadenopathy:      Lower Body: No right inguinal adenopathy. No left inguinal adenopathy. Skin:     General: Skin is warm and dry. Neurological:      Mental Status: She is alert and oriented to person, place, and time. Psychiatric:         Behavior: Behavior normal.         Thought Content:  Thought content normal.         Judgment: Judgment normal.

## 2023-12-13 DIAGNOSIS — F41.8 DEPRESSION WITH ANXIETY: ICD-10-CM

## 2023-12-13 RX ORDER — ZOLPIDEM TARTRATE 10 MG/1
10 TABLET ORAL
Qty: 30 TABLET | Refills: 0 | Status: SHIPPED | OUTPATIENT
Start: 2023-12-13

## 2023-12-21 DIAGNOSIS — I10 ESSENTIAL HYPERTENSION: ICD-10-CM

## 2023-12-21 RX ORDER — AMLODIPINE BESYLATE 5 MG/1
5 TABLET ORAL DAILY
Qty: 90 TABLET | Refills: 1 | Status: SHIPPED | OUTPATIENT
Start: 2023-12-21

## 2023-12-28 DIAGNOSIS — F41.8 DEPRESSION WITH ANXIETY: ICD-10-CM

## 2023-12-29 ENCOUNTER — TELEPHONE (OUTPATIENT)
Dept: INTERNAL MEDICINE CLINIC | Age: 73
End: 2023-12-29

## 2023-12-29 NOTE — TELEPHONE ENCOUNTER
Next Office Visit 2/6/24      Please refill klonopin. I dont see it on her list since it was cancelled so I can't request it :(

## 2023-12-30 DIAGNOSIS — F41.9 ANXIETY: Primary | ICD-10-CM

## 2023-12-30 RX ORDER — CLONAZEPAM 0.5 MG/1
0.5 TABLET ORAL
Qty: 30 TABLET | Refills: 1 | Status: SHIPPED | OUTPATIENT
Start: 2023-12-30 | End: 2024-02-28

## 2024-01-10 DIAGNOSIS — E11.9 TYPE 2 DIABETES MELLITUS WITHOUT COMPLICATION, WITH LONG-TERM CURRENT USE OF INSULIN (HCC): ICD-10-CM

## 2024-01-10 DIAGNOSIS — Z79.4 TYPE 2 DIABETES MELLITUS WITHOUT COMPLICATION, WITH LONG-TERM CURRENT USE OF INSULIN (HCC): ICD-10-CM

## 2024-01-10 DIAGNOSIS — F41.8 DEPRESSION WITH ANXIETY: ICD-10-CM

## 2024-01-10 RX ORDER — ZOLPIDEM TARTRATE 10 MG/1
10 TABLET ORAL
Qty: 30 TABLET | Refills: 0 | Status: SHIPPED | OUTPATIENT
Start: 2024-01-10

## 2024-01-26 DIAGNOSIS — E11.9 TYPE 2 DIABETES MELLITUS WITHOUT COMPLICATION, WITH LONG-TERM CURRENT USE OF INSULIN (HCC): ICD-10-CM

## 2024-01-26 DIAGNOSIS — Z79.4 TYPE 2 DIABETES MELLITUS WITHOUT COMPLICATION, WITH LONG-TERM CURRENT USE OF INSULIN (HCC): ICD-10-CM

## 2024-01-26 RX ORDER — INSULIN GLARGINE 100 [IU]/ML
INJECTION, SOLUTION SUBCUTANEOUS
Refills: 1 | OUTPATIENT
Start: 2024-01-26

## 2024-01-26 RX ORDER — INSULIN GLARGINE 100 [IU]/ML
33 INJECTION, SOLUTION SUBCUTANEOUS DAILY
Qty: 29.7 ML | Refills: 1 | Status: SHIPPED | OUTPATIENT
Start: 2024-01-26 | End: 2024-07-24

## 2024-02-06 ENCOUNTER — OFFICE VISIT (OUTPATIENT)
Dept: INTERNAL MEDICINE CLINIC | Age: 74
End: 2024-02-06
Payer: MEDICARE

## 2024-02-06 VITALS
HEIGHT: 62 IN | DIASTOLIC BLOOD PRESSURE: 76 MMHG | TEMPERATURE: 97.6 F | SYSTOLIC BLOOD PRESSURE: 142 MMHG | OXYGEN SATURATION: 98 % | WEIGHT: 156 LBS | HEART RATE: 64 BPM | BODY MASS INDEX: 28.71 KG/M2

## 2024-02-06 DIAGNOSIS — I10 ESSENTIAL HYPERTENSION: ICD-10-CM

## 2024-02-06 DIAGNOSIS — E78.00 HYPERCHOLESTEROLEMIA: ICD-10-CM

## 2024-02-06 DIAGNOSIS — F41.8 DEPRESSION WITH ANXIETY: ICD-10-CM

## 2024-02-06 DIAGNOSIS — F51.01 PRIMARY INSOMNIA: ICD-10-CM

## 2024-02-06 DIAGNOSIS — E22.2 SIADH (SYNDROME OF INAPPROPRIATE ADH PRODUCTION) (HCC): ICD-10-CM

## 2024-02-06 DIAGNOSIS — C67.2 MALIGNANT NEOPLASM OF LATERAL WALL OF URINARY BLADDER (HCC): ICD-10-CM

## 2024-02-06 DIAGNOSIS — Z79.4 TYPE 2 DIABETES MELLITUS WITHOUT COMPLICATION, WITH LONG-TERM CURRENT USE OF INSULIN (HCC): Primary | ICD-10-CM

## 2024-02-06 DIAGNOSIS — E11.9 TYPE 2 DIABETES MELLITUS WITHOUT COMPLICATION, WITH LONG-TERM CURRENT USE OF INSULIN (HCC): Primary | ICD-10-CM

## 2024-02-06 PROCEDURE — 99213 OFFICE O/P EST LOW 20 MIN: CPT | Performed by: PHYSICIAN ASSISTANT

## 2024-02-06 RX ORDER — ZOLPIDEM TARTRATE 10 MG/1
10 TABLET ORAL
Qty: 30 TABLET | Refills: 1 | Status: SHIPPED | OUTPATIENT
Start: 2024-02-06

## 2024-02-06 RX ORDER — FENOFIBRATE 145 MG/1
145 TABLET, COATED ORAL DAILY
Qty: 90 TABLET | Refills: 1 | Status: SHIPPED | OUTPATIENT
Start: 2024-02-06

## 2024-02-06 NOTE — PROGRESS NOTES
Assessment/Plan:         Diagnoses and all orders for this visit:    Type 2 diabetes mellitus without complication, with long-term current use of insulin ([facility])  Comments:  due for labs  Orders:  -     Ambulatory Referral to Ophthalmology; Future  -     Comprehensive metabolic panel; Future  -     CBC and differential; Future  -     TSH, 3rd generation; Future  -     Hemoglobin A1C; Future  -     Lipid panel; Future    Hypercholesterolemia  Comments:  low chol diet  continue statin, tolerating well     Orders:  -     fenofibrate (TRICOR) 145 mg tablet; Take 1 tablet (145 mg total) by mouth daily  -     Comprehensive metabolic panel; Future  -     CBC and differential; Future  -     TSH, 3rd generation; Future  -     Hemoglobin A1C; Future  -     Lipid panel; Future    Malignant neoplasm of lateral wall of urinary bladder (HCC)    SIADH (syndrome of inappropriate ADH production) ([facility])  -     Comprehensive metabolic panel; Future  -     CBC and differential; Future  -     TSH, 3rd generation; Future  -     Hemoglobin A1C; Future  -     Lipid panel; Future    Depression with anxiety  -     zolpidem (AMBIEN) 10 mg tablet; Take 1 tablet (10 mg total) by mouth daily at bedtime  -     Comprehensive metabolic panel; Future  -     CBC and differential; Future  -     TSH, 3rd generation; Future  -     Hemoglobin A1C; Future  -     Lipid panel; Future    Primary insomnia  Comments:  has been on ambien due to chronic insomnia for many years  related to family illness and anxiety  Orders:  -     zolpidem (AMBIEN) 10 mg tablet; Take 1 tablet (10 mg total) by mouth daily at bedtime  -     Comprehensive metabolic panel; Future  -     CBC and differential; Future  -     TSH, 3rd generation; Future  -     Hemoglobin A1C; Future  -     Lipid panel; Future    Essential hypertension  -     Comprehensive metabolic panel; Future  -     CBC and differential; Future  -     TSH, 3rd generation; Future  -     Hemoglobin A1C; Future  -      Lipid panel; Future          Subjective:      Patient ID: Shellie Lee is a 73 y.o. female.    72 y/o female with hx of dm, htn, hld, presents for f/u   Pt reports continued need for ambien - has taken for many years and is dependant on this  Pt with hx of NELIA/depression  No recent labs  Pt reports she has not been checking BS at home            The following portions of the patient's history were reviewed and updated as appropriate: allergies, current medications, past family history, past medical history, past social history, past surgical history, and problem list.    Review of Systems   Constitutional:  Negative for activity change, appetite change, chills, diaphoresis, fatigue and fever.   HENT:  Negative for congestion and sore throat.    Eyes:  Negative for pain and redness.   Respiratory:  Negative for cough and shortness of breath.    Cardiovascular:  Negative for chest pain and leg swelling.   Gastrointestinal:  Negative for abdominal pain, constipation, diarrhea and nausea.   Genitourinary:  Negative for dysuria.   Musculoskeletal:  Negative for arthralgias, back pain and gait problem.   Skin:  Negative for rash.   Neurological:  Negative for dizziness and headaches.   Hematological:  Does not bruise/bleed easily.   Psychiatric/Behavioral:  Negative for sleep disturbance. The patient is not nervous/anxious.          Past Medical History:   Diagnosis Date    Abdominal pain 2017    Acute non-recurrent frontal sinusitis 2019    Acute pain of right shoulder 01/10/2018    Anxiety     Arthritis     Bladder carcinoma (HCC) 2016    Bladder mass     Dental abscess 2017    Depression     Diabetes mellitus (HCC)     Disease of thyroid gland     thyroid nodules-not treating at this time    Dysuria     Last assessed 17    GERD (gastroesophageal reflux disease)      2 para 2     HL (hearing loss)     HLD (hyperlipidemia)     HTN (hypertension)     Hypercholesterolemia     Hypertension      Knee pain     Lichen sclerosus 2020    Melanoma (HCC) 2014    Melanoma (HCC)     Mild aortic regurgitation with left ventricular dilation by prior echocardiogram     Papanicolaou smear 04/17/2017    NEG    PONV (postoperative nausea and vomiting)     Tinnitus          Current Outpatient Medications:     acetaminophen (TYLENOL) 325 mg tablet, Take 650 mg by mouth 4 (four) times a day as needed for mild pain, Disp: , Rfl:     amLODIPine (NORVASC) 5 mg tablet, Take 1 tablet (5 mg total) by mouth daily, Disp: 90 tablet, Rfl: 1    clonazePAM (KlonoPIN) 0.5 mg tablet, Take 1 tablet (0.5 mg total) by mouth daily at bedtime, Disp: 30 tablet, Rfl: 1    estradiol (ESTRACE VAGINAL) 0.1 mg/g vaginal cream, Apply small amount on fingertip. Apply twice a week., Disp: 45 g, Rfl: 2    famotidine (PEPCID) 20 mg tablet, Take 1 tablet (20 mg total) by mouth daily (Patient taking differently: Take 20 mg by mouth daily As needed), Disp: 90 tablet, Rfl: 0    fenofibrate (TRICOR) 145 mg tablet, Take 1 tablet (145 mg total) by mouth daily, Disp: 90 tablet, Rfl: 1    fluticasone (FLONASE) 50 mcg/act nasal spray, 1 spray into each nostril 2 (two) times a day (Patient taking differently: 1 spray into each nostril if needed), Disp: 15.8 g, Rfl: 0    Ibuprofen (ADVIL PO), Take 1 tablet by mouth if needed, Disp: , Rfl:     Insulin Glargine Solostar (Lantus SoloStar) 100 UNIT/ML SOPN, Inject 0.33 mL (33 Units total) under the skin daily, Disp: 29.7 mL, Rfl: 1    Insulin Pen Needle 32G X 4 MM MISC, Use in the morning, Disp: 100 each, Rfl: 3    levothyroxine 50 mcg tablet, Take 1 tablet (50 mcg total) by mouth daily, Disp: 90 tablet, Rfl: 1    loratadine (CLARITIN) 10 mg tablet, Take 1 tablet (10 mg total) by mouth daily (Patient taking differently: Take 10 mg by mouth daily As needed), Disp: 14 tablet, Rfl: 0    metFORMIN (GLUCOPHAGE) 500 mg tablet, Take 2 tablets (1,000 mg total) by mouth 2 (two) times a day with meals, Disp: 360 tablet, Rfl:  1    metoprolol tartrate (LOPRESSOR) 25 mg tablet, Take 1 tablet (25 mg total) by mouth every 12 (twelve) hours, Disp: 180 tablet, Rfl: 1    mometasone (ELOCON) 0.1 % cream, Apply topically once a week, Disp: 45 g, Rfl: 4    OneTouch Verio test strip, Test 3 times daily, Disp: 300 each, Rfl: 1    Probiotic Product (PROBIOTIC-10) CAPS, Take 1 capsule by mouth daily, Disp: , Rfl:     simvastatin (ZOCOR) 20 mg tablet, Take 1 tablet (20 mg total) by mouth daily at bedtime, Disp: 90 tablet, Rfl: 1    sitaGLIPtin (Januvia) 100 mg tablet, Take 1 tablet (100 mg total) by mouth daily, Disp: 90 tablet, Rfl: 1    zolpidem (AMBIEN) 10 mg tablet, Take 1 tablet (10 mg total) by mouth daily at bedtime, Disp: 30 tablet, Rfl: 1    Allergies   Allergen Reactions    Losartan Edema    Nickel Swelling     Swelling, irritation, lumps to ears       Social History   Past Surgical History:   Procedure Laterality Date    CARPAL TUNNEL RELEASE Right      SECTION      x2    CHOLECYSTECTOMY      CYSTOSCOPY N/A 2016    Procedure: CYSTOSCOPY WITH BIOPSIES;  Surgeon: Lane Rios MD;  [facility];  Service:     CYSTOSCOPY N/A 2016    Procedure: CYSTOSCOPY;  Surgeon: Lane Rios MD;  [facility];  Service:     CYSTOSCOPY  2020    HERNIA REPAIR      WY ARTHRS KNE SURG W/MENISCECTOMY MED/LAT W/SHVG Left 2016    Procedure: KNEE ARTHROSCOPIC PARTIAL MEDIAL MENISCECTOMY ;  Surgeon: Rehan Pete MD;  [facility];  Service: Orthopedics    WY BLADDER INSTILLATION ANTICARCINOGENIC AGENT N/A 2016    Procedure: INSTILLATION MYTOMYCIN;  Surgeon: Lane Rios MD;  [facility];  Service: Urology    WY CYSTO BLADDER W/URETERAL CATHETERIZATION Bilateral 2016    Procedure: RETROGRADE PYELOGRAM ;  Surgeon: Lane Rios MD;  [facility];  Service: Urology    WY CYSTO BLADDER W/URETERAL CATHETERIZATION Bilateral 2017    Procedure: RETROGRADE PYELOGRAM WITH  STENT EXCHANGE, RIGHT;  Surgeon: Lane Rios MD;  [facility];  Service: Urology    AR CYSTO W/INSERT URETERAL STENT Left 09/29/2016    Procedure: URETERAL STENTS;  Surgeon: Lane Rios MD;  [facility];  Service: Urology    AR CYSTO W/REMOVAL OF LESIONS SMALL N/A 11/29/2016    Procedure: TRANSURETHRAL RESECTION OF BLADDER TUMOR (TURBT);  Surgeon: Lane Rios MD;  [facility];  Service: Urology    AR CYSTO W/REMOVAL OF LESIONS SMALL N/A 09/29/2016    Procedure: CYSTOSCOPY; TUR BLADDER TUMOR ;  Surgeon: Lane Rios MD;  [facility];  Service: Urology    AR CYSTO W/REMOVAL OF LESIONS SMALL N/A 09/01/2016    Procedure: TUR BLADDER TUMOR ;  Surgeon: Lane Rios MD;  [facility];  Service: Urology    AR CYSTO W/REMOVAL OF LESIONS SMALL Bilateral 05/09/2017    Procedure: CYSTOSCOPY, RIGHT URETERAL WASHINGS, ;  Surgeon: Lane Rios MD;  [facility];  Service: Urology    AR CYSTO W/URTROSCOPY&/PYELOSCOPY DX Right 05/09/2017    Procedure: URETEROSCOPY;  Surgeon: Lane Rios MD;  [facility];  Service: Urology    AR CYSTO/URETERO W/LITHOTRIPSY &INDWELL STENT INSRT  11/29/2016    Procedure: CYSTOSCOPY URETEROSCOPY WITH LITHOTRIPSY HOLMIUM LASER RIGHT, RETROGRADE PYELOGRAM BILATERAL: AND INSERTION STENT URETERAL Right ;  Surgeon: Lane Rios MD;  [facility];  Service: Urology    REMOVAL URETERAL STENT Left 11/29/2016    Procedure: REMOVAL STENT URETERAL;  Surgeon: Lane Rios MD;  [facility];  Service:     REPLACEMENT TOTAL KNEE Left 12/2021    SKIN CANCER EXCISION  2021    right arm    TONSILLECTOMY       Family History   Problem Relation Age of Onset    Heart attack Mother     Heart disease Mother     ALS Father     Diabetes Maternal Grandfather     Lung cancer Paternal Grandfather     Heart disease Maternal Grandmother     No Known Problems Daughter     No Known Problems Daughter     No Known Problems  "Paternal Aunt     No Known Problems Paternal Aunt     No Known Problems Paternal Aunt     No Known Problems Paternal Aunt     No Known Problems Paternal Aunt     Breast cancer Neg Hx        Objective:  /76 (BP Location: Left arm, Patient Position: Sitting, Cuff Size: Standard)   Pulse 64   Temp 97.6 °F (36.4 °C) (Temporal)   Ht 5' 2\" (1.575 m)   Wt 70.8 kg (156 lb)   LMP  (LMP Unknown)   SpO2 98%   BMI 28.53 kg/m²        Physical Exam  Vitals reviewed.   Constitutional:       General: She is not in acute distress.  HENT:      Head: Normocephalic and atraumatic.      Nose: Nose normal.   Eyes:      General:         Right eye: No discharge.         Left eye: No discharge.      Extraocular Movements: Extraocular movements intact.      Conjunctiva/sclera: Conjunctivae normal.      Pupils: Pupils are equal, round, and reactive to light.   Cardiovascular:      Rate and Rhythm: Normal rate and regular rhythm.   Pulmonary:      Effort: Pulmonary effort is normal. No respiratory distress.      Breath sounds: Normal breath sounds. No wheezing or rales.   Musculoskeletal:      Cervical back: Normal range of motion.      Right lower leg: No edema.      Left lower leg: No edema.   Neurological:      General: No focal deficit present.      Mental Status: She is alert and oriented to person, place, and time.         "

## 2024-03-15 LAB
LEFT EYE DIABETIC RETINOPATHY: POSITIVE
RIGHT EYE DIABETIC RETINOPATHY: POSITIVE

## 2024-04-08 DIAGNOSIS — F41.8 DEPRESSION WITH ANXIETY: ICD-10-CM

## 2024-04-08 DIAGNOSIS — F41.9 ANXIETY: ICD-10-CM

## 2024-04-08 DIAGNOSIS — F51.01 PRIMARY INSOMNIA: ICD-10-CM

## 2024-04-08 RX ORDER — CLONAZEPAM 0.5 MG/1
0.5 TABLET ORAL
Qty: 30 TABLET | Refills: 1 | Status: SHIPPED | OUTPATIENT
Start: 2024-04-08 | End: 2024-06-07

## 2024-04-08 RX ORDER — ZOLPIDEM TARTRATE 10 MG/1
10 TABLET ORAL
Qty: 30 TABLET | Refills: 1 | Status: SHIPPED | OUTPATIENT
Start: 2024-04-08

## 2024-04-25 ENCOUNTER — TELEPHONE (OUTPATIENT)
Age: 74
End: 2024-04-25

## 2024-04-25 NOTE — TELEPHONE ENCOUNTER
"Patient called the RX Refill Line.  Message is being forwarded to the office.     Patient is requesting   A script be sent to her pharmacy for her zolpidem tartrate to be fill on MAY 3RD.    Patient is leaving for vacation on May 4th and will run out of her current script while she is gone. Please make sure it is noticed that it can be filled on May 3rd as it will be considered an \"early refill\" and the pharmacy is very strict about this.    Please contact patient at   [phone]  "

## 2024-05-09 ENCOUNTER — TELEPHONE (OUTPATIENT)
Dept: UROLOGY | Facility: AMBULATORY SURGERY CENTER | Age: 74
End: 2024-05-09

## 2024-05-09 NOTE — TELEPHONE ENCOUNTER
Called the patient and left a VM, we are calling to schedule the 1 yr follow-up, please call [phone] and we would be more than happy to assist you in scheduling the appointment, thank-you.      Please schedule with [facility], 1 year (around 8/29/2024) for 1 yr with [facility] with cytology, thank-you.

## 2024-05-13 ENCOUNTER — APPOINTMENT (EMERGENCY)
Dept: CT IMAGING | Facility: HOSPITAL | Age: 74
End: 2024-05-13
Payer: MEDICARE

## 2024-05-13 ENCOUNTER — HOSPITAL ENCOUNTER (OUTPATIENT)
Facility: HOSPITAL | Age: 74
Setting detail: OBSERVATION
Discharge: HOME | End: 2024-05-14
Attending: EMERGENCY MEDICINE | Admitting: INTERNAL MEDICINE
Payer: MEDICARE

## 2024-05-13 DIAGNOSIS — H53.8 BLURRED VISION: ICD-10-CM

## 2024-05-13 DIAGNOSIS — E83.42 HYPOMAGNESEMIA: Primary | ICD-10-CM

## 2024-05-13 DIAGNOSIS — H53.2 TRANSIENT DIPLOPIA: ICD-10-CM

## 2024-05-13 LAB
ALBUMIN SERPL BCP-MCNC: 4.6 G/DL (ref 3.5–5)
ALP SERPL-CCNC: 45 U/L (ref 34–104)
ALT SERPL W P-5'-P-CCNC: 22 U/L (ref 7–52)
ANION GAP SERPL CALCULATED.3IONS-SCNC: 10 MMOL/L (ref 4–13)
APTT PPP: 25 SECONDS (ref 23–37)
AST SERPL W P-5'-P-CCNC: 22 U/L (ref 13–39)
BASOPHILS # BLD AUTO: 0.04 THOUSANDS/ÂΜL (ref 0–0.1)
BASOPHILS NFR BLD AUTO: 1 % (ref 0–1)
BILIRUB SERPL-MCNC: 0.46 MG/DL (ref 0.2–1)
BUN SERPL-MCNC: 16 MG/DL (ref 5–25)
CALCIUM SERPL-MCNC: 9.3 MG/DL (ref 8.4–10.2)
CHLORIDE SERPL-SCNC: 101 MMOL/L (ref 96–108)
CO2 SERPL-SCNC: 26 MMOL/L (ref 21–32)
CREAT SERPL-MCNC: 0.86 MG/DL (ref 0.6–1.3)
EOSINOPHIL # BLD AUTO: 0.25 THOUSAND/ÂΜL (ref 0–0.61)
EOSINOPHIL NFR BLD AUTO: 4 % (ref 0–6)
ERYTHROCYTE [DISTWIDTH] IN BLOOD BY AUTOMATED COUNT: 13.9 % (ref 11.6–15.1)
GFR SERPL CREATININE-BSD FRML MDRD: 66 ML/MIN/1.73SQ M
GLUCOSE SERPL-MCNC: 145 MG/DL (ref 65–140)
GLUCOSE SERPL-MCNC: 149 MG/DL (ref 65–140)
HCT VFR BLD AUTO: 39.3 % (ref 34.8–46.1)
HGB BLD-MCNC: 12.9 G/DL (ref 11.5–15.4)
IMM GRANULOCYTES # BLD AUTO: 0.03 THOUSAND/UL (ref 0–0.2)
IMM GRANULOCYTES NFR BLD AUTO: 0 % (ref 0–2)
INR PPP: 0.95 (ref 0.84–1.19)
LYMPHOCYTES # BLD AUTO: 1.73 THOUSANDS/ÂΜL (ref 0.6–4.47)
LYMPHOCYTES NFR BLD AUTO: 25 % (ref 14–44)
MAGNESIUM SERPL-MCNC: 1 MG/DL (ref 1.9–2.7)
MCH RBC QN AUTO: 29.4 PG (ref 26.8–34.3)
MCHC RBC AUTO-ENTMCNC: 32.8 G/DL (ref 31.4–37.4)
MCV RBC AUTO: 90 FL (ref 82–98)
MONOCYTES # BLD AUTO: 0.51 THOUSAND/ÂΜL (ref 0.17–1.22)
MONOCYTES NFR BLD AUTO: 7 % (ref 4–12)
NEUTROPHILS # BLD AUTO: 4.37 THOUSANDS/ÂΜL (ref 1.85–7.62)
NEUTS SEG NFR BLD AUTO: 63 % (ref 43–75)
NRBC BLD AUTO-RTO: 0 /100 WBCS
PHOSPHATE SERPL-MCNC: 2.9 MG/DL (ref 2.3–4.1)
PLATELET # BLD AUTO: 205 THOUSANDS/UL (ref 149–390)
PMV BLD AUTO: 10.4 FL (ref 8.9–12.7)
POTASSIUM SERPL-SCNC: 4 MMOL/L (ref 3.5–5.3)
PROT SERPL-MCNC: 7.8 G/DL (ref 6.4–8.4)
PROTHROMBIN TIME: 13.3 SECONDS (ref 11.6–14.5)
RBC # BLD AUTO: 4.39 MILLION/UL (ref 3.81–5.12)
SODIUM SERPL-SCNC: 137 MMOL/L (ref 135–147)
TSH SERPL DL<=0.05 MIU/L-ACNC: 1.64 UIU/ML (ref 0.45–4.5)
WBC # BLD AUTO: 6.93 THOUSAND/UL (ref 4.31–10.16)

## 2024-05-13 PROCEDURE — 99284 EMERGENCY DEPT VISIT MOD MDM: CPT

## 2024-05-13 PROCEDURE — 96376 TX/PRO/DX INJ SAME DRUG ADON: CPT

## 2024-05-13 PROCEDURE — 83735 ASSAY OF MAGNESIUM: CPT

## 2024-05-13 PROCEDURE — 70496 CT ANGIOGRAPHY HEAD: CPT

## 2024-05-13 PROCEDURE — 93005 ELECTROCARDIOGRAM TRACING: CPT

## 2024-05-13 PROCEDURE — 70498 CT ANGIOGRAPHY NECK: CPT

## 2024-05-13 PROCEDURE — 80053 COMPREHEN METABOLIC PANEL: CPT

## 2024-05-13 PROCEDURE — 85730 THROMBOPLASTIN TIME PARTIAL: CPT

## 2024-05-13 PROCEDURE — 96365 THER/PROPH/DIAG IV INF INIT: CPT

## 2024-05-13 PROCEDURE — 99223 1ST HOSP IP/OBS HIGH 75: CPT | Performed by: PHYSICIAN ASSISTANT

## 2024-05-13 PROCEDURE — 84443 ASSAY THYROID STIM HORMONE: CPT

## 2024-05-13 PROCEDURE — 82948 REAGENT STRIP/BLOOD GLUCOSE: CPT

## 2024-05-13 PROCEDURE — 84100 ASSAY OF PHOSPHORUS: CPT

## 2024-05-13 PROCEDURE — 85025 COMPLETE CBC W/AUTO DIFF WBC: CPT

## 2024-05-13 PROCEDURE — 36415 COLL VENOUS BLD VENIPUNCTURE: CPT

## 2024-05-13 PROCEDURE — 96366 THER/PROPH/DIAG IV INF ADDON: CPT

## 2024-05-13 PROCEDURE — 99285 EMERGENCY DEPT VISIT HI MDM: CPT | Performed by: EMERGENCY MEDICINE

## 2024-05-13 PROCEDURE — 85610 PROTHROMBIN TIME: CPT

## 2024-05-13 RX ORDER — INSULIN LISPRO 100 [IU]/ML
1-5 INJECTION, SOLUTION INTRAVENOUS; SUBCUTANEOUS
Status: DISCONTINUED | OUTPATIENT
Start: 2024-05-13 | End: 2024-05-14 | Stop reason: HOSPADM

## 2024-05-13 RX ORDER — ASPIRIN 81 MG/1
81 TABLET, CHEWABLE ORAL DAILY
Status: DISCONTINUED | OUTPATIENT
Start: 2024-05-14 | End: 2024-05-14 | Stop reason: HOSPADM

## 2024-05-13 RX ORDER — INSULIN GLARGINE 100 [IU]/ML
33 INJECTION, SOLUTION SUBCUTANEOUS
Status: DISCONTINUED | OUTPATIENT
Start: 2024-05-13 | End: 2024-05-14 | Stop reason: HOSPADM

## 2024-05-13 RX ORDER — MAGNESIUM SULFATE HEPTAHYDRATE 40 MG/ML
2 INJECTION, SOLUTION INTRAVENOUS ONCE
Status: COMPLETED | OUTPATIENT
Start: 2024-05-13 | End: 2024-05-13

## 2024-05-13 RX ORDER — INSULIN LISPRO 100 [IU]/ML
1-5 INJECTION, SOLUTION INTRAVENOUS; SUBCUTANEOUS
Status: DISCONTINUED | OUTPATIENT
Start: 2024-05-14 | End: 2024-05-14 | Stop reason: HOSPADM

## 2024-05-13 RX ORDER — ACETAMINOPHEN 325 MG/1
650 TABLET ORAL EVERY 6 HOURS PRN
Status: DISCONTINUED | OUTPATIENT
Start: 2024-05-13 | End: 2024-05-14 | Stop reason: HOSPADM

## 2024-05-13 RX ORDER — LEVOTHYROXINE SODIUM 0.05 MG/1
50 TABLET ORAL
Status: DISCONTINUED | OUTPATIENT
Start: 2024-05-14 | End: 2024-05-14 | Stop reason: HOSPADM

## 2024-05-13 RX ORDER — ENOXAPARIN SODIUM 100 MG/ML
40 INJECTION SUBCUTANEOUS DAILY
Status: DISCONTINUED | OUTPATIENT
Start: 2024-05-14 | End: 2024-05-14 | Stop reason: HOSPADM

## 2024-05-13 RX ORDER — FENOFIBRATE 145 MG/1
145 TABLET, COATED ORAL DAILY
Status: DISCONTINUED | OUTPATIENT
Start: 2024-05-14 | End: 2024-05-14 | Stop reason: HOSPADM

## 2024-05-13 RX ORDER — PRAVASTATIN SODIUM 40 MG
40 TABLET ORAL
Status: DISCONTINUED | OUTPATIENT
Start: 2024-05-14 | End: 2024-05-14 | Stop reason: HOSPADM

## 2024-05-13 RX ADMIN — MAGNESIUM SULFATE HEPTAHYDRATE 2 G: 40 INJECTION, SOLUTION INTRAVENOUS at 19:16

## 2024-05-13 RX ADMIN — IOHEXOL 85 ML: 350 INJECTION, SOLUTION INTRAVENOUS at 19:43

## 2024-05-13 RX ADMIN — MAGNESIUM 64 MG (MAGNESIUM CHLORIDE) TABLET,DELAYED RELEASE 128 MG: at 21:11

## 2024-05-13 RX ADMIN — MAGNESIUM SULFATE HEPTAHYDRATE 2 G: 40 INJECTION, SOLUTION INTRAVENOUS at 21:22

## 2024-05-13 NOTE — ED ATTENDING ATTESTATION
5/13/2024  IVani MD, saw and evaluated the patient. I have discussed the patient with the resident/non-physician practitioner and agree with the resident's/non-physician practitioner's findings, Plan of Care, and MDM as documented in the resident's/non-physician practitioner's note, except where noted. All available labs and Radiology studies were reviewed.  I was present for key portions of any procedure(s) performed by the resident/non-physician practitioner and I was immediately available to provide assistance.       At this point I agree with the current assessment done in the Emergency Department.  I have conducted an independent evaluation of this patient a history and physical is as follows:    This is a 73-year-old female with a relevant past medical history of hypertension, hyperlipidemia, diabetes, presenting to the ED today for complaint of blurry vision.  Patient states that her symptoms lasted for approximately 3 to 4 minutes, and resolved spontaneously.  She does not have symptoms at present time.  She states the diplopia was painless.  She denied any other associated symptoms such as slurred speech, facial droop, symmetry, focal weakness, numbness or tingling, tinnitus, vertigo, chest pain pressure discomfort, or any other significantly related symptoms.  Patient was unaware whether this was monocular or binocular diplopia.  She did not close either of her eyes.  She, on presentation to the ED as stated previously is asymptomatic.  This concerned her however very much and she decided to come to the ED for further evaluation.  Upon arrival to the ED she does not have any abnormalities on her physical exam.  Her extraocular movements are intact.  She does not have any focal weakness, numbness or tingling or other abnormalities.  She has bilateral TMs which are slightly bulging from an effusion, but without any opacity or erythema to suggest infection.  Her differential diagnosis  includes: Extraocular muscle palsy/neuropathy versus optic neuritis versus vascular pathology versus electrolyte abnormality versus stroke versus other.  Patient has a CBC, metabolic panel which are for the most part unremarkable.  Her magnesium is critically low at 1.0.  Patient signed out at shift change pending CTA of the head and neck with expected hospitalization for further stroke workup.    ED Course         Critical Care Time  Procedures

## 2024-05-13 NOTE — ED PROVIDER NOTES
History  Chief Complaint   Patient presents with    Dizziness     Patient here for eval of an episode of blurred vision that occurred 30 mins ago while watching TV. Reports episode lasted for 3 mins where she was having doubled vision, now resolved. Denies any numbness/tingling/trouble ambulating/slurred speech. Reports she skipped all meds this morning.      HPI    73-year-old female presents for evaluation of double vision. She has a history of T2DM, HTN, bladder CA, melanoma.  She reports an episode of diplopia that occurred 25 minutes PTA. Episode lasted about 3-4 minutes, and resolved completely on the way to the hospital. She is currently back to her baseline. No associated pain, headache, dizziness, lightheadedness, nausea. No prior episodes. She was at her baseline of health prior to this episode. She does report that she forgot to take her morning medications, and so she took them PTA.     Prior to Admission Medications   Prescriptions Last Dose Informant Patient Reported? Taking?   Ibuprofen (ADVIL PO)  Self Yes No   Sig: Take 1 tablet by mouth if needed   Insulin Glargine Solostar (Lantus SoloStar) 100 UNIT/ML SOPN   No No   Sig: Inject 0.33 mL (33 Units total) under the skin daily   Insulin Pen Needle 32G X 4 MM MISC   No No   Sig: Use in the morning   OneTouch Verio test strip  Self No No   Sig: Test 3 times daily   Probiotic Product (PROBIOTIC-10) CAPS  Self Yes No   Sig: Take 1 capsule by mouth daily   acetaminophen (TYLENOL) 325 mg tablet  Self Yes No   Sig: Take 650 mg by mouth 4 (four) times a day as needed for mild pain   amLODIPine (NORVASC) 5 mg tablet   No No   Sig: Take 1 tablet (5 mg total) by mouth daily   clonazePAM (KlonoPIN) 0.5 mg tablet   No No   Sig: Take 1 tablet (0.5 mg total) by mouth daily at bedtime   estradiol (ESTRACE VAGINAL) 0.1 mg/g vaginal cream   No No   Sig: Apply small amount on fingertip. Apply twice a week.   famotidine (PEPCID) 20 mg tablet  Self No No   Sig: Take 1  tablet (20 mg total) by mouth daily   Patient taking differently: Take 20 mg by mouth daily As needed   fenofibrate (TRICOR) 145 mg tablet   No No   Sig: Take 1 tablet (145 mg total) by mouth daily   fluticasone (FLONASE) 50 mcg/act nasal spray  Self No No   Si spray into each nostril 2 (two) times a day   Patient taking differently: 1 spray into each nostril if needed   levothyroxine 50 mcg tablet  Self No No   Sig: Take 1 tablet (50 mcg total) by mouth daily   loratadine (CLARITIN) 10 mg tablet  Self No No   Sig: Take 1 tablet (10 mg total) by mouth daily   Patient taking differently: Take 10 mg by mouth daily As needed   metFORMIN (GLUCOPHAGE) 500 mg tablet  Self No No   Sig: Take 2 tablets (1,000 mg total) by mouth 2 (two) times a day with meals   metoprolol tartrate (LOPRESSOR) 25 mg tablet  Self No No   Sig: Take 1 tablet (25 mg total) by mouth every 12 (twelve) hours   mometasone (ELOCON) 0.1 % cream   No No   Sig: Apply topically once a week   simvastatin (ZOCOR) 20 mg tablet  Self No No   Sig: Take 1 tablet (20 mg total) by mouth daily at bedtime   sitaGLIPtin (Januvia) 100 mg tablet  Self No No   Sig: Take 1 tablet (100 mg total) by mouth daily   zolpidem (AMBIEN) 10 mg tablet   No No   Sig: Take 1 tablet (10 mg total) by mouth daily at bedtime      Facility-Administered Medications: None       Past Medical History:   Diagnosis Date    Abdominal pain 2017    Acute non-recurrent frontal sinusitis 2019    Acute pain of right shoulder 01/10/2018    Anxiety     Arthritis     Bladder carcinoma (HCC) 2016    Bladder mass     Dental abscess 2017    Depression     Diabetes mellitus (HCC)     Disease of thyroid gland     thyroid nodules-not treating at this time    Dysuria     Last assessed 17    GERD (gastroesophageal reflux disease)      2 para 2     HL (hearing loss)     HLD (hyperlipidemia)     HTN (hypertension)     Hypercholesterolemia     Hypertension     Knee pain      Lichen sclerosus     Melanoma (HCC) 2014    Melanoma (HCC)     Mild aortic regurgitation with left ventricular dilation by prior echocardiogram     Papanicolaou smear 2017    NEG    PONV (postoperative nausea and vomiting)     Tinnitus        Past Surgical History:   Procedure Laterality Date    CARPAL TUNNEL RELEASE Right      SECTION      x2    CHOLECYSTECTOMY      CYSTOSCOPY N/A 2016    Procedure: CYSTOSCOPY WITH BIOPSIES;  Surgeon: Lane Rios MD;  [facility];  Service:     CYSTOSCOPY N/A 2016    Procedure: CYSTOSCOPY;  Surgeon: Lane Rios MD;  [facility];  Service:     CYSTOSCOPY  2020    HERNIA REPAIR      ME ARTHRS KNE SURG W/MENISCECTOMY MED/LAT W/SHVG Left 2016    Procedure: KNEE ARTHROSCOPIC PARTIAL MEDIAL MENISCECTOMY ;  Surgeon: Rehan Pete MD;  [facility];  Service: Orthopedics    ME BLADDER INSTILLATION ANTICARCINOGENIC AGENT N/A 2016    Procedure: INSTILLATION MYTOMYCIN;  Surgeon: Lane Rios MD;  [facility];  Service: Urology    ME CYSTO BLADDER W/URETERAL CATHETERIZATION Bilateral 2016    Procedure: RETROGRADE PYELOGRAM ;  Surgeon: Lane Rios MD;  [facility];  Service: Urology    ME CYSTO BLADDER W/URETERAL CATHETERIZATION Bilateral 2017    Procedure: RETROGRADE PYELOGRAM WITH STENT EXCHANGE, RIGHT;  Surgeon: Lane Rios MD;  [facility];  Service: Urology    ME CYSTO W/INSERT URETERAL STENT Left 2016    Procedure: URETERAL STENTS;  Surgeon: Lane Rios MD;  [facility];  Service: Urology    ME CYSTO W/REMOVAL OF LESIONS SMALL N/A 2016    Procedure: TRANSURETHRAL RESECTION OF BLADDER TUMOR (TURBT);  Surgeon: Lane Rios MD;  [facility];  Service: Urology    ME CYSTO W/REMOVAL OF LESIONS SMALL N/A 2016    Procedure: CYSTOSCOPY; TUR BLADDER TUMOR ;  Surgeon: Lane Rios MD;  [facility];  Service:  Urology    TX CYSTO W/REMOVAL OF LESIONS SMALL N/A 09/01/2016    Procedure: TUR BLADDER TUMOR ;  Surgeon: Lane Rios MD;  [facility];  Service: Urology    TX CYSTO W/REMOVAL OF LESIONS SMALL Bilateral 05/09/2017    Procedure: CYSTOSCOPY, RIGHT URETERAL WASHINGS, ;  Surgeon: Lane Rios MD;  [facility];  Service: Urology    TX CYSTO W/URTROSCOPY&/PYELOSCOPY DX Right 05/09/2017    Procedure: URETEROSCOPY;  Surgeon: Lane Rios MD;  [facility];  Service: Urology    TX CYSTO/URETERO W/LITHOTRIPSY &INDWELL STENT INSRT  11/29/2016    Procedure: CYSTOSCOPY URETEROSCOPY WITH LITHOTRIPSY HOLMIUM LASER RIGHT, RETROGRADE PYELOGRAM BILATERAL: AND INSERTION STENT URETERAL Right ;  Surgeon: Lane Rios MD;  [facility];  Service: Urology    REMOVAL URETERAL STENT Left 11/29/2016    Procedure: REMOVAL STENT URETERAL;  Surgeon: Lane Rios MD;  [facility];  Service:     REPLACEMENT TOTAL KNEE Left 12/2021    SKIN CANCER EXCISION  2021    right arm    TONSILLECTOMY         Family History   Problem Relation Age of Onset    Heart attack Mother     Heart disease Mother     ALS Father     Diabetes Maternal Grandfather     Lung cancer Paternal Grandfather     Heart disease Maternal Grandmother     No Known Problems Daughter     No Known Problems Daughter     No Known Problems Paternal Aunt     No Known Problems Paternal Aunt     No Known Problems Paternal Aunt     No Known Problems Paternal Aunt     No Known Problems Paternal Aunt     Breast cancer Neg Hx      I have reviewed and agree with the history as documented.    E-Cigarette/Vaping    E-Cigarette Use Never User      E-Cigarette/Vaping Substances    Nicotine No     THC No     CBD No     Flavoring No     Other No     Unknown No      Social History     Tobacco Use    Smoking status: Former     Current packs/day: 0.00     Average packs/day: 1 pack/day for 21.0 years (21.0 ttl pk-yrs)     Types: Cigarettes     Start  date: 1969     Quit date:      Years since quittin.3    Smokeless tobacco: Never   Vaping Use    Vaping status: Never Used   Substance Use Topics    Alcohol use: Not Currently    Drug use: No        Review of Systems   Constitutional:  Negative for fever.   Eyes:  Positive for visual disturbance.   Respiratory:  Negative for cough.    Cardiovascular:  Negative for chest pain and palpitations.   Gastrointestinal:  Negative for nausea and vomiting.   Neurological:  Negative for dizziness, facial asymmetry, weakness, light-headedness and headaches.   Psychiatric/Behavioral:  Negative for confusion.    All other systems reviewed and are negative.      Physical Exam  ED Triage Vitals [24 1738]   Temperature Pulse Respirations Blood Pressure SpO2   98 °F (36.7 °C) 68 18 (!) 176/81 97 %      Temp Source Heart Rate Source Patient Position - Orthostatic VS BP Location FiO2 (%)   Oral Monitor Sitting Right arm --      Pain Score       No Pain             Orthostatic Vital Signs  Vitals:    24 0607 24 0700 24 0900 24 1033   BP: (!) 175/78 (!) 174/83 (!) 173/86 (!) 173/86   Pulse: 65 64 69 69   Patient Position - Orthostatic VS: Sitting          Physical Exam  Vitals and nursing note reviewed.   Constitutional:       General: She is not in acute distress.     Appearance: She is well-developed.   HENT:      Head: Normocephalic and atraumatic.   Eyes:      General: Vision grossly intact. Gaze aligned appropriately. No visual field deficit.     Extraocular Movements: Extraocular movements intact.      Conjunctiva/sclera: Conjunctivae normal.      Pupils: Pupils are equal, round, and reactive to light.   Cardiovascular:      Rate and Rhythm: Normal rate and regular rhythm.      Pulses: Normal pulses.      Heart sounds: Normal heart sounds. No murmur heard.  Pulmonary:      Effort: Pulmonary effort is normal. No respiratory distress.      Breath sounds: Normal breath sounds.   Abdominal:       Palpations: Abdomen is soft.      Tenderness: There is no abdominal tenderness.   Musculoskeletal:         General: No swelling.      Cervical back: Neck supple.   Skin:     General: Skin is warm and dry.      Capillary Refill: Capillary refill takes less than 2 seconds.   Neurological:      General: No focal deficit present.      Mental Status: She is alert and oriented to person, place, and time. Mental status is at baseline.      Cranial Nerves: Cranial nerves 2-12 are intact. No cranial nerve deficit, dysarthria or facial asymmetry.      Sensory: No sensory deficit.      Motor: No weakness.      Coordination: Coordination normal. Finger-Nose-Finger Test normal.      Gait: Gait normal.   Psychiatric:         Mood and Affect: Mood normal.         ED Medications  Medications   acetaminophen (TYLENOL) tablet 650 mg (650 mg Oral Given 5/14/24 0036)   fenofibrate (TRICOR) tablet 145 mg (145 mg Oral Given 5/14/24 0827)   insulin glargine (LANTUS) subcutaneous injection 33 Units 0.33 mL (33 Units Subcutaneous Given 5/14/24 0011)   levothyroxine tablet 50 mcg (50 mcg Oral Given 5/14/24 0503)   pravastatin (PRAVACHOL) tablet 40 mg (has no administration in time range)   aspirin chewable tablet 81 mg ( Oral Canceled Entry 5/14/24 0827)   enoxaparin (LOVENOX) subcutaneous injection 40 mg (40 mg Subcutaneous Given 5/14/24 0827)   insulin lispro (HumALOG/ADMELOG) 100 units/mL subcutaneous injection 1-5 Units ( Subcutaneous Not Given 5/14/24 0751)   insulin lispro (HumALOG/ADMELOG) 100 units/mL subcutaneous injection 1-5 Units ( Subcutaneous Not Given 5/14/24 0013)   magnesium sulfate 2 g/50 mL IVPB (premix) 2 g (0 g Intravenous Stopped 5/13/24 2121)   magnesium chloride (MAG64) EC tablet TBEC 128 mg (128 mg Oral Given 5/13/24 2111)   magnesium sulfate 2 g/50 mL IVPB (premix) 2 g (0 g Intravenous Stopped 5/13/24 2322)   iohexol (OMNIPAQUE) 350 MG/ML injection (MULTI-DOSE) 85 mL (85 mL Intravenous Given 5/13/24 1943)        Diagnostic Studies  Results Reviewed       Procedure Component Value Units Date/Time    Hemoglobin A1c w/EAG Estimation [[id number]]  (Abnormal) Collected: 05/14/24 0503    Lab Status: Final result Specimen: Blood from Arm, Right Updated: 05/14/24 1015     Hemoglobin A1C 7.5 %       mg/dl     Magnesium [[id number]]  (Normal) Collected: 05/14/24 0503    Lab Status: Final result Specimen: Blood from Arm, Right Updated: 05/14/24 0558     Magnesium 2.1 mg/dL     Lipid Panel with Direct LDL reflex [[id number]]  (Abnormal) Collected: 05/14/24 0503    Lab Status: Final result Specimen: Blood from Arm, Right Updated: 05/14/24 0541     Cholesterol 166 mg/dL      Triglycerides 211 mg/dL      HDL, Direct 30 mg/dL      LDL Calculated 94 mg/dL     Fingerstick Glucose (POCT) [[id number]]  (Normal) Collected: 05/14/24 0003    Lab Status: Final result Specimen: Blood Updated: 05/14/24 0004     POC Glucose 127 mg/dl     Phosphorus [[id number]]  (Normal) Collected: 05/13/24 1828    Lab Status: Final result Specimen: Blood from Arm, Right Updated: 05/13/24 2018     Phosphorus 2.9 mg/dL     TSH, 3rd generation with Free T4 reflex [[id number]]  (Normal) Collected: 05/13/24 1828    Lab Status: Final result Specimen: Blood from Arm, Right Updated: 05/13/24 1907     TSH 3RD GENERATON 1.643 uIU/mL     Magnesium [[id number]]  (Abnormal) Collected: 05/13/24 1828    Lab Status: Final result Specimen: Blood from Arm, Right Updated: 05/13/24 1901     Magnesium 1.0 mg/dL     Comprehensive metabolic panel [[id number]]  (Abnormal) Collected: 05/13/24 1828    Lab Status: Final result Specimen: Blood from Arm, Right Updated: 05/13/24 1858     Sodium 137 mmol/L      Potassium 4.0 mmol/L      Chloride 101 mmol/L      CO2 26 mmol/L      ANION GAP 10 mmol/L      BUN 16 mg/dL      Creatinine 0.86 mg/dL      Glucose 149 mg/dL      Calcium 9.3 mg/dL      AST 22 U/L      ALT 22 U/L      Alkaline Phosphatase 45 U/L      Total Protein 7.8 g/dL       Albumin 4.6 g/dL      Total Bilirubin 0.46 mg/dL      eGFR 66 ml/min/1.73sq m     Narrative:      National Kid[employer] guidelines for Chronic Kidney Disease (CKD):     Stage 1 with normal or high GFR (GFR > 90 mL/min/1.73 square meters)    Stage 2 Mild CKD (GFR = 60-89 mL/min/1.73 square meters)    Stage 3A Moderate CKD (GFR = 45-59 mL/min/1.73 square meters)    Stage 3B Moderate CKD (GFR = 30-44 mL/min/1.73 square meters)    Stage 4 Severe CKD (GFR = 15-29 mL/min/1.73 square meters)    Stage 5 End Stage CKD (GFR <15 mL/min/1.73 square meters)  Note: GFR calculation is accurate only with a steady state creatinine    Protime-INR [[id number]]  (Normal) Collected: 05/13/24 1828    Lab Status: Final result Specimen: Blood from Arm, Right Updated: 05/13/24 1848     Protime 13.3 seconds      INR 0.95    APTT [[id number]]  (Normal) Collected: 05/13/24 1828    Lab Status: Final result Specimen: Blood from Arm, Right Updated: 05/13/24 1848     PTT 25 seconds     CBC and differential [[id number]] Collected: 05/13/24 1828    Lab Status: Final result Specimen: Blood from Arm, Right Updated: 05/13/24 1839     WBC 6.93 Thousand/uL      RBC 4.39 Million/uL      Hemoglobin 12.9 g/dL      Hematocrit 39.3 %      MCV 90 fL      MCH 29.4 pg      MCHC 32.8 g/dL      RDW 13.9 %      MPV 10.4 fL      Platelets 205 Thousands/uL      nRBC 0 /100 WBCs      Segmented % 63 %      Immature Grans % 0 %      Lymphocytes % 25 %      Monocytes % 7 %      Eosinophils Relative 4 %      Basophils Relative 1 %      Absolute Neutrophils 4.37 Thousands/µL      Absolute Immature Grans 0.03 Thousand/uL      Absolute Lymphocytes 1.73 Thousands/µL      Absolute Monocytes 0.51 Thousand/µL      Eosinophils Absolute 0.25 Thousand/µL      Basophils Absolute 0.04 Thousands/µL     Fingerstick Glucose (POCT) [[id number]]  (Abnormal) Collected: 05/13/24 1737    Lab Status: Final result Specimen: Blood Updated: 05/13/24 1738     POC Glucose 145 mg/dl                     CTA head and neck with and without contrast   Final Result by Surinder Kingston MD (05/13 2130)         1. No evidence of acute infarct, intracranial hemorrhage or mass.   2. No hemodynamically significant stenosis, dissection or occlusion of the carotid or vertebral arteries or major vessels of the [place].                  Workstation performed: [id number]         MRI Inpatient Order    (Results Pending)         Procedures  ECG 12 Lead Documentation Only    Date/Time: 5/13/2024 6:10 PM    Performed by: Montana Guillen MD  Authorized by: Montana Guillen MD    Patient location:  ED  Rate:     ECG rate:  65    ECG rate assessment: normal    Rhythm:     Rhythm: sinus rhythm    QRS:     QRS axis:  Left    QRS intervals:  Normal  Conduction:     Conduction: abnormal      Abnormal conduction: LAFB    ST segments:     ST segments:  Normal  Comments:      NSR with left anterior fascicular block, 65 bpm.  MT interval 202 ms, QRS 88 ms, QTc 424 ms.  Left axis deviation.  No ST elevation or ischemic changes noted.  Compared to EKG 2/16/2018, no significant changes noted.        ED Course                             SBIRT 20yo+      Flowsheet Row Most Recent Value   Initial Alcohol Screen: US AUDIT-C     1. How often do you have a drink containing alcohol? 0 Filed at: 05/13/2024 1806   2. How many drinks containing alcohol do you have on a typical day you are drinking?  0 Filed at: 05/13/2024 1806   3b. FEMALE Any Age, or MALE 65+: How often do you have 4 or more drinks on one occassion? 0 Filed at: 05/13/2024 1806   Audit-C Score 0 Filed at: 05/13/2024 1806   DORETHA: How many times in the past year have you...    Used an illegal drug or used a prescription medication for non-medical reasons? Never Filed at: 05/13/2024 1806                  Medical Decision Making  74 yo female presents for evaluation of diplopia    Differentials: TIA, CVA, nerve palsy, electrolyte abnormality, optic  neuritis  Workup: Labs, CTA head and neck, coags    On initial evaluation, pt is resting comfortably in bed, in no acute distress. She is AO x4, and VSS. Neuro exam was unremarkable, EOM intact, with no pain with movement, no nystagmus, and no other abnormalities. Labs showed critically low Mag, and it was repleted with 4g of IV mag sulfate and oral mag chloride. CTA negative for acute intracranial abnormalities. Due to critically low magnesium and unclear cause of visual changes, pt admitted for MRI brain and magnesium repletion.     Amount and/or Complexity of Data Reviewed  Labs: ordered.  Radiology: ordered.    Risk  OTC drugs.  Prescription drug management.  Decision regarding hospitalization.          Disposition  Final diagnoses:   Hypomagnesemia   Transient diplopia     Time reflects when diagnosis was documented in both MDM as applicable and the Disposition within this note       Time User Action Codes Description Comment    5/13/2024  8:19 PM Montana Guillen Add [E83.42] Hypomagnesemia     5/13/2024  8:19 PM Montana Guillen Add [H53.2] Transient diplopia     5/13/2024 11:24 PM Mary Kay Hebert Add [H53.8] Blurred vision           ED Disposition       ED Disposition   Admit    Condition   Stable    Date/Time   Mon May 13, 2024 1637    Comment   Case was discussed with JANET and the patient's admission status was agreed to be Admission Status: observation status to the service of Dr. Holbrook .               Follow-up Information    None         Current Discharge Medication List        CONTINUE these medications which have NOT CHANGED    Details   acetaminophen (TYLENOL) 325 mg tablet Take 650 mg by mouth 4 (four) times a day as needed for mild pain      amLODIPine (NORVASC) 5 mg tablet Take 1 tablet (5 mg total) by mouth daily  Qty: 90 tablet, Refills: 1    Associated Diagnoses: Essential hypertension      clonazePAM (KlonoPIN) 0.5 mg tablet Take 1 tablet (0.5 mg total) by mouth daily at  bedtime  Qty: 30 tablet, Refills: 1    Associated Diagnoses: Anxiety      estradiol (ESTRACE VAGINAL) 0.1 mg/g vaginal cream Apply small amount on fingertip. Apply twice a week.  Qty: 45 g, Refills: 2    Associated Diagnoses: Vaginal atrophy; Labial fusion      famotidine (PEPCID) 20 mg tablet Take 1 tablet (20 mg total) by mouth daily  Qty: 90 tablet, Refills: 0    Associated Diagnoses: Gastroesophageal reflux disease, esophagitis presence not specified      fenofibrate (TRICOR) 145 mg tablet Take 1 tablet (145 mg total) by mouth daily  Qty: 90 tablet, Refills: 1    Associated Diagnoses: Hypercholesterolemia      fluticasone (FLONASE) 50 mcg/act nasal spray 1 spray into each nostril 2 (two) times a day  Qty: 15.8 g, Refills: 0    Associated Diagnoses: Acute URI      Ibuprofen (ADVIL PO) Take 1 tablet by mouth if needed      Insulin Glargine Solostar (Lantus SoloStar) 100 UNIT/ML SOPN Inject 0.33 mL (33 Units total) under the skin daily  Qty: 29.7 mL, Refills: 1    Associated Diagnoses: Type 2 diabetes mellitus without complication, with long-term current use of insulin ([facility])      Insulin Pen Needle 32G X 4 MM MISC Use in the morning  Qty: 100 each, Refills: 3    Associated Diagnoses: Type 2 diabetes mellitus without complication, with long-term current use of insulin ([facility])      levothyroxine 50 mcg tablet Take 1 tablet (50 mcg total) by mouth daily  Qty: 90 tablet, Refills: 1    Associated Diagnoses: Hypothyroidism, unspecified type      loratadine (CLARITIN) 10 mg tablet Take 1 tablet (10 mg total) by mouth daily  Qty: 14 tablet, Refills: 0    Associated Diagnoses: Acute non-recurrent maxillary sinusitis; Acute URI      metFORMIN (GLUCOPHAGE) 500 mg tablet Take 2 tablets (1,000 mg total) by mouth 2 (two) times a day with meals  Qty: 360 tablet, Refills: 1    Associated Diagnoses: Type 2 diabetes mellitus with complication, without long-term current use of insulin ([facility])      metoprolol tartrate (LOPRESSOR) 25 mg  tablet Take 1 tablet (25 mg total) by mouth every 12 (twelve) hours  Qty: 180 tablet, Refills: 1    Associated Diagnoses: Essential hypertension; Tachycardia      mometasone (ELOCON) 0.1 % cream Apply topically once a week  Qty: 45 g, Refills: 4    Associated Diagnoses: Lichen sclerosus et atrophicus      OneTouch Verio test strip Test 3 times daily  Qty: 300 each, Refills: 1    Associated Diagnoses: Type 2 diabetes mellitus without complication, without long-term current use of insulin ([facility])      Probiotic Product (PROBIOTIC-10) CAPS Take 1 capsule by mouth daily      simvastatin (ZOCOR) 20 mg tablet Take 1 tablet (20 mg total) by mouth daily at bedtime  Qty: 90 tablet, Refills: 1    Associated Diagnoses: Hypercholesterolemia; Type 2 diabetes mellitus without complication, without long-term current use of insulin ([facility])      sitaGLIPtin (Januvia) 100 mg tablet Take 1 tablet (100 mg total) by mouth daily  Qty: 90 tablet, Refills: 1    Associated Diagnoses: Type 2 diabetes mellitus without complication, without long-term current use of insulin ([facility])      zolpidem (AMBIEN) 10 mg tablet Take 1 tablet (10 mg total) by mouth daily at bedtime  Qty: 30 tablet, Refills: 1    Associated Diagnoses: Depression with anxiety; Primary insomnia           No discharge procedures on file.    PDMP Review         Value Time User    PDMP Reviewed  Yes 4/8/2024  4:26 PM Iris Gandhi PA-C             ED Provider  Attending physically available and evaluated Shellie Lee. I managed the patient along with the ED Attending.    Electronically Signed by           Montana Guillen MD  05/14/24 1709

## 2024-05-14 ENCOUNTER — APPOINTMENT (OUTPATIENT)
Dept: MRI IMAGING | Facility: HOSPITAL | Age: 74
End: 2024-05-14
Payer: MEDICARE

## 2024-05-14 ENCOUNTER — APPOINTMENT (OUTPATIENT)
Dept: NON INVASIVE DIAGNOSTICS | Facility: HOSPITAL | Age: 74
End: 2024-05-14
Payer: MEDICARE

## 2024-05-14 VITALS
DIASTOLIC BLOOD PRESSURE: 79 MMHG | SYSTOLIC BLOOD PRESSURE: 167 MMHG | TEMPERATURE: 97.7 F | HEART RATE: 65 BPM | RESPIRATION RATE: 14 BRPM | WEIGHT: 158 LBS | BODY MASS INDEX: 29.08 KG/M2 | OXYGEN SATURATION: 96 % | HEIGHT: 62 IN

## 2024-05-14 PROBLEM — E83.42 HYPOMAGNESEMIA: Status: RESOLVED | Noted: 2024-05-13 | Resolved: 2024-05-14

## 2024-05-14 LAB
AORTIC ROOT: 2.5 CM
AORTIC VALVE MEAN VELOCITY: 12.2 M/S
APICAL FOUR CHAMBER EJECTION FRACTION: 68 %
ASCENDING AORTA: 2.9 CM
AV AREA BY CONTINUOUS VTI: 1.1 CM2
AV AREA PEAK VELOCITY: 0.9 CM2
AV LVOT MEAN GRADIENT: 2 MMHG
AV LVOT PEAK GRADIENT: 3 MMHG
AV MEAN GRADIENT: 7 MMHG
AV PEAK GRADIENT: 13 MMHG
AV VALVE AREA: 1.1 CM2
AV VELOCITY RATIO: 0.48
BSA FOR ECHO PROCEDURE: 1.73 M2
CHOLEST SERPL-MCNC: 166 MG/DL
DOP CALC AO PEAK VEL: 1.79 M/S
DOP CALC AO VTI: 36.62 CM
DOP CALC LVOT AREA: 2.01 CM2
DOP CALC LVOT CARDIAC INDEX: 1.84 L/MIN/M2
DOP CALC LVOT CARDIAC OUTPUT: 3.18 L/MIN
DOP CALC LVOT DIAMETER: 1.6 CM
DOP CALC LVOT PEAK VEL VTI: 20.1 CM
DOP CALC LVOT PEAK VEL: 0.86 M/S
DOP CALC LVOT STROKE INDEX: 23.1 ML/M2
DOP CALC LVOT STROKE VOLUME: 40.39
DOP CALC MV VTI: 29.47 CM
E WAVE DECELERATION TIME: 152 MS
E/A RATIO: 0.66
EST. AVERAGE GLUCOSE BLD GHB EST-MCNC: 169 MG/DL
FRACTIONAL SHORTENING: 35 (ref 28–44)
GLUCOSE SERPL-MCNC: 127 MG/DL (ref 65–140)
GLUCOSE SERPL-MCNC: 139 MG/DL (ref 65–140)
GLUCOSE SERPL-MCNC: 162 MG/DL (ref 65–140)
HBA1C MFR BLD: 7.5 %
HDLC SERPL-MCNC: 30 MG/DL
INTERVENTRICULAR SEPTUM IN DIASTOLE (PARASTERNAL SHORT AXIS VIEW): 1.1 CM
INTERVENTRICULAR SEPTUM: 1.1 CM (ref 0.6–1.1)
LAAS-AP2: 12 CM2
LAAS-AP4: 13.3 CM2
LDLC SERPL CALC-MCNC: 94 MG/DL (ref 0–100)
LEFT ATRIUM SIZE: 3.6 CM
LEFT ATRIUM VOLUME (MOD BIPLANE): 30 ML
LEFT ATRIUM VOLUME INDEX (MOD BIPLANE): 17.3 ML/M2
LEFT INTERNAL DIMENSION IN SYSTOLE: 2.4 CM (ref 2.1–4)
LEFT VENTRICULAR INTERNAL DIMENSION IN DIASTOLE: 3.7 CM (ref 3.5–6)
LEFT VENTRICULAR POSTERIOR WALL IN END DIASTOLE: 0.9 CM
LEFT VENTRICULAR STROKE VOLUME: 36 ML
LVSV (TEICH): 36 ML
MAGNESIUM SERPL-MCNC: 2.1 MG/DL (ref 1.9–2.7)
MV E'TISSUE VEL-SEP: 6 CM/S
MV MEAN GRADIENT: 2 MMHG
MV PEAK A VEL: 1 M/S
MV PEAK E VEL: 66 CM/S
MV PEAK GRADIENT: 5 MMHG
MV STENOSIS PRESSURE HALF TIME: 44 MS
MV VALVE AREA BY CONTINUITY EQUATION: 1.37 CM2
MV VALVE AREA P 1/2 METHOD: 5
RIGHT ATRIUM AREA SYSTOLE A4C: 8.2 CM2
RIGHT VENTRICLE ID DIMENSION: 2.8 CM
SL CV LEFT ATRIUM LENGTH A2C: 4.3 CM
SL CV LV EF: 60
SL CV PED ECHO LEFT VENTRICLE DIASTOLIC VOLUME (MOD BIPLANE) 2D: 56 ML
SL CV PED ECHO LEFT VENTRICLE SYSTOLIC VOLUME (MOD BIPLANE) 2D: 20 ML
TRICUSPID ANNULAR PLANE SYSTOLIC EXCURSION: 1.9 CM
TRIGL SERPL-MCNC: 211 MG/DL

## 2024-05-14 PROCEDURE — 99205 OFFICE O/P NEW HI 60 MIN: CPT | Performed by: PSYCHIATRY & NEUROLOGY

## 2024-05-14 PROCEDURE — 82948 REAGENT STRIP/BLOOD GLUCOSE: CPT

## 2024-05-14 PROCEDURE — 93306 TTE W/DOPPLER COMPLETE: CPT

## 2024-05-14 PROCEDURE — 83735 ASSAY OF MAGNESIUM: CPT | Performed by: PHYSICIAN ASSISTANT

## 2024-05-14 PROCEDURE — 80061 LIPID PANEL: CPT | Performed by: PHYSICIAN ASSISTANT

## 2024-05-14 PROCEDURE — 99239 HOSP IP/OBS DSCHRG MGMT >30: CPT | Performed by: HOSPITALIST

## 2024-05-14 PROCEDURE — A9585 GADOBUTROL INJECTION: HCPCS | Performed by: HOSPITALIST

## 2024-05-14 PROCEDURE — 83036 HEMOGLOBIN GLYCOSYLATED A1C: CPT | Performed by: PHYSICIAN ASSISTANT

## 2024-05-14 PROCEDURE — 97165 OT EVAL LOW COMPLEX 30 MIN: CPT

## 2024-05-14 PROCEDURE — 70553 MRI BRAIN STEM W/O & W/DYE: CPT

## 2024-05-14 PROCEDURE — 70543 MRI ORBT/FAC/NCK W/O &W/DYE: CPT

## 2024-05-14 PROCEDURE — 93306 TTE W/DOPPLER COMPLETE: CPT | Performed by: INTERNAL MEDICINE

## 2024-05-14 PROCEDURE — 36415 COLL VENOUS BLD VENIPUNCTURE: CPT | Performed by: PHYSICIAN ASSISTANT

## 2024-05-14 RX ORDER — ASPIRIN 81 MG/1
81 TABLET, CHEWABLE ORAL DAILY
Qty: 30 TABLET | Refills: 0 | Status: SHIPPED | OUTPATIENT
Start: 2024-05-14

## 2024-05-14 RX ORDER — GADOBUTROL 604.72 MG/ML
7 INJECTION INTRAVENOUS
Status: COMPLETED | OUTPATIENT
Start: 2024-05-14 | End: 2024-05-14

## 2024-05-14 RX ADMIN — GADOBUTROL 7 ML: 604.72 INJECTION INTRAVENOUS at 13:23

## 2024-05-14 RX ADMIN — FENOFIBRATE 145 MG: 145 TABLET ORAL at 08:27

## 2024-05-14 RX ADMIN — ASPIRIN 81 MG 81 MG: 81 TABLET ORAL at 08:26

## 2024-05-14 RX ADMIN — LEVOTHYROXINE SODIUM 50 MCG: 50 TABLET ORAL at 05:03

## 2024-05-14 RX ADMIN — ENOXAPARIN SODIUM 40 MG: 40 INJECTION SUBCUTANEOUS at 08:27

## 2024-05-14 RX ADMIN — INSULIN GLARGINE 33 UNITS: 100 INJECTION, SOLUTION SUBCUTANEOUS at 00:11

## 2024-05-14 RX ADMIN — ACETAMINOPHEN 650 MG: 325 TABLET ORAL at 00:36

## 2024-05-14 NOTE — H&P
[facility]  H&P  Name: Shellie Lee 73 y.o. female I MRN: [id number]  Unit/Bed#: ED-42 I Date of Admission: 5/13/2024   Date of Service: 5/13/2024 I Hospital Day: 0      Assessment/Plan   * Blurred vision  Assessment & Plan  Presented after an episode of blurred vision. Symptoms resolved prior to arrival.   CTA head and neck without acute findings.  Stroke pathway.  Neuro checks per protocol. Notify of any changes.   Obtain MRI brain.   Obtain echo.  Check lipid panel and Hgb A1c.  Continue statin and ASA.  Neurology consult.   PT/OT/ST consueloals.     Hypomagnesemia  Assessment & Plan  POA with mag 1.0.  Replete.  Repeat magnesium level in AM.     Type 2 diabetes mellitus without complication, with long-term current use of insulin (HCC)  Assessment & Plan  Lab Results   Component Value Date    HGBA1C 7.6 (H) 09/28/2023       Recent Labs     05/13/24  1737   POCGLU 145*       Blood Sugar Average: Last 72 hrs:  (P) 145    Hold metformin and Januvia while inpatient.   Monitor accu-checks AC and HS.   Continue home insulin regimen with Lantus 33 units HS.   SSI coverage.   Hypoglycemia protocol.    Essential hypertension  Assessment & Plan  BP acceptable in the setting of possible TIA.   Hold metoprolol and amlodipine in AM.     Mixed hyperlipidemia  Assessment & Plan  Continue fenofibrate and statin.     Acquired hypothyroidism  Assessment & Plan  TSH within normal limits.   Continue levothyroxine.          VTE Pharmacologic Prophylaxis: VTE Score: 8 High Risk (Score >/= 5) - Pharmacological DVT Prophylaxis Ordered: enoxaparin (Lovenox). Sequential Compression Devices Ordered.  Code Status: level 1 full code  Discussion with family: Patient declined call to .     Anticipated Length of Stay: Patient will be admitted on an observation basis with an anticipated length of stay of less than 2 midnights secondary to suspected tia.    Total Time Spent on Date of Encounter in care of  patient:   This time was spent on one or more of the following: performing physical exam; counseling and coordination of care; obtaining or reviewing history; documenting in the medical record; reviewing/ordering tests, medications or procedures; communicating with other healthcare professionals and discussing with patient's family/caregivers.    Chief Complaint: blurred vision    History of Present Illness:  Shellie Lee is a 73 y.o. female with a PMH of HTN, HLD, T2DM, hypothyroidism, asthma, anxiety and depression who presents with blurred vision. Patient reports that she was in her usual state of health until today around 4:30pm when she was watching television and her vision suddenly became blurry. She states that the episode lasted for about 4 minutes and her vision returned to normal prior to arrival at the hospital. She notes that nothing like this has ever happened to her before. She reports that she missed her medications this morning but did take them this evening. She denies facial asymmetry, speech difficulty, dizziness/ lightheadedness, headaches, weakness or paresthesias. She states that she recently returned from a trip to [place] and denies any recent change in appetite.     Review of Systems:  Review of Systems   Constitutional:  Negative for appetite change, chills and fever.   Eyes:  Positive for visual disturbance.   Respiratory:  Negative for cough and shortness of breath.    Cardiovascular:  Negative for chest pain.   Gastrointestinal:  Negative for abdominal pain, diarrhea, nausea and vomiting.   Neurological:  Negative for dizziness, syncope, facial asymmetry, speech difficulty, weakness, light-headedness and headaches.   All other systems reviewed and are negative.      Past Medical and Surgical History:   Past Medical History:   Diagnosis Date    Abdominal pain 01/16/2017    Acute non-recurrent frontal sinusitis 01/08/2019    Acute pain of right shoulder 01/10/2018    Anxiety     Arthritis      Bladder carcinoma (HCC) 2016    Bladder mass     Dental abscess 2017    Depression     Diabetes mellitus (HCC)     Disease of thyroid gland     thyroid nodules-not treating at this time    Dysuria     Last assessed 17    GERD (gastroesophageal reflux disease)      2 para 2     HL (hearing loss)     HLD (hyperlipidemia)     HTN (hypertension)     Hypercholesterolemia     Hypertension     Knee pain     Lichen sclerosus     Melanoma (HCC)     Melanoma (HCC)     Mild aortic regurgitation with left ventricular dilation by prior echocardiogram     Papanicolaou smear 2017    NEG    PONV (postoperative nausea and vomiting)     Tinnitus        Past Surgical History:   Procedure Laterality Date    CARPAL TUNNEL RELEASE Right      SECTION      x2    CHOLECYSTECTOMY      CYSTOSCOPY N/A 2016    Procedure: CYSTOSCOPY WITH BIOPSIES;  Surgeon: Lane Rios MD;  [facility];  Service:     CYSTOSCOPY N/A 2016    Procedure: CYSTOSCOPY;  Surgeon: Lnae Rios MD;  [facility];  Service:     CYSTOSCOPY  2020    HERNIA REPAIR      FL ARTHRS KNE SURG W/MENISCECTOMY MED/LAT W/SHVG Left 2016    Procedure: KNEE ARTHROSCOPIC PARTIAL MEDIAL MENISCECTOMY ;  Surgeon: Rehan Pete MD;  [facility];  Service: Orthopedics    FL BLADDER INSTILLATION ANTICARCINOGENIC AGENT N/A 2016    Procedure: INSTILLATION MYTOMYCIN;  Surgeon: Lane Rios MD;  [facility];  Service: Urology    FL CYSTO BLADDER W/URETERAL CATHETERIZATION Bilateral 2016    Procedure: RETROGRADE PYELOGRAM ;  Surgeon: Lane Rios MD;  [facility];  Service: Urology    FL CYSTO BLADDER W/URETERAL CATHETERIZATION Bilateral 2017    Procedure: RETROGRADE PYELOGRAM WITH STENT EXCHANGE, RIGHT;  Surgeon: Lane Rios MD;  [facility];  Service: Urology    FL CYSTO W/INSERT URETERAL STENT Left 2016    Procedure: URETERAL STENTS;   Surgeon: Lane Rios MD;  [facility];  Service: Urology    NY CYSTO W/REMOVAL OF LESIONS SMALL N/A 11/29/2016    Procedure: TRANSURETHRAL RESECTION OF BLADDER TUMOR (TURBT);  Surgeon: Lane Rios MD;  [facility];  Service: Urology    NY CYSTO W/REMOVAL OF LESIONS SMALL N/A 09/29/2016    Procedure: CYSTOSCOPY; TUR BLADDER TUMOR ;  Surgeon: Lane Rios MD;  [facility];  Service: Urology    NY CYSTO W/REMOVAL OF LESIONS SMALL N/A 09/01/2016    Procedure: TUR BLADDER TUMOR ;  Surgeon: Lane Rios MD;  [facility];  Service: Urology    NY CYSTO W/REMOVAL OF LESIONS SMALL Bilateral 05/09/2017    Procedure: CYSTOSCOPY, RIGHT URETERAL WASHINGS, ;  Surgeon: Lane Rios MD;  [facility];  Service: Urology    NY CYSTO W/URTROSCOPY&/PYELOSCOPY DX Right 05/09/2017    Procedure: URETEROSCOPY;  Surgeon: Lane Rios MD;  [facility];  Service: Urology    NY CYSTO/URETERO W/LITHOTRIPSY &INDWELL STENT INSRT  11/29/2016    Procedure: CYSTOSCOPY URETEROSCOPY WITH LITHOTRIPSY HOLMIUM LASER RIGHT, RETROGRADE PYELOGRAM BILATERAL: AND INSERTION STENT URETERAL Right ;  Surgeon: Lane Rios MD;  [facility];  Service: Urology    REMOVAL URETERAL STENT Left 11/29/2016    Procedure: REMOVAL STENT URETERAL;  Surgeon: Lane Rios MD;  [facility];  Service:     REPLACEMENT TOTAL KNEE Left 12/2021    SKIN CANCER EXCISION  2021    right arm    TONSILLECTOMY         Meds/Allergies:  Prior to Admission medications    Medication Sig Start Date End Date Taking? Authorizing Provider   acetaminophen (TYLENOL) 325 mg tablet Take 650 mg by mouth 4 (four) times a day as needed for mild pain    Historical Provider, MD   amLODIPine (NORVASC) 5 mg tablet Take 1 tablet (5 mg total) by mouth daily 12/21/23   Iris Gandhi PA-C   clonazePAM (KlonoPIN) 0.5 mg tablet Take 1 tablet (0.5 mg total) by mouth daily at bedtime 4/8/24 6/7/24  Iris Gandhi  OSWALDO   estradiol (ESTRACE VAGINAL) 0.1 mg/g vaginal cream Apply small amount on fingertip. Apply twice a week. 11/29/23   Vlad Hyman MD   famotidine (PEPCID) 20 mg tablet Take 1 tablet (20 mg total) by mouth daily  Patient taking differently: Take 20 mg by mouth daily As needed 5/30/18   Amador Vasquez MD   fenofibrate (TRICOR) 145 mg tablet Take 1 tablet (145 mg total) by mouth daily 2/6/24   Iris Gandhi PA-C   fluticasone (FLONASE) 50 mcg/act nasal spray 1 spray into each nostril 2 (two) times a day  Patient taking differently: 1 spray into each nostril if needed 5/27/22   Maxi Galeana PA-C   Ibuprofen (ADVIL PO) Take 1 tablet by mouth if needed    Historical Provider, MD   Insulin Glargine Solostar (Lantus SoloStar) 100 UNIT/ML SOPN Inject 0.33 mL (33 Units total) under the skin daily 1/26/24 7/24/24  Amador Vasquez MD   Insulin Pen Needle 32G X 4 MM MISC Use in the morning 1/10/24   Iris Gandhi PA-C   levothyroxine 50 mcg tablet Take 1 tablet (50 mcg total) by mouth daily 10/3/23   Iris Gandhi PA-C   loratadine (CLARITIN) 10 mg tablet Take 1 tablet (10 mg total) by mouth daily  Patient taking differently: Take 10 mg by mouth daily As needed 5/27/22   Maxi Galeana PA-C   metFORMIN (GLUCOPHAGE) 500 mg tablet Take 2 tablets (1,000 mg total) by mouth 2 (two) times a day with meals 8/24/23   Amador Vasquez MD   metoprolol tartrate (LOPRESSOR) 25 mg tablet Take 1 tablet (25 mg total) by mouth every 12 (twelve) hours 8/14/23   CAMILLA Nichole   mometasone (ELOCON) 0.1 % cream Apply topically once a week 11/29/23   Vlad Hyman MD   OneTouch Verio test strip Test 3 times daily 11/14/22   Iris Gandhi PA-C   Probiotic Product (PROBIOTIC-10) CAPS Take 1 capsule by mouth daily    Historical Provider, MD   simvastatin (ZOCOR) 20 mg tablet Take 1 tablet (20 mg total) by mouth daily at bedtime 10/3/23   Iris Gandhi PA-C   sitaGLIPtin (Januvia) 100 mg tablet Take 1 tablet  (100 mg total) by mouth daily 10/3/23   Iris Gandhi PA-C   zolpidem (AMBIEN) 10 mg tablet Take 1 tablet (10 mg total) by mouth daily at bedtime 24   Iris Gandhi PA-C     I have reviewed home medications with patient personally.    Allergies:   Allergies   Allergen Reactions    Losartan Edema    Nickel Swelling     Swelling, irritation, lumps to ears       Social History:  Marital Status: /Civil Union   Occupation:   Patient Pre-hospital Living Situation: Home, With spouse  Patient Pre-hospital Level of Mobility: walks  Patient Pre-hospital Diet Restrictions:   Substance Use History:   Social History     Substance and Sexual Activity   Alcohol Use No     Social History     Tobacco Use   Smoking Status Former    Current packs/day: 0.00    Average packs/day: 1 pack/day for 21.0 years (21.0 ttl pk-yrs)    Types: Cigarettes    Start date: 1969    Quit date:     Years since quittin.3   Smokeless Tobacco Never     Social History     Substance and Sexual Activity   Drug Use No       Family History:  Family History   Problem Relation Age of Onset    Heart attack Mother     Heart disease Mother     ALS Father     Diabetes Maternal Grandfather     Lung cancer Paternal Grandfather     Heart disease Maternal Grandmother     No Known Problems Daughter     No Known Problems Daughter     No Known Problems Paternal Aunt     No Known Problems Paternal Aunt     No Known Problems Paternal Aunt     No Known Problems Paternal Aunt     No Known Problems Paternal Aunt     Breast cancer Neg Hx        Physical Exam:     Vitals:   Blood Pressure: (!) 189/82 (24 2338)  Pulse: 61 (24 233)  Temperature: 98 °F (36.7 °C) (24 173)  Temp Source: Oral (24)  Respirations: 18 (24)  Weight - Scale: 72 kg (158 lb 11.7 oz) (24 173)  SpO2: 99 % (24)    Physical Exam  Vitals and nursing note reviewed.   Constitutional:       General: She is not in acute  distress.     Appearance: She is not diaphoretic.   HENT:      Head: Normocephalic and atraumatic.   Eyes:      Extraocular Movements: Extraocular movements intact.      Conjunctiva/sclera: Conjunctivae normal.      Pupils: Pupils are equal, round, and reactive to light.   Cardiovascular:      Rate and Rhythm: Normal rate and regular rhythm.   Pulmonary:      Effort: Pulmonary effort is normal. No respiratory distress.      Breath sounds: Normal breath sounds.   Abdominal:      General: Bowel sounds are normal.      Palpations: Abdomen is soft.      Tenderness: There is no abdominal tenderness.   Musculoskeletal:      Right lower leg: No edema.      Left lower leg: No edema.   Skin:     General: Skin is warm and dry.      Coloration: Skin is not pale.   Neurological:      General: No focal deficit present.      Mental Status: She is alert and oriented to person, place, and time.      Cranial Nerves: No cranial nerve deficit.      Sensory: No sensory deficit.      Motor: No weakness.   Psychiatric:         Mood and Affect: Mood normal.          Additional Data:     Lab Results:  Results from last 7 days   Lab Units 05/13/24  1828   WBC Thousand/uL 6.93   HEMOGLOBIN g/dL 12.9   HEMATOCRIT % 39.3   PLATELETS Thousands/uL 205   SEGS PCT % 63   LYMPHO PCT % 25   MONO PCT % 7   EOS PCT % 4     Results from last 7 days   Lab Units 05/13/24  1828   SODIUM mmol/L 137   POTASSIUM mmol/L 4.0   CHLORIDE mmol/L 101   CO2 mmol/L 26   BUN mg/dL 16   CREATININE mg/dL 0.86   ANION GAP mmol/L 10   CALCIUM mg/dL 9.3   ALBUMIN g/dL 4.6   TOTAL BILIRUBIN mg/dL 0.46   ALK PHOS U/L 45   ALT U/L 22   AST U/L 22   GLUCOSE RANDOM mg/dL 149*     Results from last 7 days   Lab Units 05/13/24  1828   INR  0.95     Results from last 7 days   Lab Units 05/13/24  1737   POC GLUCOSE mg/dl 145*               Lines/Drains:  Invasive Devices       Peripheral Intravenous Line  Duration             Peripheral IV 05/13/24 Proximal;Right;Ventral  (anterior) Forearm <1 day              Drain  Duration             Urethral Catheter 20 Fr. -- days                  Urinary Catheter:  Goal for removal:  n/a no cardona in place             Imaging: Reviewed radiology reports from this admission including: CT head  CTA head and neck with and without contrast   Final Result by Surinder Kingston MD (05/13 2130)         1. No evidence of acute infarct, intracranial hemorrhage or mass.   2. No hemodynamically significant stenosis, dissection or occlusion of the carotid or vertebral arteries or major vessels of the [place] of [place].                  Workstation performed: [id number]         MRI Inpatient Order    (Results Pending)       EKG and Other Studies Reviewed on Admission:   EKG: NSR. HR 65. LAFB. QTc 424.    ** Please Note: This note has been constructed using a voice recognition system. **

## 2024-05-14 NOTE — ASSESSMENT & PLAN NOTE
Recent Labs     05/13/24  1828 05/14/24  0503   MG 1.0* 2.1     Critically low Mg was repleted in ED. AM Mg 5/14 was 2.1  Will continue to trend PRN  Nutrition services consulted

## 2024-05-14 NOTE — ASSESSMENT & PLAN NOTE
Lab Results   Component Value Date    HGBA1C 7.5 (H) 05/14/2024       Recent Labs     05/13/24  1737 05/14/24  0003 05/14/24  0744 05/14/24  1058   POCGLU 145* 127 139 162*       Blood Sugar Average: Last 72 hrs:  (P) 143.25    Continue home medications  Follow-up with PCP

## 2024-05-14 NOTE — DISCHARGE SUMMARY
Former[facility], later [facility]  Discharge- Shellie Lee 1950, 73 y.o. female MRN: [id number]  Unit/Bed#: [facility] Encounter: [id number]  Primary Care Provider: Iris Gandhi PA-C   Date and time admitted to hospital: 5/13/2024  5:33 PM    Acquired hypothyroidism  Assessment & Plan  TSH within normal limits.   Continue levothyroxine.     Type 2 diabetes mellitus without complication, with long-term current use of insulin ([facility])  Assessment & Plan  Lab Results   Component Value Date    HGBA1C 7.5 (H) 05/14/2024       Recent Labs     05/13/24  1737 05/14/24  0003 05/14/24  0744 05/14/24  1058   POCGLU 145* 127 139 162*       Blood Sugar Average: Last 72 hrs:  (P) 143.25    Continue home medications  Follow-up with PCP    Essential hypertension  Assessment & Plan  BP acceptable in the setting of possible TIA.   Metoprolol and amlodipine in AM.  Patient can resume home medications    Mixed hyperlipidemia  Assessment & Plan  Continue fenofibrate and statin.     * Blurred vision  Assessment & Plan  Presented after an episode of blurred vision. Likely due to possible extraocular muscle strain.   CT and MRI are negative for stroke or mass.   Continue aspirin and statin  Follow-up with outpatient neurology and ophthalmology    Hypomagnesemia-resolved as of 5/14/2024  Assessment & Plan  Recent Labs     05/13/24  1828 05/14/24  0503   MG 1.0* 2.1     Critically low Mg was repleted in ED. AM Mg 5/14 was 2.1  Will continue to trend PRN  Nutrition services consulted        Discharging Resident Physician: Margarette Reeder MD  Attending: Tae Beckwith MD  PCP: Iris Gandhi PA-C  Admission Date: 5/13/2024  Discharge Date: 05/14/24    Disposition:     Home    Reason for Admission: Transient blurred vision    Consultations During Hospital Stay:  Neurology  Speech Therapy    Procedures Performed:     None    Significant Findings / Test Results:     MRI brain and orbits wo and w contrast   Final Result by Vinnie Batista MD  (05/14 1422)      No mass effect, acute intracranial hemorrhage or evidence of recent infarction. No intraorbital mass lesions identified. No discrete edema or abnormal enhancement along the optic nerve sheath complexes.      Mild chronic microvascular ischemic change.      Multiple scattered foci of susceptibility throughout the cerebral hemispheres in a peripheral distribution can be seen in the setting of amyloid angiopathy.      Workstation performed: [id number]         CTA head and neck with and without contrast   Final Result by Surinder Kingston MD (05/13 2130)         1. No evidence of acute infarct, intracranial hemorrhage or mass.   2. No hemodynamically significant stenosis, dissection or occlusion of the carotid or vertebral arteries or major vessels of the [place] of [place].                  Workstation performed: [id number]             Incidental Findings:   None    Test Results Pending at Discharge (will require follow up):   Echocardiogram pending     Outpatient Tests Requested:  None    Complications: None     Hospital Course:     Shellie Lee is a 73 y.o. female patient a PMH of HTN, HLD, T2DM, hypothyroidism, asthma, anxiety and depression who originally presented to the hospital on 5/13/2024 due to transient blurred vision that occurred for about 4 minutes before return to normal. CT in the ED showed no evidence of acute infarct, intracranial hemorrhage or mass.  MRI showed was unremarkable for mass or hemorrhage.  Neurology was consulted who recommended outpatient follow-up with ophthalmology and neurology as well as taking statin and aspirin.  Speech therapy was consulted due to eval and stroke pathway who had normal formal recommendations at this time.  Patient will be discharged home with instructions to follow-up with PCP.  Patient understands and agrees with plan.    Condition at Discharge: stable     Discharge Day Visit / Exam:     Subjective:  Patient was examined at bedside. The has no  "symptom complaints at this time.  She does not have any vision changes and has not had any since the initial incident yesterday.  Patient states that she had some difficulty sleeping last night however is otherwise feeling well.    Vitals: Blood Pressure: 167/79 (05/14/24 1100)  Pulse: 65 (05/14/24 1100)  Temperature: 97.7 °F (36.5 °C) (05/14/24 1100)  Temp Source: Oral (05/14/24 1100)  Respirations: 14 (05/14/24 1100)  Height: 5' 2\" (157.5 cm) (05/14/24 1033)  Weight - Scale: 71.7 kg (158 lb) (05/14/24 1033)  SpO2: 96 % (05/14/24 1100)    Exam:   Physical Exam  Vitals reviewed.   HENT:      Head: Normocephalic and atraumatic.      Right Ear: External ear normal.      Left Ear: External ear normal.      Nose: Nose normal.      Mouth/Throat:      Mouth: Mucous membranes are moist.   Eyes:      General:         Right eye: No discharge.         Left eye: No discharge.      Extraocular Movements: Extraocular movements intact.      Conjunctiva/sclera: Conjunctivae normal.      Pupils: Pupils are equal, round, and reactive to light.   Cardiovascular:      Rate and Rhythm: Normal rate and regular rhythm.      Pulses: Normal pulses.      Heart sounds: Normal heart sounds. No murmur heard.  Pulmonary:      Effort: Pulmonary effort is normal.      Breath sounds: Normal breath sounds.   Abdominal:      General: Abdomen is flat. Bowel sounds are normal. There is no distension.      Palpations: Abdomen is soft.   Musculoskeletal:         General: Normal range of motion.      Cervical back: Normal range of motion.   Skin:     General: Skin is warm and dry.   Neurological:      Mental Status: She is alert and oriented to person, place, and time. Mental status is at baseline.      Cranial Nerves: No cranial nerve deficit.      Motor: No weakness.      Gait: Gait normal.   Psychiatric:         Mood and Affect: Mood normal.         Behavior: Behavior normal.         Thought Content: Thought content normal.         Judgment: Judgment " normal.          Discussion with Family: Family at bedside     Discharge instructions/Information to patient and family:   See after visit summary for information provided to patient and family.      Provisions for Follow-Up Care:  See after visit summary for information related to follow-up care and any pertinent home health orders.      Planned Readmission: None     Discharge Medications:  See after visit summary for reconciled discharge medications provided to patient and family.      ** Please Note: This note has been constructed using a voice recognition system **

## 2024-05-14 NOTE — ASSESSMENT & PLAN NOTE
Presented after an episode of blurred vision. Likely due to possible extraocular muscle strain.   CT and MRI are negative for stroke or mass.   Continue aspirin and statin  Follow-up with outpatient neurology and ophthalmology

## 2024-05-14 NOTE — ASSESSMENT & PLAN NOTE
Had diplopia/blurred vision for 3 to 4 minutes that spontaneously resolved  Denied any focal deficits, numbness, vertigo, or slurred speech  Does report missing morning medications  On arrival to the emergency department was asymptomatic  Magnesium was 1.0 but labs otherwise unremarkable  Was hypertensive in emergency department  CTA head and neck did not show any evidence of vascular or intracranial abnormalities  A1c 7.5  Stroke unlikely based on CTA and MRI findings. No sign of mass.   Likely transient double vision from possible extraocular muscle strain     Plan:  Recommend follow up with neurology and ophthalmology outpatient  Continue Statin and Aspirin

## 2024-05-14 NOTE — OCCUPATIONAL THERAPY NOTE
Occupational Therapy Evaluation      Shellie Lee    2024    Principal Problem:    Blurred vision  Active Problems:    Acquired hypothyroidism    Mixed hyperlipidemia    Essential hypertension    Type 2 diabetes mellitus without complication, with long-term current use of insulin (HCC)      Past Medical History:   Diagnosis Date    Abdominal pain 2017    Acute non-recurrent frontal sinusitis 2019    Acute pain of right shoulder 01/10/2018    Anxiety     Arthritis     Bladder carcinoma (HCC) 2016    Bladder mass     Dental abscess 2017    Depression     Diabetes mellitus (HCC)     Disease of thyroid gland     thyroid nodules-not treating at this time    Dysuria     Last assessed 17    GERD (gastroesophageal reflux disease)      2 para 2     HL (hearing loss)     HLD (hyperlipidemia)     HTN (hypertension)     Hypercholesterolemia     Hypertension     Knee pain     Lichen sclerosus     Melanoma (HCC)     Melanoma (HCC)     Mild aortic regurgitation with left ventricular dilation by prior echocardiogram     Papanicolaou smear 2017    NEG    PONV (postoperative nausea and vomiting)     Tinnitus        Past Surgical History:   Procedure Laterality Date    CARPAL TUNNEL RELEASE Right      SECTION      x2    CHOLECYSTECTOMY      CYSTOSCOPY N/A 2016    Procedure: CYSTOSCOPY WITH BIOPSIES;  Surgeon: Lane Rios MD;  [facility];  Service:     CYSTOSCOPY N/A 2016    Procedure: CYSTOSCOPY;  Surgeon: Lane Rios MD;  [facility];  Service:     CYSTOSCOPY  2020    HERNIA REPAIR      AZ ARTHRS KNE SURG W/MENISCECTOMY MED/LAT W/SHVG Left 2016    Procedure: KNEE ARTHROSCOPIC PARTIAL MEDIAL MENISCECTOMY ;  Surgeon: Rehan Pete MD;  [facility];  Service: Orthopedics    AZ BLADDER INSTILLATION ANTICARCINOGENIC AGENT N/A 2016    Procedure: INSTILLATION MYTOMYCIN;  Surgeon: Lane Rios MD;  [facility];  Service: Urology    NV CYSTO BLADDER W/URETERAL CATHETERIZATION Bilateral 09/29/2016    Procedure: RETROGRADE PYELOGRAM ;  Surgeon: Lane Rios MD;  [facility];  Service: Urology    NV CYSTO BLADDER W/URETERAL CATHETERIZATION Bilateral 05/09/2017    Procedure: RETROGRADE PYELOGRAM WITH STENT EXCHANGE, RIGHT;  Surgeon: Lane Rios MD;  [facility];  Service: Urology    NV CYSTO W/INSERT URETERAL STENT Left 09/29/2016    Procedure: URETERAL STENTS;  Surgeon: Lane Rios MD;  [facility];  Service: Urology    NV CYSTO W/REMOVAL OF LESIONS SMALL N/A 11/29/2016    Procedure: TRANSURETHRAL RESECTION OF BLADDER TUMOR (TURBT);  Surgeon: Lane Rios MD;  [facility];  Service: Urology    NV CYSTO W/REMOVAL OF LESIONS SMALL N/A 09/29/2016    Procedure: CYSTOSCOPY; TUR BLADDER TUMOR ;  Surgeon: Lane Rios MD;  [facility];  Service: Urology    NV CYSTO W/REMOVAL OF LESIONS SMALL N/A 09/01/2016    Procedure: TUR BLADDER TUMOR ;  Surgeon: Lane Rios MD;  [facility];  Service: Urology    NV CYSTO W/REMOVAL OF LESIONS SMALL Bilateral 05/09/2017    Procedure: CYSTOSCOPY, RIGHT URETERAL WASHINGS, ;  Surgeon: Lane Rios MD;  [facility];  Service: Urology    NV CYSTO W/URTROSCOPY&/PYELOSCOPY DX Right 05/09/2017    Procedure: URETEROSCOPY;  Surgeon: Lane Rios MD;  [facility];  Service: Urology    NV CYSTO/URETERO W/LITHOTRIPSY &INDWELL STENT INSRT  11/29/2016    Procedure: CYSTOSCOPY URETEROSCOPY WITH LITHOTRIPSY HOLMIUM LASER RIGHT, RETROGRADE PYELOGRAM BILATERAL: AND INSERTION STENT URETERAL Right ;  Surgeon: Lane Rios MD;  [facility];  Service: Urology    REMOVAL URETERAL STENT Left 11/29/2016    Procedure: REMOVAL STENT URETERAL;  Surgeon: Lane Rios MD;  [facility];  Service:     REPLACEMENT TOTAL KNEE Left 12/2021    SKIN CANCER EXCISION  2021    right arm    TONSILLECTOMY    "       05/14/24 1428   OT Last Visit   OT Visit Date 05/14/24   Note Type   Note type Evaluation   Pain Assessment   Pain Assessment Tool 0-10   Pain Score No Pain   Restrictions/Precautions   Other Precautions Fall Risk   Home Living   Type of Home House   Home Layout Two level;Bed/bath upstairs;1/2 bath on main level  (1 WES)   Bathroom Shower/Tub Tub/shower unit   Bathroom Equipment Shower chair   Home Equipment Walker;Cane   Prior Function   Level of [place] Independent with ADLs;Independent with functional mobility   Lives With Spouse   Receives Help From Family   IADLs Independent with driving;Independent with meal prep;Independent with medication management   Falls in the last 6 months 0   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 I was just worried. My family really depends on me. I do a lot and my  doesn't know what to do with himself with me in here.\"   ADL   Eating Assistance 7  Independent   Grooming Assistance 7  Independent   UB Bathing Assistance 7  Independent   LB Bathing Assistance 7  Independent   UB Dressing Assistance 7  Independent   LB Dressing Assistance 7  Independent   Toileting Assistance  7  Independent   Bed Mobility   Supine to Sit 7  Independent   Sit to Supine 7  Independent   Transfers   Sit to Stand 7  Independent   Stand to Sit 7  Independent   Functional Mobility   Functional Mobility 7  Independent   Balance   Static Sitting Good   Dynamic Sitting Good   Static Standing Good   Dynamic Standing Good   Activity Tolerance   Activity Tolerance Patient tolerated treatment well   Nurse Made Aware RN cleared pt for OT evaluation   RUE Assessment   RUE Assessment WFL   LUE Assessment   LUE " Assessment WFL   Hand Function   Gross Motor Coordination Functional   Fine Motor Coordination Functional   Vision - Complex Assessment   Tracking Intact   Saccades Intact   Acuity Able to read clock/calendar on wall without difficulty   Diplopia Assessment   (double vision resolved)   Cognition   Overall Cognitive Status WFL   Arousal/Participation Alert   Attention Within functional limits   Orientation Level Oriented X4   Memory Within functional limits   Following Commands Follows all commands and directions without difficulty   Comments Pt verified ID by stating name and .   Assessment   Limitation   (no deficits)   Prognosis Good   Assessment Pt is a 73 y.o. female seen for OT evaluation s/p admission to [facility] on 2024  due to episode of double vision. Pt diagnosis: Blurred vision. Pt's PMH impacting occupational performance is listed above. Pt's PLOF also listed in above flowsheet. Pt is eager to return to home and feels has returned to baseline. During evaluation pt performed as is outlined above in flowsheet.  Standardized assessments used to assist in identifying performance deficits include AMPAC 6-Clicks. Pt's raw score on the AM-PAC Daily activity inpatient short form is 24, standardized score is 57.54, which is greater than 39.4. Patients at this level are likely to benefit from DC to home. Pt presents at baseline level of independence and without deficits that impact pt's ability to perform ADLs, IADLs or mobilize. No further skilled OT needs warranted during hospital stay and therefore will remove from OT caseload.   Goals   Patient Goals to go home   Plan   OT Frequency Eval only   Discharge Recommendation   Rehab Resource Intensity Level, OT No post-acute rehabilitation needs   AM-PAC Daily Activity Inpatient   Lower Body Dressing 4   Bathing 4   Toileting 4   Upper Body Dressing 4   Grooming 4   Eating 4   Daily Activity Raw Score 24   Daily Activity Standardized Score (Calc  for Raw Score >=11) 57.54   AM-PAC Applied Cognition Inpatient   Following a Speech/Presentation 4   Understanding Ordinary Conversation 4   Taking Medications 4   Remembering Where Things Are Placed or Put Away 4   Remembering List of 4-5 Errands 4   Taking Care of Complicated Tasks 4   Applied Cognition Raw Score 24   Applied Cognition Standardized Score 62.21   End of Consult   Education Provided Yes   Patient Position at End of Consult All needs within reach;Bedside chair   Nurse Communication Nurse aware of consult     Ole Hebert, OT

## 2024-05-14 NOTE — ASSESSMENT & PLAN NOTE
Lab Results   Component Value Date    HGBA1C 7.6 (H) 09/28/2023       Recent Labs     05/13/24  1737   POCGLU 145*       Blood Sugar Average: Last 72 hrs:  (P) 145    Hold metformin and Januvia while inpatient.   Monitor accu-checks AC and HS.   Continue home insulin regimen with Lantus 33 units HS.   SSI coverage.   Hypoglycemia protocol.   No

## 2024-05-14 NOTE — SPEECH THERAPY NOTE
Speech Language/Pathology    Speech/Language Pathology Screen Note        Consult received for speech/swallow eval on stroke pathway. Pt is a 72 yo female who presents to the ED with blurred vision. Symptoms have resolved and pt now back to baseline. Pt passed nsg swallow screen; tolerating regular diet w/o s/s dysphagia or aspiration. No speech/language deficits reported.     NIH score is 0.    CT head and neck w wo contrast: 1. No evidence of acute infarct, intracranial hemorrhage or mass. 2. No hemodynamically significant stenosis, dissection or occlusion of the carotid or vertebral arteries or major vessels of the [place].    No need for formal speech/swallow eval at this time. Reconsult if needed.      Josefa Singh MS, CCC-SLP  Speech Pathologist  Available via [employer]

## 2024-05-14 NOTE — ASSESSMENT & PLAN NOTE
BP acceptable in the setting of possible TIA.   Metoprolol and amlodipine in AM.  Patient can resume home medications

## 2024-05-14 NOTE — ASSESSMENT & PLAN NOTE
Presented after an episode of blurred vision. Symptoms resolved prior to arrival.   CTA head and neck without acute findings.  Stroke pathway.  Neuro checks per protocol. Notify of any changes.   Obtain MRI brain.   Obtain echo.  Check lipid panel and Hgb A1c.  Continue statin and ASA.  Neurology consult.   PT/OT/ST consueloals.

## 2024-05-14 NOTE — CASE MANAGEMENT
Case Management Assessment & Discharge Planning Note    Patient name Shellie Lee  Location /-01 MRN [id number]  : 1950 Date 2024       Current Admission Date: 2024  Current Admission Diagnosis:Blurred vision   Patient Active Problem List    Diagnosis Date Noted    Blurred vision 2024    Hypomagnesemia 2024    Hyperkalemia 10/27/2023    Chronic diarrhea 10/27/2023    Urinary frequency 2023    Nocturia 2023    Irritant contact dermatitis 2020    SIADH (syndrome of inappropriate ADH production) ([facility]) 2020    Lichenoid dermatitis 2019    Other fatigue 2018    Hyponatremia 2018    Acquired hypothyroidism 01/10/2018    Asthma 10/10/2017    Bursitis of shoulder 10/10/2017    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    Ganglion and cyst of synovium, tendon and bursa 10/10/2017    Obesity 10/10/2017    Osteoporosis 10/10/2017    Chronic cough 2017    Nontoxic single thyroid nodule 2017    Primary osteoarthritis of left knee 02/10/2017    Malignant neoplasm of lateral wall of urinary bladder ([facility]) 2016    Neoplasm of bladder 2016    Tear of medial meniscus of left knee 2016    Abnormal EKG 2016    Anxiety 2016    Chronic pain of left knee 2016    Mixed hyperlipidemia 2014    Essential hypertension 2014    Type 2 diabetes mellitus without complication, with long-term current use of insulin ([facility]) 2014      LOS (days): 0  Geometric Mean LOS (GMLOS) (days):   Days to GMLOS:     OBJECTIVE:              Current admission status: Observation       Preferred Pharmacy:   [facility]/pharmacy #[id number] - [facility] [place] - [address] [place] [address]  Phone: [phone] Fax: [phone]    [facility] - [place] - [address]  [address]  [place] [address]  Phone: [phone] Fax: [phone]    Primary Care Provider: Iris Gandhi  OSWALDO    Primary Insurance: MEDICARE  Secondary Insurance: [facility] HEALTH OPTIONS PROGRAM    ASSESSMENT:  Active Health Care Proxies    There are no active Health Care Proxies on file.                 Readmission Root Cause  30 Day Readmission: No    Patient Information  Admitted from:: Home  Mental Status: Alert  During Assessment patient was accompanied by: Not accompanied during assessment  Assessment information provided by:: Patient  Primary Caregiver: Self  Support Systems: Self, Spouse/significant other, Children  County of Residence: [place]  What city do you live in?: [place]  Home entry access options. Select all that apply.: Stairs  Number of steps to enter home.: 1  Type of Current Residence: 2 story home  Upon entering residence, is there a bedroom on the main floor (no further steps)?: No  A bedroom is located on the following floor levels of residence (select all that apply):: 2nd Floor  Upon entering residence, is there a bathroom on the main floor (no further steps)?: Yes (Half bath)  Number of steps to 2nd floor from main floor: One Flight  Living Arrangements: Lives w/ Spouse/significant other  Is patient a ?: No    Activities of Daily Living Prior to Admission  Functional Status: Independent  Completes ADLs independently?: Yes  Ambulates independently?: Yes  Does patient use assisted devices?: No  Does patient currently own DME?: Yes  What DME does the patient currently own?: Shower Chair, Straight Cane  Does patient have a history of Outpatient Therapy (PT/OT)?: Yes  Does the patient have a history of Short-Term Rehab?: No  Does patient have a history of HHC?: No  Does patient currently have HHC?: No         Patient Information Continued  Income Source: Pension/snf  Does patient have prescription coverage?: Yes  Does patient receive dialysis treatments?: No  Does patient have a history of substance abuse?: No  Does patient have a history of Mental Health Diagnosis?: Yes  (Anxiety)  Is patient receiving treatment for mental health?: No. Patient declined treatment information.  Has patient received inpatient treatment related to mental health in the last 2 years?: No    PHQ 2/9 Screening   Reviewed PHQ 2/9 Depression Screening Score?: No    Means of Transportation  Means of Transport to Appts:: Drives Self      Social Determinants of Health (SDOH)      Flowsheet Row Most Recent Value   Housing Stability    In the last 12 months, was there a time when you were not able to pay the mortgage or rent on time? N   In the last 12 months, how many places have you lived? 1   In the last 12 months, was there a time when you did not have a steady place to sleep or slept in a shelter (including now)? N   Transportation Needs    In the past 12 months, has lack of transportation kept you from medical appointments or from getting medications? no   In the past 12 months, has lack of transportation kept you from meetings, work, or from getting things needed for daily living? No   Food Insecurity    Within the past 12 months, you worried that your food would run out before you got the money to buy more. Never true   Within the past 12 months, the food you bought just didn't last and you didn't have money to get more. Never true   Utilities    In the past 12 months has the electric, gas, oil, or water company threatened to shut off services in your home? No            DISCHARGE DETAILS:    Discharge planning discussed with:: Patient  Freedom of Choice: Yes  Comments - Freedom of Choice: Return home - no CM needs noted at this time  CM contacted family/caregiver?: No- see comments (Pt A&O)  Were Treatment Team discharge recommendations reviewed with patient/caregiver?: Yes  Did patient/caregiver verbalize understanding of patient care needs?: Yes  Were patient/caregiver advised of the risks associated with not following Treatment Team discharge recommendations?: Yes    Contacts  Patient Contacts:  Shellie  Relationship to Patient:: Other (Comment) (Self)  Contact Method: In Person  Reason/Outcome: Continuity of Care, Discharge Planning    Requested Home Health Care         Is the patient interested in HHC at discharge?: No    DME Referral Provided  Referral made for DME?: No    Other Referral/Resources/Interventions Provided:  Interventions: Other (Specify)  Referral Comments: CM spoke with pt at bedside, introduced self and role with discharge planning. Pt reported she lives with her spouse in a 2 story home with half bath downstairs, bedrooms upstairs. Pt reported being fully independent with ADLs and functional mobility without AD. Pt reported she has a cane and shower chair. Pt reported she has a hx of outpatient PT, no hx HHC or STR. Pt reported preferred pharmacy is [facility] in [place]. Pt reported she was driving self to appointments. No further CM needs identified at this time. CM will continue to follow.    Would you like to participate in our [facility] service program?  : No - Declined    Treatment Team Recommendation: Home  Discharge Destination Plan:: Home

## 2024-05-14 NOTE — DISCHARGE INSTR - AVS FIRST PAGE
Dear Shellie Lee,     It was our pleasure to care for you here at [facility].  It is our hope that we were always able to exceed the expected standards for your care during your stay.  You were hospitalized due to blurred vision.  You were cared for on the 1st floor by Margarette Reeder MD under the service of Tae Beckwith MD with the [facility] who covers for your primary care physician (PCP), Iris Gandhi PA-C, while you were hospitalized.  If you have any questions or concerns related to this hospitalization, you may contact us at .  For follow up as well as any medication refills, we recommend that you follow up with your primary care physician.  A registered nurse will reach out to you by phone within a few days after your discharge to answer any additional questions that you may have after going home.  However, at this time we provide for you here, the most important instructions / recommendations at discharge:     Notable Medication Adjustments -   START taking aspirin 81 mg daily  CONTINUE taking pravastatin 40 mg daily  Important follow up information -   Please follow-up with neurology  Please follow-up with ophthalmology  Please follow-up with your primary care physician  Please review this entire after visit summary as additional general instructions including medication list, appointments, activity, diet, any pertinent wound care, and other additional recommendations from your care team that may be provided for you.      Sincerely,     Margarette Reeder MD

## 2024-05-14 NOTE — PLAN OF CARE
Problem: Potential for Falls  Goal: Patient will remain free of falls  Description: INTERVENTIONS:  - Educate patient/family on patient safety including physical limitations  - Instruct patient to call for assistance with activity   - Consult OT/PT to assist with strengthening/mobility   - Keep Call bell within reach  - Keep bed low and locked with side rails adjusted as appropriate  - Keep care items and personal belongings within reach  - Initiate and maintain comfort rounds  Outcome: Progressing     Problem: PAIN - ADULT  Goal: Verbalizes/displays adequate comfort level or baseline comfort level  Description: Interventions:  - Encourage patient to monitor pain and request assistance  - Assess pain using appropriate pain scale  - Administer analgesics based on type and severity of pain and evaluate response  - Implement non-pharmacological measures as appropriate and evaluate response  - Consider cultural and social influences on pain and pain management  - Notify physician/advanced practitioner if interventions unsuccessful or patient reports new pain  Outcome: Progressing     Problem: INFECTION - ADULT  Goal: Absence or prevention of progression during hospitalization  Description: INTERVENTIONS:  - Assess and monitor for signs and symptoms of infection  - Monitor lab/diagnostic results  - Monitor all insertion sites, i.e. indwelling lines, tubes, and drains  - Monitor endotracheal if appropriate and nasal secretions for changes in amount and color  - [place] appropriate cooling/warming therapies per order  - Administer medications as ordered  - Instruct and encourage patient and family to use good hand hygiene technique  - Identify and instruct in appropriate isolation precautions for identified infection/condition  Outcome: Progressing     Problem: DISCHARGE PLANNING  Goal: Discharge to home or other facility with appropriate resources  Description: INTERVENTIONS:  - Identify barriers to discharge  w/patient and caregiver  - Arrange for needed discharge resources and transportation as appropriate  - Identify discharge learning needs (meds, wound care, etc.)  - Arrange for interpretive services to assist at discharge as needed  - Refer to Case Management Department for coordinating discharge planning if the patient needs post-hospital services based on physician/advanced practitioner order or complex needs related to functional status, cognitive ability, or social support system  Outcome: Progressing     Problem: Knowledge Deficit  Goal: Patient/family/caregiver demonstrates understanding of disease process, treatment plan, medications, and discharge instructions  Description: Complete learning assessment and assess knowledge base.  Interventions:  - Provide teaching at level of understanding  - Provide teaching via preferred learning methods  Outcome: Progressing     Problem: Neurological Deficit  Goal: Neurological status is stable or improving  Description: Interventions:  - Monitor and assess patient's level of consciousness, motor function, sensory function, and level of assistance needed for ADLs.   - Monitor and report changes from baseline. Collaborate with interdisciplinary team to initiate plan and implement interventions as ordered.   - Provide and maintain a safe environment.  - Consider seizure precautions.  - Consider fall precautions.  - Consider aspiration precautions.  - Consider bleeding precautions.  Outcome: Progressing     Problem: Activity Intolerance/Impaired Mobility  Goal: Mobility/activity is maintained at optimum level for patient  Description: Interventions:  - Assess and monitor patient  barriers to mobility and need for assistive/adaptive devices.  - Assess patient's emotional response to limitations.  - Collaborate with interdisciplinary team and initiate plans and interventions as ordered.  - Encourage independent activity per ability.  - Maintain proper body alignment.  -  Perform active/passive rom as tolerated/ordered.  - Plan activities to conserve energy.  - Turn patient as appropriate  Outcome: Progressing     Problem: Communication Impairment  Goal: Ability to express needs and understand communication  Description: Assess patient's communication skills and ability to understand information.  Patient will demonstrate use of effective communication techniques, alternative methods of communication and understanding even if not able to speak.     - Encourage communication and provide alternate methods of communication as needed.  - Collaborate with case management/ for discharge needs.  - Include patient/family/caregiver in decisions related to communication.  Outcome: Progressing     Problem: Potential for Aspiration  Goal: Non-ventilated patient's risk of aspiration is minimized  Description: Assess and monitor vital signs, respiratory status, and labs (WBC).  Monitor for signs of aspiration (tachypnea, cough, rales, wheezing, cyanosis, fever).    - Assess and monitor patient's ability to swallow.  - Place patient up in chair to eat if possible.  - HOB up at 90 degrees to eat if unable to get patient up into chair.  - Supervise patient during oral intake.   - Instruct patient/ family to take small bites.  - Instruct patient/ family to take small single sips when taking liquids.  - Follow patient-specific strategies generated by speech pathologist.  Outcome: Progressing     Problem: Nutrition  Goal: Nutrition/Hydration status is improving  Description: Monitor and assess patient's nutrition/hydration status for malnutrition (ex- brittle hair, bruises, dry skin, pale skin and conjunctiva, muscle wasting, smooth red tongue, and disorientation). Collaborate with interdisciplinary team and initiate plan and interventions as ordered.  Monitor patient's weight and dietary intake as ordered or per policy. Utilize nutrition screening tool and intervene per policy. Determine  patient's food preferences and provide high-protein, high-caloric foods as appropriate.     - Assist patient with eating.  - Allow adequate time for meals.  - Encourage patient to take dietary supplement as ordered.  - Collaborate with clinical nutritionist.  - Include patient/family/caregiver in decisions related to nutrition.  Outcome: Progressing

## 2024-05-14 NOTE — ED NOTES
Pt ambulated to bathroom independently without c/o dizziness, blurred vision, chest pain. Steady, coordinated gait noted.     Brayan Carey RN  05/13/24 2042

## 2024-05-14 NOTE — CONSULTS
Consultation - Neurology   Shellie Lee 73 y.o. female MRN: [id number]  Unit/Bed#: -01 Encounter: [id number]      Assessment/Plan     * Blurred vision  Assessment & Plan  Had diplopia/blurred vision for 3 to 4 minutes that spontaneously resolved  Denied any focal deficits, numbness, vertigo, or slurred speech  Does report missing morning medications  On arrival to the emergency department was asymptomatic  Magnesium was 1.0 but labs otherwise unremarkable  Was hypertensive in emergency department  CTA head and neck did not show any evidence of vascular or intracranial abnormalities  A1c 7.5  Stroke unlikely based on CTA and MRI findings. No sign of mass.   Likely transient double vision from possible extraocular muscle strain     Plan:  Recommend follow up with neurology and ophthalmology outpatient  Continue Statin and Aspirin          Shellie Lee will need follow up in in 4 weeks with general attending or advance practitioner. She will not require outpatient neurological testing.    History of Present Illness     Reason for Consult / Principal Problem: Blurry vision/diplopia   Hx and PE limited by: None  HPI: Shellie Lee is a 73 y.o.  female with a PMHx of hypertension, hyperlipidemia, and T2DM who presents with blurred vision.  Per chart review patient had episode of diplopia/blurred vision for 3 to 4 minutes that spontaneously resolved.  She had no pain during this episode.  She denied any focal deficits, numbness, vertigo, or slurred speech.  Patient does report that she missed her morning medications but took her evening medications yesterday. On arrival to the emergency department she was asymptomatic. She was found to be hypertensive with pressures ranging from 143-189/70-81.  CMP, CBC were mostly within normal limits.  Her magnesium was found to be critically low at 1.0.  Improved to 2.1 with repletion.  CTA head and neck did not show evidence of any vascular or intracranial  abnormalities.    Patient today is denying any symptoms since admission.  She denies any headaches prior to onset of symptoms or after onset.  She denies any pain around temples.  She does not have a history of migraines.    Inpatient consult to Neurology  Consult performed by: Antonio Martinez DO  Consult ordered by: Mary Kay Hebert PA-C          Review of Systems   Constitutional: Negative.    HENT: Negative.     Eyes:  Positive for visual disturbance (blurry vision, dipolopia).   Respiratory: Negative.     Cardiovascular: Negative.    Gastrointestinal: Negative.    Neurological:  Negative for dizziness, syncope, weakness, light-headedness, numbness and headaches.       Historical Information   Past Medical History:   Diagnosis Date    Abdominal pain 2017    Acute non-recurrent frontal sinusitis 2019    Acute pain of right shoulder 01/10/2018    Anxiety     Arthritis     Bladder carcinoma (HCC) 2016    Bladder mass     Dental abscess 2017    Depression     Diabetes mellitus (HCC)     Disease of thyroid gland     thyroid nodules-not treating at this time    Dysuria     Last assessed 17    GERD (gastroesophageal reflux disease)      2 para 2     HL (hearing loss)     HLD (hyperlipidemia)     HTN (hypertension)     Hypercholesterolemia     Hypertension     Knee pain     Lichen sclerosus     Melanoma (HCC)     Melanoma (HCC)     Mild aortic regurgitation with left ventricular dilation by prior echocardiogram     Papanicolaou smear 2017    NEG    PONV (postoperative nausea and vomiting)     Tinnitus      Past Surgical History:   Procedure Laterality Date    CARPAL TUNNEL RELEASE Right      SECTION      x2    CHOLECYSTECTOMY      CYSTOSCOPY N/A 2016    Procedure: CYSTOSCOPY WITH BIOPSIES;  Surgeon: Lane Rios MD;  [facility];  Service:     CYSTOSCOPY N/A 2016    Procedure: CYSTOSCOPY;  Surgeon: Lane Rios MD;  [facility];   Service:     CYSTOSCOPY  08/07/2020    HERNIA REPAIR      DC ARTHRS KNE SURG W/MENISCECTOMY MED/LAT W/SHVG Left 04/04/2016    Procedure: KNEE ARTHROSCOPIC PARTIAL MEDIAL MENISCECTOMY ;  Surgeon: Rehan Pete MD;  [facility];  Service: Orthopedics    DC BLADDER INSTILLATION ANTICARCINOGENIC AGENT N/A 11/29/2016    Procedure: INSTILLATION MYTOMYCIN;  Surgeon: Lane Rios MD;  [facility];  Service: Urology    DC CYSTO BLADDER W/URETERAL CATHETERIZATION Bilateral 09/29/2016    Procedure: RETROGRADE PYELOGRAM ;  Surgeon: Lane Rios MD;  [facility];  Service: Urology    DC CYSTO BLADDER W/URETERAL CATHETERIZATION Bilateral 05/09/2017    Procedure: RETROGRADE PYELOGRAM WITH STENT EXCHANGE, RIGHT;  Surgeon: Lane Rios MD;  [facility];  Service: Urology    DC CYSTO W/INSERT URETERAL STENT Left 09/29/2016    Procedure: URETERAL STENTS;  Surgeon: Lane Rios MD;  [facility];  Service: Urology    DC CYSTO W/REMOVAL OF LESIONS SMALL N/A 11/29/2016    Procedure: TRANSURETHRAL RESECTION OF BLADDER TUMOR (TURBT);  Surgeon: Lane Rios MD;  [facility];  Service: Urology    DC CYSTO W/REMOVAL OF LESIONS SMALL N/A 09/29/2016    Procedure: CYSTOSCOPY; TUR BLADDER TUMOR ;  Surgeon: Lane Rios MD;  [facility];  Service: Urology    DC CYSTO W/REMOVAL OF LESIONS SMALL N/A 09/01/2016    Procedure: TUR BLADDER TUMOR ;  Surgeon: Lane Rios MD;  [facility];  Service: Urology    DC CYSTO W/REMOVAL OF LESIONS SMALL Bilateral 05/09/2017    Procedure: CYSTOSCOPY, RIGHT URETERAL WASHINGS, ;  Surgeon: Lane Rios MD;  [facility];  Service: Urology    DC CYSTO W/URTROSCOPY&/PYELOSCOPY DX Right 05/09/2017    Procedure: URETEROSCOPY;  Surgeon: Lane Rios MD;  [facility];  Service: Urology    DC CYSTO/URETERO W/LITHOTRIPSY &INDWELL STENT INSRT  11/29/2016    Procedure: CYSTOSCOPY URETEROSCOPY WITH LITHOTRIPSY  "HOLMIUM LASER RIGHT, RETROGRADE PYELOGRAM BILATERAL: AND INSERTION STENT URETERAL Right ;  Surgeon: Lane Rios MD;  [facility];  Service: Urology    REMOVAL URETERAL STENT Left 2016    Procedure: REMOVAL STENT URETERAL;  Surgeon: Lane Rios MD;  [facility];  Service:     REPLACEMENT TOTAL KNEE Left 2021    SKIN CANCER EXCISION      right arm    TONSILLECTOMY       Social History   Social History     Substance and Sexual Activity   Alcohol Use Not Currently     Social History     Substance and Sexual Activity   Drug Use No     E-Cigarette/Vaping    E-Cigarette Use Never User      E-Cigarette/Vaping Substances    Nicotine No     THC No     CBD No     Flavoring No     Other No     Unknown No      Social History     Tobacco Use   Smoking Status Former    Current packs/day: 0.00    Average packs/day: 1 pack/day for 21.0 years (21.0 ttl pk-yrs)    Types: Cigarettes    Start date: 1969    Quit date:     Years since quittin.3   Smokeless Tobacco Never     Family History:   Family History   Problem Relation Age of Onset    Heart attack Mother     Heart disease Mother     ALS Father     Diabetes Maternal Grandfather     Lung cancer Paternal Grandfather     Heart disease Maternal Grandmother     No Known Problems Daughter     No Known Problems Daughter     No Known Problems Paternal Aunt     No Known Problems Paternal Aunt     No Known Problems Paternal Aunt     No Known Problems Paternal Aunt     No Known Problems Paternal Aunt     Breast cancer Neg Hx        Review of previous medical records was  completed.     Meds/Allergies   all current active meds have been reviewed    Allergies   Allergen Reactions    Losartan Edema    Nickel Swelling     Swelling, irritation, lumps to ears       Objective   Vitals:Blood pressure 167/79, pulse 65, temperature 97.7 °F (36.5 °C), temperature source Oral, resp. rate 14, height 5' 2\" (1.575 m), weight 71.7 kg (158 lb), SpO2 96%, not " currently breastfeeding.,Body mass index is 28.9 kg/m².    Intake/Output Summary (Last 24 hours) at 5/14/2024 1438  Last data filed at 5/14/2024 1410  Gross per 24 hour   Intake 1130 ml   Output --   Net 1130 ml       Invasive Devices:   Invasive Devices       Peripheral Intravenous Line  Duration             Peripheral IV 05/13/24 Proximal;Right;Ventral (anterior) Forearm <1 day                    Physical Exam  Constitutional:       Appearance: Normal appearance.   HENT:      Head: Normocephalic and atraumatic.   Pulmonary:      Effort: Pulmonary effort is normal.   Neurological:      Mental Status: She is alert and oriented to person, place, and time.      Cranial Nerves: Cranial nerves 2-12 are intact.      Motor: Motor strength is normal.  Psychiatric:         Speech: Speech normal.       Neurologic Exam     Mental Status   Oriented to person, place, and time.   Attention: normal. Concentration: normal.   Speech: speech is normal     Cranial Nerves   Cranial nerves II through XII intact.     Motor Exam   Muscle bulk: normal  Overall muscle tone: normal    Strength   Strength 5/5 throughout.     Sensory Exam   Light touch normal.   Vibration normal.   Proprioception normal.       Lab Results: I have personally reviewed pertinent reports.    Imaging Studies: I have personally reviewed pertinent reports.    EKG, Pathology, and Other Studies: I have personally reviewed pertinent reports.    VTE Prophylaxis: Enoxaparin (Lovenox)    Code Status: Level 1 - Full Code  Advance Directive and Living Will:      Power of :    POLST:

## 2024-05-15 ENCOUNTER — TELEPHONE (OUTPATIENT)
Dept: INTERNAL MEDICINE CLINIC | Facility: CLINIC | Age: 74
End: 2024-05-15

## 2024-05-15 LAB
ATRIAL RATE: 65 BPM
P AXIS: 51 DEGREES
PR INTERVAL: 202 MS
QRS AXIS: -47 DEGREES
QRSD INTERVAL: 88 MS
QT INTERVAL: 408 MS
QTC INTERVAL: 424 MS
T WAVE AXIS: 13 DEGREES
VENTRICULAR RATE: 65 BPM

## 2024-05-15 PROCEDURE — 93010 ELECTROCARDIOGRAM REPORT: CPT | Performed by: INTERNAL MEDICINE

## 2024-05-15 NOTE — TELEPHONE ENCOUNTER
LMOM for pt to call me at [facility] office    Please schedule TCM on or before 5/28    Please send me TEAMS message to see if I am available to take the call.

## 2024-05-17 DIAGNOSIS — E03.9 HYPOTHYROIDISM, UNSPECIFIED TYPE: ICD-10-CM

## 2024-05-17 NOTE — TELEPHONE ENCOUNTER
Patient returned call from office.  Patient stated it was not from [place].  Warm transferred to clinical.

## 2024-05-18 RX ORDER — LEVOTHYROXINE SODIUM 0.05 MG/1
50 TABLET ORAL DAILY
Qty: 90 TABLET | Refills: 1 | Status: SHIPPED | OUTPATIENT
Start: 2024-05-18

## 2024-05-23 ENCOUNTER — TELEPHONE (OUTPATIENT)
Dept: INTERNAL MEDICINE CLINIC | Age: 74
End: 2024-05-23

## 2024-05-23 NOTE — TELEPHONE ENCOUNTER
Patient had tcm for today and was cancelled and did call the patient back to r/s it was for her TCM and states needs to be done by 5/59

## 2024-05-28 ENCOUNTER — TELEMEDICINE (OUTPATIENT)
Dept: INTERNAL MEDICINE CLINIC | Age: 74
End: 2024-05-28
Payer: MEDICARE

## 2024-05-28 VITALS — HEART RATE: 60 BPM | OXYGEN SATURATION: 96 %

## 2024-05-28 DIAGNOSIS — I10 ESSENTIAL HYPERTENSION: ICD-10-CM

## 2024-05-28 DIAGNOSIS — R30.0 DYSURIA: ICD-10-CM

## 2024-05-28 DIAGNOSIS — E87.5 HYPERKALEMIA: Primary | ICD-10-CM

## 2024-05-28 DIAGNOSIS — F41.8 DEPRESSION WITH ANXIETY: ICD-10-CM

## 2024-05-28 DIAGNOSIS — F51.01 PRIMARY INSOMNIA: ICD-10-CM

## 2024-05-28 DIAGNOSIS — E11.8 TYPE 2 DIABETES MELLITUS WITH COMPLICATION, WITHOUT LONG-TERM CURRENT USE OF INSULIN (HCC): ICD-10-CM

## 2024-05-28 DIAGNOSIS — E83.42 HYPOMAGNESEMIA: ICD-10-CM

## 2024-05-28 PROCEDURE — 99495 TRANSJ CARE MGMT MOD F2F 14D: CPT | Performed by: PHYSICIAN ASSISTANT

## 2024-05-28 RX ORDER — ZOLPIDEM TARTRATE 10 MG/1
10 TABLET ORAL
Qty: 30 TABLET | Refills: 0 | Status: SHIPPED | OUTPATIENT
Start: 2024-05-28

## 2024-05-28 RX ORDER — AMLODIPINE BESYLATE 5 MG/1
5 TABLET ORAL DAILY
Qty: 90 TABLET | Refills: 1 | Status: SHIPPED | OUTPATIENT
Start: 2024-05-28

## 2024-05-28 NOTE — PROGRESS NOTES
Virtual Regular Visit    Verification of patient location:    Patient is located at {Amb Virtual Patient Location:[address]} in the following state in which I hold an active license { amb virtual patient location:[address]}      Assessment/Plan:    Problem List Items Addressed This Visit    None           Reason for visit is   Chief Complaint   Patient presents with   • Virtual Tcm     virtual TCM d/c 5/14 blurred vision carlito rodriges-    •      Patient is aware and states she will schedule on her own for mammogram & colonoscopy    • Virtual Regular Visit          Encounter provider Iris Gandhi PA-C      Recent Visits  Date Type Provider Dept   05/23/24 Telephone Katie Terrell Pg [facility]   Showing recent visits within past 7 days and meeting all other requirements  Today's Visits  Date Type Provider Dept   05/28/24 Telemedicine Iris Gandhi PA-C [facility]   Showing today's visits and meeting all other requirements  Future Appointments  No visits were found meeting these conditions.  Showing future appointments within next 150 days and meeting all other requirements       The patient was identified by name and date of birth. Shellie Lee was informed that this is a telemedicine visit and that the visit is being conducted through {AMB VIRTUAL VISIT MEDIUM:15048}.  {Telemedicine confidentiality :60899} {Telemedicine participants:42830}  She acknowledged consent and understanding of privacy and security of the video platform. The patient has agreed to participate and understands they can discontinue the visit at any time.    Patient is aware this is a billable service.     Subjective  Shellie Lee is a 73 y.o. female *** .      HPI     Past Medical History:   Diagnosis Date   • Abdominal pain 01/16/2017   • Acute non-recurrent frontal sinusitis 01/08/2019   • Acute pain of right shoulder 01/10/2018   • Anxiety    • Arthritis    • Bladder carcinoma (HCC)     • Bladder mass    • Dental abscess 2017   • Depression    • Diabetes mellitus (HCC)    • Disease of thyroid gland     thyroid nodules-not treating at this time   • Dysuria     Last assessed 17   • GERD (gastroesophageal reflux disease)    •  2 para 2    • HL (hearing loss)    • HLD (hyperlipidemia)    • HTN (hypertension)    • Hypercholesterolemia    • Hypertension    • Knee pain    • Lichen sclerosus    • Melanoma (HCC)    • Melanoma (HCC)    • Mild aortic regurgitation with left ventricular dilation by prior echocardiogram    • Papanicolaou smear 2017    NEG   • PONV (postoperative nausea and vomiting)    • Tinnitus        Past Surgical History:   Procedure Laterality Date   • BASAL CELL CARCINOMA EXCISION  2024    nose   • CARPAL TUNNEL RELEASE Right    •  SECTION      x2   • CHOLECYSTECTOMY     • CYSTOSCOPY N/A 2016    Procedure: CYSTOSCOPY WITH BIOPSIES;  Surgeon: Lane Rios MD;  [facility];  Service:    • CYSTOSCOPY N/A 2016    Procedure: CYSTOSCOPY;  Surgeon: Lane Rios MD;  [facility];  Service:    • CYSTOSCOPY  2020   • HERNIA REPAIR     • MN ARTHRS KNE SURG W/MENISCECTOMY MED/LAT W/SHVG Left 2016    Procedure: KNEE ARTHROSCOPIC PARTIAL MEDIAL MENISCECTOMY ;  Surgeon: Rehan Pete MD;  [facility];  Service: Orthopedics   • MN BLADDER INSTILLATION ANTICARCINOGENIC AGENT N/A 2016    Procedure: INSTILLATION MYTOMYCIN;  Surgeon: Lane Rios MD;  [facility];  Service: Urology   • MN CYSTO BLADDER W/URETERAL CATHETERIZATION Bilateral 2016    Procedure: RETROGRADE PYELOGRAM ;  Surgeon: Lane Rios MD;  [facility];  Service: Urology   • MN CYSTO BLADDER W/URETERAL CATHETERIZATION Bilateral 2017    Procedure: RETROGRADE PYELOGRAM WITH STENT EXCHANGE, RIGHT;  Surgeon: Lane Rios MD;  [facility];  Service: Urology   • MN CYSTO W/INSERT  URETERAL STENT Left 09/29/2016    Procedure: URETERAL STENTS;  Surgeon: Lane Rios MD;  [facility];  Service: Urology   • MN CYSTO W/REMOVAL OF LESIONS SMALL N/A 11/29/2016    Procedure: TRANSURETHRAL RESECTION OF BLADDER TUMOR (TURBT);  Surgeon: Lane Rios MD;  [facility];  Service: Urology   • MN CYSTO W/REMOVAL OF LESIONS SMALL N/A 09/29/2016    Procedure: CYSTOSCOPY; TUR BLADDER TUMOR ;  Surgeon: Lane Rios MD;  [facility];  Service: Urology   • MN CYSTO W/REMOVAL OF LESIONS SMALL N/A 09/01/2016    Procedure: TUR BLADDER TUMOR ;  Surgeon: Lane Rios MD;  [facility];  Service: Urology   • MN CYSTO W/REMOVAL OF LESIONS SMALL Bilateral 05/09/2017    Procedure: CYSTOSCOPY, RIGHT URETERAL WASHINGS, ;  Surgeon: Lane Rios MD;  [facility];  Service: Urology   • MN CYSTO W/URTROSCOPY&/PYELOSCOPY DX Right 05/09/2017    Procedure: URETEROSCOPY;  Surgeon: Lane Rios MD;  [facility];  Service: Urology   • MN CYSTO/URETERO W/LITHOTRIPSY &INDWELL STENT INSRT  11/29/2016    Procedure: CYSTOSCOPY URETEROSCOPY WITH LITHOTRIPSY HOLMIUM LASER RIGHT, RETROGRADE PYELOGRAM BILATERAL: AND INSERTION STENT URETERAL Right ;  Surgeon: Lane Rios MD;  [facility];  Service: Urology   • REMOVAL URETERAL STENT Left 11/29/2016    Procedure: REMOVAL STENT URETERAL;  Surgeon: Lane Rios MD;  [facility];  Service:    • REPLACEMENT TOTAL KNEE Left 12/2021   • SKIN CANCER EXCISION  2021    right arm   • TONSILLECTOMY         Current Outpatient Medications   Medication Sig Dispense Refill   • acetaminophen (TYLENOL) 325 mg tablet Take 650 mg by mouth 4 (four) times a day as needed for mild pain     • amLODIPine (NORVASC) 5 mg tablet Take 1 tablet (5 mg total) by mouth daily 90 tablet 1   • aspirin 81 mg chewable tablet Chew 1 tablet (81 mg total) daily 30 tablet 0   • clonazePAM (KlonoPIN) 0.5 mg tablet Take 1 tablet (0.5 mg total)  by mouth daily at bedtime 30 tablet 1   • estradiol (ESTRACE VAGINAL) 0.1 mg/g vaginal cream Apply small amount on fingertip. Apply twice a week. 45 g 2   • famotidine (PEPCID) 20 mg tablet Take 1 tablet (20 mg total) by mouth daily (Patient taking differently: Take 20 mg by mouth daily As needed) 90 tablet 0   • fenofibrate (TRICOR) 145 mg tablet Take 1 tablet (145 mg total) by mouth daily 90 tablet 1   • fluticasone (FLONASE) 50 mcg/act nasal spray 1 spray into each nostril 2 (two) times a day (Patient taking differently: 1 spray into each nostril if needed) 15.8 g 0   • Ibuprofen (ADVIL PO) Take 1 tablet by mouth if needed     • Insulin Glargine Solostar (Lantus SoloStar) 100 UNIT/ML SOPN Inject 0.33 mL (33 Units total) under the skin daily 29.7 mL 1   • Insulin Pen Needle 32G X 4 MM MISC Use in the morning 100 each 3   • levothyroxine 50 mcg tablet Take 1 tablet (50 mcg total) by mouth daily 90 tablet 1   • loratadine (CLARITIN) 10 mg tablet Take 1 tablet (10 mg total) by mouth daily (Patient taking differently: Take 10 mg by mouth daily As needed) 14 tablet 0   • metFORMIN (GLUCOPHAGE) 500 mg tablet Take 2 tablets (1,000 mg total) by mouth 2 (two) times a day with meals 360 tablet 1   • metoprolol tartrate (LOPRESSOR) 25 mg tablet Take 1 tablet (25 mg total) by mouth every 12 (twelve) hours 180 tablet 1   • mometasone (ELOCON) 0.1 % cream Apply topically once a week 45 g 4   • OneTouch Verio test strip Test 3 times daily 300 each 1   • Probiotic Product (PROBIOTIC-10) CAPS Take 1 capsule by mouth daily     • simvastatin (ZOCOR) 20 mg tablet Take 1 tablet (20 mg total) by mouth daily at bedtime 90 tablet 1   • sitaGLIPtin (Januvia) 100 mg tablet Take 1 tablet (100 mg total) by mouth daily 90 tablet 1   • zolpidem (AMBIEN) 10 mg tablet Take 1 tablet (10 mg total) by mouth daily at bedtime 30 tablet 1     No current facility-administered medications for this visit.        Allergies   Allergen Reactions   •  Losartan Edema   • Nickel Swelling     Swelling, irritation, lumps to ears       Review of Systems    Video Exam    Vitals:    05/28/24 1053   Pulse: 60   SpO2: 96%       Physical Exam     Visit Time  Total Visit Duration: ***

## 2024-05-28 NOTE — PROGRESS NOTES
Virtual TCM Visit:    Verification of patient location:    Patient is located at Home in the following state in which I hold an active license [place]    Assessment & Plan   1. Hyperkalemia  -     Electrolyte panel; Future  -     Magnesium; Future  2. Dysuria  -     UA w Reflex to Microscopic w Reflex to Culture; Future  3. Hypomagnesemia  -     Electrolyte panel; Future  -     Magnesium; Future  4. Depression with anxiety  -     zolpidem (AMBIEN) 10 mg tablet; Take 1 tablet (10 mg total) by mouth daily at bedtime  5. Primary insomnia  Comments:  has been on ambien due to chronic insomnia for many years  related to family illness and anxiety  Orders:  -     zolpidem (AMBIEN) 10 mg tablet; Take 1 tablet (10 mg total) by mouth daily at bedtime  6. Type 2 diabetes mellitus with complication, without long-term current use of insulin (HCC)  -     metFORMIN (GLUCOPHAGE) 500 mg tablet; Take 2 tablets (1,000 mg total) by mouth 2 (two) times a day with meals  7. Essential hypertension  -     amLODIPine (NORVASC) 5 mg tablet; Take 1 tablet (5 mg total) by mouth daily     Continue to follow with ophthalmology, continue baby aspirin and statin.  Return to ED/report episode of recurrent blurred or double vision.  Will go for urinalysis today to evaluate for dysuria symptoms.    Encounter provider Iris Gandhi PA-C     Provider located at [facility]  [address] [place] [address]  [address]  [facility] [id number]    Recent Visits  Date Type Provider Dept   05/23/24 Telephone Katie Terrell [facility]   Showing recent visits within past 7 days and meeting all other requirements  Today's Visits  Date Type Provider Dept   05/28/24 Telemedicine Iris Gandhi PA-C [facility]   Showing today's visits and meeting all other requirements  Future Appointments  No visits were found meeting these conditions.  Showing future appointments  within next 150 days and meeting all other requirements       After connecting through [employer]zzle[employer], the patient was identified by name and date of birth. Shellie Lee was informed that this is a telemedicine visit and that the visit is being conducted through the Tame platform. She agrees to proceed..  My office door was closed. No one else was in the room.  She acknowledged consent and understanding of privacy and security of the video platform. The patient has agreed to participate and understands they can discontinue the visit at any time.    Patient is aware this is a billable service.    History of Present Illness     Transitional Care Management Review:   Shellie Lee is a 73 y.o. female here for TCM follow up.    During the TCM phone call patient stated:  TCM Call       Date and time call was made  5/15/2024  1:43 PM    Hospital care reviewed  Records reviewed    Patient was hospitialized at  [facility]    Date of Admission  05/13/24    Date of discharge  05/14/24    Diagnosis  blurred vision    Disposition  Home    Current Symptoms  Fatigue          TCM Call       Post hospital issues  None    Scheduled for follow up?  Yes    Did you obtain your prescribed medications  Yes    Do you need help managing your prescriptions or medications  No    I have advised the patient to call PCP with any new or worsening symptoms  Gaston Villegas [facility]           73 y.o. female patient a PMH of HTN, HLD, DM, hypothyroidism, asthma, anxiety and depression who originally presented to the hospital on 5/13/2024 due to transient blurred vision that occurred for about 4 minutes before returning to normal. CT in the ED showed no evidence of acute infarct, MRI was unremarkable for mass or hemorrhage.  Patient was discharged after evaluation with neurology.  Patient reports no further episodes of change or blurry vision.  Patient reports she follows regularly with her ophthalmologist.  Patient reports occasional  feelings of dry eyes.    Patient continues with taking her statin and aspirin.  She has resumed a low carbohydrate diet and is trying to watch blood sugars.  Patient denies bowel movement problems or diarrhea.    Discussed reevaluation of electrolytes and magnesium in 3 to 4 weeks.    Patient complaining of mild dysuria x 1 week and increasing urinary frequency as well.  Patient denies gross hematuria.  Will order UA with reflex culture to be done ASAP at lab.  Review of Systems   Constitutional:  Negative for activity change, appetite change, chills, fatigue and fever.   HENT:  Negative for congestion.    Eyes:  Negative for photophobia, pain, redness and visual disturbance.   Respiratory:  Negative for cough and shortness of breath.    Cardiovascular:  Negative for chest pain and leg swelling.   Gastrointestinal:  Negative for abdominal pain, diarrhea and nausea.   Genitourinary:  Positive for dysuria and frequency.   Musculoskeletal:  Negative for arthralgias.   Skin:  Negative for rash.   Neurological:  Negative for dizziness, weakness, light-headedness, numbness and headaches.   Psychiatric/Behavioral:  Positive for sleep disturbance. The patient is not nervous/anxious.      Objective     Pulse 60 Comment: pt verbal  LMP  (LMP Unknown)   SpO2 96% Comment: pt verbal    Physical Exam  HENT:      Head: Normocephalic.      Nose: Nose normal.   Pulmonary:      Effort: Pulmonary effort is normal. No respiratory distress.   Skin:     Findings: No rash.   Neurological:      General: No focal deficit present.      Mental Status: She is alert.   Psychiatric:         Mood and Affect: Mood normal.       Medications have been reviewed by provider in current encounter    Administrative Statements   I have spent a total time of 17 minutes on 05/28/24 In caring for this patient including Diagnostic results, Instructions for management, Counseling / Coordination of care, Documenting in the medical record, Reviewing / ordering  tests, medicine, procedures  , and Obtaining or reviewing history  .        Iris Gandhi PA-C      VIRTUAL VISIT DISCLAIMER    Shellie Charleshart verbally agrees to participate in Virtual Care Services. Pt is aware that Virtual Care Services could be limited without vital signs or the ability to perform a full hands-on physical exam. Shellie Rosa understands she or the provider may request at any time to terminate the video visit and request the patient to seek care or treatment in person.

## 2024-06-21 DIAGNOSIS — F41.9 ANXIETY: ICD-10-CM

## 2024-06-21 RX ORDER — CLONAZEPAM 0.5 MG/1
0.5 TABLET ORAL
Qty: 30 TABLET | Refills: 1 | Status: SHIPPED | OUTPATIENT
Start: 2024-06-21 | End: 2024-08-20

## 2024-06-21 NOTE — TELEPHONE ENCOUNTER
Reason for call:   [x] Refill   [] Prior Auth  [] Other:     Office:   [x] PCP/Provider - Iris Gandhi PA-C   [] Specialty/Provider -     Medication: clonazePAM (KlonoPIN) 0.5 mg tablet     Dose/Frequency: Take 1 tablet (0.5 mg total) by mouth daily at bedtime     Quantity: 30    Pharmacy: [facility]/pharmacy #[id number] - [place], [place] - [address]     Does the patient have enough for 3 days?   [] Yes   [x] No - Send as HP to POD

## 2024-07-01 DIAGNOSIS — F41.8 DEPRESSION WITH ANXIETY: ICD-10-CM

## 2024-07-01 DIAGNOSIS — F51.01 PRIMARY INSOMNIA: ICD-10-CM

## 2024-07-01 DIAGNOSIS — H53.8 BLURRED VISION: ICD-10-CM

## 2024-07-01 DIAGNOSIS — E11.9 TYPE 2 DIABETES MELLITUS WITHOUT COMPLICATION, WITHOUT LONG-TERM CURRENT USE OF INSULIN (HCC): ICD-10-CM

## 2024-07-01 NOTE — TELEPHONE ENCOUNTER
Reason for call:   [x] Refill   [] Prior Auth  [] Other:     Office: [facility] PRIMARY [facility]  [x] PCP/Provider - Hiram   [] Specialty/Provider -     Medication: aspirin, januvia, zolpidem     Dose/Frequency: 81 mg, 100 mg, 10 mg/ daily     Quantity: 30 day supply, 90 day supply, 30 day supply     Pharmacy: [facility]     Does the patient have enough for 3 days?   [] Yes   [x] No - Send as HP to POD

## 2024-07-02 RX ORDER — ZOLPIDEM TARTRATE 10 MG/1
10 TABLET ORAL
Qty: 30 TABLET | Refills: 0 | Status: SHIPPED | OUTPATIENT
Start: 2024-07-02

## 2024-07-02 RX ORDER — ASPIRIN 81 MG/1
81 TABLET, CHEWABLE ORAL DAILY
Qty: 30 TABLET | Refills: 0 | Status: SHIPPED | OUTPATIENT
Start: 2024-07-02

## 2024-07-15 ENCOUNTER — APPOINTMENT (OUTPATIENT)
Dept: LAB | Facility: MEDICAL CENTER | Age: 74
End: 2024-07-15
Payer: MEDICARE

## 2024-07-15 DIAGNOSIS — R30.0 DYSURIA: ICD-10-CM

## 2024-07-15 DIAGNOSIS — E11.9 TYPE 2 DIABETES MELLITUS WITHOUT COMPLICATION, WITH LONG-TERM CURRENT USE OF INSULIN (HCC): ICD-10-CM

## 2024-07-15 DIAGNOSIS — F51.01 PRIMARY INSOMNIA: ICD-10-CM

## 2024-07-15 DIAGNOSIS — I10 ESSENTIAL HYPERTENSION: ICD-10-CM

## 2024-07-15 DIAGNOSIS — E87.5 HYPERKALEMIA: ICD-10-CM

## 2024-07-15 DIAGNOSIS — E78.00 HYPERCHOLESTEROLEMIA: ICD-10-CM

## 2024-07-15 DIAGNOSIS — Z79.4 TYPE 2 DIABETES MELLITUS WITHOUT COMPLICATION, WITH LONG-TERM CURRENT USE OF INSULIN (HCC): ICD-10-CM

## 2024-07-15 DIAGNOSIS — E22.2 SIADH (SYNDROME OF INAPPROPRIATE ADH PRODUCTION) (HCC): ICD-10-CM

## 2024-07-15 DIAGNOSIS — F41.8 DEPRESSION WITH ANXIETY: ICD-10-CM

## 2024-07-15 DIAGNOSIS — E83.42 HYPOMAGNESEMIA: ICD-10-CM

## 2024-07-15 DIAGNOSIS — C67.9 MALIGNANT NEOPLASM OF URINARY BLADDER, UNSPECIFIED SITE (HCC): ICD-10-CM

## 2024-07-15 LAB
ALBUMIN SERPL BCG-MCNC: 4.5 G/DL (ref 3.5–5)
ALP SERPL-CCNC: 46 U/L (ref 34–104)
ALT SERPL W P-5'-P-CCNC: 40 U/L (ref 7–52)
ANION GAP SERPL CALCULATED.3IONS-SCNC: 14 MMOL/L (ref 4–13)
AST SERPL W P-5'-P-CCNC: 42 U/L (ref 13–39)
BACTERIA UR QL AUTO: ABNORMAL /HPF
BASOPHILS # BLD AUTO: 0.08 THOUSANDS/ÂΜL (ref 0–0.1)
BASOPHILS NFR BLD AUTO: 1 % (ref 0–1)
BILIRUB SERPL-MCNC: 0.46 MG/DL (ref 0.2–1)
BILIRUB UR QL STRIP: NEGATIVE
BUN SERPL-MCNC: 17 MG/DL (ref 5–25)
CALCIUM SERPL-MCNC: 9.9 MG/DL (ref 8.4–10.2)
CHLORIDE SERPL-SCNC: 96 MMOL/L (ref 96–108)
CHOLEST SERPL-MCNC: 184 MG/DL
CLARITY UR: CLEAR
CO2 SERPL-SCNC: 25 MMOL/L (ref 21–32)
COLOR UR: YELLOW
CREAT SERPL-MCNC: 0.77 MG/DL (ref 0.6–1.3)
EOSINOPHIL # BLD AUTO: 0.17 THOUSAND/ÂΜL (ref 0–0.61)
EOSINOPHIL NFR BLD AUTO: 2 % (ref 0–6)
ERYTHROCYTE [DISTWIDTH] IN BLOOD BY AUTOMATED COUNT: 13.6 % (ref 11.6–15.1)
EST. AVERAGE GLUCOSE BLD GHB EST-MCNC: 206 MG/DL
GFR SERPL CREATININE-BSD FRML MDRD: 76 ML/MIN/1.73SQ M
GLUCOSE P FAST SERPL-MCNC: 208 MG/DL (ref 65–99)
GLUCOSE UR STRIP-MCNC: NEGATIVE MG/DL
HBA1C MFR BLD: 8.8 %
HCT VFR BLD AUTO: 42.1 % (ref 34.8–46.1)
HDLC SERPL-MCNC: 28 MG/DL
HGB BLD-MCNC: 14 G/DL (ref 11.5–15.4)
HGB UR QL STRIP.AUTO: NEGATIVE
HYALINE CASTS #/AREA URNS LPF: ABNORMAL /LPF
IMM GRANULOCYTES # BLD AUTO: 0.05 THOUSAND/UL (ref 0–0.2)
IMM GRANULOCYTES NFR BLD AUTO: 1 % (ref 0–2)
KETONES UR STRIP-MCNC: NEGATIVE MG/DL
LDLC SERPL CALC-MCNC: 81 MG/DL (ref 0–100)
LEUKOCYTE ESTERASE UR QL STRIP: ABNORMAL
LYMPHOCYTES # BLD AUTO: 1.61 THOUSANDS/ÂΜL (ref 0.6–4.47)
LYMPHOCYTES NFR BLD AUTO: 22 % (ref 14–44)
MAGNESIUM SERPL-MCNC: 1.2 MG/DL (ref 1.9–2.7)
MCH RBC QN AUTO: 29.9 PG (ref 26.8–34.3)
MCHC RBC AUTO-ENTMCNC: 33.3 G/DL (ref 31.4–37.4)
MCV RBC AUTO: 90 FL (ref 82–98)
MONOCYTES # BLD AUTO: 0.58 THOUSAND/ÂΜL (ref 0.17–1.22)
MONOCYTES NFR BLD AUTO: 8 % (ref 4–12)
NEUTROPHILS # BLD AUTO: 4.82 THOUSANDS/ÂΜL (ref 1.85–7.62)
NEUTS SEG NFR BLD AUTO: 66 % (ref 43–75)
NITRITE UR QL STRIP: NEGATIVE
NON-SQ EPI CELLS URNS QL MICRO: ABNORMAL /HPF
NONHDLC SERPL-MCNC: 156 MG/DL
NRBC BLD AUTO-RTO: 0 /100 WBCS
PH UR STRIP.AUTO: 6 [PH]
PLATELET # BLD AUTO: 231 THOUSANDS/UL (ref 149–390)
PMV BLD AUTO: 10.7 FL (ref 8.9–12.7)
POTASSIUM SERPL-SCNC: 4.6 MMOL/L (ref 3.5–5.3)
PROT SERPL-MCNC: 7.6 G/DL (ref 6.4–8.4)
PROT UR STRIP-MCNC: ABNORMAL MG/DL
RBC # BLD AUTO: 4.68 MILLION/UL (ref 3.81–5.12)
RBC #/AREA URNS AUTO: ABNORMAL /HPF
SODIUM SERPL-SCNC: 135 MMOL/L (ref 135–147)
SP GR UR STRIP.AUTO: 1.02 (ref 1–1.03)
TRIGL SERPL-MCNC: 376 MG/DL
TSH SERPL DL<=0.05 MIU/L-ACNC: 2.58 UIU/ML (ref 0.45–4.5)
UROBILINOGEN UR STRIP-ACNC: 2 MG/DL
WBC # BLD AUTO: 7.31 THOUSAND/UL (ref 4.31–10.16)
WBC #/AREA URNS AUTO: ABNORMAL /HPF

## 2024-07-15 PROCEDURE — 81001 URINALYSIS AUTO W/SCOPE: CPT

## 2024-07-15 PROCEDURE — 80061 LIPID PANEL: CPT

## 2024-07-15 PROCEDURE — 36415 COLL VENOUS BLD VENIPUNCTURE: CPT

## 2024-07-15 PROCEDURE — 84443 ASSAY THYROID STIM HORMONE: CPT

## 2024-07-15 PROCEDURE — 83735 ASSAY OF MAGNESIUM: CPT

## 2024-07-15 PROCEDURE — 85025 COMPLETE CBC W/AUTO DIFF WBC: CPT

## 2024-07-15 PROCEDURE — 80053 COMPREHEN METABOLIC PANEL: CPT

## 2024-07-15 PROCEDURE — 88112 CYTOPATH CELL ENHANCE TECH: CPT | Performed by: PATHOLOGY

## 2024-07-16 DIAGNOSIS — E83.42 HYPOMAGNESEMIA: Primary | ICD-10-CM

## 2024-07-16 RX ORDER — CALCIUM CARBONATE/VITAMIN D3 500-10/5ML
1 LIQUID (ML) ORAL 2 TIMES DAILY
Qty: 60 CAPSULE | Refills: 0 | Status: SHIPPED | OUTPATIENT
Start: 2024-07-16

## 2024-07-17 DIAGNOSIS — E11.9 TYPE 2 DIABETES MELLITUS WITHOUT COMPLICATION, WITH LONG-TERM CURRENT USE OF INSULIN (HCC): ICD-10-CM

## 2024-07-17 DIAGNOSIS — Z79.4 TYPE 2 DIABETES MELLITUS WITHOUT COMPLICATION, WITH LONG-TERM CURRENT USE OF INSULIN (HCC): ICD-10-CM

## 2024-07-17 PROCEDURE — 88112 CYTOPATH CELL ENHANCE TECH: CPT | Performed by: PATHOLOGY

## 2024-07-17 RX ORDER — INSULIN GLARGINE 100 [IU]/ML
INJECTION, SOLUTION SUBCUTANEOUS
Qty: 0.33 ML | Refills: 1 | Status: SHIPPED | OUTPATIENT
Start: 2024-07-17

## 2024-07-23 DIAGNOSIS — F41.9 ANXIETY: ICD-10-CM

## 2024-07-23 RX ORDER — CLONAZEPAM 0.5 MG/1
0.5 TABLET ORAL
Qty: 30 TABLET | Refills: 0 | Status: SHIPPED | OUTPATIENT
Start: 2024-07-23 | End: 2024-09-21

## 2024-07-23 NOTE — TELEPHONE ENCOUNTER
Reason for call:   [x] Refill   [] Prior Auth  [] Other:     Office:   [x] PCP/Provider -  Tesha Andres DO-[facility] bath   [] Specialty/Provider -     Medication:     clonazePAM (KlonoPIN) 0.5 mg tablet       Dose/Frequency:     Take 1 tablet (0.5 mg total) by mouth daily at bedtime           Pharmacy:  [facility]/pharmacy #[id number] - [place], [place] - [address]     Does the patient have enough for 3 days?   [] Yes   [] No - Send as HP to POD

## 2024-07-24 ENCOUNTER — TELEPHONE (OUTPATIENT)
Age: 74
End: 2024-07-24

## 2024-07-24 NOTE — TELEPHONE ENCOUNTER
1ST ATTEMPT,     Called pt no answer, LMOM.    Lauri message sent     Thank you,     Ambreen PRESLEY/ MARILYN SANCHEZ/ [facility]     DC- HOME- 5/14/2024    Shellie Lee will need follow up in in 4 weeks with general attending or advance practitioner. She will not require outpatient neurological testing.

## 2024-07-25 ENCOUNTER — APPOINTMENT (OUTPATIENT)
Dept: LAB | Facility: MEDICAL CENTER | Age: 74
End: 2024-07-25
Payer: MEDICARE

## 2024-07-25 DIAGNOSIS — E83.42 HYPOMAGNESEMIA: ICD-10-CM

## 2024-07-25 DIAGNOSIS — Z79.4 TYPE 2 DIABETES MELLITUS WITHOUT COMPLICATION, WITH LONG-TERM CURRENT USE OF INSULIN (HCC): ICD-10-CM

## 2024-07-25 DIAGNOSIS — E11.9 TYPE 2 DIABETES MELLITUS WITHOUT COMPLICATION, WITH LONG-TERM CURRENT USE OF INSULIN (HCC): ICD-10-CM

## 2024-07-25 DIAGNOSIS — E87.5 HYPERKALEMIA: ICD-10-CM

## 2024-07-25 DIAGNOSIS — E83.42 HYPOMAGNESEMIA: Primary | ICD-10-CM

## 2024-07-25 LAB — MAGNESIUM SERPL-MCNC: 1.5 MG/DL (ref 1.9–2.7)

## 2024-07-25 PROCEDURE — 36415 COLL VENOUS BLD VENIPUNCTURE: CPT

## 2024-07-25 PROCEDURE — 83735 ASSAY OF MAGNESIUM: CPT

## 2024-07-29 DIAGNOSIS — H53.8 BLURRED VISION: ICD-10-CM

## 2024-07-29 RX ORDER — LORATADINE 10 MG
81 TABLET ORAL DAILY
Qty: 30 TABLET | Refills: 5 | Status: SHIPPED | OUTPATIENT
Start: 2024-07-29

## 2024-08-02 DIAGNOSIS — F51.01 PRIMARY INSOMNIA: ICD-10-CM

## 2024-08-02 DIAGNOSIS — F41.8 DEPRESSION WITH ANXIETY: ICD-10-CM

## 2024-08-02 RX ORDER — ZOLPIDEM TARTRATE 10 MG/1
10 TABLET ORAL
Qty: 30 TABLET | Refills: 0 | Status: SHIPPED | OUTPATIENT
Start: 2024-08-02

## 2024-08-02 NOTE — TELEPHONE ENCOUNTER
Reason for call:   [x] Refill   [] Prior Auth  [] Other:     Office:   [x] PCP/Provider - [facility]   [] Specialty/Provider -     Medication: zolpidem (AMBIEN) 10 mg tablet     Dose/Frequency: Take 1 tablet (10 mg total) by mouth daily at bedtime,     Quantity: 30    Pharmacy: [facility]/pharmacy #[id number] - [place], [place] - [address] [place] [address] [phone]     Does the patient have enough for 3 days?   [] Yes   [x] No - Send as HP to POD

## 2024-08-02 NOTE — TELEPHONE ENCOUNTER
8/2/24  last seen 5/28/24, next appointment 8/29/24, last filled 7/2/24- amt 30/0 , checked the REINA Simmons is out of office

## 2024-08-05 ENCOUNTER — TELEPHONE (OUTPATIENT)
Age: 74
End: 2024-08-05

## 2024-08-05 NOTE — TELEPHONE ENCOUNTER
Please advise, patient would like to know when she is to repeat labs for magnesium level.     Thank you

## 2024-08-05 NOTE — TELEPHONE ENCOUNTER
Patient called inquiring as to when she should be getting repeat labs for her magnesium levels.  She was taking medication for magnesium levels and would like to know if she should be getting labs soon.  Please advise.

## 2024-08-06 ENCOUNTER — APPOINTMENT (OUTPATIENT)
Dept: LAB | Facility: MEDICAL CENTER | Age: 74
End: 2024-08-06
Payer: MEDICARE

## 2024-08-06 DIAGNOSIS — E11.9 TYPE 2 DIABETES MELLITUS WITHOUT COMPLICATION, WITH LONG-TERM CURRENT USE OF INSULIN (HCC): ICD-10-CM

## 2024-08-06 DIAGNOSIS — E83.42 HYPOMAGNESEMIA: ICD-10-CM

## 2024-08-06 DIAGNOSIS — Z79.4 TYPE 2 DIABETES MELLITUS WITHOUT COMPLICATION, WITH LONG-TERM CURRENT USE OF INSULIN (HCC): ICD-10-CM

## 2024-08-06 DIAGNOSIS — E87.5 HYPERKALEMIA: ICD-10-CM

## 2024-08-06 LAB — MAGNESIUM SERPL-MCNC: 1.5 MG/DL (ref 1.9–2.7)

## 2024-08-06 PROCEDURE — 83735 ASSAY OF MAGNESIUM: CPT

## 2024-08-06 PROCEDURE — 36415 COLL VENOUS BLD VENIPUNCTURE: CPT

## 2024-08-07 DIAGNOSIS — E78.00 HYPERCHOLESTEROLEMIA: ICD-10-CM

## 2024-08-07 DIAGNOSIS — Z79.4 TYPE 2 DIABETES MELLITUS WITHOUT COMPLICATION, WITH LONG-TERM CURRENT USE OF INSULIN (HCC): ICD-10-CM

## 2024-08-07 DIAGNOSIS — E87.5 HYPERKALEMIA: ICD-10-CM

## 2024-08-07 DIAGNOSIS — E83.42 HYPOMAGNESEMIA: Primary | ICD-10-CM

## 2024-08-07 DIAGNOSIS — E11.9 TYPE 2 DIABETES MELLITUS WITHOUT COMPLICATION, WITH LONG-TERM CURRENT USE OF INSULIN (HCC): ICD-10-CM

## 2024-08-07 RX ORDER — FENOFIBRATE 145 MG/1
145 TABLET, COATED ORAL DAILY
Qty: 100 TABLET | Refills: 1 | Status: SHIPPED | OUTPATIENT
Start: 2024-08-07

## 2024-08-14 ENCOUNTER — RA CDI HCC (OUTPATIENT)
Dept: OTHER | Facility: HOSPITAL | Age: 74
End: 2024-08-14

## 2024-08-19 ENCOUNTER — APPOINTMENT (OUTPATIENT)
Dept: LAB | Facility: CLINIC | Age: 74
End: 2024-08-19
Payer: MEDICARE

## 2024-08-19 DIAGNOSIS — Z79.4 TYPE 2 DIABETES MELLITUS WITHOUT COMPLICATION, WITH LONG-TERM CURRENT USE OF INSULIN (HCC): ICD-10-CM

## 2024-08-19 DIAGNOSIS — E83.42 HYPOMAGNESEMIA: ICD-10-CM

## 2024-08-19 DIAGNOSIS — E11.9 TYPE 2 DIABETES MELLITUS WITHOUT COMPLICATION, WITH LONG-TERM CURRENT USE OF INSULIN (HCC): ICD-10-CM

## 2024-08-19 DIAGNOSIS — E87.5 HYPERKALEMIA: ICD-10-CM

## 2024-08-19 LAB
ALBUMIN SERPL BCG-MCNC: 4.4 G/DL (ref 3.5–5)
ALP SERPL-CCNC: 49 U/L (ref 34–104)
ALT SERPL W P-5'-P-CCNC: 31 U/L (ref 7–52)
ANION GAP SERPL CALCULATED.3IONS-SCNC: 9 MMOL/L (ref 4–13)
AST SERPL W P-5'-P-CCNC: 28 U/L (ref 13–39)
BILIRUB SERPL-MCNC: 0.51 MG/DL (ref 0.2–1)
BUN SERPL-MCNC: 14 MG/DL (ref 5–25)
CALCIUM SERPL-MCNC: 9.6 MG/DL (ref 8.4–10.2)
CHLORIDE SERPL-SCNC: 100 MMOL/L (ref 96–108)
CO2 SERPL-SCNC: 26 MMOL/L (ref 21–32)
CREAT SERPL-MCNC: 0.77 MG/DL (ref 0.6–1.3)
GFR SERPL CREATININE-BSD FRML MDRD: 75 ML/MIN/1.73SQ M
GLUCOSE P FAST SERPL-MCNC: 175 MG/DL (ref 65–99)
MAGNESIUM SERPL-MCNC: 1.5 MG/DL (ref 1.9–2.7)
POTASSIUM SERPL-SCNC: 5 MMOL/L (ref 3.5–5.3)
PROT SERPL-MCNC: 7.2 G/DL (ref 6.4–8.4)
SODIUM SERPL-SCNC: 135 MMOL/L (ref 135–147)

## 2024-08-19 PROCEDURE — 83735 ASSAY OF MAGNESIUM: CPT

## 2024-08-19 PROCEDURE — 36415 COLL VENOUS BLD VENIPUNCTURE: CPT

## 2024-08-19 PROCEDURE — 80053 COMPREHEN METABOLIC PANEL: CPT

## 2024-08-20 DIAGNOSIS — E11.9 TYPE 2 DIABETES MELLITUS WITHOUT COMPLICATION, WITH LONG-TERM CURRENT USE OF INSULIN (HCC): ICD-10-CM

## 2024-08-20 DIAGNOSIS — E83.42 HYPOMAGNESEMIA: ICD-10-CM

## 2024-08-20 DIAGNOSIS — Z79.4 TYPE 2 DIABETES MELLITUS WITHOUT COMPLICATION, WITH LONG-TERM CURRENT USE OF INSULIN (HCC): ICD-10-CM

## 2024-08-20 RX ORDER — CALCIUM CARBONATE/VITAMIN D3 500-10/5ML
1 LIQUID (ML) ORAL 3 TIMES DAILY
Qty: 90 CAPSULE | Refills: 5 | Status: SHIPPED | OUTPATIENT
Start: 2024-08-20

## 2024-08-20 RX ORDER — INSULIN GLARGINE 100 [IU]/ML
33 INJECTION, SOLUTION SUBCUTANEOUS DAILY
Qty: 15 ML | Refills: 2 | Status: SHIPPED | OUTPATIENT
Start: 2024-08-20 | End: 2024-08-29 | Stop reason: SDUPTHER

## 2024-08-20 NOTE — TELEPHONE ENCOUNTER
Reason for call:   [x] Refill   [] Prior Auth  [] Other:     Office:   [x] PCP/Provider - Amador Vasquez MD; Iris Gandhi PA-C   [] Specialty/Provider -     Medication: Insulin Glargine Solostar (Lantus SoloStar) 100 UNIT/ML SOPN    Dose/Frequency: INJECT 0.33 ML (33 UNITS TOTAL) UNDER THE SKIN DAILY    Quantity:  0.33 mL     Medication: Magnesium Oxide 400 MG CAPS     *Dose/Frequency: Take 1 tablet (400 mg total) by mouth 2 (two) times a day   *Patient states Iris upped dosage to 3x a day. Just had bloodwork done and Mag level still low.     Quantity: 90 capsule     Pharmacy: [facility]/pharmacy #[id number] - [place], [place] - [address] [facility] [phone]    Does the patient have enough for 3 days?   [] Yes   [x] No - Send as HP to POD

## 2024-08-27 DIAGNOSIS — E11.8 TYPE 2 DIABETES MELLITUS WITH COMPLICATION, WITHOUT LONG-TERM CURRENT USE OF INSULIN (HCC): ICD-10-CM

## 2024-08-27 DIAGNOSIS — I10 ESSENTIAL HYPERTENSION: ICD-10-CM

## 2024-08-28 RX ORDER — AMLODIPINE BESYLATE 5 MG/1
5 TABLET ORAL DAILY
Qty: 90 TABLET | Refills: 1 | Status: SHIPPED | OUTPATIENT
Start: 2024-08-28

## 2024-08-29 ENCOUNTER — OFFICE VISIT (OUTPATIENT)
Dept: INTERNAL MEDICINE CLINIC | Age: 74
End: 2024-08-29
Payer: MEDICARE

## 2024-08-29 VITALS
WEIGHT: 159 LBS | DIASTOLIC BLOOD PRESSURE: 80 MMHG | TEMPERATURE: 97.1 F | BODY MASS INDEX: 29.26 KG/M2 | HEIGHT: 62 IN | SYSTOLIC BLOOD PRESSURE: 140 MMHG | OXYGEN SATURATION: 97 % | HEART RATE: 62 BPM

## 2024-08-29 DIAGNOSIS — Z79.4 TYPE 2 DIABETES MELLITUS WITHOUT COMPLICATION, WITH LONG-TERM CURRENT USE OF INSULIN (HCC): ICD-10-CM

## 2024-08-29 DIAGNOSIS — F51.01 PRIMARY INSOMNIA: ICD-10-CM

## 2024-08-29 DIAGNOSIS — E78.2 MIXED HYPERLIPIDEMIA: ICD-10-CM

## 2024-08-29 DIAGNOSIS — E11.9 TYPE 2 DIABETES MELLITUS WITHOUT COMPLICATION, WITH LONG-TERM CURRENT USE OF INSULIN (HCC): ICD-10-CM

## 2024-08-29 DIAGNOSIS — K52.9 CHRONIC DIARRHEA: ICD-10-CM

## 2024-08-29 DIAGNOSIS — E11.8 TYPE 2 DIABETES MELLITUS WITH COMPLICATION, WITHOUT LONG-TERM CURRENT USE OF INSULIN (HCC): Primary | ICD-10-CM

## 2024-08-29 DIAGNOSIS — E66.3 OVERWEIGHT (BMI 25.0-29.9): ICD-10-CM

## 2024-08-29 DIAGNOSIS — F41.8 DEPRESSION WITH ANXIETY: ICD-10-CM

## 2024-08-29 DIAGNOSIS — E03.9 ACQUIRED HYPOTHYROIDISM: ICD-10-CM

## 2024-08-29 DIAGNOSIS — Z12.11 SCREENING FOR MALIGNANT NEOPLASM OF COLON: ICD-10-CM

## 2024-08-29 DIAGNOSIS — I10 ESSENTIAL HYPERTENSION: ICD-10-CM

## 2024-08-29 DIAGNOSIS — Z00.00 MEDICARE ANNUAL WELLNESS VISIT, SUBSEQUENT: ICD-10-CM

## 2024-08-29 PROCEDURE — 99214 OFFICE O/P EST MOD 30 MIN: CPT | Performed by: PHYSICIAN ASSISTANT

## 2024-08-29 PROCEDURE — G0439 PPPS, SUBSEQ VISIT: HCPCS | Performed by: PHYSICIAN ASSISTANT

## 2024-08-29 RX ORDER — ZOLPIDEM TARTRATE 10 MG/1
10 TABLET ORAL
Qty: 30 TABLET | Refills: 0 | Status: SHIPPED | OUTPATIENT
Start: 2024-08-29

## 2024-08-29 RX ORDER — TIRZEPATIDE 2.5 MG/.5ML
2.5 INJECTION, SOLUTION SUBCUTANEOUS WEEKLY
Qty: 2 ML | Refills: 0 | Status: SHIPPED | OUTPATIENT
Start: 2024-08-29 | End: 2024-10-24

## 2024-08-29 RX ORDER — TIRZEPATIDE 5 MG/.5ML
5 INJECTION, SOLUTION SUBCUTANEOUS WEEKLY
Qty: 4 ML | Refills: 0 | Status: SHIPPED | OUTPATIENT
Start: 2024-09-28

## 2024-08-29 RX ORDER — INSULIN GLARGINE 100 [IU]/ML
33 INJECTION, SOLUTION SUBCUTANEOUS DAILY
Qty: 15 ML | Refills: 2 | Status: SHIPPED | OUTPATIENT
Start: 2024-08-29

## 2024-08-29 NOTE — PROGRESS NOTES
Diabetic Foot Exam    Patient's shoes and socks removed.    Right Foot/Ankle   Right Foot Inspection  Skin Exam: skin normal, skin intact and dry skin. No warmth, no callus, no erythema, no maceration, no abnormal color, no pre-ulcer, no ulcer and no callus.     Toe Exam: ROM and strength within normal limits.     Sensory   Monofilament testing: intact    Vascular  The right DP pulse is 2+. The right PT pulse is 2+.     Left Foot/Ankle  Left Foot Inspection  Skin Exam: skin normal, skin intact and dry skin. No warmth, no erythema, no maceration, normal color, no pre-ulcer, no ulcer and no callus.     Toe Exam: ROM and strength within normal limits.     Sensory   Monofilament testing: intact    Vascular  The left DP pulse is 2+. The left PT pulse is 2+.     Assign Risk Category  No deformity present  No loss of protective sensation  No weak pulses  Risk: 0  Ambulatory Visit  Name: Shellie Lee      : 1950      MRN: [id number]  Encounter Provider: Iris Gandhi PA-C  Encounter Date: 2024   Encounter department: [facility] PRIMARY [facility]ARE [facility]    Assessment & Plan   1. Type 2 diabetes mellitus with complication, without long-term current use of insulin (HCC)  Comments:  working on getting mounjaro approved  will d/c januvia if starts this  decrease metformin to 500mg bid  Orders:  -     Albumin / creatinine urine ratio; Future; Expected date: 2024  -     Mounjaro 2.5 MG/0.5ML; Inject 0.5 mL (2.5 mg total) under the skin once a week  -     Mounjaro 5 MG/0.5ML; Inject 0.5 mL (5 mg total) under the skin once a week Do not start before 2024.  -     metFORMIN (GLUCOPHAGE) 500 mg tablet; Take 1 tablet (500 mg total) by mouth 2 (two) times a day with meals  -     Comprehensive metabolic panel; Future  -     Magnesium; Future  -     CBC and differential; Future  2. Type 2 diabetes mellitus without complication, with long-term current use of insulin (HCC)  -     Albumin /  creatinine urine ratio; Future; Expected date: 11/07/2024  -     Insulin Glargine Solostar (Lantus SoloStar) 100 UNIT/ML SOPN; Inject 0.33 mL (33 Units total) under the skin in the morning  3. Depression with anxiety  -     zolpidem (AMBIEN) 10 mg tablet; Take 1 tablet (10 mg total) by mouth daily at bedtime  4. Primary insomnia  Comments:  has been on ambien due to chronic insomnia for many years  related to family illness and anxiety  Orders:  -     zolpidem (AMBIEN) 10 mg tablet; Take 1 tablet (10 mg total) by mouth daily at bedtime  5. Type 2 diabetes mellitus without complication, with long-term current use of insulin (HCC)  Comments:  discussed diet and avoiding nighttime eating  urged to go for labs asap   continue lantus 30 units for now   consider increase dose to this and januvia   Orders:  -     Albumin / creatinine urine ratio; Future; Expected date: 11/07/2024  -     Insulin Glargine Solostar (Lantus SoloStar) 100 UNIT/ML SOPN; Inject 0.33 mL (33 Units total) under the skin in the morning  6. Screening for malignant neoplasm of colon  -     Ambulatory Referral to Gastroenterology; Future  7. Overweight (BMI 25.0-29.9)  -     Mounjaro 2.5 MG/0.5ML; Inject 0.5 mL (2.5 mg total) under the skin once a week  -     Mounjaro 5 MG/0.5ML; Inject 0.5 mL (5 mg total) under the skin once a week Do not start before September 28, 2024.  8. Essential hypertension  -     Comprehensive metabolic panel; Future  -     Magnesium; Future  -     CBC and differential; Future  9. Chronic diarrhea  10. Acquired hypothyroidism  -     Comprehensive metabolic panel; Future  -     Magnesium; Future  -     CBC and differential; Future  11. Mixed hyperlipidemia  -     Comprehensive metabolic panel; Future  -     Magnesium; Future  -     CBC and differential; Future  12. Medicare annual wellness visit, subsequent      Depression Screening and Follow-up Plan: Patient was screened for depression during today's encounter. They screened  negative with a PHQ-2 score of 1.      Preventive health issues were discussed with patient, and age appropriate screening tests were ordered as noted in patient's After Visit Summary. Personalized health advice and appropriate referrals for health education or preventive services given if needed, as noted in patient's After Visit Summary.    History of Present Illness     73 y/o female presents for AWV and f/u     Pt has been having loose stools - ongoing for months, saw GI for this   On metformin ? If this is affecting stools  Pt reports some days stools are formed but often has diarrhea  Mag has been low, likely due to GI loss    Discussed Dm - uncontrolled  Will d/c januvia if mounjaro approved  Cut down on metformin dose         Patient Care Team:  Iris Gandhi PA-C as PCP - General (Physician Assistant)  MD Rehan Hamilton MD Dwithiya Thomas, MD    Review of Systems   Constitutional:  Negative for activity change, appetite change, chills, diaphoresis, fatigue and fever.   HENT:  Negative for congestion and sore throat.    Eyes:  Negative for pain and redness.   Respiratory:  Negative for cough and shortness of breath.    Cardiovascular:  Negative for chest pain and leg swelling.   Gastrointestinal:  Positive for diarrhea. Negative for abdominal pain and constipation.   Genitourinary:  Negative for dysuria and flank pain.   Musculoskeletal:  Negative for arthralgias and back pain.   Skin:  Negative for rash.   Neurological:  Negative for light-headedness and headaches.   Psychiatric/Behavioral:  Negative for sleep disturbance.      Medical History Reviewed by provider this encounter:  Tobacco  Allergies  Meds  Problems  Med Hx  Surg Hx  Fam Hx       Annual Wellness Visit Questionnaire   Shellie is here for her Subsequent Wellness visit. Last Medicare Wellness visit information reviewed, patient interviewed and updates made to the record today.      Health Risk Assessment:    Patient rates overall health as good. Patient feels that their physical health rating is same. Patient is satisfied with their life. Eyesight was rated as same. Hearing was rated as same. Patient feels that their emotional and mental health rating is same. Patients states they are sometimes angry. Patient states they are sometimes unusually tired/fatigued. Pain experienced in the last 7 days has been some. Patient's pain rating has been 5/10. Patient states that she has experienced weight loss or gain in last 6 months.     Depression Screening:   PHQ-2 Score: 1      Fall Risk Screening:   In the past year, patient has experienced: no history of falling in past year      Urinary Incontinence Screening:   Patient has not leaked urine accidently in the last six months.     Home Safety:  Patient does not have trouble with stairs inside or outside of their home. Patient has working smoke alarms and has no working carbon monoxide detector. Home safety hazards include: none.     Nutrition:   Current diet is Diabetic.     Medications:   Patient is currently taking over-the-counter supplements. OTC medications include: see medication list. Patient is able to manage medications.     Activities of Daily Living (ADLs)/Instrumental Activities of Daily Living (IADLs):   Walk and transfer into and out of bed and chair?: Yes  Dress and groom yourself?: Yes    Bathe or shower yourself?: Yes    Feed yourself? Yes  Do your laundry/housekeeping?: Yes  Manage your money, pay your bills and track your expenses?: Yes  Make your own meals?: Yes    Do your own shopping?: Yes    Previous Hospitalizations:   Any hospitalizations or ED visits within the last 12 months?: No      Advance Care Planning:   Living will: Yes    Advanced directive: Yes      Cognitive Screening:   Provider or family/friend/caregiver concerned regarding cognition?: No    PREVENTIVE SCREENINGS      Cardiovascular Screening:    General: Screening Not Indicated and  History Lipid Disorder      Diabetes Screening:     General: Screening Not Indicated and History Diabetes      Colorectal Cancer Screening:     General: Screening Current      Breast Cancer Screening:     General: Screening Current      Cervical Cancer Screening:    General: Screening Not Indicated      Osteoporosis Screening:    General: Screening Not Indicated and History Osteoporosis      Abdominal Aortic Aneurysm (AAA) Screening:        General: Screening Not Indicated      Lung Cancer Screening:     General: Screening Not Indicated      Hepatitis C Screening:    General: Screening Current    Screening, Brief Intervention, and Referral to Treatment (SBIRT)    Screening  Typical number of drinks in a day: 0  Typical number of drinks in a week: 0  Interpretation: Low risk drinking behavior.    Single Item Drug Screening:  How often have you used an illegal drug (including marijuana) or a prescription medication for non-medical reasons in the past year? never    Single Item Drug Screen Score: 0  Interpretation: Negative screen for possible drug use disorder    Brief Intervention  Alcohol & drug use screenings were reviewed. No concerns regarding substance use disorder identified.     Other Counseling Topics:   Regular weightbearing exercise and calcium and vitamin D intake.     Social Determinants of Health     Financial Resource Strain: Low Risk  (3/29/2023)    Overall Financial Resource Strain (CARDIA)     Difficulty of Paying Living Expenses: Not very hard   Food Insecurity: No Food Insecurity (8/29/2024)    Hunger Vital Sign     Worried About Running Out of Food in the Last Year: Never true     Ran Out of Food in the Last Year: Never true   Transportation Needs: No Transportation Needs (8/29/2024)    PRAPARE - Transportation     Lack of Transportation (Medical): No     Lack of Transportation (Non-Medical): No   Housing Stability: Low Risk  (8/29/2024)    Housing Stability Vital Sign     Unable to Pay for  "Housing in the Last Year: No     Number of Times Moved in the Last Year: 0     Homeless in the Last Year: No   Utilities: Not At Risk (8/29/2024)    [facility] Utilities     Threatened with loss of utilities: No     No results found.    Objective     /80 (BP Location: Left arm, Patient Position: Sitting, Cuff Size: Standard)   Pulse 62   Temp (!) 97.1 °F (36.2 °C) (Temporal)   Ht 5' 2\" (1.575 m)   Wt 72.1 kg (159 lb)   LMP  (LMP Unknown)   SpO2 97%   BMI 29.08 kg/m²     Physical Exam  Vitals reviewed.   Constitutional:       General: She is not in acute distress.  HENT:      Head: Normocephalic and atraumatic.      Nose: Nose normal.      Mouth/Throat:      Mouth: Mucous membranes are moist.   Eyes:      General:         Right eye: No discharge.         Left eye: No discharge.      Extraocular Movements: Extraocular movements intact.      Conjunctiva/sclera: Conjunctivae normal.      Pupils: Pupils are equal, round, and reactive to light.   Cardiovascular:      Rate and Rhythm: Normal rate and regular rhythm.      Pulses: no weak pulses.           Dorsalis pedis pulses are 2+ on the right side and 2+ on the left side.        Posterior tibial pulses are 2+ on the right side and 2+ on the left side.   Pulmonary:      Effort: Pulmonary effort is normal. No respiratory distress.      Breath sounds: Normal breath sounds. No wheezing or rales.   Abdominal:      General: Bowel sounds are normal.   Musculoskeletal:         General: Normal range of motion.      Cervical back: Normal range of motion. No rigidity.      Right lower leg: No edema.      Left lower leg: No edema.   Feet:      Right foot:      Skin integrity: Dry skin present. No ulcer, skin breakdown, erythema, warmth or callus.      Left foot:      Skin integrity: Dry skin present. No ulcer, skin breakdown, erythema, warmth or callus.   Lymphadenopathy:      Cervical: No cervical adenopathy.   Skin:     Findings: No rash.   Neurological:      General: No " focal deficit present.      Mental Status: She is alert and oriented to person, place, and time.   Psychiatric:         Mood and Affect: Mood normal.       Administrative Statements   I have spent a total time of 32 minutes in caring for this patient on the day of the visit/encounter including Instructions for management, Documenting in the medical record, and Reviewing / ordering tests, medicine, procedures  .

## 2024-08-29 NOTE — PATIENT INSTRUCTIONS
Medicare Preventive Visit Patient Instructions  Thank you for completing your Welcome to Medicare Visit or Medicare Annual Wellness Visit today. Your next wellness visit will be due in one year (8/30/2025).  The screening/preventive services that you may require over the next 5-10 years are detailed below. Some tests may not apply to you based off risk factors and/or age. Screening tests ordered at today's visit but not completed yet may show as past due. Also, please note that scanned in results may not display below.  Preventive Screenings:  Service Recommendations Previous Testing/Comments   Colorectal Cancer Screening  * Colonoscopy    * Fecal Occult Blood Test (FOBT)/Fecal Immunochemical Test (FIT)  * Fecal DNA/Cologuard Test  * Flexible Sigmoidoscopy Age: 45-75 years old   Colonoscopy: every 10 years (may be performed more frequently if at higher risk)  OR  FOBT/FIT: every 1 year  OR  Cologuard: every 3 years  OR  Sigmoidoscopy: every 5 years  Screening may be recommended earlier than age 45 if at higher risk for colorectal cancer. Also, an individualized decision between you and your healthcare provider will decide whether screening between the ages of 76-85 would be appropriate. Colonoscopy: 02/27/2018  FOBT/FIT: Not on file  Cologuard: Not on file  Sigmoidoscopy: Not on file    Screening Current     Breast Cancer Screening Age: 40+ years old  Frequency: every 1-2 years  Not required if history of left and right mastectomy Mammogram: 06/22/2023    Screening Current   Cervical Cancer Screening Between the ages of 21-29, pap smear recommended once every 3 years.   Between the ages of 30-65, can perform pap smear with HPV co-testing every 5 years.   Recommendations may differ for women with a history of total hysterectomy, cervical cancer, or abnormal pap smears in past. Pap Smear: 11/29/2023    Screening Not Indicated   Hepatitis C Screening Once for adults born between 1945 and 1965  More frequently in  patients at high risk for Hepatitis C Hep C Antibody: 02/22/2018    Screening Current   Diabetes Screening 1-2 times per year if you're at risk for diabetes or have pre-diabetes Fasting glucose: 175 mg/dL (8/19/2024)  A1C: 8.8 % (7/15/2024)  Screening Not Indicated  History Diabetes   Cholesterol Screening Once every 5 years if you don't have a lipid disorder. May order more often based on risk factors. Lipid panel: 07/15/2024    Screening Not Indicated  History Lipid Disorder     Other Preventive Screenings Covered by Medicare:  Abdominal Aortic Aneurysm (AAA) Screening: covered once if your at risk. You're considered to be at risk if you have a family history of AAA.  Lung Cancer Screening: covers low dose CT scan once per year if you meet all of the following conditions: (1) Age 55-77; (2) No signs or symptoms of lung cancer; (3) Current smoker or have quit smoking within the last 15 years; (4) You have a tobacco smoking history of at least 20 pack years (packs per day multiplied by number of years you smoked); (5) You get a written order from a healthcare provider.  Glaucoma Screening: covered annually if you're considered high risk: (1) You have diabetes OR (2) Family history of glaucoma OR (3)  aged 50 and older OR (4)  American aged 65 and older  Osteoporosis Screening: covered every 2 years if you meet one of the following conditions: (1) You're estrogen deficient and at risk for osteoporosis based off medical history and other findings; (2) Have a vertebral abnormality; (3) On glucocorticoid therapy for more than 3 months; (4) Have primary hyperparathyroidism; (5) On osteoporosis medications and need to assess response to drug therapy.   Last bone density test (DXA Scan): 06/26/2022.  HIV Screening: covered annually if you're between the age of 15-65. Also covered annually if you are younger than 15 and older than 65 with risk factors for HIV infection. For pregnant patients, it is  covered up to 3 times per pregnancy.    Immunizations:  Immunization Recommendations   Influenza Vaccine Annual influenza vaccination during flu season is recommended for all persons aged >= 6 months who do not have contraindications   Pneumococcal Vaccine   * Pneumococcal conjugate vaccine = PCV13 (Prevnar 13), PCV15 (Vaxneuvance), PCV20 (Prevnar 20)  * Pneumococcal polysaccharide vaccine = PPSV23 (Pneumovax) Adults 19-65 yo with certain risk factors or if 65+ yo  If never received any pneumonia vaccine: recommend Prevnar 20 (PCV20)  Give PCV20 if previously received 1 dose of PCV13 or PPSV23   Hepatitis B Vaccine 3 dose series if at intermediate or high risk (ex: diabetes, end stage renal disease, liver disease)   Respiratory syncytial virus (RSV) Vaccine - COVERED BY MEDICARE PART D  * RSVPreF3 (Arexvy) CDC recommends that adults 60 years of age and older may receive a single dose of RSV vaccine using shared clinical decision-making (SCDM)   Tetanus (Td) Vaccine - COST NOT COVERED BY MEDICARE PART B Following completion of primary series, a booster dose should be given every 10 years to maintain immunity against tetanus. Td may also be given as tetanus wound prophylaxis.   Tdap Vaccine - COST NOT COVERED BY MEDICARE PART B Recommended at least once for all adults. For pregnant patients, recommended with each pregnancy.   Shingles Vaccine (Shingrix) - COST NOT COVERED BY MEDICARE PART B  2 shot series recommended in those 19 years and older who have or will have weakened immune systems or those 50 years and older     Health Maintenance Due:      Topic Date Due   • Breast Cancer Screening: Mammogram  06/22/2024   • Colorectal Cancer Screening  02/27/2028   • Hepatitis C Screening  Completed     Immunizations Due:      Topic Date Due   • Influenza Vaccine (1) 09/01/2024     Advance Directives   What are advance directives?  Advance directives are legal documents that state your wishes and plans for medical care.  These plans are made ahead of time in case you lose your ability to make decisions for yourself. Advance directives can apply to any medical decision, such as the treatments you want, and if you want to donate organs.   What are the types of advance directives?  There are many types of advance directives, and each state has rules about how to use them. You may choose a combination of any of the following:  Living will:  This is a written record of the treatment you want. You can also choose which treatments you do not want, which to limit, and which to stop at a certain time. This includes surgery, medicine, IV fluid, and tube feedings.   Durable power of  for healthcare (DPAHC):  This is a written record that states who you want to make healthcare choices for you when you are unable to make them for yourself. This person, called a proxy, is usually a family member or a friend. You may choose more than 1 proxy.  Do not resuscitate (DNR) order:  A DNR order is used in case your heart stops beating or you stop breathing. It is a request not to have certain forms of treatment, such as CPR. A DNR order may be included in other types of advance directives.  Medical directive:  This covers the care that you want if you are in a coma, near death, or unable to make decisions for yourself. You can list the treatments you want for each condition. Treatment may include pain medicine, surgery, blood transfusions, dialysis, IV or tube feedings, and a ventilator (breathing machine).  Values history:  This document has questions about your views, beliefs, and how you feel and think about life. This information can help others choose the care that you would choose.  Why are advance directives important?  An advance directive helps you control your care. Although spoken wishes may be used, it is better to have your wishes written down. Spoken wishes can be misunderstood, or not followed. Treatments may be given even if you  do not want them. An advance directive may make it easier for your family to make difficult choices about your care.   Urinary Incontinence   Urinary incontinence (UI)  is when you lose control of your bladder. UI develops because your bladder cannot store or empty urine properly. The 3 most common types of UI are stress incontinence, urge incontinence, or both.  Medicines:   May be given to help strengthen your bladder control. Report any side effects of medication to your healthcare provider.  Do pelvic muscle exercises often:  Your pelvic muscles help you stop urinating. Squeeze these muscles tight for 5 seconds, then relax for 5 seconds. Gradually work up to squeezing for 10 seconds. Do 3 sets of 15 repetitions a day, or as directed. This will help strengthen your pelvic muscles and improve bladder control.  Train your bladder:  Go to the bathroom at set times, such as every 2 hours, even if you do not feel the urge to go. You can also try to hold your urine when you feel the urge to go. For example, hold your urine for 5 minutes when you feel the urge to go. As that becomes easier, hold your urine for 10 minutes.   Self-care:   Keep a UI record.  Write down how often you leak urine and how much you leak. Make a note of what you were doing when you leaked urine.  Drink liquids as directed. You may need to limit the amount of liquid you drink to help control your urine leakage. Do not drink any liquid right before you go to bed. Limit or do not have drinks that contain caffeine or alcohol.   Prevent constipation.  Eat a variety of high-fiber foods. Good examples are high-fiber cereals, beans, vegetables, and whole-grain breads. Walking is the best way to trigger your intestines to have a bowel movement.  Exercise regularly and maintain a healthy weight.  Weight loss and exercise will decrease pressure on your bladder and help you control your leakage.   Use a catheter as directed  to help empty your bladder. A  catheter is a tiny, plastic tube that is put into your bladder to drain your urine.   Go to behavior therapy as directed.  Behavior therapy may be used to help you learn to control your urge to urinate.    Weight Management   Why it is important to manage your weight:  Being overweight increases your risk of health conditions such as heart disease, high blood pressure, type 2 diabetes, and certain types of cancer. It can also increase your risk for osteoarthritis, sleep apnea, and other respiratory problems. Aim for a slow, steady weight loss. Even a small amount of weight loss can lower your risk of health problems.  How to lose weight safely:  A safe and healthy way to lose weight is to eat fewer calories and get regular exercise. You can lose up about 1 pound a week by decreasing the number of calories you eat by 500 calories each day.   Healthy meal plan for weight management:  A healthy meal plan includes a variety of foods, contains fewer calories, and helps you stay healthy. A healthy meal plan includes the following:  Eat whole-grain foods more often.  A healthy meal plan should contain fiber. Fiber is the part of grains, fruits, and vegetables that is not broken down by your body. Whole-grain foods are healthy and provide extra fiber in your diet. Some examples of whole-grain foods are whole-wheat breads and pastas, oatmeal, brown rice, and bulgur.  Eat a variety of vegetables every day.  Include dark, leafy greens such as spinach, kale, gal greens, and mustard greens. Eat yellow and orange vegetables such as carrots, sweet potatoes, and winter squash.   Eat a variety of fruits every day.  Choose fresh or canned fruit (canned in its own juice or light syrup) instead of juice. Fruit juice has very little or no fiber.  Eat low-fat dairy foods.  Drink fat-free (skim) milk or 1% milk. Eat fat-free yogurt and low-fat cottage cheese. Try low-fat cheeses such as mozzarella and other reduced-fat  cheeses.  Choose meat and other protein foods that are low in fat.  Choose beans or other legumes such as split peas or lentils. Choose fish, skinless poultry (chicken or turkey), or lean cuts of red meat (beef or pork). Before you cook meat or poultry, cut off any visible fat.   Use less fat and oil.  Try baking foods instead of frying them. Add less fat, such as margarine, sour cream, regular salad dressing and mayonnaise to foods. Eat fewer high-fat foods. Some examples of high-fat foods include french fries, doughnuts, ice cream, and cakes.  Eat fewer sweets.  Limit foods and drinks that are high in sugar. This includes candy, cookies, regular soda, and sweetened drinks.  Exercise:  Exercise at least 30 minutes per day on most days of the week. Some examples of exercise include walking, biking, dancing, and swimming. You can also fit in more physical activity by taking the stairs instead of the elevator or parking farther away from stores. Ask your healthcare provider about the best exercise plan for you.      © Copyright [employer] 2018 Information is for End User's use only and may not be sold, redistributed or otherwise used for commercial purposes. All illustrations and images included in CareNotes® are the copyrighted property of [employer] or [employer]

## 2024-08-30 ENCOUNTER — TELEPHONE (OUTPATIENT)
Age: 74
End: 2024-08-30

## 2024-08-30 NOTE — TELEPHONE ENCOUNTER
PA for MOUNJARO 2.5MG SUBMITTED     via    []CMM-KEY:   [x][facility]-Case ID # [id number]  []Faxed to plan   []Other website   []Phone call Case ID #     Office notes sent, clinical questions answered. Awaiting determination    Turnaround time for your insurance to make a decision on your Prior Authorization can take 7-21 business days.

## 2024-08-30 NOTE — TELEPHONE ENCOUNTER
PA for Mounjaro 5mg SUBMITTED     via    [][facility]-KEY:   [x][facility]-Case ID #: [id number]   []Faxed to plan   []Other website   []Phone call Case ID #     Office notes sent, clinical questions answered. Awaiting determination    Turnaround time for your insurance to make a decision on your Prior Authorization can take 7-21 business days.

## 2024-08-31 NOTE — TELEPHONE ENCOUNTER
PA for Mounjaro 5MG APPROVED     Date(s) approved 8/30/24-8/30/25    Case #[id number]     Patient advised by          [x]Rypplehart Message  []Phone call   []LMOM  []L/M to call office as no active Communication consent on file  []Unable to leave detailed message as VM not approved on Communication consent       Pharmacy advised by    [x]Fax  []Phone call    Approval letter scanned into [employer] Yes

## 2024-09-04 ENCOUNTER — OFFICE VISIT (OUTPATIENT)
Dept: UROLOGY | Facility: AMBULATORY SURGERY CENTER | Age: 74
End: 2024-09-04

## 2024-09-04 VITALS
SYSTOLIC BLOOD PRESSURE: 130 MMHG | HEART RATE: 66 BPM | BODY MASS INDEX: 28.89 KG/M2 | WEIGHT: 157 LBS | OXYGEN SATURATION: 96 % | DIASTOLIC BLOOD PRESSURE: 84 MMHG | HEIGHT: 62 IN

## 2024-09-04 DIAGNOSIS — C67.2 MALIGNANT NEOPLASM OF LATERAL WALL OF URINARY BLADDER (HCC): ICD-10-CM

## 2024-09-04 DIAGNOSIS — C67.9 MALIGNANT NEOPLASM OF URINARY BLADDER, UNSPECIFIED SITE (HCC): Primary | ICD-10-CM

## 2024-09-04 NOTE — ASSESSMENT & PLAN NOTE
History of high-grade T1 bladder cancer dating back to 2016  Underwent cystoscopy by Dr. Rios on 8/29/2023 which was negative for malignancy  Kidney and bladder ultrasound from July 2023 demonstrating a stable septated right renal cyst and an overall unremarkable bladder  Urine cytology on 7/15/2024 negative for high-grade urothelial carcinoma  Plan to obtain kidney and bladder ultrasound now, will call patient with results  She wishes to follow-up with a cystoscopy next year with Dr. Rios for bladder cancer surveillance

## 2024-09-04 NOTE — PROGRESS NOTES
"  Assessment and plan:     Malignant neoplasm of lateral wall of urinary bladder (HCC)  History of high-grade T1 bladder cancer dating back to 2016  Underwent cystoscopy by Dr. Rios on 8/29/2023 which was negative for malignancy  Kidney and bladder ultrasound from July 2023 demonstrating a stable septated right renal cyst and an overall unremarkable bladder  Urine cytology on 7/15/2024 negative for high-grade urothelial carcinoma  Plan to obtain kidney and bladder ultrasound now, will call patient with results  She wishes to follow-up with a cystoscopy next year with Dr. Rios for bladder cancer surveillance      History of Present Illness     Shellie Lee is a 74 y.o. female who presents today to the office for follow-up of urothelial carcinoma.  She has a history of high-grade T1 bladder cancer dating back to 2016.  Several years ago she received 2 years of BCG induction and maintenance.  Her last upper tract imaging with a kidney and bladder ultrasound was from July 2023 which demonstrated a stable septated right renal cyst and an overall unremarkable bladder.  She underwent a surveillance cystoscopy by Dr. Rios in August 2023 which was negative for malignancy.  She was to hold off on any additional cystoscopies and just obtain yearly urine cytology.    Today in the office she states she is overall doing very well.  She denies any hematuria.  She does state that she feels as though she is going to the bathroom more frequently but she does drink a lot of coffee throughout the day.  We did discuss diet and lifestyle modifications regarding this.  She states she will call our office if the urinary symptoms start to worsen.    Laboratory     Lab Results   Component Value Date    BUN 14 08/19/2024    CREATININE 0.77 08/19/2024       No components found for: \"GFR\"    Lab Results   Component Value Date    CALCIUM 9.6 08/19/2024    K 5.0 08/19/2024    CO2 26 08/19/2024     08/19/2024       Lab Results " "  Component Value Date    WBC 7.31 07/15/2024    HGB 14.0 07/15/2024    HCT 42.1 07/15/2024    MCV 90 07/15/2024     07/15/2024       No results found for: \"PSA\"    No results found for this or any previous visit (from the past 1 hour(s)).    Review of Systems     Review of Systems   Constitutional:  Negative for chills and fever.   Respiratory: Negative.  Negative for cough and shortness of breath.    Cardiovascular:  Negative for chest pain and leg swelling.   Genitourinary:  Negative for dyspareunia, dysuria, flank pain, frequency, hematuria, menstrual problem, pelvic pain, urgency, vaginal bleeding, vaginal discharge and vaginal pain.   Skin:  Negative for rash.   Neurological: Negative.    Hematological:  Negative for adenopathy. Does not bruise/bleed easily.                 Allergies     Allergies   Allergen Reactions    Losartan Edema    Nickel Swelling     Swelling, irritation, lumps to ears       Physical Exam     Physical Exam  Vitals reviewed.   Constitutional:       Appearance: Normal appearance.   HENT:      Head: Normocephalic and atraumatic.   Eyes:      Pupils: Pupils are equal, round, and reactive to light.   Cardiovascular:      Rate and Rhythm: Normal rate.   Pulmonary:      Effort: Pulmonary effort is normal.   Musculoskeletal:      Cervical back: Normal range of motion.   Skin:     General: Skin is warm and dry.   Neurological:      General: No focal deficit present.      Mental Status: She is alert and oriented to person, place, and time. Mental status is at baseline.   Psychiatric:         Mood and Affect: Mood normal.         Behavior: Behavior normal.         Thought Content: Thought content normal.         Judgment: Judgment normal.         Vital Signs     Vitals:    09/04/24 0907   BP: 130/84   BP Location: Left arm   Patient Position: Sitting   Cuff Size: Adult   Pulse: 66   SpO2: 96%   Weight: 71.2 kg (157 lb)   Height: 5' 2\" (1.575 m)       Current Medications       Current " Outpatient Medications:     acetaminophen (TYLENOL) 325 mg tablet, Take 650 mg by mouth 4 (four) times a day as needed for mild pain, Disp: , Rfl:     amLODIPine (NORVASC) 5 mg tablet, TAKE 1 TABLET (5 MG TOTAL) BY MOUTH DAILY., Disp: 90 tablet, Rfl: 1    aspirin ([employer] Aspirin Adult Low Dose) 81 mg chewable tablet, CHEW 1 TABLET BY MOUTH DAILY, Disp: 30 tablet, Rfl: 5    clonazePAM (KlonoPIN) 0.5 mg tablet, Take 1 tablet (0.5 mg total) by mouth daily at bedtime, Disp: 30 tablet, Rfl: 0    estradiol (ESTRACE VAGINAL) 0.1 mg/g vaginal cream, Apply small amount on fingertip. Apply twice a week., Disp: 45 g, Rfl: 2    famotidine (PEPCID) 20 mg tablet, Take 1 tablet (20 mg total) by mouth daily (Patient taking differently: Take 20 mg by mouth daily As needed), Disp: 90 tablet, Rfl: 0    fenofibrate (TRICOR) 145 mg tablet, TAKE 1 TABLET BY MOUTH EVERY DAY, Disp: 100 tablet, Rfl: 1    fluticasone (FLONASE) 50 mcg/act nasal spray, 1 spray into each nostril 2 (two) times a day (Patient taking differently: 1 spray into each nostril if needed), Disp: 15.8 g, Rfl: 0    Ibuprofen (ADVIL PO), Take 1 tablet by mouth if needed, Disp: , Rfl:     Insulin Glargine Solostar (Lantus SoloStar) 100 UNIT/ML SOPN, Inject 0.33 mL (33 Units total) under the skin in the morning, Disp: 15 mL, Rfl: 2    Insulin Pen Needle 32G X 4 MM MISC, Use in the morning, Disp: 100 each, Rfl: 3    levothyroxine 50 mcg tablet, Take 1 tablet (50 mcg total) by mouth daily, Disp: 90 tablet, Rfl: 1    loratadine (CLARITIN) 10 mg tablet, Take 1 tablet (10 mg total) by mouth daily (Patient taking differently: Take 10 mg by mouth daily As needed), Disp: 14 tablet, Rfl: 0    Magnesium Oxide 400 MG CAPS, Take 1 tablet (400 mg total) by mouth 3 (three) times a day, Disp: 90 capsule, Rfl: 5    metFORMIN (GLUCOPHAGE) 500 mg tablet, Take 1 tablet (500 mg total) by mouth 2 (two) times a day with meals, Disp: , Rfl:     metoprolol tartrate (LOPRESSOR) 25 mg tablet, Take 1  tablet (25 mg total) by mouth every 12 (twelve) hours, Disp: 180 tablet, Rfl: 1    mometasone (ELOCON) 0.1 % cream, Apply topically once a week, Disp: 45 g, Rfl: 4    Mounjaro 2.5 MG/0.5ML, Inject 0.5 mL (2.5 mg total) under the skin once a week, Disp: 2 mL, Rfl: 0    [START ON 9/28/2024] Mounjaro 5 MG/0.5ML, Inject 0.5 mL (5 mg total) under the skin once a week Do not start before September 28, 2024., Disp: 4 mL, Rfl: 0    OneTouch Verio test strip, Test 3 times daily, Disp: 300 each, Rfl: 1    Probiotic Product (PROBIOTIC-10) CAPS, Take 1 capsule by mouth daily, Disp: , Rfl:     simvastatin (ZOCOR) 20 mg tablet, Take 1 tablet (20 mg total) by mouth daily at bedtime, Disp: 90 tablet, Rfl: 1    zolpidem (AMBIEN) 10 mg tablet, Take 1 tablet (10 mg total) by mouth daily at bedtime, Disp: 30 tablet, Rfl: 0    Active Problems     Patient Active Problem List   Diagnosis    Tear of medial meniscus of left knee    Abnormal EKG    Acquired hypothyroidism    Chronic pain of left knee    Anxiety    Asthma    Neoplasm of bladder    Bursitis of shoulder    Chronic cough    Hyponatremia    Fibromyositis    Ganglion and cyst of synovium, tendon and bursa    Mixed hyperlipidemia    Essential hypertension    Malignant neoplasm of lateral wall of urinary bladder ([facility])    Type 2 diabetes mellitus without complication, with long-term current use of insulin ([facility])    Nontoxic single thyroid nodule    Obesity    Osteoporosis    Primary osteoarthritis of left knee    Other fatigue    Lichenoid dermatitis    SIADH (syndrome of inappropriate ADH production) ([facility])    Irritant contact dermatitis    Urinary frequency    Nocturia    Hyperkalemia    Chronic diarrhea    Blurred vision       Past Medical History     Past Medical History:   Diagnosis Date    Abdominal pain 01/16/2017    Acute non-recurrent frontal sinusitis 01/08/2019    Acute pain of right shoulder 01/10/2018    Anxiety     Arthritis     Bladder carcinoma ([facility]) 2016    Bladder  mass     Dental abscess 2017    Depression     Diabetes mellitus (HCC)     Disease of thyroid gland     thyroid nodules-not treating at this time    Dysuria     Last assessed 17    GERD (gastroesophageal reflux disease)      2 para 2     HL (hearing loss)     HLD (hyperlipidemia)     HTN (hypertension)     Hypercholesterolemia     Hypertension     Knee pain     Lichen sclerosus     Melanoma (HCC)     Melanoma (HCC)     Mild aortic regurgitation with left ventricular dilation by prior echocardiogram     Papanicolaou smear 2017    NEG    PONV (postoperative nausea and vomiting)     Tinnitus        Surgical History     Past Surgical History:   Procedure Laterality Date    BASAL CELL CARCINOMA EXCISION  2024    nose    CARPAL TUNNEL RELEASE Right      SECTION      x2    CHOLECYSTECTOMY      CYSTOSCOPY N/A 2016    Procedure: CYSTOSCOPY WITH BIOPSIES;  Surgeon: Lane Rios MD;  [facility];  Service:     CYSTOSCOPY N/A 2016    Procedure: CYSTOSCOPY;  Surgeon: Lane Rios MD;  [facility];  Service:     CYSTOSCOPY  2020    HERNIA REPAIR      NH ARTHRS KNE SURG W/MENISCECTOMY MED/LAT W/SHVG Left 2016    Procedure: KNEE ARTHROSCOPIC PARTIAL MEDIAL MENISCECTOMY ;  Surgeon: Rehan Pete MD;  [facility];  Service: Orthopedics    NH BLADDER INSTILLATION ANTICARCINOGENIC AGENT N/A 2016    Procedure: INSTILLATION MYTOMYCIN;  Surgeon: Lane Rios MD;  [facility];  Service: Urology    NH CYSTO BLADDER W/URETERAL CATHETERIZATION Bilateral 2016    Procedure: RETROGRADE PYELOGRAM ;  Surgeon: Lane Rios MD;  [facility];  Service: Urology    NH CYSTO BLADDER W/URETERAL CATHETERIZATION Bilateral 2017    Procedure: RETROGRADE PYELOGRAM WITH STENT EXCHANGE, RIGHT;  Surgeon: Lane Rios MD;  [facility];  Service: Urology    NH CYSTO W/INSERT URETERAL STENT Left 2016     Procedure: URETERAL STENTS;  Surgeon: Lane Rios MD;  [facility];  Service: Urology    GA CYSTO W/REMOVAL OF LESIONS SMALL N/A 11/29/2016    Procedure: TRANSURETHRAL RESECTION OF BLADDER TUMOR (TURBT);  Surgeon: Lane Rios MD;  [facility];  Service: Urology    GA CYSTO W/REMOVAL OF LESIONS SMALL N/A 09/29/2016    Procedure: CYSTOSCOPY; TUR BLADDER TUMOR ;  Surgeon: Lane Rios MD;  [facility];  Service: Urology    GA CYSTO W/REMOVAL OF LESIONS SMALL N/A 09/01/2016    Procedure: TUR BLADDER TUMOR ;  Surgeon: Lane Rios MD;  [facility];  Service: Urology    GA CYSTO W/REMOVAL OF LESIONS SMALL Bilateral 05/09/2017    Procedure: CYSTOSCOPY, RIGHT URETERAL WASHINGS, ;  Surgeon: Lane Rios MD;  [facility];  Service: Urology    GA CYSTO W/URTROSCOPY&/PYELOSCOPY DX Right 05/09/2017    Procedure: URETEROSCOPY;  Surgeon: Lane Rios MD;  [facility];  Service: Urology    GA CYSTO/URETERO W/LITHOTRIPSY &INDWELL STENT INSRT  11/29/2016    Procedure: CYSTOSCOPY URETEROSCOPY WITH LITHOTRIPSY HOLMIUM LASER RIGHT, RETROGRADE PYELOGRAM BILATERAL: AND INSERTION STENT URETERAL Right ;  Surgeon: Lane Rios MD;  [facility];  Service: Urology    REMOVAL URETERAL STENT Left 11/29/2016    Procedure: REMOVAL STENT URETERAL;  Surgeon: Lane Rios MD;  [facility];  Service:     REPLACEMENT TOTAL KNEE Left 12/2021    SKIN CANCER EXCISION  2021    right arm    TONSILLECTOMY         Family History     Family History   Problem Relation Age of Onset    Heart attack Mother     Heart disease Mother     ALS Father     Diabetes Maternal Grandfather     Lung cancer Paternal Grandfather     Heart disease Maternal Grandmother     No Known Problems Daughter     No Known Problems Daughter     No Known Problems Paternal Aunt     No Known Problems Paternal Aunt     No Known Problems Paternal Aunt     No Known Problems Paternal Aunt     No Known  Problems Paternal Aunt     Breast cancer Neg Hx        Social History     Social History     Social History     Tobacco Use   Smoking Status Former    Current packs/day: 0.00    Average packs/day: 1 pack/day for 21.0 years (21.0 ttl pk-yrs)    Types: Cigarettes    Start date: 1969    Quit date:     Years since quittin.6   Smokeless Tobacco Never       Past Surgical History:   Procedure Laterality Date    BASAL CELL CARCINOMA EXCISION  2024    nose    CARPAL TUNNEL RELEASE Right      SECTION      x2    CHOLECYSTECTOMY      CYSTOSCOPY N/A 2016    Procedure: CYSTOSCOPY WITH BIOPSIES;  Surgeon: Lane Rios MD;  [facility];  Service:     CYSTOSCOPY N/A 2016    Procedure: CYSTOSCOPY;  Surgeon: Lane Rios MD;  [facility];  Service:     CYSTOSCOPY  2020    HERNIA REPAIR      MN ARTHRS KNE SURG W/MENISCECTOMY MED/LAT W/SHVG Left 2016    Procedure: KNEE ARTHROSCOPIC PARTIAL MEDIAL MENISCECTOMY ;  Surgeon: Rehan Pete MD;  [facility];  Service: Orthopedics    MN BLADDER INSTILLATION ANTICARCINOGENIC AGENT N/A 2016    Procedure: INSTILLATION MYTOMYCIN;  Surgeon: Lane Rios MD;  [facility];  Service: Urology    MN CYSTO BLADDER W/URETERAL CATHETERIZATION Bilateral 2016    Procedure: RETROGRADE PYELOGRAM ;  Surgeon: Lane Rios MD;  [facility];  Service: Urology    MN CYSTO BLADDER W/URETERAL CATHETERIZATION Bilateral 2017    Procedure: RETROGRADE PYELOGRAM WITH STENT EXCHANGE, RIGHT;  Surgeon: Lane Rios MD;  [facility];  Service: Urology    MN CYSTO W/INSERT URETERAL STENT Left 2016    Procedure: URETERAL STENTS;  Surgeon: Lane Rios MD;  [facility];  Service: Urology    MN CYSTO W/REMOVAL OF LESIONS SMALL N/A 2016    Procedure: TRANSURETHRAL RESECTION OF BLADDER TUMOR (TURBT);  Surgeon: Lane Rios MD;  [facility];  Service: Urology     [place] CYSTO W/REMOVAL OF LESIONS SMALL N/A 09/29/2016    Procedure: CYSTOSCOPY; TUR BLADDER TUMOR ;  Surgeon: Lane Rios MD;  [facility];  Service: Urology    [place] CYSTO W/REMOVAL OF LESIONS SMALL N/A 09/01/2016    Procedure: TUR BLADDER TUMOR ;  Surgeon: Lane Rios MD;  [facility];  Service: Urology    [place] CYSTO W/REMOVAL OF LESIONS SMALL Bilateral 05/09/2017    Procedure: CYSTOSCOPY, RIGHT URETERAL WASHINGS, ;  Surgeon: Lane Rios MD;  [facility];  Service: Urology    [place] CYSTO W/URTROSCOPY&/PYELOSCOPY DX Right 05/09/2017    Procedure: URETEROSCOPY;  Surgeon: Lane Rios MD;  [facility];  Service: Urology    [place] CYSTO/URETERO W/LITHOTRIPSY &INDWELL STENT INSRT  11/29/2016    Procedure: CYSTOSCOPY URETEROSCOPY WITH LITHOTRIPSY HOLMIUM LASER RIGHT, RETROGRADE PYELOGRAM BILATERAL: AND INSERTION STENT URETERAL Right ;  Surgeon: Lane Rios MD;  [facility];  Service: Urology    REMOVAL URETERAL STENT Left 11/29/2016    Procedure: REMOVAL STENT URETERAL;  Surgeon: Lane Rios MD;  [facility];  Service:     REPLACEMENT TOTAL KNEE Left 12/2021    SKIN CANCER EXCISION  2021    right arm    TONSILLECTOMY           The following portions of the patient's history were reviewed and updated as appropriate: allergies, current medications, past family history, past medical history, past social history, past surgical history and problem list    Please note :  Voice dictation software has been used to create this document.  There may be inadvertent transcription errors.    CAMILLA Lopez

## 2024-09-05 ENCOUNTER — CONSULT (OUTPATIENT)
Dept: INTERNAL MEDICINE CLINIC | Age: 74
End: 2024-09-05
Payer: MEDICARE

## 2024-09-05 ENCOUNTER — APPOINTMENT (OUTPATIENT)
Dept: LAB | Age: 74
End: 2024-09-05
Payer: MEDICARE

## 2024-09-05 VITALS
HEART RATE: 60 BPM | HEIGHT: 62 IN | BODY MASS INDEX: 29.08 KG/M2 | DIASTOLIC BLOOD PRESSURE: 76 MMHG | SYSTOLIC BLOOD PRESSURE: 128 MMHG | OXYGEN SATURATION: 97 % | TEMPERATURE: 98 F | WEIGHT: 158 LBS

## 2024-09-05 DIAGNOSIS — E11.8 TYPE 2 DIABETES MELLITUS WITH COMPLICATION, WITHOUT LONG-TERM CURRENT USE OF INSULIN (HCC): ICD-10-CM

## 2024-09-05 DIAGNOSIS — Z01.818 PRE-OP EXAMINATION: Primary | ICD-10-CM

## 2024-09-05 DIAGNOSIS — E78.2 MIXED HYPERLIPIDEMIA: ICD-10-CM

## 2024-09-05 DIAGNOSIS — Z79.4 TYPE 2 DIABETES MELLITUS WITHOUT COMPLICATION, WITH LONG-TERM CURRENT USE OF INSULIN (HCC): ICD-10-CM

## 2024-09-05 DIAGNOSIS — E03.9 ACQUIRED HYPOTHYROIDISM: ICD-10-CM

## 2024-09-05 DIAGNOSIS — E87.5 HYPERKALEMIA: ICD-10-CM

## 2024-09-05 DIAGNOSIS — H02.409 PTOSIS OF EYELID, UNSPECIFIED LATERALITY: ICD-10-CM

## 2024-09-05 DIAGNOSIS — E83.42 HYPOMAGNESEMIA: ICD-10-CM

## 2024-09-05 DIAGNOSIS — I10 ESSENTIAL HYPERTENSION: ICD-10-CM

## 2024-09-05 DIAGNOSIS — E11.9 TYPE 2 DIABETES MELLITUS WITHOUT COMPLICATION, WITH LONG-TERM CURRENT USE OF INSULIN (HCC): ICD-10-CM

## 2024-09-05 LAB
ALBUMIN SERPL BCG-MCNC: 4.6 G/DL (ref 3.5–5)
ALP SERPL-CCNC: 51 U/L (ref 34–104)
ALT SERPL W P-5'-P-CCNC: 36 U/L (ref 7–52)
ANION GAP SERPL CALCULATED.3IONS-SCNC: 12 MMOL/L (ref 4–13)
AST SERPL W P-5'-P-CCNC: 50 U/L (ref 13–39)
BASOPHILS # BLD AUTO: 0.09 THOUSANDS/ÂΜL (ref 0–0.1)
BASOPHILS NFR BLD AUTO: 1 % (ref 0–1)
BILIRUB SERPL-MCNC: 0.41 MG/DL (ref 0.2–1)
BUN SERPL-MCNC: 17 MG/DL (ref 5–25)
CALCIUM SERPL-MCNC: 9.7 MG/DL (ref 8.4–10.2)
CHLORIDE SERPL-SCNC: 98 MMOL/L (ref 96–108)
CO2 SERPL-SCNC: 25 MMOL/L (ref 21–32)
CREAT SERPL-MCNC: 0.78 MG/DL (ref 0.6–1.3)
EOSINOPHIL # BLD AUTO: 0.26 THOUSAND/ÂΜL (ref 0–0.61)
EOSINOPHIL NFR BLD AUTO: 4 % (ref 0–6)
ERYTHROCYTE [DISTWIDTH] IN BLOOD BY AUTOMATED COUNT: 13.4 % (ref 11.6–15.1)
GFR SERPL CREATININE-BSD FRML MDRD: 74 ML/MIN/1.73SQ M
GLUCOSE P FAST SERPL-MCNC: 162 MG/DL (ref 65–99)
HCT VFR BLD AUTO: 40.5 % (ref 34.8–46.1)
HGB BLD-MCNC: 13.2 G/DL (ref 11.5–15.4)
IMM GRANULOCYTES # BLD AUTO: 0.04 THOUSAND/UL (ref 0–0.2)
IMM GRANULOCYTES NFR BLD AUTO: 1 % (ref 0–2)
LYMPHOCYTES # BLD AUTO: 1.33 THOUSANDS/ÂΜL (ref 0.6–4.47)
LYMPHOCYTES NFR BLD AUTO: 21 % (ref 14–44)
MAGNESIUM SERPL-MCNC: 1.6 MG/DL (ref 1.9–2.7)
MCH RBC QN AUTO: 28.9 PG (ref 26.8–34.3)
MCHC RBC AUTO-ENTMCNC: 32.6 G/DL (ref 31.4–37.4)
MCV RBC AUTO: 89 FL (ref 82–98)
MONOCYTES # BLD AUTO: 0.54 THOUSAND/ÂΜL (ref 0.17–1.22)
MONOCYTES NFR BLD AUTO: 9 % (ref 4–12)
NEUTROPHILS # BLD AUTO: 4.01 THOUSANDS/ÂΜL (ref 1.85–7.62)
NEUTS SEG NFR BLD AUTO: 64 % (ref 43–75)
NRBC BLD AUTO-RTO: 0 /100 WBCS
PLATELET # BLD AUTO: 224 THOUSANDS/UL (ref 149–390)
PMV BLD AUTO: 10.5 FL (ref 8.9–12.7)
POTASSIUM SERPL-SCNC: 4.9 MMOL/L (ref 3.5–5.3)
PROT SERPL-MCNC: 7.3 G/DL (ref 6.4–8.4)
RBC # BLD AUTO: 4.57 MILLION/UL (ref 3.81–5.12)
SODIUM SERPL-SCNC: 135 MMOL/L (ref 135–147)
WBC # BLD AUTO: 6.27 THOUSAND/UL (ref 4.31–10.16)

## 2024-09-05 PROCEDURE — 83735 ASSAY OF MAGNESIUM: CPT

## 2024-09-05 PROCEDURE — 80053 COMPREHEN METABOLIC PANEL: CPT

## 2024-09-05 PROCEDURE — 36415 COLL VENOUS BLD VENIPUNCTURE: CPT

## 2024-09-05 PROCEDURE — 85025 COMPLETE CBC W/AUTO DIFF WBC: CPT

## 2024-09-05 PROCEDURE — 99214 OFFICE O/P EST MOD 30 MIN: CPT | Performed by: PHYSICIAN ASSISTANT

## 2024-09-05 NOTE — TELEPHONE ENCOUNTER
PA for MOUNJARO 2.5MG APPROVED     Date(s) approved 08/30/24-08/30/2025    Case #[id number]     Patient advised by          [x][employer] Message  [x]Phone call   Patient did not . I forgot to check consent for voicemail prior to calling, did not leave message. Sent My chart message.  []LMOM  []L/M to call office as no active Communication consent on file  []Unable to leave detailed message as VM not approved on Communication consent       Pharmacy advised by    [x]Fax  []Phone call    Approval letter scanned into Media Yes

## 2024-09-05 NOTE — PROGRESS NOTES
Assessment/Plan:         Diagnoses and all orders for this visit:    Pre-op examination  Comments:  4.8 METS  EKG 5/24 unchanged from previous, NSR  low risk for surgery    Type 2 diabetes mellitus without complication, with long-term current use of insulin (HCC)  Comments:  will start mounjaro after eye surgery  Orders:  -     Comprehensive metabolic panel; Future  -     Magnesium; Future    Essential hypertension  Comments:  well controlled  Orders:  -     Comprehensive metabolic panel; Future  -     Magnesium; Future    Hypomagnesemia  -     Comprehensive metabolic panel; Future  -     Magnesium; Future    Hyperkalemia  -     Comprehensive metabolic panel; Future  -     Magnesium; Future          Subjective:      Patient ID: Shellie Lee is a 74 y.o. female.    Presurgical Evaluation    Subjective:     Patient ID: Shellie Lee is a 74 y.o. female.    Patient presents with:  Pre-op Clearance: Eyelid surgery- 9/30/24- Dr. Cabrera         The following portions of the patient's history were reviewed and updated as appropriate: allergies, current medications, past family history, past medical history, past social history, past surgical history, and problem list.    Procedure date: 9/30/24    Surgeon:  Dr Cabrera  Diagnosis for procedure:  ptosis    Prior anesthesia: Yes   General; Complications:  Nausea, Local block/mod sedation: nausea    CAD History: None   NOTE: Patient should see Cardiology if time available before surgery, and if appropriate.    Pulmonary History: None    Renal history: None    Diabetes History:  Type 2  Controlled     Neurological History: None     On Immunosuppressant meds/biologics: No      Preop labs/testing available and reviewed: yes    eGFR       Date                     Value               Ref Range           Status                08/19/2024               75                  ml/min/1.73sq m     Final            ----------       EKG yes      Functional capacity: Dancing                               4.8 Mets   Pick the highest level patient can comfortably perform   4 mets or greater for surgery    RCRI  High Risk surgery?         1 Point  CAD History:         1 Point   MI; Positive Stress Test; CP due to Mi;  Nitrate Usage to control Angina; Pathologic Q wave on EKG  CHF Active:         1 Point   Pulm Edema; Paroxysmal Nocturnal Dyspnea;  Bibasilar Rales (crackles);S3; CHF on CXR  Cerebrovascular Disease (TIA or CVA):     1 Point  DM on Insulin:        1 Point  Serum Creat >2.0 mg/dl:       1 Point          Total Points: 0     Scorin: Class I, Very Low Risk (0.4%)     1: Class II, Low risk (0.9%)     2: Class III Moderate (6.6%)     3: Class IV High (>11%)      SANJU Risk:  GFR: eGFR       Date                     Value               Ref Range           Status                2024               75                  ml/min/1.73sq m     Final            ----------      For PCP:  If GFR>60, Hold ACE/ARB/Diuretic on the day of surgery, and NSAIDS 10 days before.    If GFR<45, Consider PRE and POST op Nephrology Consult.    If 46 <GFR> 59 : Has Patient had SANJU in last 6 Months? no   If YES: Preop Nephrology consult   If No:  Post Op Nephrology consult.                   The following portions of the patient's history were reviewed and updated as appropriate: allergies, current medications, past family history, past medical history, past social history, past surgical history, and problem list.    Review of Systems   Constitutional:  Negative for activity change, appetite change, diaphoresis and fever.   HENT:  Negative for congestion and sore throat.    Eyes:  Positive for visual disturbance. Negative for pain.   Respiratory:  Negative for cough and shortness of breath.    Cardiovascular:  Negative for chest pain and leg swelling.   Gastrointestinal:  Negative for abdominal pain, constipation, diarrhea and nausea.   Genitourinary:  Negative for dysuria and flank pain.   Musculoskeletal:  Negative for  arthralgias and back pain.   Skin:  Negative for rash.   Neurological:  Negative for dizziness, light-headedness and headaches.   Psychiatric/Behavioral:  Negative for sleep disturbance. The patient is not nervous/anxious.          Past Medical History:   Diagnosis Date    Abdominal pain 2017    Acute non-recurrent frontal sinusitis 2019    Acute pain of right shoulder 01/10/2018    Anxiety     Arthritis     Bladder carcinoma (HCC) 2016    Bladder mass     Dental abscess 2017    Depression     Diabetes mellitus (HCC)     Disease of thyroid gland     thyroid nodules-not treating at this time    Dysuria     Last assessed 17    GERD (gastroesophageal reflux disease)      2 para 2     HL (hearing loss)     HLD (hyperlipidemia)     HTN (hypertension)     Hypercholesterolemia     Hypertension     Knee pain     Lichen sclerosus     Melanoma (HCC)     Melanoma (HCC)     Mild aortic regurgitation with left ventricular dilation by prior echocardiogram     Papanicolaou smear 2017    NEG    PONV (postoperative nausea and vomiting)     Tinnitus          Current Outpatient Medications:     acetaminophen (TYLENOL) 325 mg tablet, Take 650 mg by mouth 4 (four) times a day as needed for mild pain, Disp: , Rfl:     amLODIPine (NORVASC) 5 mg tablet, TAKE 1 TABLET (5 MG TOTAL) BY MOUTH DAILY., Disp: 90 tablet, Rfl: 1    aspirin ([employer] Aspirin Adult Low Dose) 81 mg chewable tablet, CHEW 1 TABLET BY MOUTH DAILY, Disp: 30 tablet, Rfl: 5    clonazePAM (KlonoPIN) 0.5 mg tablet, Take 1 tablet (0.5 mg total) by mouth daily at bedtime, Disp: 30 tablet, Rfl: 0    estradiol (ESTRACE VAGINAL) 0.1 mg/g vaginal cream, Apply small amount on fingertip. Apply twice a week., Disp: 45 g, Rfl: 2    famotidine (PEPCID) 20 mg tablet, Take 1 tablet (20 mg total) by mouth daily (Patient taking differently: Take 20 mg by mouth daily As needed), Disp: 90 tablet, Rfl: 0    fenofibrate (TRICOR) 145 mg tablet, TAKE 1  TABLET BY MOUTH EVERY DAY, Disp: 100 tablet, Rfl: 1    fluticasone (FLONASE) 50 mcg/act nasal spray, 1 spray into each nostril 2 (two) times a day (Patient taking differently: 1 spray into each nostril if needed), Disp: 15.8 g, Rfl: 0    Ibuprofen (ADVIL PO), Take 1 tablet by mouth if needed, Disp: , Rfl:     Insulin Glargine Solostar (Lantus SoloStar) 100 UNIT/ML SOPN, Inject 0.33 mL (33 Units total) under the skin in the morning, Disp: 15 mL, Rfl: 2    Insulin Pen Needle 32G X 4 MM MISC, Use in the morning, Disp: 100 each, Rfl: 3    levothyroxine 50 mcg tablet, Take 1 tablet (50 mcg total) by mouth daily, Disp: 90 tablet, Rfl: 1    loratadine (CLARITIN) 10 mg tablet, Take 1 tablet (10 mg total) by mouth daily (Patient taking differently: Take 10 mg by mouth daily As needed), Disp: 14 tablet, Rfl: 0    Magnesium Oxide 400 MG CAPS, Take 1 tablet (400 mg total) by mouth 3 (three) times a day, Disp: 90 capsule, Rfl: 5    metFORMIN (GLUCOPHAGE) 500 mg tablet, Take 1 tablet (500 mg total) by mouth 2 (two) times a day with meals, Disp: , Rfl:     metoprolol tartrate (LOPRESSOR) 25 mg tablet, Take 1 tablet (25 mg total) by mouth every 12 (twelve) hours, Disp: 180 tablet, Rfl: 1    mometasone (ELOCON) 0.1 % cream, Apply topically once a week, Disp: 45 g, Rfl: 4    OneTouch Verio test strip, Test 3 times daily, Disp: 300 each, Rfl: 1    Probiotic Product (PROBIOTIC-10) CAPS, Take 1 capsule by mouth daily, Disp: , Rfl:     simvastatin (ZOCOR) 20 mg tablet, Take 1 tablet (20 mg total) by mouth daily at bedtime, Disp: 90 tablet, Rfl: 1    zolpidem (AMBIEN) 10 mg tablet, Take 1 tablet (10 mg total) by mouth daily at bedtime, Disp: 30 tablet, Rfl: 0    Mounjaro 2.5 MG/0.5ML, Inject 0.5 mL (2.5 mg total) under the skin once a week (Patient not taking: Reported on 9/5/2024), Disp: 2 mL, Rfl: 0    [START ON 9/28/2024] Mounjaro 5 MG/0.5ML, Inject 0.5 mL (5 mg total) under the skin once a week Do not start before September 28, 2024.  (Patient not taking: Reported on 2024 Do not start before 2024.), Disp: 4 mL, Rfl: 0    Allergies   Allergen Reactions    Losartan Edema    Nickel Swelling     Swelling, irritation, lumps to ears       Social History   Past Surgical History:   Procedure Laterality Date    BASAL CELL CARCINOMA EXCISION  2024    nose    CARPAL TUNNEL RELEASE Right      SECTION      x2    CHOLECYSTECTOMY      CYSTOSCOPY N/A 2016    Procedure: CYSTOSCOPY WITH BIOPSIES;  Surgeon: Lane Rios MD;  [facility];  Service:     CYSTOSCOPY N/A 2016    Procedure: CYSTOSCOPY;  Surgeon: Lane Rios MD;  [facility];  Service:     CYSTOSCOPY  2020    HERNIA REPAIR      AL ARTHRS KNE SURG W/MENISCECTOMY MED/LAT W/SHVG Left 2016    Procedure: KNEE ARTHROSCOPIC PARTIAL MEDIAL MENISCECTOMY ;  Surgeon: Rehan Pete MD;  [facility];  Service: Orthopedics    AL BLADDER INSTILLATION ANTICARCINOGENIC AGENT N/A 2016    Procedure: INSTILLATION MYTOMYCIN;  Surgeon: Lane Rios MD;  [facility];  Service: Urology    AL CYSTO BLADDER W/URETERAL CATHETERIZATION Bilateral 2016    Procedure: RETROGRADE PYELOGRAM ;  Surgeon: Lane Rios MD;  [facility];  Service: Urology    AL CYSTO BLADDER W/URETERAL CATHETERIZATION Bilateral 2017    Procedure: RETROGRADE PYELOGRAM WITH STENT EXCHANGE, RIGHT;  Surgeon: Lane Rios MD;  [facility];  Service: Urology    AL CYSTO W/INSERT URETERAL STENT Left 2016    Procedure: URETERAL STENTS;  Surgeon: Lane Rios MD;  [facility];  Service: Urology    AL CYSTO W/REMOVAL OF LESIONS SMALL N/A 2016    Procedure: TRANSURETHRAL RESECTION OF BLADDER TUMOR (TURBT);  Surgeon: Lane Rios MD;  [facility];  Service: Urology    AL CYSTO W/REMOVAL OF LESIONS SMALL N/A 2016    Procedure: CYSTOSCOPY; TUR BLADDER TUMOR ;  Surgeon: Lane Rios MD;   "[facility];  Service: Urology    MA CYSTO W/REMOVAL OF LESIONS SMALL N/A 09/01/2016    Procedure: TUR BLADDER TUMOR ;  Surgeon: Lane Rios MD;  [facility];  Service: Urology    MA CYSTO W/REMOVAL OF LESIONS SMALL Bilateral 05/09/2017    Procedure: CYSTOSCOPY, RIGHT URETERAL WASHINGS, ;  Surgeon: Lane Rios MD;  [facility];  Service: Urology    MA CYSTO W/URTROSCOPY&/PYELOSCOPY DX Right 05/09/2017    Procedure: URETEROSCOPY;  Surgeon: Lane Rios MD;  [facility];  Service: Urology    MA CYSTO/URETERO W/LITHOTRIPSY &INDWELL STENT INSRT  11/29/2016    Procedure: CYSTOSCOPY URETEROSCOPY WITH LITHOTRIPSY HOLMIUM LASER RIGHT, RETROGRADE PYELOGRAM BILATERAL: AND INSERTION STENT URETERAL Right ;  Surgeon: Lane Rios MD;  [facility];  Service: Urology    REMOVAL URETERAL STENT Left 11/29/2016    Procedure: REMOVAL STENT URETERAL;  Surgeon: Lane Rios MD;  [facility];  Service:     REPLACEMENT TOTAL KNEE Left 12/2021    SKIN CANCER EXCISION  2021    right arm    TONSILLECTOMY       Family History   Problem Relation Age of Onset    Heart attack Mother     Heart disease Mother     ALS Father     Diabetes Maternal Grandfather     Lung cancer Paternal Grandfather     Heart disease Maternal Grandmother     No Known Problems Daughter     No Known Problems Daughter     No Known Problems Paternal Aunt     No Known Problems Paternal Aunt     No Known Problems Paternal Aunt     No Known Problems Paternal Aunt     No Known Problems Paternal Aunt     Breast cancer Neg Hx        Objective:  /76 (BP Location: Left arm, Patient Position: Sitting, Cuff Size: Large)   Pulse 60   Temp 98 °F (36.7 °C) (Temporal)   Ht 5' 2\" (1.575 m)   Wt 71.7 kg (158 lb)   LMP  (LMP Unknown)   SpO2 97%   BMI 28.90 kg/m²        Physical Exam  Vitals reviewed.   Constitutional:       General: She is not in acute distress.  HENT:      Head: Normocephalic and " atraumatic.      Right Ear: Tympanic membrane, ear canal and external ear normal. There is no impacted cerumen.      Left Ear: Tympanic membrane, ear canal and external ear normal. There is no impacted cerumen.      Nose: Nose normal.      Mouth/Throat:      Mouth: Mucous membranes are moist.   Eyes:      General:         Right eye: No discharge.         Left eye: No discharge.      Extraocular Movements: Extraocular movements intact.      Conjunctiva/sclera: Conjunctivae normal.      Pupils: Pupils are equal, round, and reactive to light.   Cardiovascular:      Rate and Rhythm: Normal rate and regular rhythm.   Pulmonary:      Effort: Pulmonary effort is normal. No respiratory distress.      Breath sounds: Normal breath sounds. No wheezing or rales.   Musculoskeletal:      Cervical back: Normal range of motion.      Right lower leg: No edema.      Left lower leg: No edema.   Skin:     General: Skin is warm.      Findings: No rash.   Neurological:      General: No focal deficit present.      Mental Status: She is alert and oriented to person, place, and time.   Psychiatric:         Mood and Affect: Mood normal.

## 2024-09-06 DIAGNOSIS — F41.9 ANXIETY: ICD-10-CM

## 2024-09-06 RX ORDER — CLONAZEPAM 0.5 MG/1
0.5 TABLET ORAL
Qty: 30 TABLET | Refills: 0 | Status: SHIPPED | OUTPATIENT
Start: 2024-09-06 | End: 2024-11-05

## 2024-09-06 NOTE — TELEPHONE ENCOUNTER
Reason for call:   [x] Refill   [] Prior Auth  [] Other:     Office:   [x] PCP/Provider - Iris Gandhi PA-C / [facility]   [] Specialty/Provider -     Medication: clonazePAM (KlonoPIN) 0.5 mg tablet     Dose/Frequency: Take 1 tablet (0.5 mg total) by mouth daily at bedtime     Quantity: 30    Pharmacy:   [facility]/pharmacy #[id number] - [place], [place] - [address]       Does the patient have enough for 3 days?   [] Yes   [x] No - Send as HP to POD

## 2024-09-09 ENCOUNTER — HOSPITAL ENCOUNTER (OUTPATIENT)
Dept: RADIOLOGY | Facility: MEDICAL CENTER | Age: 74
Discharge: HOME | End: 2024-09-09
Payer: MEDICARE

## 2024-09-09 DIAGNOSIS — C67.9 MALIGNANT NEOPLASM OF URINARY BLADDER, UNSPECIFIED SITE (HCC): ICD-10-CM

## 2024-09-09 PROCEDURE — 76775 US EXAM ABDO BACK WALL LIM: CPT

## 2024-09-12 ENCOUNTER — TELEPHONE (OUTPATIENT)
Dept: UROLOGY | Facility: AMBULATORY SURGERY CENTER | Age: 74
End: 2024-09-12

## 2024-09-12 DIAGNOSIS — E83.42 HYPOMAGNESEMIA: ICD-10-CM

## 2024-09-12 RX ORDER — LANOLIN ALCOHOL/MO/W.PET/CERES
400 CREAM (GRAM) TOPICAL 3 TIMES DAILY
Qty: 270 TABLET | Refills: 1 | Status: SHIPPED | OUTPATIENT
Start: 2024-09-12

## 2024-09-12 NOTE — TELEPHONE ENCOUNTER
----- Message from CAMILLA Lopez sent at 9/11/2024  3:43 PM EDT -----  Please call Shellie and let her know that I reviewed her most recent kidney and bladder ultrasound.  Please let her know that there is nothing abnormal within the bladder.  She continues with a small renal cyst on the right kidney that is unchanged from 2016.  There is nothing to do at this time and no repeat imaging or workup is needed.  Plan to follow-up next year with Dr. Rios.

## 2024-09-12 NOTE — TELEPHONE ENCOUNTER
Called patient and went over Barbie SAMPSON last said. Patient is aware of everything I did say to her since schedules are not open that far out we will put umair a recall list so that we can give her a call closer to the year yovani so that we can make that appt with . Patient understood.

## 2024-09-16 DIAGNOSIS — I10 ESSENTIAL HYPERTENSION: ICD-10-CM

## 2024-09-16 DIAGNOSIS — R00.0 TACHYCARDIA: ICD-10-CM

## 2024-09-16 RX ORDER — METOPROLOL TARTRATE 25 MG/1
25 TABLET, FILM COATED ORAL EVERY 12 HOURS SCHEDULED
Qty: 180 TABLET | Refills: 1 | Status: SHIPPED | OUTPATIENT
Start: 2024-09-16

## 2024-09-16 NOTE — TELEPHONE ENCOUNTER
Reason for call:   [x] Refill   [] Prior Auth  [] Other:     Office:[facility]    [x] PCP/Provider - Iris Gandhi   [] Specialty/Provider -     Medication: metoprolol tartrate     Dose/Frequency: 25 mg/ twice daily     Quantity: 90 day supply     Pharmacy: [facility] in [place]     Does the patient have enough for 3 days?   [] Yes   [x] No - Send as HP to POD

## 2024-10-03 DIAGNOSIS — F51.01 PRIMARY INSOMNIA: ICD-10-CM

## 2024-10-03 DIAGNOSIS — F41.8 DEPRESSION WITH ANXIETY: ICD-10-CM

## 2024-10-03 RX ORDER — ZOLPIDEM TARTRATE 10 MG/1
10 TABLET ORAL
Qty: 30 TABLET | Refills: 0 | Status: SHIPPED | OUTPATIENT
Start: 2024-10-03

## 2024-10-03 NOTE — TELEPHONE ENCOUNTER
Reason for call:   [x] Refill   [] Prior Auth  [] Other:     Office:   [x] PCP/Provider - Iris Gandhi/[facility]       [] Specialty/Provider -     Medication: Ambien    Dose/Frequency: 10 mg     Quantity: #30    Pharmacy: [facility] [address]    Does the patient have enough for 3 days?   [] Yes   [x] No - Send as HP to POD

## 2024-10-18 ENCOUNTER — TELEPHONE (OUTPATIENT)
Age: 74
End: 2024-10-18

## 2024-10-18 DIAGNOSIS — F41.9 ANXIETY: ICD-10-CM

## 2024-10-18 RX ORDER — CLONAZEPAM 0.5 MG/1
0.5 TABLET ORAL
Qty: 30 TABLET | Refills: 0 | Status: SHIPPED | OUTPATIENT
Start: 2024-10-18 | End: 2024-12-17

## 2024-10-18 NOTE — TELEPHONE ENCOUNTER
Pt called regarding her Jardiance/Mounjaro; stated she finished the jardiance and has the mounjaro but isnt sure when she was supposed to start it or how to go about administering it.    She mentioned that she'll substitute the Jardiance with the metformin and hold off on the mounjaro until she can be seen; pt scheduled 10/30.    Please contact pt to advise if this is ok of if Iris recommends otherwise.

## 2024-10-23 DIAGNOSIS — H53.8 BLURRED VISION: ICD-10-CM

## 2024-10-24 RX ORDER — LORATADINE 10 MG
81 TABLET ORAL DAILY
Qty: 90 TABLET | Refills: 1 | Status: SHIPPED | OUTPATIENT
Start: 2024-10-24

## 2024-10-26 DIAGNOSIS — E11.9 TYPE 2 DIABETES MELLITUS WITHOUT COMPLICATION, WITH LONG-TERM CURRENT USE OF INSULIN (HCC): ICD-10-CM

## 2024-10-26 DIAGNOSIS — Z79.4 TYPE 2 DIABETES MELLITUS WITHOUT COMPLICATION, WITH LONG-TERM CURRENT USE OF INSULIN (HCC): ICD-10-CM

## 2024-10-28 DIAGNOSIS — E11.9 TYPE 2 DIABETES MELLITUS WITHOUT COMPLICATION, WITH LONG-TERM CURRENT USE OF INSULIN (HCC): ICD-10-CM

## 2024-10-28 DIAGNOSIS — Z79.4 TYPE 2 DIABETES MELLITUS WITHOUT COMPLICATION, WITH LONG-TERM CURRENT USE OF INSULIN (HCC): ICD-10-CM

## 2024-10-28 RX ORDER — INSULIN GLARGINE 100 [IU]/ML
INJECTION, SOLUTION SUBCUTANEOUS
Qty: 30 ML | Refills: 1 | Status: SHIPPED | OUTPATIENT
Start: 2024-10-28

## 2024-10-28 NOTE — TELEPHONE ENCOUNTER
Reason for call:   [x] Refill   [] Prior Auth  [] Other:     Office:   [x] PCP/Provider - [facility] - Iris Gandhi PA-C   [] Specialty/Provider -     Medication: Insulin Pen Needle 32G X 4 MM MISC     Dose/Frequency: Use in the morning,     Quantity: 100 each    Pharmacy: [facility]/pharmacy #[id number] - [place], [place] - [address] [facility] [address] [facility] [address] [phone]    Does the patient have enough for 3 days?   [] Yes   [x] No - Send as HP to POD

## 2024-11-01 ENCOUNTER — TELEPHONE (OUTPATIENT)
Dept: ADMINISTRATIVE | Facility: OTHER | Age: 74
End: 2024-11-01

## 2024-11-01 NOTE — TELEPHONE ENCOUNTER
11/01/24 12:07 PM    Patient contacted to bring Advance Directive, POLST, or Living Will document to next scheduled pcp visit.[facility] Department left message.    Thank you.  Shannan Singh MA  [facility]

## 2024-11-05 ENCOUNTER — OFFICE VISIT (OUTPATIENT)
Dept: INTERNAL MEDICINE CLINIC | Age: 74
End: 2024-11-05
Payer: MEDICARE

## 2024-11-05 VITALS
HEIGHT: 62 IN | DIASTOLIC BLOOD PRESSURE: 86 MMHG | OXYGEN SATURATION: 98 % | TEMPERATURE: 98.4 F | BODY MASS INDEX: 28.89 KG/M2 | HEART RATE: 69 BPM | SYSTOLIC BLOOD PRESSURE: 140 MMHG | WEIGHT: 157 LBS

## 2024-11-05 DIAGNOSIS — F41.8 DEPRESSION WITH ANXIETY: ICD-10-CM

## 2024-11-05 DIAGNOSIS — E03.9 ACQUIRED HYPOTHYROIDISM: ICD-10-CM

## 2024-11-05 DIAGNOSIS — F51.01 PRIMARY INSOMNIA: ICD-10-CM

## 2024-11-05 DIAGNOSIS — I10 ESSENTIAL HYPERTENSION: ICD-10-CM

## 2024-11-05 DIAGNOSIS — M25.541 ARTHRALGIA OF BOTH HANDS: ICD-10-CM

## 2024-11-05 DIAGNOSIS — Z79.4 TYPE 2 DIABETES MELLITUS WITHOUT COMPLICATION, WITH LONG-TERM CURRENT USE OF INSULIN (HCC): Primary | ICD-10-CM

## 2024-11-05 DIAGNOSIS — R35.1 NOCTURIA: ICD-10-CM

## 2024-11-05 DIAGNOSIS — E11.9 TYPE 2 DIABETES MELLITUS WITHOUT COMPLICATION, WITH LONG-TERM CURRENT USE OF INSULIN (HCC): Primary | ICD-10-CM

## 2024-11-05 DIAGNOSIS — M25.542 ARTHRALGIA OF BOTH HANDS: ICD-10-CM

## 2024-11-05 PROCEDURE — 99214 OFFICE O/P EST MOD 30 MIN: CPT | Performed by: PHYSICIAN ASSISTANT

## 2024-11-05 RX ORDER — ZOLPIDEM TARTRATE 10 MG/1
10 TABLET ORAL
Qty: 30 TABLET | Refills: 0 | Status: SHIPPED | OUTPATIENT
Start: 2024-11-05

## 2024-11-05 NOTE — ASSESSMENT & PLAN NOTE
Discussed starting mounjaro  Off januvia  Monitor BS at home, will need to taper off lantus as doses increase   Lab Results   Component Value Date    HGBA1C 8.8 (H) 07/15/2024       Orders:    Comprehensive metabolic panel; Future    Hemoglobin A1C; Future

## 2024-11-05 NOTE — PROGRESS NOTES
Ambulatory Visit  Name: Shellie Lee      : 1950      MRN: [id number]  Encounter Provider: Iris Gandhi PA-C  Encounter Date: 2024   Encounter department: [facility]    Assessment & Plan  Type 2 diabetes mellitus without complication, with long-term current use of insulin (HCC)  Discussed starting mounjaro  Off januvia  Monitor BS at home, will need to taper off lantus as doses increase   Lab Results   Component Value Date    HGBA1C 8.8 (H) 07/15/2024       Orders:    Comprehensive metabolic panel; Future    Hemoglobin A1C; Future    Essential hypertension    Orders:    Comprehensive metabolic panel; Future    Hemoglobin A1C; Future    Acquired hypothyroidism         Nocturia         Arthralgia of both hands  Check xray  Consider topical voltaren  Orders:    XR hand 3+ vw left; Future    XR hand 3+ vw right; Future    Comprehensive metabolic panel; Future    Hemoglobin A1C; Future    Uric acid; Future    JODY Screen w/ Reflex to Titer/Pattern; Future       History of Present Illness     73 y/o female with hx of DM, htn, hld, hypothyroidism presents for f/u   Pt reports worsening anxiety sx - related to family medical conditions   Pt has mounjaro but has not yet started this - she is leary regarding side effects   Pt has stopped januvia     Canceled her upcoming eye surgery due to stressors with family         History obtained from : patient  Review of Systems   Constitutional:  Negative for activity change, appetite change, chills, diaphoresis and fever.   HENT:  Negative for congestion and sore throat.    Eyes:  Negative for pain and redness.   Respiratory:  Negative for cough and shortness of breath.    Cardiovascular:  Negative for chest pain and leg swelling.   Gastrointestinal:  Negative for abdominal pain, constipation, diarrhea and nausea.   Genitourinary:  Negative for dysuria and flank pain.   Musculoskeletal:  Negative for arthralgias and back pain.  "  Skin:  Negative for rash.   Neurological:  Negative for dizziness, light-headedness and headaches.   Psychiatric/Behavioral:  The patient is nervous/anxious.      Medical History Reviewed by provider this encounter:           Objective     /86 (BP Location: Left arm, Patient Position: Sitting, Cuff Size: Standard)   Pulse 69   Temp 98.4 °F (36.9 °C) (Temporal)   Ht 5' 2\" (1.575 m)   Wt 71.2 kg (157 lb)   LMP  (LMP Unknown)   SpO2 98%   BMI 28.72 kg/m²     Physical Exam  Vitals reviewed.   Constitutional:       General: She is not in acute distress.  HENT:      Head: Normocephalic and atraumatic.      Nose: Nose normal.      Mouth/Throat:      Mouth: Mucous membranes are moist.   Eyes:      General:         Right eye: No discharge.         Left eye: No discharge.      Extraocular Movements: Extraocular movements intact.      Conjunctiva/sclera: Conjunctivae normal.      Pupils: Pupils are equal, round, and reactive to light.   Cardiovascular:      Rate and Rhythm: Normal rate and regular rhythm.   Pulmonary:      Effort: Pulmonary effort is normal. No respiratory distress.      Breath sounds: Normal breath sounds. No wheezing or rales.   Musculoskeletal:         General: Normal range of motion.      Cervical back: Normal range of motion. No rigidity.      Right lower leg: No edema.      Left lower leg: No edema.   Neurological:      Mental Status: She is alert.   Psychiatric:         Mood and Affect: Mood normal.       Administrative Statements   I have spent a total time of 20 minutes in caring for this patient on the day of the visit/encounter including Instructions for management, Documenting in the medical record, Reviewing / ordering tests, medicine, procedures  , and Obtaining or reviewing history  .  "

## 2024-11-05 NOTE — TELEPHONE ENCOUNTER
Reason for call:   [x] Refill   [] Prior Auth  [] Other:     Office:   [x] PCP/Provider - SHAYY Bean- .[facility]   [] Specialty/Provider -     Medication: zolpidem (AMBIEN) 10 mg tablet     Dose/Frequency:  Take 1 tablet (10 mg total) by mouth daily at bedtime     Quantity: 30    Pharmacy: [facility]/pharmacy #[id number] - [place], [place] - [address]     Does the patient have enough for 3 days?   [] Yes   [x] No - Send as HP to POD

## 2024-11-07 ENCOUNTER — APPOINTMENT (OUTPATIENT)
Dept: RADIOLOGY | Facility: MEDICAL CENTER | Age: 74
End: 2024-11-07
Payer: MEDICARE

## 2024-11-07 ENCOUNTER — APPOINTMENT (OUTPATIENT)
Dept: LAB | Facility: MEDICAL CENTER | Age: 74
End: 2024-11-07
Payer: MEDICARE

## 2024-11-07 DIAGNOSIS — I10 ESSENTIAL HYPERTENSION: ICD-10-CM

## 2024-11-07 DIAGNOSIS — Z79.4 TYPE 2 DIABETES MELLITUS WITHOUT COMPLICATION, WITH LONG-TERM CURRENT USE OF INSULIN (HCC): ICD-10-CM

## 2024-11-07 DIAGNOSIS — M25.542 ARTHRALGIA OF BOTH HANDS: ICD-10-CM

## 2024-11-07 DIAGNOSIS — E11.9 TYPE 2 DIABETES MELLITUS WITHOUT COMPLICATION, WITH LONG-TERM CURRENT USE OF INSULIN (HCC): ICD-10-CM

## 2024-11-07 DIAGNOSIS — M25.541 ARTHRALGIA OF BOTH HANDS: ICD-10-CM

## 2024-11-07 LAB
ALBUMIN SERPL BCG-MCNC: 4.6 G/DL (ref 3.5–5)
ALP SERPL-CCNC: 59 U/L (ref 34–104)
ALT SERPL W P-5'-P-CCNC: 34 U/L (ref 7–52)
ANION GAP SERPL CALCULATED.3IONS-SCNC: 10 MMOL/L (ref 4–13)
AST SERPL W P-5'-P-CCNC: 36 U/L (ref 13–39)
BILIRUB SERPL-MCNC: 0.55 MG/DL (ref 0.2–1)
BUN SERPL-MCNC: 15 MG/DL (ref 5–25)
CALCIUM SERPL-MCNC: 10 MG/DL (ref 8.4–10.2)
CHLORIDE SERPL-SCNC: 98 MMOL/L (ref 96–108)
CO2 SERPL-SCNC: 27 MMOL/L (ref 21–32)
CREAT SERPL-MCNC: 0.87 MG/DL (ref 0.6–1.3)
EST. AVERAGE GLUCOSE BLD GHB EST-MCNC: 209 MG/DL
GFR SERPL CREATININE-BSD FRML MDRD: 65 ML/MIN/1.73SQ M
GLUCOSE P FAST SERPL-MCNC: 194 MG/DL (ref 65–99)
HBA1C MFR BLD: 8.9 %
POTASSIUM SERPL-SCNC: 4.9 MMOL/L (ref 3.5–5.3)
PROT SERPL-MCNC: 7.6 G/DL (ref 6.4–8.4)
SODIUM SERPL-SCNC: 135 MMOL/L (ref 135–147)
URATE SERPL-MCNC: 4.4 MG/DL (ref 2–7.5)

## 2024-11-07 PROCEDURE — 36415 COLL VENOUS BLD VENIPUNCTURE: CPT

## 2024-11-07 PROCEDURE — 86039 ANTINUCLEAR ANTIBODIES (ANA): CPT

## 2024-11-07 PROCEDURE — 84550 ASSAY OF BLOOD/URIC ACID: CPT

## 2024-11-07 PROCEDURE — 83036 HEMOGLOBIN GLYCOSYLATED A1C: CPT

## 2024-11-07 PROCEDURE — 80053 COMPREHEN METABOLIC PANEL: CPT

## 2024-11-07 PROCEDURE — 73130 X-RAY EXAM OF HAND: CPT

## 2024-11-07 PROCEDURE — 86038 ANTINUCLEAR ANTIBODIES: CPT

## 2024-11-08 ENCOUNTER — IMMUNIZATIONS (OUTPATIENT)
Dept: INTERNAL MEDICINE CLINIC | Age: 74
End: 2024-11-08
Payer: MEDICARE

## 2024-11-08 ENCOUNTER — TELEPHONE (OUTPATIENT)
Age: 74
End: 2024-11-08

## 2024-11-08 DIAGNOSIS — Z23 NEEDS FLU SHOT: Primary | ICD-10-CM

## 2024-11-08 LAB
ANA HOMOGEN SER QL IF: NORMAL
ANA HOMOGEN TITR SER: NORMAL {TITER}
ANA SER QL IA: POSITIVE

## 2024-11-08 PROCEDURE — 90662 IIV NO PRSV INCREASED AG IM: CPT

## 2024-11-08 PROCEDURE — G0008 ADMIN INFLUENZA VIRUS VAC: HCPCS

## 2024-11-08 NOTE — TELEPHONE ENCOUNTER
Patient had xrays done - she's wondering if Irsi Gandhi is gonna refer her to an orthopedic concerns on right hand xray.

## 2024-11-10 DIAGNOSIS — E03.9 HYPOTHYROIDISM, UNSPECIFIED TYPE: ICD-10-CM

## 2024-11-11 ENCOUNTER — OFFICE VISIT (OUTPATIENT)
Dept: URGENT CARE | Facility: MEDICAL CENTER | Age: 74
End: 2024-11-11
Payer: MEDICARE

## 2024-11-11 ENCOUNTER — HOSPITAL ENCOUNTER (OUTPATIENT)
Dept: RADIOLOGY | Facility: MEDICAL CENTER | Age: 74
Discharge: HOME | End: 2024-11-11
Payer: MEDICARE

## 2024-11-11 VITALS
WEIGHT: 156.38 LBS | SYSTOLIC BLOOD PRESSURE: 120 MMHG | RESPIRATION RATE: 16 BRPM | TEMPERATURE: 97.1 F | DIASTOLIC BLOOD PRESSURE: 76 MMHG | OXYGEN SATURATION: 98 % | HEART RATE: 100 BPM | BODY MASS INDEX: 28.6 KG/M2

## 2024-11-11 VITALS — WEIGHT: 157 LBS | BODY MASS INDEX: 28.89 KG/M2 | HEIGHT: 62 IN

## 2024-11-11 DIAGNOSIS — R30.0 DYSURIA: ICD-10-CM

## 2024-11-11 DIAGNOSIS — N30.01 ACUTE CYSTITIS WITH HEMATURIA: Primary | ICD-10-CM

## 2024-11-11 DIAGNOSIS — Z12.31 ENCOUNTER FOR SCREENING MAMMOGRAM FOR MALIGNANT NEOPLASM OF BREAST: ICD-10-CM

## 2024-11-11 DIAGNOSIS — Z12.31 SCREENING MAMMOGRAM FOR BREAST CANCER: ICD-10-CM

## 2024-11-11 LAB
SL AMB  POCT GLUCOSE, UA: ABNORMAL
SL AMB LEUKOCYTE ESTERASE,UA: ABNORMAL
SL AMB POCT BILIRUBIN,UA: ABNORMAL
SL AMB POCT BLOOD,UA: ABNORMAL
SL AMB POCT CLARITY,UA: ABNORMAL
SL AMB POCT COLOR,UA: YELLOW
SL AMB POCT KETONES,UA: 15
SL AMB POCT NITRITE,UA: ABNORMAL
SL AMB POCT PH,UA: 5
SL AMB POCT SPECIFIC GRAVITY,UA: 1.03
SL AMB POCT URINE PROTEIN: 300
SL AMB POCT UROBILINOGEN: 0.2

## 2024-11-11 PROCEDURE — 81002 URINALYSIS NONAUTO W/O SCOPE: CPT | Performed by: NURSE PRACTITIONER

## 2024-11-11 PROCEDURE — 77067 SCR MAMMO BI INCL CAD: CPT

## 2024-11-11 PROCEDURE — 99213 OFFICE O/P EST LOW 20 MIN: CPT | Performed by: NURSE PRACTITIONER

## 2024-11-11 PROCEDURE — 77063 BREAST TOMOSYNTHESIS BI: CPT

## 2024-11-11 PROCEDURE — G0463 HOSPITAL OUTPT CLINIC VISIT: HCPCS | Performed by: NURSE PRACTITIONER

## 2024-11-11 RX ORDER — CEPHALEXIN 500 MG/1
500 CAPSULE ORAL EVERY 8 HOURS SCHEDULED
Qty: 15 CAPSULE | Refills: 0 | Status: SHIPPED | OUTPATIENT
Start: 2024-11-11 | End: 2024-11-16

## 2024-11-11 NOTE — PROGRESS NOTES
Assessment/Plan    Acute cystitis with hematuria [N30.01]  1. Acute cystitis with hematuria  cephalexin (KEFLEX) 500 mg capsule      2. Dysuria  POCT urine dip        U/A reveals cloudy character, ketones, large blood, protein and large leukocytes. Unable to send out for culture due to small amount of urine. Previous culture from  reveals e.coli susceptible to most agents  Start keflex q8hrs x 5 days   Start proper hydration with water in between caffeine   Start cranberry pills daily  Continue probiotics   F/u with PCP within 1-2 weeks  Proceed to ER should symptoms worsen     Patient ID: Shellie Lee is a 74 y.o. female.      Reason For Visit / Chief Complaint  Chief Complaint   Patient presents with    Possible UTI     Sx started 1 week ago. Urgency, frequency, burning, occ chills possible fever.     Abnormal ECG         75 y/o female with PMH of bladder cancer in remission presents for dysuria, urinary frequency, urinary urgency, pelvic pain x 1 week. Patient has not tried anything for her symptoms at this time. Denies fevers, chills, dizziness, AMS, SOB, CP, nausea, vomiting.         Past Medical History:   Diagnosis Date    Abdominal pain 2017    Acute non-recurrent frontal sinusitis 2019    Acute pain of right shoulder 01/10/2018    Anxiety     Arthritis     Bladder carcinoma (HCC) 2016    Bladder mass     Dental abscess 2017    Depression     Diabetes mellitus (HCC)     Disease of thyroid gland     thyroid nodules-not treating at this time    Dysuria     Last assessed 17    GERD (gastroesophageal reflux disease)      2 para 2     HL (hearing loss)     HLD (hyperlipidemia)     HTN (hypertension)     Hypercholesterolemia     Hypertension     Knee pain     Lichen sclerosus     Melanoma (HCC)     Melanoma (HCC)     Mild aortic regurgitation with left ventricular dilation by prior echocardiogram     Papanicolaou smear 2017    NEG    PONV (postoperative nausea  and vomiting)     Tinnitus        Past Surgical History:   Procedure Laterality Date    BASAL CELL CARCINOMA EXCISION  2024    nose    CARPAL TUNNEL RELEASE Right      SECTION      x2    CHOLECYSTECTOMY      CYSTOSCOPY N/A 2016    Procedure: CYSTOSCOPY WITH BIOPSIES;  Surgeon: Lane Rios MD;  [facility];  Service:     CYSTOSCOPY N/A 2016    Procedure: CYSTOSCOPY;  Surgeon: Lane Rios MD;  [facility];  Service:     CYSTOSCOPY  2020    HERNIA REPAIR      KY ARTHRS KNE SURG W/MENISCECTOMY MED/LAT W/SHVG Left 2016    Procedure: KNEE ARTHROSCOPIC PARTIAL MEDIAL MENISCECTOMY ;  Surgeon: Rehan Pete MD;  [facility];  Service: Orthopedics    KY BLADDER INSTILLATION ANTICARCINOGENIC AGENT N/A 2016    Procedure: INSTILLATION MYTOMYCIN;  Surgeon: Lane Rios MD;  [facility];  Service: Urology    KY CYSTO BLADDER W/URETERAL CATHETERIZATION Bilateral 2016    Procedure: RETROGRADE PYELOGRAM ;  Surgeon: Lane Rios MD;  [facility];  Service: Urology    KY CYSTO BLADDER W/URETERAL CATHETERIZATION Bilateral 2017    Procedure: RETROGRADE PYELOGRAM WITH STENT EXCHANGE, RIGHT;  Surgeon: Lane Rios MD;  [facility];  Service: Urology    KY CYSTO W/INSERT URETERAL STENT Left 2016    Procedure: URETERAL STENTS;  Surgeon: Lane Rios MD;  [facility];  Service: Urology    KY CYSTO W/REMOVAL OF LESIONS SMALL N/A 2016    Procedure: TRANSURETHRAL RESECTION OF BLADDER TUMOR (TURBT);  Surgeon: Lane Rios MD;  [facility];  Service: Urology    KY CYSTO W/REMOVAL OF LESIONS SMALL N/A 2016    Procedure: CYSTOSCOPY; TUR BLADDER TUMOR ;  Surgeon: Lane Rios MD;  [facility];  Service: Urology    KY CYSTO W/REMOVAL OF LESIONS SMALL N/A 2016    Procedure: TUR BLADDER TUMOR ;  Surgeon: Lane Rios MD;  [facility];  Service: Urology    KY  CYSTO W/REMOVAL OF LESIONS SMALL Bilateral 05/09/2017    Procedure: CYSTOSCOPY, RIGHT URETERAL WASHINGS, ;  Surgeon: Lane Rios MD;  [facility];  Service: Urology    AR CYSTO W/URTROSCOPY&/PYELOSCOPY DX Right 05/09/2017    Procedure: URETEROSCOPY;  Surgeon: Lane Rios MD;  [facility];  Service: Urology    AR CYSTO/URETERO W/LITHOTRIPSY &INDWELL STENT INSRT  11/29/2016    Procedure: CYSTOSCOPY URETEROSCOPY WITH LITHOTRIPSY HOLMIUM LASER RIGHT, RETROGRADE PYELOGRAM BILATERAL: AND INSERTION STENT URETERAL Right ;  Surgeon: Lane Rios MD;  [facility];  Service: Urology    REMOVAL URETERAL STENT Left 11/29/2016    Procedure: REMOVAL STENT URETERAL;  Surgeon: Lane Rios MD;  [facility];  Service:     REPLACEMENT TOTAL KNEE Left 12/2021    SKIN CANCER EXCISION  2021    right arm    TONSILLECTOMY         Family History   Problem Relation Age of Onset    Heart attack Mother     Heart disease Mother     ALS Father     Diabetes Maternal Grandfather     Lung cancer Paternal Grandfather     Heart disease Maternal Grandmother     No Known Problems Daughter     No Known Problems Daughter     No Known Problems Paternal Aunt     No Known Problems Paternal Aunt     No Known Problems Paternal Aunt     No Known Problems Paternal Aunt     No Known Problems Paternal Aunt     Breast cancer Neg Hx        Review of Systems   Constitutional: Negative.    HENT: Negative.     Eyes: Negative.    Respiratory: Negative.     Cardiovascular: Negative.    Gastrointestinal:  Positive for abdominal distention.   Endocrine: Negative.    Genitourinary:  Positive for decreased urine volume, dysuria, frequency, pelvic pain and urgency.   Musculoskeletal: Negative.    Skin: Negative.    Allergic/Immunologic: Negative.    Neurological: Negative.    Hematological: Negative.    Psychiatric/Behavioral: Negative.         Objective:    /76   Pulse (!) 108   Temp (!) 97.1 °F (36.2 °C)   Resp 16    Wt 70.9 kg (156 lb 6 oz)   LMP  (LMP Unknown)   SpO2 98%   BMI 28.60 kg/m²     Physical Exam  Constitutional:       Appearance: Normal appearance.   HENT:      Head: Normocephalic and atraumatic.      Right Ear: External ear normal.      Left Ear: External ear normal.      Nose: Nose normal.      Mouth/Throat:      Mouth: Mucous membranes are moist.      Pharynx: Oropharynx is clear.   Eyes:      Conjunctiva/sclera: Conjunctivae normal.   Cardiovascular:      Rate and Rhythm: Normal rate and regular rhythm.      Pulses: Normal pulses.      Heart sounds: Normal heart sounds.   Pulmonary:      Effort: Pulmonary effort is normal.      Breath sounds: Normal breath sounds.   Abdominal:      General: Bowel sounds are normal. There is distension.      Palpations: Abdomen is soft.      Tenderness: There is abdominal tenderness in the suprapubic area. There is right CVA tenderness. There is no rebound. Negative signs include Leone's sign, Rovsing's sign and McBurney's sign.   Musculoskeletal:         General: Normal range of motion.      Cervical back: Normal range of motion.   Skin:     General: Skin is warm and dry.      Capillary Refill: Capillary refill takes less than 2 seconds.   Neurological:      General: No focal deficit present.      Mental Status: She is alert and oriented to person, place, and time.   Psychiatric:         Behavior: Behavior normal.         Thought Content: Thought content normal.

## 2024-11-11 NOTE — PATIENT INSTRUCTIONS
U/A reveals cloudy character, ketones, large blood, protein and large leukocytes. Unable to send out for culture due to small amount of urine. Previous culture from 2022 reveals e.coli susceptible to most agents  Start keflex q8hrs x 5 days   Start proper hydration with water in between caffeine   Start cranberry pills daily  Continue probiotics   F/u with PCP within 1-2 weeks  Proceed to ER should symptoms worsen

## 2024-11-12 DIAGNOSIS — S62.001A CLOSED NONDISPLACED FRACTURE OF SCAPHOID OF RIGHT WRIST, UNSPECIFIED PORTION OF SCAPHOID, INITIAL ENCOUNTER: Primary | ICD-10-CM

## 2024-11-12 RX ORDER — LEVOTHYROXINE SODIUM 50 UG/1
50 TABLET ORAL DAILY
Qty: 90 TABLET | Refills: 1 | Status: SHIPPED | OUTPATIENT
Start: 2024-11-12

## 2024-11-18 ENCOUNTER — RESULTS FOLLOW-UP (OUTPATIENT)
Dept: GYNECOLOGY | Facility: CLINIC | Age: 74
End: 2024-11-18

## 2024-11-20 DIAGNOSIS — F41.9 ANXIETY: ICD-10-CM

## 2024-11-20 RX ORDER — CLONAZEPAM 0.5 MG/1
0.5 TABLET ORAL
Qty: 30 TABLET | Refills: 0 | Status: SHIPPED | OUTPATIENT
Start: 2024-11-20 | End: 2025-01-19

## 2024-11-20 NOTE — TELEPHONE ENCOUNTER
Reason for call:   [x] Refill   [] Prior Auth  [] Other:     Office:   [x] PCP/Provider -   [] Specialty/Provider -     Medication: clonazePAM (KlonoPIN) 0.5 mg       Dose/Frequency: Take 1 tablet (0.5 mg total) by mouth daily at bedtime     Quantity: 30    Pharmacy: [facility]/pharmacy #[id number] - [place], [place] - [address]     Does the patient have enough for 3 days?   [] Yes   [x] No - Send as HP to POD

## 2024-12-02 ENCOUNTER — OFFICE VISIT (OUTPATIENT)
Dept: OBGYN CLINIC | Facility: CLINIC | Age: 74
End: 2024-12-02
Payer: MEDICARE

## 2024-12-02 ENCOUNTER — HOSPITAL ENCOUNTER (OUTPATIENT)
Dept: RADIOLOGY | Facility: HOSPITAL | Age: 74
Discharge: HOME | End: 2024-12-02
Payer: MEDICARE

## 2024-12-02 VITALS
WEIGHT: 156 LBS | BODY MASS INDEX: 28.71 KG/M2 | DIASTOLIC BLOOD PRESSURE: 83 MMHG | HEIGHT: 62 IN | SYSTOLIC BLOOD PRESSURE: 124 MMHG

## 2024-12-02 DIAGNOSIS — M18.12 ARTHRITIS OF CARPOMETACARPAL (CMC) JOINT OF LEFT THUMB: ICD-10-CM

## 2024-12-02 DIAGNOSIS — M77.8 RIGHT WRIST TENDONITIS: Primary | ICD-10-CM

## 2024-12-02 DIAGNOSIS — S62.001A CLOSED NONDISPLACED FRACTURE OF SCAPHOID OF RIGHT WRIST, UNSPECIFIED PORTION OF SCAPHOID, INITIAL ENCOUNTER: ICD-10-CM

## 2024-12-02 PROCEDURE — 99204 OFFICE O/P NEW MOD 45 MIN: CPT | Performed by: STUDENT IN AN ORGANIZED HEALTH CARE EDUCATION/TRAINING PROGRAM

## 2024-12-02 PROCEDURE — 73110 X-RAY EXAM OF WRIST: CPT

## 2024-12-02 PROCEDURE — 20550 NJX 1 TENDON SHEATH/LIGAMENT: CPT | Performed by: STUDENT IN AN ORGANIZED HEALTH CARE EDUCATION/TRAINING PROGRAM

## 2024-12-02 RX ADMIN — BETAMETHASONE SODIUM PHOSPHATE AND BETAMETHASONE ACETATE 6 MG: 3; 3 INJECTION, SUSPENSION INTRA-ARTICULAR; INTRALESIONAL; INTRAMUSCULAR; SOFT TISSUE at 12:30

## 2024-12-02 RX ADMIN — ROPIVACAINE HYDROCHLORIDE 1 ML: 5 INJECTION, SOLUTION EPIDURAL; INFILTRATION; PERINEURAL at 12:30

## 2024-12-02 NOTE — PATIENT INSTRUCTIONS
RIGHT WRIST - FCU tendonitis  Cock up wrist brace primarily at night and as needed during the day.  Therapy    LEFT WRIST - Thumb CMC joint arthritis  Comfort Cool brace to use as needed  Paraffin wax machine  Therapy  Voltaren Gel

## 2024-12-02 NOTE — PROGRESS NOTES
ORTHOPAEDIC HAND, WRIST, AND ELBOW OFFICE  VISIT      ASSESSMENT/PLAN:      Diagnoses and all orders for this visit:    Right wrist tendonitis  -     Ambulatory Referral to Orthopedic Surgery  -     XR wrist 3+ vw right; Future  -     Cock Up Wrist Splint    Arthritis of carpometacarpal (CMC) joint of left thumb  -     Cancel: Thumb Cude comf/Cool          74 y.o. female with R wrist FCU tendonitis and L thumb CMC OA.  XRs reviewed with the pt today. Since she has no pain along the anatomic snuffbox and no evidence of fx on scaphoid-specific views, along with no history of trauma, suspicion for fx here is extremely low.   Instead, it seems like her ulnar sided pain is more c/w FCU tendonitis.  Treatment options and expected outcomes were discussed.  The patient verbalized understanding of exam findings and treatment plan.   The patient was given the opportunity to ask questions.  Questions were answered to the patient's satisfaction.  The patient decided to move forward with R wrist cock up wrist brace and steroid injx into the FCU tendon.   At this time, pt does not feel bracing or injx necessary for her L CMC.  Pt agreeable to try therapy for both conditions.       Follow Up:  2 months PRN      To Do Next Visit:  Re-evaluation of current issue      Discussions:  The anatomy and physiology of the diagnosis(es) was(were) discussed with the patient today in the office. Treatment options and recommendations were discussed, as well as expected future outcomes.  and Thumb CMC Arthritis: The anatomy and physiology of carpometacarpal joint arthritis was discussed with the patient today in the office.  Deterioration of the articular cartilage eventually leads to hypermobility at the thumb CMC joint, resulting in joint subluxation, osteophyte formation, cystic changes within the trapezium and base of the first metacarpal, as well as subchondral sclerosis.  Eventually, pain, limited mobility, and compensatory hyperextension  at the metacarpophalangeal joint may develop.  While normal activity and usage of the thumb joint may provide a painful experience to the patient, this typically does not result in damage to the thumb or hand.  Treatment options include resting thumb spica splints to decreased joint edema, pain, and inflammation.  Therapy exercises to strengthen the thenar musculature may relieve pain, but do not alter the overall continued development of osteoarthritis.  Oral medications, topical medications, corticosteroid injections may decrease pain and increase overall function.  Eventually, approximately 5% of patients may require surgical intervention.       Gerry Tran MD  Attending, Orthopaedic Surgery  Hand, Wrist, and Elbow Surgery  [facility]    ______________________________________________________________________________________________    CHIEF COMPLAINT:  Chief Complaint   Patient presents with    Right Wrist - Pain       SUBJECTIVE:  Patient is a 74 y.o. RHD female who presents today for evaluation and treatment of R>L wrist pain. Pt denies trauma. States she's noticed this primarily with activity. Describes pain along the R ulnar wrist, worse with things such as gripping the steering wheel or grocery bags. States the L doesn't bother much, but when it does it is by the thumb side.   Referred for evaluation by SHAYY Bean.     Last A1c from 11/7 was 8.9      I have personally reviewed all the relevant PMH, PSH, SH, FH, Medications and allergies      PAST MEDICAL HISTORY:  Past Medical History:   Diagnosis Date    Abdominal pain 01/16/2017    Acute non-recurrent frontal sinusitis 01/08/2019    Acute pain of right shoulder 01/10/2018    Anxiety     Arthritis     Bladder carcinoma (HCC) 2016    Bladder mass     Dental abscess 11/17/2017    Depression     Diabetes mellitus (HCC)     Disease of thyroid gland     thyroid nodules-not treating at this time    Dysuria     Last assessed  17    GERD (gastroesophageal reflux disease)      2 para 2     HL (hearing loss)     HLD (hyperlipidemia)     HTN (hypertension)     Hypercholesterolemia     Hypertension     Knee pain     Lichen sclerosus     Melanoma (HCC)     Melanoma (HCC)     Mild aortic regurgitation with left ventricular dilation by prior echocardiogram     Papanicolaou smear 2017    NEG    PONV (postoperative nausea and vomiting)     Tinnitus        PAST SURGICAL HISTORY:  Past Surgical History:   Procedure Laterality Date    BASAL CELL CARCINOMA EXCISION  2024    nose    CARPAL TUNNEL RELEASE Right      SECTION      x2    CHOLECYSTECTOMY      CYSTOSCOPY N/A 2016    Procedure: CYSTOSCOPY WITH BIOPSIES;  Surgeon: Lane Rios MD;  [facility];  Service:     CYSTOSCOPY N/A 2016    Procedure: CYSTOSCOPY;  Surgeon: Lane Rios MD;  [facility];  Service:     CYSTOSCOPY  2020    HERNIA REPAIR      [place] ARTHRS KNE SURG W/MENISCECTOMY MED/LAT W/SHVG Left 2016    Procedure: KNEE ARTHROSCOPIC PARTIAL MEDIAL MENISCECTOMY ;  Surgeon: Rehan Pete MD;  [facility];  Service: Orthopedics    [place] BLADDER INSTILLATION ANTICARCINOGENIC AGENT N/A 2016    Procedure: INSTILLATION MYTOMYCIN;  Surgeon: Lane Rios MD;  [facility];  Service: Urology    WA CYSTO BLADDER W/URETERAL CATHETERIZATION Bilateral 2016    Procedure: RETROGRADE PYELOGRAM ;  Surgeon: Lane Rios MD;  [facility];  Service: Urology    WA CYSTO BLADDER W/URETERAL CATHETERIZATION Bilateral 2017    Procedure: RETROGRADE PYELOGRAM WITH STENT EXCHANGE, RIGHT;  Surgeon: Lane Rios MD;  [facility];  Service: Urology    WA CYSTO W/INSERT URETERAL STENT Left 2016    Procedure: URETERAL STENTS;  Surgeon: Lane Rios MD;  [facility];  Service: Urology    WA CYSTO W/REMOVAL OF LESIONS SMALL N/A 2016    Procedure:  TRANSURETHRAL RESECTION OF BLADDER TUMOR (TURBT);  Surgeon: Lane Rios MD;  [facility];  Service: Urology    WI CYSTO W/REMOVAL OF LESIONS SMALL N/A 09/29/2016    Procedure: CYSTOSCOPY; TUR BLADDER TUMOR ;  Surgeon: Lane Rios MD;  [facility];  Service: Urology    WI CYSTO W/REMOVAL OF LESIONS SMALL N/A 09/01/2016    Procedure: TUR BLADDER TUMOR ;  Surgeon: Lane Rios MD;  [facility];  Service: Urology    WI CYSTO W/REMOVAL OF LESIONS SMALL Bilateral 05/09/2017    Procedure: CYSTOSCOPY, RIGHT URETERAL WASHINGS, ;  Surgeon: Lane Rios MD;  [facility];  Service: Urology    WI CYSTO W/URTROSCOPY&/PYELOSCOPY DX Right 05/09/2017    Procedure: URETEROSCOPY;  Surgeon: Lane Rios MD;  [facility];  Service: Urology    WI CYSTO/URETERO W/LITHOTRIPSY &INDWELL STENT INSRT  11/29/2016    Procedure: CYSTOSCOPY URETEROSCOPY WITH LITHOTRIPSY HOLMIUM LASER RIGHT, RETROGRADE PYELOGRAM BILATERAL: AND INSERTION STENT URETERAL Right ;  Surgeon: Lane Rios MD;  [facility];  Service: Urology    REMOVAL URETERAL STENT Left 11/29/2016    Procedure: REMOVAL STENT URETERAL;  Surgeon: Lane Rios MD;  [facility];  Service:     REPLACEMENT TOTAL KNEE Left 12/2021    SKIN CANCER EXCISION  2021    right arm    TONSILLECTOMY         FAMILY HISTORY:  Family History   Problem Relation Age of Onset    Heart attack Mother     Heart disease Mother     ALS Father     Diabetes Maternal Grandfather     Lung cancer Paternal Grandfather     Heart disease Maternal Grandmother     No Known Problems Daughter     No Known Problems Daughter     No Known Problems Paternal Aunt     No Known Problems Paternal Aunt     No Known Problems Paternal Aunt     No Known Problems Paternal Aunt     No Known Problems Paternal Aunt     Breast cancer Neg Hx        SOCIAL HISTORY:  Social History     Tobacco Use    Smoking status: Former     Current packs/day: 0.00     Average  packs/day: 1 pack/day for 21.0 years (21.0 ttl pk-yrs)     Types: Cigarettes     Start date: 1969     Quit date:      Years since quittin.9    Smokeless tobacco: Never   Vaping Use    Vaping status: Never Used   Substance Use Topics    Alcohol use: Not Currently    Drug use: No       MEDICATIONS:    Current Outpatient Medications:     acetaminophen (TYLENOL) 325 mg tablet, Take 650 mg by mouth 4 (four) times a day as needed for mild pain, Disp: , Rfl:     amLODIPine (NORVASC) 5 mg tablet, TAKE 1 TABLET (5 MG TOTAL) BY MOUTH DAILY., Disp: 90 tablet, Rfl: 1    aspirin ([employer] Aspirin Adult Low Dose) 81 mg chewable tablet, CHEW 1 TABLET BY MOUTH DAILY, Disp: 90 tablet, Rfl: 1    clonazePAM (KlonoPIN) 0.5 mg tablet, Take 1 tablet (0.5 mg total) by mouth daily at bedtime, Disp: 30 tablet, Rfl: 0    estradiol (ESTRACE VAGINAL) 0.1 mg/g vaginal cream, Apply small amount on fingertip. Apply twice a week., Disp: 45 g, Rfl: 2    famotidine (PEPCID) 20 mg tablet, Take 1 tablet (20 mg total) by mouth daily, Disp: 90 tablet, Rfl: 0    fenofibrate (TRICOR) 145 mg tablet, TAKE 1 TABLET BY MOUTH EVERY DAY, Disp: 100 tablet, Rfl: 1    fluticasone (FLONASE) 50 mcg/act nasal spray, 1 spray into each nostril 2 (two) times a day, Disp: 15.8 g, Rfl: 0    Ibuprofen (ADVIL PO), Take 1 tablet by mouth if needed, Disp: , Rfl:     Insulin Glargine Solostar (Lantus SoloStar) 100 UNIT/ML SOPN, INJECT 0.33 ML (33 UNITS TOTAL) UNDER THE SKIN IN THE MORNING, Disp: 30 mL, Rfl: 1    Insulin Pen Needle 32G X 4 MM MISC, Use in the morning, Disp: 100 each, Rfl: 1    levothyroxine 50 mcg tablet, TAKE 1 TABLET BY MOUTH EVERY DAY, Disp: 90 tablet, Rfl: 1    loratadine (CLARITIN) 10 mg tablet, Take 1 tablet (10 mg total) by mouth daily, Disp: 14 tablet, Rfl: 0    magnesium Oxide (MAG-OX) 400 mg TABS, TAKE 1 TABLET BY MOUTH THREE TIMES A DAY, Disp: 270 tablet, Rfl: 1    metFORMIN (GLUCOPHAGE) 500 mg tablet, Take 1 tablet (500 mg total) by mouth 2  (two) times a day with meals, Disp: , Rfl:     metoprolol tartrate (LOPRESSOR) 25 mg tablet, Take 1 tablet (25 mg total) by mouth every 12 (twelve) hours, Disp: 180 tablet, Rfl: 1    mometasone (ELOCON) 0.1 % cream, Apply topically once a week, Disp: 45 g, Rfl: 4    Mounjaro 5 MG/0.5ML, Inject 0.5 mL (5 mg total) under the skin once a week Do not start before September 28, 2024., Disp: 4 mL, Rfl: 0    OneTouch Verio test strip, Test 3 times daily, Disp: 300 each, Rfl: 1    Probiotic Product (PROBIOTIC-10) CAPS, Take 1 capsule by mouth daily, Disp: , Rfl:     simvastatin (ZOCOR) 20 mg tablet, Take 1 tablet (20 mg total) by mouth daily at bedtime, Disp: 90 tablet, Rfl: 1    zolpidem (AMBIEN) 10 mg tablet, Take 1 tablet (10 mg total) by mouth daily at bedtime, Disp: 30 tablet, Rfl: 0    ALLERGIES:  Allergies   Allergen Reactions    Losartan Edema    Nickel Swelling     Swelling, irritation, lumps to ears           REVIEW OF SYSTEMS:  Musculoskeletal:        As noted in HPI.   All other systems reviewed and are negative.    VITALS:  Vitals:    12/02/24 1227   BP: 124/83       LABS:  HgA1c:   Lab Results   Component Value Date    HGBA1C 8.9 (H) 11/07/2024     BMP:   Lab Results   Component Value Date    CALCIUM 10.0 11/07/2024    K 4.9 11/07/2024    CO2 27 11/07/2024    CL 98 11/07/2024    BUN 15 11/07/2024    CREATININE 0.87 11/07/2024       _____________________________________________________  PHYSICAL EXAMINATION:  General: Well developed and well nourished, alert & oriented x 3, appears comfortable  Psychiatric: Normal  HEENT: Normocephalic, Atraumatic Trachea Midline, No torticollis  Pulmonary: No audible wheezing or respiratory distress   Abdomen/GI: Non tender, non distended   Cardiovascular: No pitting edema, 2+ radial pulse   Skin: No masses, erythema, lacerations, fluctation, ulcerations  Neurovascular: Sensation Intact to the Median, Ulnar, Radial Nerve, Motor Intact to the Median, Ulnar, Radial Nerve, and  Pulses Intact  Musculoskeletal: Normal, except as noted in detailed exam and in HPI.      MUSCULOSKELETAL EXAMINATION:  R wrist:  + edema along ulnar wrist.  No ecchymosis.  Nontender along anatomic snuffbox.   Nontender along thumb CMC joint.  TTP along the pisotriquetral joint.  Nontender ECU.  Nontender TFCC.  Tender FCU.  Mildly + Edinson test.  Pain with resisted wrist flexion.      L wrist:  No edema, ecchymosis.  Nontender anatomic snuffbox.  TTP thumb CMC joint.  + CMC circumduction.     ___________________________________________________  STUDIES REVIEWED:  Xrays of the right wrist were reviewed and independently interpreted in PACS by Dr. Tran and demonstrate no evidence of acute osseous abnormalities. She does have some bone spur formation along the pisotriquetral joint. Xrays of the left hand also reviewed and these demonstrate no acute osseous abnormalities, but pt with significant degeneration seen at the thumb CMC joint.           PROCEDURES PERFORMED:  Hand/upper extremity injection: R wrist  Universal Protocol:  Consent: Verbal consent obtained.  Risks and benefits: risks, benefits and alternatives were discussed  Consent given by: patient  Patient understanding: patient states understanding of the procedure being performed  Patient identity confirmed: verbally with patient  Supporting Documentation  Indications: tendon swelling   Procedure Details  Condition:tendonitis Site: R wrist (FCU)   Needle size: 25 G  Approach: volar  Medications administered: 1 mL ropivacaine 0.5 %; 6 mg betamethasone acetate-betamethasone sodium phosphate 6 (3-3) mg/mL  Patient tolerance: patient tolerated the procedure well with no immediate complications  Dressing:  Sterile dressing applied             _____________________________________________________      Scribe Attestation      I,:  Marleni Dallas PA-C am acting as a scribe while in the presence of the attending physician.:       I,:  Gerry Tran MD  personally performed the services described in this documentation    as scribed in my presence.:

## 2024-12-03 RX ORDER — BETAMETHASONE SODIUM PHOSPHATE AND BETAMETHASONE ACETATE 3; 3 MG/ML; MG/ML
6 INJECTION, SUSPENSION INTRA-ARTICULAR; INTRALESIONAL; INTRAMUSCULAR; SOFT TISSUE
Status: COMPLETED | OUTPATIENT
Start: 2024-12-02 | End: 2024-12-02

## 2024-12-03 RX ORDER — ROPIVACAINE HYDROCHLORIDE 5 MG/ML
1 INJECTION, SOLUTION EPIDURAL; INFILTRATION; PERINEURAL
Status: COMPLETED | OUTPATIENT
Start: 2024-12-02 | End: 2024-12-02

## 2024-12-04 ENCOUNTER — OFFICE VISIT (OUTPATIENT)
Dept: GYNECOLOGY | Facility: CLINIC | Age: 74
End: 2024-12-04
Payer: MEDICARE

## 2024-12-04 VITALS
HEART RATE: 61 BPM | WEIGHT: 149.6 LBS | HEIGHT: 62 IN | DIASTOLIC BLOOD PRESSURE: 73 MMHG | RESPIRATION RATE: 18 BRPM | BODY MASS INDEX: 27.53 KG/M2 | SYSTOLIC BLOOD PRESSURE: 146 MMHG

## 2024-12-04 DIAGNOSIS — F41.8 DEPRESSION WITH ANXIETY: ICD-10-CM

## 2024-12-04 DIAGNOSIS — H53.8 BLURRED VISION: ICD-10-CM

## 2024-12-04 DIAGNOSIS — N88.9 ABNORMALITY OF CERVIX: ICD-10-CM

## 2024-12-04 DIAGNOSIS — N95.2 VAGINAL ATROPHY: ICD-10-CM

## 2024-12-04 DIAGNOSIS — Q52.5 LABIAL FUSION: ICD-10-CM

## 2024-12-04 DIAGNOSIS — Z12.31 ENCOUNTER FOR SCREENING MAMMOGRAM FOR MALIGNANT NEOPLASM OF BREAST: Primary | ICD-10-CM

## 2024-12-04 DIAGNOSIS — F51.01 PRIMARY INSOMNIA: ICD-10-CM

## 2024-12-04 DIAGNOSIS — L90.0 LICHEN SCLEROSUS ET ATROPHICUS: ICD-10-CM

## 2024-12-04 PROCEDURE — G0101 CA SCREEN;PELVIC/BREAST EXAM: HCPCS | Performed by: OBSTETRICS & GYNECOLOGY

## 2024-12-04 RX ORDER — ASPIRIN 81 MG/1
81 TABLET, CHEWABLE ORAL DAILY
Qty: 90 TABLET | Refills: 1 | Status: CANCELLED | OUTPATIENT
Start: 2024-12-04

## 2024-12-04 RX ORDER — ESTRADIOL 0.1 MG/G
CREAM VAGINAL
Qty: 45 G | Refills: 2 | Status: SHIPPED | OUTPATIENT
Start: 2024-12-04

## 2024-12-04 RX ORDER — MOMETASONE FUROATE 1 MG/G
CREAM TOPICAL WEEKLY
Qty: 45 G | Refills: 4 | Status: SHIPPED | OUTPATIENT
Start: 2024-12-04

## 2024-12-04 RX ORDER — ZOLPIDEM TARTRATE 10 MG/1
10 TABLET ORAL
Qty: 30 TABLET | Refills: 0 | Status: SHIPPED | OUTPATIENT
Start: 2024-12-04

## 2024-12-04 NOTE — PROGRESS NOTES
Name: Shellie Lee      : 1950      MRN: [id number]  Encounter Provider: Vlad Hyman MD  Encounter Date: 2024   Encounter department: [facility]  :  Assessment & Plan          Vaginal atrophy  History of labial fusion  Lichen sclerosus  History of bladder cancer  Normal mammogram 2024      Plan: Rx mammogram.  Continue vaginal estrogen cream one to two's a week . recommend healthy diet weightbearing and balancing exercises.  Continue steroid cream once a week for lichen sclerosus.    Shellie Lee is a 74 y.o. female who presents for annual exam with no complaints.  Denies any vaginal bleeding pelvic pain or vulvar irritation.  History of lichen sclerosis and labial fusion.  Denies any breast bowel or bladder issues.  No change in family history.  Medications reviewed.  Eating healthy and exercising regularly.       Objective   LMP  (LMP Unknown)      Physical Exam  Vitals and nursing note reviewed.   Constitutional:       General: She is not in acute distress.     Appearance: She is well-developed.   HENT:      Head: Normocephalic and atraumatic.   Eyes:      Conjunctiva/sclera: Conjunctivae normal.   Cardiovascular:      Rate and Rhythm: Normal rate and regular rhythm.      Heart sounds: No murmur heard.  Pulmonary:      Effort: Pulmonary effort is normal. No respiratory distress.      Breath sounds: Normal breath sounds.   Chest:   Breasts:     Right: Normal.      Left: Normal.   Abdominal:      Palpations: Abdomen is soft.      Tenderness: There is no abdominal tenderness.   Genitourinary:     Vagina: Normal.      Cervix: Normal.      Uterus: Normal.       Adnexa: Left adnexa normal.      Rectum: Normal.      Comments: Vaginal atrophy.  No labial lesions.  No uterine prolapse cystocele or rectocele.  Normal urethra and [place]'s glands.  Musculoskeletal:         General: No swelling.      Cervical back: Neck supple.   Skin:     General: Skin is warm and dry.       Capillary Refill: Capillary refill takes less than 2 seconds.   Neurological:      Mental Status: She is alert.   Psychiatric:         Mood and Affect: Mood normal.

## 2024-12-04 NOTE — TELEPHONE ENCOUNTER
Reason for call:   [x] Refill   [] Prior Auth  [] Other:     Office:   [x] PCP/Provider - [facility]  Authorized By: Iris Gandhi PA-C  [] Specialty/Provider -     Medication: Zolpidem (AMBIEN) 10 mg tablet     Dose/Frequency: Take 1 tablet (10 mg total) by mouth daily at bedtime     Quantity: 30 tablet     Pharmacy: [facility]/pharmacy #[id number] - [place], [place] - [address]     Does the patient have enough for 3 days?   [] Yes   [x] No - Send as HP to POD    Reason for call:   [x] Refill   [] Prior Auth  [] Other:     Office:   [x] PCP/Provider -  [facility]  Authorized By: CAMILLA Nichole  [] Specialty/Provider -     Medication: aspirin ([facility] Aspirin Adult Low Dose) 81 mg chewable tablet     Dose/Frequency: CHEW 1 TABLET BY MOUTH DAILy    Quantity: 90 tablet     Pharmacy: [facility]/pharmacy #[id number] - [place], [place] - [address]     Does the patient have enough for 3 days?   [] Yes   [x] No - Send as HP to POD

## 2024-12-12 ENCOUNTER — OFFICE VISIT (OUTPATIENT)
Dept: INTERNAL MEDICINE CLINIC | Age: 74
End: 2024-12-12
Payer: MEDICARE

## 2024-12-12 VITALS
TEMPERATURE: 97.2 F | DIASTOLIC BLOOD PRESSURE: 70 MMHG | OXYGEN SATURATION: 97 % | HEART RATE: 68 BPM | BODY MASS INDEX: 27.05 KG/M2 | HEIGHT: 62 IN | SYSTOLIC BLOOD PRESSURE: 124 MMHG | WEIGHT: 147 LBS

## 2024-12-12 DIAGNOSIS — I10 ESSENTIAL HYPERTENSION: ICD-10-CM

## 2024-12-12 DIAGNOSIS — Z79.4 TYPE 2 DIABETES MELLITUS WITHOUT COMPLICATION, WITH LONG-TERM CURRENT USE OF INSULIN (HCC): Primary | ICD-10-CM

## 2024-12-12 DIAGNOSIS — E03.9 HYPOTHYROIDISM, UNSPECIFIED TYPE: ICD-10-CM

## 2024-12-12 DIAGNOSIS — M85.851 OSTEOPENIA OF BOTH HIPS: ICD-10-CM

## 2024-12-12 DIAGNOSIS — M85.852 OSTEOPENIA OF BOTH HIPS: ICD-10-CM

## 2024-12-12 DIAGNOSIS — E11.9 TYPE 2 DIABETES MELLITUS WITHOUT COMPLICATION, WITH LONG-TERM CURRENT USE OF INSULIN (HCC): Primary | ICD-10-CM

## 2024-12-12 DIAGNOSIS — M32.9 SYSTEMIC LUPUS ERYTHEMATOSUS, UNSPECIFIED SLE TYPE, UNSPECIFIED ORGAN INVOLVEMENT STATUS (HCC): ICD-10-CM

## 2024-12-12 PROCEDURE — 99214 OFFICE O/P EST MOD 30 MIN: CPT | Performed by: PHYSICIAN ASSISTANT

## 2024-12-12 NOTE — PROGRESS NOTES
Name: Shellie Lee      : 1950      MRN: [id number]  Encounter Provider: Iris Gandhi PA-C  Encounter Date: 2024   Encounter department: [facility]  :  Assessment & Plan  Type 2 diabetes mellitus without complication, with long-term current use of insulin (HCC)  May increase mounjaro to 5mg weekly  Pt has lost approx 12 lbs   Pt has been monitoring BS at home - FBS 95, 111, 116, 99, 106   Lab Results   Component Value Date    HGBA1C 8.9 (H) 2024       Orders:    Ambulatory Referral to Ophthalmology; Future    DXA bone density spine hip and pelvis; Future    CBC and differential; Future    Comprehensive metabolic panel; Future    Hemoglobin A1C; Future    Albumin / creatinine urine ratio; Future    Systemic lupus erythematosus, unspecified SLE type, unspecified organ involvement status (HCC)    Orders:    DXA bone density spine hip and pelvis; Future    CBC and differential; Future    Comprehensive metabolic panel; Future    Hemoglobin A1C; Future    Osteopenia of both hips  Due for dexa, pt will schedule   Orders:    DXA bone density spine hip and pelvis; Future    Hypothyroidism, unspecified type  Continue daily levothyroxine  Orders:    CBC and differential; Future    Comprehensive metabolic panel; Future    Hemoglobin A1C; Future    TSH, 3rd generation; Future    Essential hypertension  Continue amlodipine, metoprolol   Orders:    CBC and differential; Future    Comprehensive metabolic panel; Future    Hemoglobin A1C; Future           History of Present Illness     75 y/o female with hx of DM, obesity, HTN, hypothyroidism presents for f/u  Pt has been on mounjaro for 4 weeks - tolerating well - reports occasional diarrhea on medication but tolerable, denies ab pain  Has lost approx 12 lbs since starting  Discussed increasing to next dose up - will start today  Pt has been keeping BS log - FBS 90-120s which is much improved  Pt reports she has been  "feeling well with this weight loss    Bp well controlled      Review of Systems   Constitutional:  Negative for appetite change, chills, diaphoresis, fatigue and fever.   HENT:  Negative for congestion and sore throat.    Eyes:  Negative for pain and redness.   Respiratory:  Negative for cough and shortness of breath.    Cardiovascular:  Negative for chest pain and leg swelling.   Gastrointestinal:  Positive for diarrhea (occasional). Negative for abdominal pain, constipation and nausea.   Genitourinary:  Negative for dysuria and frequency.   Musculoskeletal:  Negative for arthralgias, back pain and gait problem.   Skin:  Negative for rash.   Allergic/Immunologic: Negative for environmental allergies.   Neurological:  Negative for dizziness, light-headedness and headaches.   Hematological:  Negative for adenopathy. Does not bruise/bleed easily.   Psychiatric/Behavioral:  Negative for sleep disturbance. The patient is not nervous/anxious.        Objective   /70 (BP Location: Left arm, Patient Position: Sitting, Cuff Size: Standard)   Pulse 68   Temp (!) 97.2 °F (36.2 °C) (Temporal)   Ht 5' 2\" (1.575 m)   Wt 66.7 kg (147 lb)   LMP  (LMP Unknown)   SpO2 97%   BMI 26.89 kg/m²      Physical Exam  Vitals reviewed.   Constitutional:       General: She is not in acute distress.  HENT:      Head: Normocephalic and atraumatic.      Right Ear: Tympanic membrane, ear canal and external ear normal. There is no impacted cerumen.      Left Ear: Tympanic membrane, ear canal and external ear normal. There is no impacted cerumen.      Nose: Nose normal.   Eyes:      Extraocular Movements: Extraocular movements intact.      Conjunctiva/sclera: Conjunctivae normal.      Pupils: Pupils are equal, round, and reactive to light.   Cardiovascular:      Rate and Rhythm: Normal rate and regular rhythm.      Pulses: Normal pulses.   Pulmonary:      Effort: Pulmonary effort is normal. No respiratory distress.      Breath sounds: " Normal breath sounds. No wheezing or rales.   Musculoskeletal:      Cervical back: Normal range of motion. No rigidity.      Right lower leg: No edema.      Left lower leg: No edema.   Lymphadenopathy:      Cervical: No cervical adenopathy.   Neurological:      General: No focal deficit present.      Mental Status: She is alert and oriented to person, place, and time.   Psychiatric:         Mood and Affect: Mood normal.       Administrative Statements   I have spent a total time of 20 minutes in caring for this patient on the day of the visit/encounter including Instructions for management, Patient and family education, Reviewing / ordering tests, medicine, procedures  , and Obtaining or reviewing history  . Topics discussed with the patient / family include symptom assessment and management and medication review.

## 2024-12-12 NOTE — ASSESSMENT & PLAN NOTE
May increase mounjaro to 5mg weekly  Pt has lost approx 12 lbs   Pt has been monitoring BS at home - FBS 95, 111, 116, 99, 106   Lab Results   Component Value Date    HGBA1C 8.9 (H) 11/07/2024       Orders:    Ambulatory Referral to Ophthalmology; Future    DXA bone density spine hip and pelvis; Future    CBC and differential; Future    Comprehensive metabolic panel; Future    Hemoglobin A1C; Future    Albumin / creatinine urine ratio; Future

## 2024-12-12 NOTE — ASSESSMENT & PLAN NOTE
Continue amlodipine, metoprolol   Orders:    CBC and differential; Future    Comprehensive metabolic panel; Future    Hemoglobin A1C; Future

## 2024-12-12 NOTE — ASSESSMENT & PLAN NOTE
Orders:    DXA bone density spine hip and pelvis; Future    CBC and differential; Future    Comprehensive metabolic panel; Future    Hemoglobin A1C; Future

## 2024-12-18 DIAGNOSIS — F41.9 ANXIETY: ICD-10-CM

## 2024-12-18 RX ORDER — CLONAZEPAM 0.5 MG/1
0.5 TABLET ORAL
Qty: 30 TABLET | Refills: 0 | Status: SHIPPED | OUTPATIENT
Start: 2024-12-18 | End: 2025-02-16

## 2024-12-18 NOTE — TELEPHONE ENCOUNTER
Reason for call:   [x] Refill   [] Prior Auth  [] Other:     Office:   [x] PCP/Provider -   [] Specialty/Provider -     Medication:     clonazePAM (KlonoPIN) 0.5 mg  : Take 1 tablet (0.5 mg total) by mouth daily at bedtime          [facility]     Does the patient have enough for 3 days?   [] Yes   [x] No - Send as HP to POD

## 2024-12-30 ENCOUNTER — TELEPHONE (OUTPATIENT)
Dept: INTERNAL MEDICINE CLINIC | Age: 74
End: 2024-12-30

## 2024-12-30 NOTE — TELEPHONE ENCOUNTER
Patient called the RX Refill Line. Message is being forwarded to the office.     Patient is requesting a call back from the office. She states Iris told her to call after she took the 3rd injection. Please call patient to discuss.     Please contact patient at [phone]

## 2024-12-30 NOTE — TELEPHONE ENCOUNTER
Spoke with patient- Patient states her stomach feels queezy day(no vomiting) after injection and some diarrhea. Patient states symptoms are manageable and no other symptoms.     [facility]

## 2024-12-31 ENCOUNTER — TELEPHONE (OUTPATIENT)
Age: 74
End: 2024-12-31

## 2024-12-31 DIAGNOSIS — E11.8 TYPE 2 DIABETES MELLITUS WITH COMPLICATION, WITHOUT LONG-TERM CURRENT USE OF INSULIN (HCC): ICD-10-CM

## 2024-12-31 DIAGNOSIS — E66.3 OVERWEIGHT (BMI 25.0-29.9): ICD-10-CM

## 2024-12-31 NOTE — TELEPHONE ENCOUNTER
PA for Tirzepatide 5 MG/0.5ML  APPROVED     Date(s) approved 08/30/24-08/30/25    Case #[id number]     Patient advised by          [][employer] Message  []Phone call   [x]LMOM  []L/M to call office as no active Communication consent on file  []Unable to leave detailed message as VM not approved on Communication consent       Pharmacy advised by    [x]Fax  []Phone call    Approval letter scanned into Media Yes

## 2024-12-31 NOTE — TELEPHONE ENCOUNTER
PA for Tirzepatide 5 MG/0.5ML SUBMITTED to [employer]     via    []CMM-KEY:   [x]Surescripts-Case ID # [id number]  []Availity-Auth ID # NDC #   []Faxed to plan   []Other website   []Phone call Case ID #     [x]PA sent as URGENT    All office notes, labs and other pertaining documents and studies sent. Clinical questions answered. Awaiting determination from insurance company.     Turnaround time for your insurance to make a decision on your Prior Authorization can take 7-21 business days.

## 2025-01-02 DIAGNOSIS — F41.8 DEPRESSION WITH ANXIETY: ICD-10-CM

## 2025-01-02 DIAGNOSIS — F51.01 PRIMARY INSOMNIA: ICD-10-CM

## 2025-01-02 DIAGNOSIS — E11.9 TYPE 2 DIABETES MELLITUS WITHOUT COMPLICATION, WITHOUT LONG-TERM CURRENT USE OF INSULIN (HCC): ICD-10-CM

## 2025-01-02 RX ORDER — BLOOD SUGAR DIAGNOSTIC
STRIP MISCELLANEOUS
Qty: 300 EACH | Refills: 1 | Status: SHIPPED | OUTPATIENT
Start: 2025-01-02

## 2025-01-02 RX ORDER — ZOLPIDEM TARTRATE 10 MG/1
10 TABLET ORAL
Qty: 30 TABLET | Refills: 0 | Status: SHIPPED | OUTPATIENT
Start: 2025-01-02

## 2025-01-02 NOTE — TELEPHONE ENCOUNTER
Reason for call:   [x] Refill   [] Prior Auth  [] Other:     Office:   [x] PCP/Provider - Iris Gandhi  [] Specialty/Provider -     Medication: One Touch Verio Test Strips  Dose/Frequency: TID  Quantity: 300    Medication: Zolpidem   Dose/Frequency: 10 mg Q HS   Quantity: 30    Pharmacy: [facility]     Does the patient have enough for 3 days?   [] Yes   [x] No - Send as HP to POD

## 2025-01-03 ENCOUNTER — CLINICAL SUPPORT (OUTPATIENT)
Dept: INTERNAL MEDICINE CLINIC | Age: 75
End: 2025-01-03
Payer: MEDICARE

## 2025-01-03 DIAGNOSIS — Z23 NEED FOR COVID-19 VACCINE: Primary | ICD-10-CM

## 2025-01-03 PROCEDURE — 90480 ADMN SARSCOV2 VAC 1/ONLY CMP: CPT

## 2025-01-03 PROCEDURE — 91320 SARSCV2 VAC 30MCG TRS-SUC IM: CPT

## 2025-01-30 DIAGNOSIS — F51.01 PRIMARY INSOMNIA: ICD-10-CM

## 2025-01-30 DIAGNOSIS — E11.8 TYPE 2 DIABETES MELLITUS WITH COMPLICATION, WITHOUT LONG-TERM CURRENT USE OF INSULIN (HCC): ICD-10-CM

## 2025-01-30 DIAGNOSIS — F41.8 DEPRESSION WITH ANXIETY: ICD-10-CM

## 2025-01-30 DIAGNOSIS — E66.3 OVERWEIGHT (BMI 25.0-29.9): ICD-10-CM

## 2025-01-30 RX ORDER — ZOLPIDEM TARTRATE 10 MG/1
10 TABLET ORAL
Qty: 30 TABLET | Refills: 0 | Status: SHIPPED | OUTPATIENT
Start: 2025-01-30

## 2025-01-30 NOTE — TELEPHONE ENCOUNTER
Reason for call:   [x] Refill   [] Prior Auth  [] Other:     Office:   [x] PCP/Provider -   [] Specialty/Provider -     Medication: zolpidem (AMBIEN) 10 mg tablet Take 1 tablet (10 mg total) by mouth daily at bedtime,       Pharmacy: [facility]/pharmacy #[id number] - [place], [place] - [address]     Does the patient have enough for 3 days?   [] Yes   [x] No - Send as HP to POD

## 2025-01-30 NOTE — TELEPHONE ENCOUNTER
Reason for call:   [x] Refill   [] Prior Auth  [] Other:     Office:   [x] PCP/Provider -   [] Specialty/Provider -     Medication: Tirzepatide 5 MG/0.5ML SOAJ Inject 5 mg under the skin once a week       [id number] [place], [place] - [address]      Does the patient have enough for 3 days?   [x] Yes   [] No - Send as HP to POD

## 2025-02-05 DIAGNOSIS — E78.00 HYPERCHOLESTEROLEMIA: ICD-10-CM

## 2025-02-05 RX ORDER — FENOFIBRATE 145 MG/1
145 TABLET, COATED ORAL DAILY
Qty: 90 TABLET | Refills: 1 | Status: SHIPPED | OUTPATIENT
Start: 2025-02-05

## 2025-02-07 DIAGNOSIS — E11.9 TYPE 2 DIABETES MELLITUS WITHOUT COMPLICATION, WITH LONG-TERM CURRENT USE OF INSULIN (HCC): ICD-10-CM

## 2025-02-07 DIAGNOSIS — Z79.4 TYPE 2 DIABETES MELLITUS WITHOUT COMPLICATION, WITH LONG-TERM CURRENT USE OF INSULIN (HCC): ICD-10-CM

## 2025-02-07 RX ORDER — INSULIN GLARGINE 100 [IU]/ML
33 INJECTION, SOLUTION SUBCUTANEOUS DAILY
Qty: 30 ML | Refills: 1 | Status: SHIPPED | OUTPATIENT
Start: 2025-02-07

## 2025-02-07 NOTE — TELEPHONE ENCOUNTER
Reason for call:   [x] Refill   [] Prior Auth  [] Other:     Office:   [x] PCP/Provider -  Iris Gandhi PA-C   [] Specialty/Provider -     Medication:  Insulin Glargine Solostar (Lantus SoloStar) 100 UNIT/ML SOPN    Dose/Frequency: INJECT 0.33 ML (33 UNITS TOTAL) UNDER THE SKIN IN THE MORNING,     Quantity: 30 ml    Pharmacy: [facility]/pharmacy #[id number] - [place], [place] - [address]     Does the patient have enough for 3 days?   [] Yes   [x] No - Send as HP to POD

## 2025-02-10 ENCOUNTER — RA CDI HCC (OUTPATIENT)
Dept: OTHER | Facility: HOSPITAL | Age: 75
End: 2025-02-10

## 2025-02-18 ENCOUNTER — APPOINTMENT (OUTPATIENT)
Dept: LAB | Age: 75
End: 2025-02-18
Payer: MEDICARE

## 2025-02-18 ENCOUNTER — OFFICE VISIT (OUTPATIENT)
Dept: INTERNAL MEDICINE CLINIC | Age: 75
End: 2025-02-18
Payer: MEDICARE

## 2025-02-18 VITALS
HEIGHT: 62 IN | WEIGHT: 137.2 LBS | BODY MASS INDEX: 25.25 KG/M2 | HEART RATE: 70 BPM | TEMPERATURE: 98.3 F | SYSTOLIC BLOOD PRESSURE: 148 MMHG | OXYGEN SATURATION: 97 % | DIASTOLIC BLOOD PRESSURE: 84 MMHG

## 2025-02-18 DIAGNOSIS — F41.9 ANXIETY: ICD-10-CM

## 2025-02-18 DIAGNOSIS — M32.9 SYSTEMIC LUPUS ERYTHEMATOSUS, UNSPECIFIED SLE TYPE, UNSPECIFIED ORGAN INVOLVEMENT STATUS (HCC): ICD-10-CM

## 2025-02-18 DIAGNOSIS — S62.001A CLOSED NONDISPLACED FRACTURE OF SCAPHOID OF RIGHT WRIST, UNSPECIFIED PORTION OF SCAPHOID, INITIAL ENCOUNTER: Primary | ICD-10-CM

## 2025-02-18 DIAGNOSIS — I10 ESSENTIAL HYPERTENSION: ICD-10-CM

## 2025-02-18 DIAGNOSIS — E83.42 HYPOMAGNESEMIA: ICD-10-CM

## 2025-02-18 DIAGNOSIS — Z79.4 TYPE 2 DIABETES MELLITUS WITHOUT COMPLICATION, WITH LONG-TERM CURRENT USE OF INSULIN (HCC): ICD-10-CM

## 2025-02-18 DIAGNOSIS — E11.9 TYPE 2 DIABETES MELLITUS WITHOUT COMPLICATION, WITH LONG-TERM CURRENT USE OF INSULIN (HCC): ICD-10-CM

## 2025-02-18 DIAGNOSIS — F51.01 PRIMARY INSOMNIA: ICD-10-CM

## 2025-02-18 DIAGNOSIS — C67.9 MALIGNANT NEOPLASM OF URINARY BLADDER, UNSPECIFIED SITE (HCC): ICD-10-CM

## 2025-02-18 DIAGNOSIS — E03.9 HYPOTHYROIDISM, UNSPECIFIED TYPE: ICD-10-CM

## 2025-02-18 DIAGNOSIS — F41.8 DEPRESSION WITH ANXIETY: ICD-10-CM

## 2025-02-18 LAB
ALBUMIN SERPL BCG-MCNC: 4.4 G/DL (ref 3.5–5)
ALP SERPL-CCNC: 62 U/L (ref 34–104)
ALT SERPL W P-5'-P-CCNC: 16 U/L (ref 7–52)
ANION GAP SERPL CALCULATED.3IONS-SCNC: 10 MMOL/L (ref 4–13)
AST SERPL W P-5'-P-CCNC: 24 U/L (ref 13–39)
BASOPHILS # BLD AUTO: 0.06 THOUSANDS/ΜL (ref 0–0.1)
BASOPHILS NFR BLD AUTO: 1 % (ref 0–1)
BILIRUB SERPL-MCNC: 0.38 MG/DL (ref 0.2–1)
BUN SERPL-MCNC: 21 MG/DL (ref 5–25)
CALCIUM SERPL-MCNC: 10.6 MG/DL (ref 8.4–10.2)
CHLORIDE SERPL-SCNC: 99 MMOL/L (ref 96–108)
CO2 SERPL-SCNC: 25 MMOL/L (ref 21–32)
CREAT SERPL-MCNC: 0.82 MG/DL (ref 0.6–1.3)
CREAT UR-MCNC: 137.9 MG/DL
EOSINOPHIL # BLD AUTO: 0.13 THOUSAND/ΜL (ref 0–0.61)
EOSINOPHIL NFR BLD AUTO: 2 % (ref 0–6)
ERYTHROCYTE [DISTWIDTH] IN BLOOD BY AUTOMATED COUNT: 13 % (ref 11.6–15.1)
EST. AVERAGE GLUCOSE BLD GHB EST-MCNC: 111 MG/DL
GFR SERPL CREATININE-BSD FRML MDRD: 70 ML/MIN/1.73SQ M
GLUCOSE P FAST SERPL-MCNC: 72 MG/DL (ref 65–99)
HBA1C MFR BLD: 5.5 %
HCT VFR BLD AUTO: 42.2 % (ref 34.8–46.1)
HGB BLD-MCNC: 13.8 G/DL (ref 11.5–15.4)
IMM GRANULOCYTES # BLD AUTO: 0.02 THOUSAND/UL (ref 0–0.2)
IMM GRANULOCYTES NFR BLD AUTO: 0 % (ref 0–2)
LYMPHOCYTES # BLD AUTO: 1.67 THOUSANDS/ΜL (ref 0.6–4.47)
LYMPHOCYTES NFR BLD AUTO: 23 % (ref 14–44)
MCH RBC QN AUTO: 29.6 PG (ref 26.8–34.3)
MCHC RBC AUTO-ENTMCNC: 32.7 G/DL (ref 31.4–37.4)
MCV RBC AUTO: 90 FL (ref 82–98)
MICROALBUMIN UR-MCNC: 27.3 MG/L
MICROALBUMIN/CREAT 24H UR: 20 MG/G CREATININE (ref 0–30)
MONOCYTES # BLD AUTO: 0.53 THOUSAND/ΜL (ref 0.17–1.22)
MONOCYTES NFR BLD AUTO: 7 % (ref 4–12)
NEUTROPHILS # BLD AUTO: 4.74 THOUSANDS/ΜL (ref 1.85–7.62)
NEUTS SEG NFR BLD AUTO: 67 % (ref 43–75)
NRBC BLD AUTO-RTO: 0 /100 WBCS
PLATELET # BLD AUTO: 262 THOUSANDS/UL (ref 149–390)
PMV BLD AUTO: 10.2 FL (ref 8.9–12.7)
POTASSIUM SERPL-SCNC: 4.8 MMOL/L (ref 3.5–5.3)
PROT SERPL-MCNC: 7.1 G/DL (ref 6.4–8.4)
RBC # BLD AUTO: 4.67 MILLION/UL (ref 3.81–5.12)
SODIUM SERPL-SCNC: 134 MMOL/L (ref 135–147)
TSH SERPL DL<=0.05 MIU/L-ACNC: 1.89 UIU/ML (ref 0.45–4.5)
WBC # BLD AUTO: 7.15 THOUSAND/UL (ref 4.31–10.16)

## 2025-02-18 PROCEDURE — 36415 COLL VENOUS BLD VENIPUNCTURE: CPT

## 2025-02-18 PROCEDURE — 99214 OFFICE O/P EST MOD 30 MIN: CPT | Performed by: PHYSICIAN ASSISTANT

## 2025-02-18 PROCEDURE — 80053 COMPREHEN METABOLIC PANEL: CPT

## 2025-02-18 PROCEDURE — 83036 HEMOGLOBIN GLYCOSYLATED A1C: CPT

## 2025-02-18 PROCEDURE — 84443 ASSAY THYROID STIM HORMONE: CPT

## 2025-02-18 PROCEDURE — 85025 COMPLETE CBC W/AUTO DIFF WBC: CPT

## 2025-02-18 RX ORDER — CALCIUM CARBONATE 300MG(750)
400 TABLET,CHEWABLE ORAL 2 TIMES DAILY
COMMUNITY

## 2025-02-18 RX ORDER — CLONAZEPAM 0.5 MG/1
0.5 TABLET ORAL
Qty: 30 TABLET | Refills: 0 | Status: SHIPPED | OUTPATIENT
Start: 2025-02-18 | End: 2025-04-19

## 2025-02-18 RX ORDER — ZOLPIDEM TARTRATE 10 MG/1
10 TABLET ORAL
Qty: 30 TABLET | Refills: 0 | Status: SHIPPED | OUTPATIENT
Start: 2025-02-18

## 2025-02-18 RX ORDER — LANOLIN ALCOHOL/MO/W.PET/CERES
400 CREAM (GRAM) TOPICAL 3 TIMES DAILY
Qty: 270 TABLET | Refills: 1 | Status: SHIPPED | OUTPATIENT
Start: 2025-02-18

## 2025-02-18 NOTE — ASSESSMENT & PLAN NOTE
Orders:    clonazePAM (KlonoPIN) 0.5 mg tablet; Take 1 tablet (0.5 mg total) by mouth daily at bedtime

## 2025-02-18 NOTE — ASSESSMENT & PLAN NOTE
Due for labs, will go for them today   Lab Results   Component Value Date    HGBA1C 8.9 (H) 11/07/2024

## 2025-02-18 NOTE — PROGRESS NOTES
Name: Shellie Lee      : 1950      MRN: [id number]  Encounter Provider: Iris Gandhi PA-C  Encounter Date: 2025   Encounter department: [facility]  :  Assessment & Plan  Closed nondisplaced fracture of scaphoid of right wrist, unspecified portion of scaphoid, initial encounter  To schedule dexa        Type 2 diabetes mellitus without complication, with long-term current use of insulin (HCC)  Due for labs, will go for them today   Lab Results   Component Value Date    HGBA1C 8.9 (H) 2024            Anxiety    Orders:  •  clonazePAM (KlonoPIN) 0.5 mg tablet; Take 1 tablet (0.5 mg total) by mouth daily at bedtime    Hypomagnesemia    Orders:  •  magnesium Oxide (MAG-OX) 400 mg TABS; Take 1 tablet (400 mg total) by mouth 3 (three) times a day    Depression with anxiety      Orders:  •  zolpidem (AMBIEN) 10 mg tablet; Take 1 tablet (10 mg total) by mouth daily at bedtime    Primary insomnia    Orders:  •  zolpidem (AMBIEN) 10 mg tablet; Take 1 tablet (10 mg total) by mouth daily at bedtime    Malignant neoplasm of urinary bladder, unspecified site (HCC)         Systemic lupus erythematosus, unspecified SLE type, unspecified organ involvement status (HCC)         Essential hypertension  Monitor BP at home  Continue with weight loss              Depression Screening and Follow-up Plan: Patient was screened for depression during today's encounter. They screened negative with a PHQ-2 score of 0.      History of Present Illness   75 y/o female with hx of DM, htn, hld presents for f/u   Bp elevated on initial intake today  Monitors BP at home and states it has not been this high     Pt tolerating mounjaro   Stools improved   Has lost another 10 lbs since Dec   No ab pain, nausea      Review of Systems   Constitutional:  Negative for activity change, appetite change, chills, diaphoresis, fatigue and fever.   HENT:  Negative for congestion and sore throat.    Eyes:   "Negative for pain and redness.   Respiratory:  Negative for cough and shortness of breath.    Cardiovascular:  Negative for chest pain and leg swelling.   Gastrointestinal:  Negative for abdominal pain, constipation, diarrhea and nausea.   Genitourinary:  Negative for dysuria and frequency.   Musculoskeletal:  Negative for arthralgias, back pain and gait problem.   Skin:  Negative for rash.   Neurological:  Negative for dizziness, light-headedness and headaches.   Psychiatric/Behavioral:  Negative for sleep disturbance. The patient is not nervous/anxious.        Objective   BP (!) 180/100 (BP Location: Left arm, Patient Position: Sitting, Cuff Size: Standard)   Pulse 70   Temp 98.3 °F (36.8 °C) (Temporal)   Ht 5' 2\" (1.575 m)   Wt 62.2 kg (137 lb 3.2 oz)   LMP  (LMP Unknown)   SpO2 97%   BMI 25.09 kg/m²      Physical Exam  Vitals reviewed.   Constitutional:       General: She is not in acute distress.  HENT:      Head: Normocephalic and atraumatic.      Right Ear: Tympanic membrane, ear canal and external ear normal. There is no impacted cerumen.      Left Ear: Tympanic membrane, ear canal and external ear normal. There is no impacted cerumen.      Nose: Nose normal.      Mouth/Throat:      Mouth: Mucous membranes are moist.   Eyes:      General:         Right eye: No discharge.         Left eye: No discharge.      Extraocular Movements: Extraocular movements intact.      Conjunctiva/sclera: Conjunctivae normal.      Pupils: Pupils are equal, round, and reactive to light.   Cardiovascular:      Rate and Rhythm: Normal rate and regular rhythm.   Pulmonary:      Effort: Pulmonary effort is normal. No respiratory distress.      Breath sounds: Normal breath sounds. No wheezing or rales.   Musculoskeletal:         General: Normal range of motion.      Cervical back: Normal range of motion.      Right lower leg: No edema.      Left lower leg: No edema.   Skin:     Findings: No rash.   Neurological:      General: No " focal deficit present.      Mental Status: She is alert.   Psychiatric:         Mood and Affect: Mood normal.     Administrative Statements   I have spent a total time of 25 minutes in caring for this patient on the day of the visit/encounter including Patient and family education, Importance of tx compliance, and Documenting in the medical record.

## 2025-02-19 ENCOUNTER — RESULTS FOLLOW-UP (OUTPATIENT)
Dept: INTERNAL MEDICINE CLINIC | Age: 75
End: 2025-02-19

## 2025-02-28 ENCOUNTER — TELEPHONE (OUTPATIENT)
Dept: UROLOGY | Facility: AMBULATORY SURGERY CENTER | Age: 75
End: 2025-02-28

## 2025-02-28 NOTE — TELEPHONE ENCOUNTER
LVM for pt that FT schedule is open for September and wanted to get her an appt for the yearly with FT. CB# given    Provider note:  1 year follow up with FT (9/12/2025)

## 2025-03-03 DIAGNOSIS — E11.9 TYPE 2 DIABETES MELLITUS WITHOUT COMPLICATION, WITHOUT LONG-TERM CURRENT USE OF INSULIN (HCC): ICD-10-CM

## 2025-03-03 DIAGNOSIS — E66.3 OVERWEIGHT (BMI 25.0-29.9): ICD-10-CM

## 2025-03-03 DIAGNOSIS — E11.8 TYPE 2 DIABETES MELLITUS WITH COMPLICATION, WITHOUT LONG-TERM CURRENT USE OF INSULIN (HCC): ICD-10-CM

## 2025-03-03 DIAGNOSIS — E78.00 HYPERCHOLESTEROLEMIA: ICD-10-CM

## 2025-03-03 RX ORDER — SIMVASTATIN 20 MG
20 TABLET ORAL
Qty: 90 TABLET | Refills: 1 | Status: SHIPPED | OUTPATIENT
Start: 2025-03-03

## 2025-03-03 NOTE — TELEPHONE ENCOUNTER
Reason for call:   [x] Refill   [] Prior Auth  [] Other:     Office:   [x] PCP/Provider - [facility]  Authorized By: Iris Gandhi PA-C    [] Specialty/Provider -     Medication: simvastatin (ZOCOR) 20 mg tablet     Dose/Frequency: Take 1 tablet (20 mg total) by mouth daily at bedtime,    Quantity: 90 tablet    Pharmacy: [facility]/pharmacy #[id number] - [place], [place] - [address]     Does the patient have enough for 3 days?   [] Yes   [x] No - Send as HP to POD    Reason for call:   [x] Refill   [] Prior Auth  [] Other:     Office:   [] PCP/Provider - [facility]  Authorized By: Iris Gandhi PA-C    [] Specialty/Provider -     Medication: Tirzepatide 5 MG/0.5ML SOAJ    Dose/Frequency: Inject 5 mg under the skin once a week    Quantity:  2 mL    Pharmacy: [facility]/pharmacy #[id number] - [place], [place] - [address]     Does the patient have enough for 3 days?   [x] Yes   [] No - Send as HP to POD

## 2025-03-04 ENCOUNTER — TELEPHONE (OUTPATIENT)
Age: 75
End: 2025-03-04

## 2025-03-04 DIAGNOSIS — Z79.4 TYPE 2 DIABETES MELLITUS WITHOUT COMPLICATION, WITH LONG-TERM CURRENT USE OF INSULIN (HCC): Primary | ICD-10-CM

## 2025-03-04 DIAGNOSIS — E11.9 TYPE 2 DIABETES MELLITUS WITHOUT COMPLICATION, WITH LONG-TERM CURRENT USE OF INSULIN (HCC): Primary | ICD-10-CM

## 2025-03-04 RX ORDER — TIRZEPATIDE 5 MG/.5ML
INJECTION, SOLUTION SUBCUTANEOUS
Refills: 0 | OUTPATIENT
Start: 2025-03-04

## 2025-03-04 NOTE — TELEPHONE ENCOUNTER
Pt called in stated the Tirzepatide was refileld as zepbound. Zepbound is not covered by ins. Pt was previously on mounjaro that was covered. Pt was unaware of the change in script and would like to continue with mounjaro

## 2025-03-04 NOTE — TELEPHONE ENCOUNTER
Reason for call:   [] Refill   [x] Prior Auth  [] Other:     Office:   [x] PCP/Provider -   [] Specialty/Provider -

## 2025-03-05 RX ORDER — TIRZEPATIDE 5 MG/.5ML
5 INJECTION, SOLUTION SUBCUTANEOUS WEEKLY
Qty: 2 ML | Refills: 0 | Status: SHIPPED | OUTPATIENT
Start: 2025-03-05

## 2025-03-05 NOTE — TELEPHONE ENCOUNTER
[facility] Mounjaro, not Zepbound. There is an active approval on file for Mounjaro until 08/30/25. Medication is ready for . No further action needed at this time.

## 2025-03-18 DIAGNOSIS — F41.9 ANXIETY: ICD-10-CM

## 2025-03-18 NOTE — TELEPHONE ENCOUNTER
Reason for call:   [x] Refill   [] Prior Auth  [] Other:     Office:   [x] PCP/Provider - [facility] [place] - Iris Gandhi PA-C   [] Specialty/Provider -     Medication: clonazePAM (KlonoPIN) 0.5 mg tablet    Dose/Frequency: Take 1 tablet (0.5 mg total) by mouth daily at bedtime     Quantity: 30 tablet    Pharmacy: [facility]/pharmacy #[id number] - [place], [place] - [address] [facility] [phone]    Local Pharmacy   Does the patient have enough for 3 days?   [] Yes   [x] No - Send as HP to POD    Mail Away Pharmacy   Does the patient have enough for 10 days?   [] Yes   [] No - Send as HP to POD

## 2025-03-19 DIAGNOSIS — I10 ESSENTIAL HYPERTENSION: ICD-10-CM

## 2025-03-19 DIAGNOSIS — R00.0 TACHYCARDIA: ICD-10-CM

## 2025-03-19 RX ORDER — CLONAZEPAM 0.5 MG/1
0.5 TABLET ORAL
Qty: 30 TABLET | Refills: 0 | Status: SHIPPED | OUTPATIENT
Start: 2025-03-19 | End: 2025-05-18

## 2025-03-19 RX ORDER — METOPROLOL TARTRATE 25 MG/1
25 TABLET, FILM COATED ORAL EVERY 12 HOURS SCHEDULED
Qty: 180 TABLET | Refills: 1 | Status: SHIPPED | OUTPATIENT
Start: 2025-03-19

## 2025-03-20 ENCOUNTER — VBI (OUTPATIENT)
Dept: ADMINISTRATIVE | Facility: OTHER | Age: 75
End: 2025-03-20

## 2025-03-20 NOTE — TELEPHONE ENCOUNTER
03/20/25 10:10 AM    The patient was called and a message was left with the return number for Central Scheduling [phone].    Thank you.  Sonia Hurt MA  [facility]

## 2025-04-02 DIAGNOSIS — Z79.4 TYPE 2 DIABETES MELLITUS WITHOUT COMPLICATION, WITH LONG-TERM CURRENT USE OF INSULIN (HCC): ICD-10-CM

## 2025-04-02 DIAGNOSIS — F51.01 PRIMARY INSOMNIA: ICD-10-CM

## 2025-04-02 DIAGNOSIS — E11.9 TYPE 2 DIABETES MELLITUS WITHOUT COMPLICATION, WITH LONG-TERM CURRENT USE OF INSULIN (HCC): ICD-10-CM

## 2025-04-02 DIAGNOSIS — F41.8 DEPRESSION WITH ANXIETY: ICD-10-CM

## 2025-04-02 RX ORDER — TIRZEPATIDE 5 MG/.5ML
5 INJECTION, SOLUTION SUBCUTANEOUS WEEKLY
Qty: 2 ML | Refills: 5 | Status: SHIPPED | OUTPATIENT
Start: 2025-04-02

## 2025-04-02 RX ORDER — ZOLPIDEM TARTRATE 10 MG/1
10 TABLET ORAL
Qty: 30 TABLET | Refills: 0 | Status: SHIPPED | OUTPATIENT
Start: 2025-04-02

## 2025-04-02 NOTE — TELEPHONE ENCOUNTER
Reason for call:   [x] Refill   [] Prior Auth  [] Other:     Office:   [x] PCP/Provider - Iris Gandhi PA-C   [] Specialty/Provider -     Medication: zolpidem (AMBIEN) 10 mg tablet / Take 1 tablet (10 mg total) by mouth daily at bedtime     Tirzepatide (Mounjaro) 5 MG/0.5ML SOAJ /  Inject 5 mg under the skin once a week    [id number] [place] [place] - [address] [facility]   Does the patient have enough for 3 days?   [] Yes   [x] No - Send as HP to POD

## 2025-04-14 DIAGNOSIS — F41.9 ANXIETY: ICD-10-CM

## 2025-04-14 NOTE — TELEPHONE ENCOUNTER
Reason for call:   [x] Refill   [] Prior Auth  [] Other:     Office:   [x] PCP/Provider - [facility] [place]  Authorized By: Iris Gandhi PA-C  [] Specialty/Provider -     Medication:       clonazePAM (KlonoPIN) 0.5 mg tablet       Pharmacy: [facility]/pharmacy #[id number] - [place], [place] - [address] [facility]   Does the patient have enough for 3 days?   [] Yes   [x] No - Send as HP to POD    Mail Away Pharmacy   Does the patient have enough for 10 days?   [] Yes   [] No - Send as HP to POD

## 2025-04-14 NOTE — TELEPHONE ENCOUNTER
SEND TO [facility] ON 3/17/25     4/14/25 last seen 2/18/25, next appointment 5/20/25, last filled 3/19/25- amt 30/0- checked PDMP

## 2025-04-15 RX ORDER — CLONAZEPAM 0.5 MG/1
0.5 TABLET ORAL
Qty: 30 TABLET | Refills: 0 | Status: SHIPPED | OUTPATIENT
Start: 2025-04-15 | End: 2025-06-14

## 2025-04-30 DIAGNOSIS — F51.01 PRIMARY INSOMNIA: ICD-10-CM

## 2025-04-30 DIAGNOSIS — F41.8 DEPRESSION WITH ANXIETY: ICD-10-CM

## 2025-04-30 RX ORDER — ZOLPIDEM TARTRATE 10 MG/1
10 TABLET ORAL
Qty: 30 TABLET | Refills: 0 | Status: SHIPPED | OUTPATIENT
Start: 2025-04-30

## 2025-04-30 NOTE — TELEPHONE ENCOUNTER
Reason for call:   [x] Refill   [] Prior Auth  [] Other:     Office:   [x] PCP/Provider - Iris Gandhi   [] Specialty/Provider -     Medication:   zolpidem (AMBIEN) 10 mg tablet     Dose/Frequency: Take 1 tablet (10 mg total) by mouth daily at bedtime     Quantity: 30    Pharmacy: [facility]    Local Pharmacy   Does the patient have enough for 3 days?   [] Yes   [x] No - Send as HP to POD

## 2025-05-06 ENCOUNTER — DOCUMENTATION (OUTPATIENT)
Dept: ADMINISTRATIVE | Facility: OTHER | Age: 75
End: 2025-05-06

## 2025-05-06 NOTE — PROGRESS NOTES
05/06/25 2:17 PM    Osteoporosis Management Post Fracture outreach is not required; there is an active DEXA screening order on file.    Thank you.  Jasmyne Villa  [facility]

## 2025-05-09 DIAGNOSIS — E03.9 HYPOTHYROIDISM, UNSPECIFIED TYPE: ICD-10-CM

## 2025-05-09 RX ORDER — LEVOTHYROXINE SODIUM 50 UG/1
50 TABLET ORAL DAILY
Qty: 90 TABLET | Refills: 1 | Status: SHIPPED | OUTPATIENT
Start: 2025-05-09

## 2025-05-12 DIAGNOSIS — Z79.4 TYPE 2 DIABETES MELLITUS WITHOUT COMPLICATION, WITH LONG-TERM CURRENT USE OF INSULIN (HCC): ICD-10-CM

## 2025-05-12 DIAGNOSIS — E11.9 TYPE 2 DIABETES MELLITUS WITHOUT COMPLICATION, WITH LONG-TERM CURRENT USE OF INSULIN (HCC): ICD-10-CM

## 2025-05-12 NOTE — TELEPHONE ENCOUNTER
Reason for call:   [x] Refill   [] Prior Auth  [] Other:     Office:   [x] PCP/Provider - Iris Gandhi,   [] Specialty/Provider -     Medication: pen needles    Dose/Frequency: 1 daily    Quantity: 100    [facility] [place]    [facility]   Does the patient have enough for 3 days?   [x] Yes   [] No - Send as HP to POD    Mail Away Pharmacy   Does the patient have enough for 10 days?   [] Yes   [] No - Send as HP to POD

## 2025-05-15 DIAGNOSIS — I10 ESSENTIAL HYPERTENSION: ICD-10-CM

## 2025-05-15 DIAGNOSIS — E11.8 TYPE 2 DIABETES MELLITUS WITH COMPLICATION, WITHOUT LONG-TERM CURRENT USE OF INSULIN (HCC): ICD-10-CM

## 2025-05-15 RX ORDER — AMLODIPINE BESYLATE 5 MG/1
5 TABLET ORAL DAILY
Qty: 90 TABLET | Refills: 1 | Status: SHIPPED | OUTPATIENT
Start: 2025-05-15

## 2025-05-19 RX ORDER — CALCIPOTRIENE 50 UG/G
CREAM TOPICAL
COMMUNITY
Start: 2025-04-01

## 2025-05-20 ENCOUNTER — OFFICE VISIT (OUTPATIENT)
Dept: INTERNAL MEDICINE CLINIC | Age: 75
End: 2025-05-20
Payer: MEDICARE

## 2025-05-20 VITALS
OXYGEN SATURATION: 99 % | HEIGHT: 62 IN | DIASTOLIC BLOOD PRESSURE: 76 MMHG | TEMPERATURE: 98 F | HEART RATE: 68 BPM | WEIGHT: 129 LBS | SYSTOLIC BLOOD PRESSURE: 116 MMHG | BODY MASS INDEX: 23.74 KG/M2

## 2025-05-20 DIAGNOSIS — F51.01 PRIMARY INSOMNIA: ICD-10-CM

## 2025-05-20 DIAGNOSIS — Z79.4 TYPE 2 DIABETES MELLITUS WITHOUT COMPLICATION, WITH LONG-TERM CURRENT USE OF INSULIN (HCC): Primary | ICD-10-CM

## 2025-05-20 DIAGNOSIS — F41.9 ANXIETY: ICD-10-CM

## 2025-05-20 DIAGNOSIS — E66.3 OVERWEIGHT (BMI 25.0-29.9): ICD-10-CM

## 2025-05-20 DIAGNOSIS — E11.9 TYPE 2 DIABETES MELLITUS WITHOUT COMPLICATION, WITH LONG-TERM CURRENT USE OF INSULIN (HCC): Primary | ICD-10-CM

## 2025-05-20 DIAGNOSIS — I10 ESSENTIAL HYPERTENSION: ICD-10-CM

## 2025-05-20 DIAGNOSIS — E03.9 HYPOTHYROIDISM, UNSPECIFIED TYPE: ICD-10-CM

## 2025-05-20 DIAGNOSIS — E11.8 TYPE 2 DIABETES MELLITUS WITH COMPLICATION, WITHOUT LONG-TERM CURRENT USE OF INSULIN (HCC): ICD-10-CM

## 2025-05-20 DIAGNOSIS — F41.8 DEPRESSION WITH ANXIETY: ICD-10-CM

## 2025-05-20 PROCEDURE — 99214 OFFICE O/P EST MOD 30 MIN: CPT | Performed by: PHYSICIAN ASSISTANT

## 2025-05-20 RX ORDER — TIRZEPATIDE 5 MG/.5ML
5 INJECTION, SOLUTION SUBCUTANEOUS WEEKLY
Qty: 2 ML | Refills: 5 | Status: SHIPPED | OUTPATIENT
Start: 2025-05-20

## 2025-05-20 RX ORDER — CLONAZEPAM 0.5 MG/1
0.5 TABLET ORAL
Qty: 30 TABLET | Refills: 2 | Status: SHIPPED | OUTPATIENT
Start: 2025-05-20 | End: 2025-07-19

## 2025-05-20 RX ORDER — ZOLPIDEM TARTRATE 10 MG/1
10 TABLET ORAL
Qty: 30 TABLET | Refills: 0 | Status: SHIPPED | OUTPATIENT
Start: 2025-05-20

## 2025-05-20 NOTE — PROGRESS NOTES
Name: Shellie Lee      : 1950      MRN: [id number]  Encounter Provider: Iris Gandhi PA-C  Encounter Date: 2025   Encounter department: [facility] [place]  :  Assessment & Plan  Type 2 diabetes mellitus without complication, with long-term current use of insulin (HCC)  Well controlled with addition of mounjaro   Lab Results   Component Value Date    HGBA1C 5.5 2025       Orders:    Hemoglobin A1C; Future    Ambulatory Referral to Ophthalmology; Future    Comprehensive metabolic panel; Future    CBC and differential; Future    Tirzepatide (Mounjaro) 5 MG/0.5ML SOAJ; Inject 5 mg under the skin once a week    Anxiety    Orders:    clonazePAM (KlonoPIN) 0.5 mg tablet; Take 1 tablet (0.5 mg total) by mouth daily at bedtime    Essential hypertension  Well controlled    Orders:    Hemoglobin A1C; Future    Hypothyroidism, unspecified type  Continue levothyroxine   Orders:    CBC and differential; Future    Type 2 diabetes mellitus with complication, without long-term current use of insulin (HCC)    Lab Results   Component Value Date    HGBA1C 5.5 2025            Overweight (BMI 25.0-29.9)         Depression with anxiety      Orders:    zolpidem (AMBIEN) 10 mg tablet; Take 1 tablet (10 mg total) by mouth daily at bedtime    Primary insomnia    Orders:    zolpidem (AMBIEN) 10 mg tablet; Take 1 tablet (10 mg total) by mouth daily at bedtime           History of Present Illness   73 y/o female with hx of DM, htn, hld, hypothyroidism, NELIA, insomnia presents for f/u  Pt reports FBS have been well controlled on mounjaro, has continued to lose weight   She will be due for hga1c, orders placed  Pt requests refills for clonazepam and ambien   BP well controlled  Labs reviewed - ordered today      Review of Systems   Constitutional:  Negative for activity change, appetite change, chills, diaphoresis, fatigue and fever.   HENT:  Negative for congestion and sore throat.   "  Eyes:  Negative for pain and redness.   Respiratory:  Negative for cough, shortness of breath and wheezing.    Cardiovascular:  Negative for chest pain, palpitations and leg swelling.   Gastrointestinal:  Negative for abdominal pain, constipation, diarrhea and nausea.   Endocrine: Negative for cold intolerance and heat intolerance.   Genitourinary:  Negative for dysuria and frequency.   Musculoskeletal:  Negative for arthralgias and back pain.   Skin:  Negative for rash.   Allergic/Immunologic: Negative for environmental allergies.   Neurological:  Negative for dizziness, light-headedness and headaches.   Psychiatric/Behavioral:  Negative for sleep disturbance. The patient is not nervous/anxious.        Objective   /76 (BP Location: Left arm, Patient Position: Sitting, Cuff Size: Standard)   Pulse 68   Temp 98 °F (36.7 °C) (Temporal)   Ht 5' 2\" (1.575 m)   Wt 58.5 kg (129 lb)   LMP  (LMP Unknown)   SpO2 99%   BMI 23.59 kg/m²      Physical Exam  Vitals reviewed.   Constitutional:       General: She is not in acute distress.  HENT:      Head: Normocephalic and atraumatic.      Right Ear: Tympanic membrane, ear canal and external ear normal. There is no impacted cerumen.      Left Ear: Tympanic membrane, ear canal and external ear normal. There is no impacted cerumen.      Nose: Nose normal.     Eyes:      General:         Right eye: No discharge.         Left eye: No discharge.      Extraocular Movements: Extraocular movements intact.      Conjunctiva/sclera: Conjunctivae normal.      Pupils: Pupils are equal, round, and reactive to light.       Cardiovascular:      Rate and Rhythm: Normal rate and regular rhythm.   Pulmonary:      Effort: Pulmonary effort is normal. No respiratory distress.      Breath sounds: Normal breath sounds. No wheezing, rhonchi or rales.     Musculoskeletal:      Cervical back: Normal range of motion. No rigidity.      Right lower leg: No edema.      Left lower leg: No edema. "   Lymphadenopathy:      Cervical: No cervical adenopathy.     Skin:     Findings: No rash.     Neurological:      General: No focal deficit present.      Mental Status: She is alert.     Psychiatric:         Mood and Affect: Mood normal.

## 2025-06-05 DIAGNOSIS — Z87.19 HISTORY OF DENTAL PROBLEMS: Primary | ICD-10-CM

## 2025-06-05 RX ORDER — AMOXICILLIN 500 MG/1
2000 CAPSULE ORAL ONCE
Qty: 4 CAPSULE | Refills: 1 | Status: SHIPPED | OUTPATIENT
Start: 2025-06-05 | End: 2025-06-05

## 2025-07-01 DIAGNOSIS — F41.8 DEPRESSION WITH ANXIETY: ICD-10-CM

## 2025-07-01 DIAGNOSIS — F51.01 PRIMARY INSOMNIA: ICD-10-CM

## 2025-07-01 RX ORDER — ZOLPIDEM TARTRATE 10 MG/1
10 TABLET ORAL
Qty: 30 TABLET | Refills: 0 | Status: SHIPPED | OUTPATIENT
Start: 2025-07-01

## 2025-07-01 NOTE — TELEPHONE ENCOUNTER
PROVIDERS,   PLEASE ADDRESS SINCE PCP IS OUT OF THE OFFICE.   THANK YOU!    Next Office Visit 09/04/2025

## 2025-07-01 NOTE — TELEPHONE ENCOUNTER
Reason for call:   [x] Refill   [] Prior Auth  [] Other:     Office:   [x] PCP/Provider - [facility] [place]  Authorized By: Iris Gandhi PA-C  [] Specialty/Provider -     Medication:       zolpidem (AMBIEN) 10 mg tablet 10 mg, Daily at bedtime       Pharmacy: [facility]/pharmacy #[id number] - [place], [place] - [address]     Local Pharmacy   Does the patient have enough for 3 days?   [] Yes   [x] No - Send as HP to POD    Mail Away Pharmacy   Does the patient have enough for 10 days?   [] Yes   [] No - Send as HP to POD

## 2025-07-07 ENCOUNTER — OFFICE VISIT (OUTPATIENT)
Dept: URGENT CARE | Facility: MEDICAL CENTER | Age: 75
End: 2025-07-07
Payer: MEDICARE

## 2025-07-07 VITALS
SYSTOLIC BLOOD PRESSURE: 140 MMHG | TEMPERATURE: 97.6 F | HEART RATE: 66 BPM | DIASTOLIC BLOOD PRESSURE: 76 MMHG | OXYGEN SATURATION: 98 % | RESPIRATION RATE: 16 BRPM

## 2025-07-07 DIAGNOSIS — H60.501 ACUTE OTITIS EXTERNA OF RIGHT EAR, UNSPECIFIED TYPE: Primary | ICD-10-CM

## 2025-07-07 PROCEDURE — G0463 HOSPITAL OUTPT CLINIC VISIT: HCPCS | Performed by: PHYSICIAN ASSISTANT

## 2025-07-07 PROCEDURE — 99213 OFFICE O/P EST LOW 20 MIN: CPT | Performed by: PHYSICIAN ASSISTANT

## 2025-07-07 RX ORDER — OFLOXACIN 3 MG/ML
10 SOLUTION AURICULAR (OTIC) DAILY
Qty: 3.5 ML | Refills: 0 | Status: SHIPPED | OUTPATIENT
Start: 2025-07-07 | End: 2025-07-14

## 2025-07-07 NOTE — PROGRESS NOTES
[facility]  Name: Shellie Lee      : 1950      MRN: [id number]  Encounter Provider: Kerrie Toscano PA-C  Encounter Date: 2025   Encounter department: [facility] [place]  :  Assessment & Plan  Acute otitis externa of right ear, unspecified type    Orders:    ofloxacin (FLOXIN) 0.3 % otic solution; Administer 10 drops to the right ear daily for 7 days        Patient Instructions  Follow up with PCP in 3-5 days.  Proceed to  ER if symptoms worsen.    If tests are performed, our office will contact you with results only if changes need to made to the care plan discussed with you at the visit. You can review your full results on [facility].    Chief Complaint:   Chief Complaint   Patient presents with    Earache     Right ear x 1 week, hx ear infections     History of Present Illness   74-year-old female presents with complaint of right ear pain x 1 week duration.  She reports that she has a history of recurrent ear infections.  Patient denies fever, chills, runny nose, sinus congestion and cough.  She states that she has some tenderness over her right eyebrow and occasionally into the right jaw as well.    Earache   Pertinent negatives include no coughing, ear discharge, hearing loss, rhinorrhea or sore throat.     History obtained from: patient    Review of Systems   Constitutional:  Negative for chills and fever.   HENT:  Positive for ear pain. Negative for congestion, ear discharge, hearing loss, postnasal drip, rhinorrhea and sore throat.    Respiratory:  Negative for cough.      Past Medical History   Past Medical History[1]  Past Surgical History[2]  Family History[3]  she reports that she quit smoking about 35 years ago. Her smoking use included cigarettes. She started smoking about 56 years ago. She has a 21 pack-year smoking history. She has never used smokeless tobacco. She reports that she does not currently use alcohol. She reports that she does not use  drugs.  Current Outpatient Medications   Medication Instructions    acetaminophen (TYLENOL) 650 mg, 4 times daily PRN    amLODIPine (NORVASC) 5 mg, Oral, Daily    calcipotriene (DOVONEX) 0.005 % cream     clonazePAM (KLONOPIN) 0.5 mg, Oral, Daily at bedtime    estradiol (ESTRACE VAGINAL) 0.1 mg/g vaginal cream Apply small amount on fingertip. Apply twice a week.    fenofibrate (TRICOR) 145 mg, Oral, Daily    Ibuprofen (ADVIL PO) 1 tablet, As needed    Insulin Glargine Solostar (LANTUS SOLOSTAR) 33 Units, Other, Daily    Insulin Pen Needle 32G X 4 MM MISC Does not apply, Daily    levothyroxine 50 mcg, Oral, Daily    magnesium Oxide (MAG-OX) 400 mg, Oral, 3 times daily    Magnesium 400 mg, 2 times daily    metFORMIN (GLUCOPHAGE) 1,000 mg, Oral, 2 times daily with meals    metoprolol tartrate (LOPRESSOR) 25 mg, Oral, Every 12 hours scheduled    Mounjaro 5 mg, Subcutaneous, Weekly    ofloxacin (FLOXIN) 0.3 % otic solution 10 drops, Right Ear, Daily    OneTouch Verio test strip Test 3 times daily    Probiotic Product (PROBIOTIC-10) CAPS 1 capsule, Daily    simvastatin (ZOCOR) 20 mg, Oral, Daily at bedtime    Tirzepatide 5 mg, Subcutaneous, Weekly    zolpidem (AMBIEN) 10 mg, Oral, Daily at bedtime   Allergies[4]     Objective   /76   Pulse 66   Temp 97.6 °F (36.4 °C) (Temporal)   Resp 16   LMP  (LMP Unknown)   SpO2 98%      Physical Exam  Vitals and nursing note reviewed.   Constitutional:       General: She is awake. She is not in acute distress.  HENT:      Head: Normocephalic and atraumatic.      Jaw: No trismus, tenderness, swelling, pain on movement or malocclusion.      Right Ear: Hearing and external ear normal. Swelling present. No middle ear effusion. No mastoid tenderness. Tympanic membrane is not injected, scarred, perforated, erythematous, retracted or bulging.      Left Ear: Hearing and external ear normal.      Ears:      Comments: Mild swelling erythema noted to the right ear canal concerning for  "otitis externa.     Nose: No nasal deformity.      Mouth/Throat:      Lips: Pink. No lesions.     Skin:     Coloration: Skin is not pale.     Neurological:      Mental Status: She is alert and easily aroused.     Psychiatric:         Attention and Perception: Attention and perception normal.         Mood and Affect: Mood and affect normal.         Behavior: Behavior normal. Behavior is cooperative.         Portions of the record may have been created with voice recognition software.  Occasional wrong word or \"sound a like\" substitutions may have occurred due to the inherent limitations of voice recognition software.  Read the chart carefully and recognize, using context, where substitutions have occurred.       [1]   Past Medical History:  Diagnosis Date    Abdominal pain 2017    Acute non-recurrent frontal sinusitis 2019    Acute pain of right shoulder 01/10/2018    Anxiety     Arthritis     Bladder carcinoma (HCC) 2016    Bladder mass     Dental abscess 2017    Depression     Diabetes mellitus (HCC)     Disease of thyroid gland     thyroid nodules-not treating at this time    Dysuria     Last assessed 17    GERD (gastroesophageal reflux disease)      2 para 2     HL (hearing loss)     HLD (hyperlipidemia)     HTN (hypertension)     Hypercholesterolemia     Hypertension     Knee pain     Lichen sclerosus     Melanoma (HCC)     Melanoma (HCC)     Mild aortic regurgitation with left ventricular dilation by prior echocardiogram     Papanicolaou smear 2017    NEG    PONV (postoperative nausea and vomiting)     Tinnitus     Visual impairment    [2]   Past Surgical History:  Procedure Laterality Date    BASAL CELL CARCINOMA EXCISION  2024    nose    CARPAL TUNNEL RELEASE Right      SECTION      x2    CHOLECYSTECTOMY      CYSTOSCOPY N/A 2016    Procedure: CYSTOSCOPY WITH BIOPSIES;  Surgeon: Lane Rios MD;  [facility];  Service:     CYSTOSCOPY " N/A 09/01/2016    Procedure: CYSTOSCOPY;  Surgeon: Lane Rios MD;  [facility];  Service:     CYSTOSCOPY  08/07/2020    HERNIA REPAIR      OH ARTHRS KNE SURG W/MENISCECTOMY MED/LAT W/SHVG Left 04/04/2016    Procedure: KNEE ARTHROSCOPIC PARTIAL MEDIAL MENISCECTOMY ;  Surgeon: Rehan Pete MD;  [facility];  Service: Orthopedics    OH BLADDER INSTILLATION ANTICARCINOGENIC AGENT N/A 11/29/2016    Procedure: INSTILLATION MYTOMYCIN;  Surgeon: Lane Rios MD;  [facility];  Service: Urology    OH CYSTO W/INSERT URETERAL STENT Left 09/29/2016    Procedure: URETERAL STENTS;  Surgeon: Lane Rios MD;  [facility];  Service: Urology    OH CYSTO W/REMOVAL OF LESIONS SMALL N/A 11/29/2016    Procedure: TRANSURETHRAL RESECTION OF BLADDER TUMOR (TURBT);  Surgeon: Lane Rios MD;  [facility];  Service: Urology    OH CYSTO W/REMOVAL OF LESIONS SMALL N/A 09/29/2016    Procedure: CYSTOSCOPY; TUR BLADDER TUMOR ;  Surgeon: Lane Rios MD;  [facility];  Service: Urology    OH CYSTO W/REMOVAL OF LESIONS SMALL N/A 09/01/2016    Procedure: TUR BLADDER TUMOR ;  Surgeon: Lane Rios MD;  [facility];  Service: Urology    OH CYSTO W/REMOVAL OF LESIONS SMALL Bilateral 05/09/2017    Procedure: CYSTOSCOPY, RIGHT URETERAL WASHINGS, ;  Surgeon: Lane Rios MD;  [facility];  Service: Urology    OH CYSTO W/URTROSCOPY&/PYELOSCOPY DX Right 05/09/2017    Procedure: URETEROSCOPY;  Surgeon: Lane Rios MD;  [facility];  Service: Urology    OH CYSTO/URETERO W/LITHOTRIPSY &INDWELL STENT INSRT  11/29/2016    Procedure: CYSTOSCOPY URETEROSCOPY WITH LITHOTRIPSY HOLMIUM LASER RIGHT, RETROGRADE PYELOGRAM BILATERAL: AND INSERTION STENT URETERAL Right ;  Surgeon: Lane Rios MD;  [facility];  Service: Urology    OH CYSTOURETHROSCOPY W/URETERAL CATHETERIZATION Bilateral 09/29/2016    Procedure: RETROGRADE PYELOGRAM ;  Surgeon: Lane  MD Gabriel;  [facility];  Service: Urology    DE CYSTOURETHROSCOPY W/URETERAL CATHETERIZATION Bilateral 05/09/2017    Procedure: RETROGRADE PYELOGRAM WITH STENT EXCHANGE, RIGHT;  Surgeon: Lane Rios MD;  [facility];  Service: Urology    REMOVAL URETERAL STENT Left 11/29/2016    Procedure: REMOVAL STENT URETERAL;  Surgeon: Lane Rios MD;  [facility];  Service:     REPLACEMENT TOTAL KNEE Left 12/2021    SKIN CANCER EXCISION  2021    right arm    TONSILLECTOMY     [3]   Family History  Problem Relation Name Age of Onset    Heart attack Mother Brielle     Heart disease Mother Brielle     ALS Father      Heart disease Maternal Grandmother Ena     Diabetes Maternal Grandfather Ylko     Lung cancer Paternal Grandfather      No Known Problems Daughter      No Known Problems Daughter      No Known Problems Paternal Aunt      No Known Problems Paternal Aunt      No Known Problems Paternal Aunt      No Known Problems Paternal Aunt      No Known Problems Paternal Aunt      Breast cancer Neg Hx     [4]   Allergies  Allergen Reactions    Losartan Edema    Nickel Swelling     Swelling, irritation, lumps to ears

## 2025-07-07 NOTE — PATIENT INSTRUCTIONS
Use ofloxacin drops as prescribed.  Leave affected ear up towards the ceiling for 3 to 5 minutes after application of drops.  Avoid water in the ear for the next week while on treatment.  If symptoms are not improved in 2 to 3 days follow-up with PCP.  If symptoms worsen or new symptoms develop report to the emergency room immediately.

## 2025-07-09 ENCOUNTER — DOCUMENTATION (OUTPATIENT)
Dept: ADMINISTRATIVE | Facility: OTHER | Age: 75
End: 2025-07-09

## 2025-07-09 NOTE — PROGRESS NOTES
Kerrie Toscano PA-C  P Patient Reported Team         Blood pressure elevated  Appointment department: [facility]  Appointment provider: Kerrie Toscano PA-C  Blood pressure  07/07/25 1543 140/76  07/07/25 1534     07/09/25 3:41 PM    Patient was called after the Urgent Care visit . Home BP reading(s) was/were did not take today . Patient has no symptoms.    Thank you.  Luis F Alvares MA  [facility]

## 2025-07-22 DIAGNOSIS — E11.8 TYPE 2 DIABETES MELLITUS WITH COMPLICATION, WITHOUT LONG-TERM CURRENT USE OF INSULIN (HCC): Primary | ICD-10-CM

## 2025-07-22 DIAGNOSIS — E66.3 OVERWEIGHT (BMI 25.0-29.9): ICD-10-CM

## 2025-07-22 NOTE — TELEPHONE ENCOUNTER
Patient called requesting refill for Clonazepam 0.5 mg, Mounjaro 5 mg. Patient made aware medication was refilled on 5/20/25 for 30, 2 ml with 2, 5 refills to [facility] pharmacy. Patient instructed to contact the pharmacy and speak with someone directly to obtain refills of medication. Patient advised to call back for refill if their pharmacy is unable to assist them. Patient verbalized understanding.

## 2025-07-28 RX ORDER — TIRZEPATIDE 2.5 MG/.5ML
INJECTION, SOLUTION SUBCUTANEOUS
OUTPATIENT
Start: 2025-07-28

## 2025-07-31 DIAGNOSIS — F51.01 PRIMARY INSOMNIA: ICD-10-CM

## 2025-07-31 DIAGNOSIS — F41.8 DEPRESSION WITH ANXIETY: ICD-10-CM

## 2025-07-31 RX ORDER — ZOLPIDEM TARTRATE 10 MG/1
10 TABLET ORAL
Qty: 30 TABLET | Refills: 0 | Status: SHIPPED | OUTPATIENT
Start: 2025-07-31

## 2025-08-03 DIAGNOSIS — Z79.4 TYPE 2 DIABETES MELLITUS WITHOUT COMPLICATION, WITH LONG-TERM CURRENT USE OF INSULIN (HCC): ICD-10-CM

## 2025-08-03 DIAGNOSIS — E78.00 HYPERCHOLESTEROLEMIA: ICD-10-CM

## 2025-08-03 DIAGNOSIS — E11.9 TYPE 2 DIABETES MELLITUS WITHOUT COMPLICATION, WITH LONG-TERM CURRENT USE OF INSULIN (HCC): ICD-10-CM

## 2025-08-04 DIAGNOSIS — E11.9 TYPE 2 DIABETES MELLITUS WITHOUT COMPLICATION, WITHOUT LONG-TERM CURRENT USE OF INSULIN (HCC): ICD-10-CM

## 2025-08-04 RX ORDER — BLOOD SUGAR DIAGNOSTIC
STRIP MISCELLANEOUS 3 TIMES DAILY
Qty: 300 STRIP | Refills: 1 | Status: SHIPPED | OUTPATIENT
Start: 2025-08-04

## 2025-08-05 RX ORDER — INSULIN GLARGINE 100 [IU]/ML
INJECTION, SOLUTION SUBCUTANEOUS
Qty: 30 ML | Refills: 1 | Status: SHIPPED | OUTPATIENT
Start: 2025-08-05

## 2025-08-05 RX ORDER — FENOFIBRATE 145 MG/1
145 TABLET, FILM COATED ORAL DAILY
Qty: 90 TABLET | Refills: 0 | Status: SHIPPED | OUTPATIENT
Start: 2025-08-05

## 2025-08-12 ENCOUNTER — TELEPHONE (OUTPATIENT)
Age: 75
End: 2025-08-12

## 2025-08-14 ENCOUNTER — APPOINTMENT (OUTPATIENT)
Dept: LAB | Facility: MEDICAL CENTER | Age: 75
End: 2025-08-14
Payer: MEDICARE

## 2025-08-15 ENCOUNTER — RESULTS FOLLOW-UP (OUTPATIENT)
Dept: INTERNAL MEDICINE CLINIC | Age: 75
End: 2025-08-15

## 2025-08-19 ENCOUNTER — APPOINTMENT (OUTPATIENT)
Dept: LAB | Facility: MEDICAL CENTER | Age: 75
End: 2025-08-19
Payer: MEDICARE

## 2025-08-19 DIAGNOSIS — E87.1 HYPONATREMIA: ICD-10-CM

## 2025-08-19 LAB
ANION GAP SERPL CALCULATED.3IONS-SCNC: 10 MMOL/L (ref 4–13)
CHLORIDE SERPL-SCNC: 97 MMOL/L (ref 96–108)
CO2 SERPL-SCNC: 29 MMOL/L (ref 21–32)
POTASSIUM SERPL-SCNC: 4.6 MMOL/L (ref 3.5–5.3)
SODIUM SERPL-SCNC: 136 MMOL/L (ref 135–147)

## 2025-08-19 PROCEDURE — 36415 COLL VENOUS BLD VENIPUNCTURE: CPT

## 2025-08-19 PROCEDURE — 80051 ELECTROLYTE PANEL: CPT

## 2025-08-21 ENCOUNTER — HOSPITAL ENCOUNTER (EMERGENCY)
Facility: HOSPITAL | Age: 75
Discharge: HOME | End: 2025-08-22
Payer: MEDICARE

## 2025-08-21 ENCOUNTER — APPOINTMENT (EMERGENCY)
Dept: CT IMAGING | Facility: HOSPITAL | Age: 75
End: 2025-08-21
Payer: MEDICARE

## 2025-08-21 VITALS
OXYGEN SATURATION: 95 % | DIASTOLIC BLOOD PRESSURE: 72 MMHG | RESPIRATION RATE: 16 BRPM | HEART RATE: 84 BPM | SYSTOLIC BLOOD PRESSURE: 155 MMHG | TEMPERATURE: 98 F

## 2025-08-21 DIAGNOSIS — N12 PYELONEPHRITIS: Primary | ICD-10-CM

## 2025-08-21 LAB
ALBUMIN SERPL BCG-MCNC: 4.3 G/DL (ref 3.5–5)
ALP SERPL-CCNC: 81 U/L (ref 34–104)
ALT SERPL W P-5'-P-CCNC: 15 U/L (ref 7–52)
ANION GAP SERPL CALCULATED.3IONS-SCNC: 13 MMOL/L (ref 4–13)
ANISOCYTOSIS BLD QL SMEAR: PRESENT
APTT PPP: 30 SECONDS (ref 23–34)
AST SERPL W P-5'-P-CCNC: 17 U/L (ref 13–39)
BASOPHILS # BLD MANUAL: 0 THOUSAND/UL (ref 0–0.1)
BASOPHILS NFR MAR MANUAL: 0 % (ref 0–1)
BILIRUB SERPL-MCNC: 0.56 MG/DL (ref 0.2–1)
BILIRUB UR QL STRIP: NEGATIVE
BUN SERPL-MCNC: 17 MG/DL (ref 5–25)
CALCIUM SERPL-MCNC: 9.7 MG/DL (ref 8.4–10.2)
CHLORIDE SERPL-SCNC: 97 MMOL/L (ref 96–108)
CLARITY UR: CLEAR
CO2 SERPL-SCNC: 22 MMOL/L (ref 21–32)
COLOR UR: ABNORMAL
CREAT SERPL-MCNC: 0.83 MG/DL (ref 0.6–1.3)
EOSINOPHIL # BLD MANUAL: 0 THOUSAND/UL (ref 0–0.4)
EOSINOPHIL NFR BLD MANUAL: 0 % (ref 0–6)
ERYTHROCYTE [DISTWIDTH] IN BLOOD BY AUTOMATED COUNT: 13.1 % (ref 11.6–15.1)
GFR SERPL CREATININE-BSD FRML MDRD: 68 ML/MIN/1.73SQ M
GLUCOSE SERPL-MCNC: 112 MG/DL (ref 65–140)
GLUCOSE SERPL-MCNC: 120 MG/DL (ref 65–140)
GLUCOSE UR STRIP-MCNC: NEGATIVE MG/DL
HCT VFR BLD AUTO: 42.2 % (ref 34.8–46.1)
HGB BLD-MCNC: 14.3 G/DL (ref 11.5–15.4)
HGB UR QL STRIP.AUTO: NEGATIVE
HOLD SPECIMEN: NORMAL
INR PPP: 1.08 (ref 0.85–1.19)
KETONES UR STRIP-MCNC: NEGATIVE MG/DL
LACTATE SERPL-SCNC: 0.8 MMOL/L (ref 0.5–2)
LEUKOCYTE ESTERASE UR QL STRIP: NEGATIVE
LIPASE SERPL-CCNC: 18 U/L (ref 11–82)
LYMPHOCYTES # BLD AUTO: 0.23 THOUSAND/UL (ref 0.6–4.47)
LYMPHOCYTES # BLD AUTO: 1 % (ref 14–44)
MCH RBC QN AUTO: 29.2 PG (ref 26.8–34.3)
MCHC RBC AUTO-ENTMCNC: 33.9 G/DL (ref 31.4–37.4)
MCV RBC AUTO: 86 FL (ref 82–98)
MONOCYTES # BLD AUTO: 0.12 THOUSAND/UL (ref 0–1.22)
MONOCYTES NFR BLD: 1 % (ref 4–12)
NEUTROPHILS # BLD MANUAL: 11.26 THOUSAND/UL (ref 1.85–7.62)
NEUTS BAND NFR BLD MANUAL: 2 % (ref 0–8)
NEUTS SEG NFR BLD AUTO: 95 % (ref 43–75)
NITRITE UR QL STRIP: NEGATIVE
PH UR STRIP.AUTO: 7 [PH]
PLATELET # BLD AUTO: 160 THOUSANDS/UL (ref 149–390)
PLATELET BLD QL SMEAR: ADEQUATE
PMV BLD AUTO: 9 FL (ref 8.9–12.7)
POTASSIUM SERPL-SCNC: 3.8 MMOL/L (ref 3.5–5.3)
PROT SERPL-MCNC: 7.3 G/DL (ref 6.4–8.4)
PROT UR STRIP-MCNC: ABNORMAL MG/DL
PROTHROMBIN TIME: 14.7 SECONDS (ref 12.3–15)
RBC # BLD AUTO: 4.9 MILLION/UL (ref 3.81–5.12)
RBC MORPH BLD: PRESENT
SODIUM SERPL-SCNC: 132 MMOL/L (ref 135–147)
SP GR UR STRIP.AUTO: 1.04 (ref 1–1.03)
UROBILINOGEN UR STRIP-ACNC: <2 MG/DL
VARIANT LYMPHS # BLD AUTO: 1 %
WBC # BLD AUTO: 11.61 THOUSAND/UL (ref 4.31–10.16)

## 2025-08-21 PROCEDURE — 84145 PROCALCITONIN (PCT): CPT

## 2025-08-21 PROCEDURE — 81001 URINALYSIS AUTO W/SCOPE: CPT

## 2025-08-21 PROCEDURE — 93005 ELECTROCARDIOGRAM TRACING: CPT

## 2025-08-21 PROCEDURE — 87154 CUL TYP ID BLD PTHGN 6+ TRGT: CPT

## 2025-08-21 PROCEDURE — 85027 COMPLETE CBC AUTOMATED: CPT

## 2025-08-21 PROCEDURE — 85610 PROTHROMBIN TIME: CPT

## 2025-08-21 PROCEDURE — 85007 BL SMEAR W/DIFF WBC COUNT: CPT

## 2025-08-21 PROCEDURE — 80053 COMPREHEN METABOLIC PANEL: CPT

## 2025-08-21 PROCEDURE — 99285 EMERGENCY DEPT VISIT HI MDM: CPT

## 2025-08-21 PROCEDURE — 74177 CT ABD & PELVIS W/CONTRAST: CPT

## 2025-08-21 PROCEDURE — 82948 REAGENT STRIP/BLOOD GLUCOSE: CPT

## 2025-08-21 PROCEDURE — 36415 COLL VENOUS BLD VENIPUNCTURE: CPT

## 2025-08-21 PROCEDURE — 87186 SC STD MICRODIL/AGAR DIL: CPT

## 2025-08-21 PROCEDURE — 87040 BLOOD CULTURE FOR BACTERIA: CPT

## 2025-08-21 PROCEDURE — 87077 CULTURE AEROBIC IDENTIFY: CPT

## 2025-08-21 PROCEDURE — 83690 ASSAY OF LIPASE: CPT

## 2025-08-21 PROCEDURE — 85730 THROMBOPLASTIN TIME PARTIAL: CPT

## 2025-08-21 PROCEDURE — 83605 ASSAY OF LACTIC ACID: CPT

## 2025-08-21 RX ADMIN — IOHEXOL 100 ML: 350 INJECTION, SOLUTION INTRAVENOUS at 22:39

## 2025-08-22 LAB
ATRIAL RATE: 95 BPM
BACTERIA UR QL AUTO: NORMAL /HPF
NON-SQ EPI CELLS URNS QL MICRO: NORMAL /HPF
P AXIS: 74 DEGREES
PR INTERVAL: 178 MS
PROCALCITONIN SERPL-MCNC: 5.03 NG/ML
QRS AXIS: -56 DEGREES
QRSD INTERVAL: 80 MS
QT INTERVAL: 344 MS
QTC INTERVAL: 432 MS
RBC #/AREA URNS AUTO: NORMAL /HPF
T WAVE AXIS: 89 DEGREES
VENTRICULAR RATE: 95 BPM
WBC #/AREA URNS AUTO: NORMAL /HPF

## 2025-08-22 PROCEDURE — 93010 ELECTROCARDIOGRAM REPORT: CPT | Performed by: INTERNAL MEDICINE

## 2025-08-22 RX ORDER — CEFPODOXIME PROXETIL 200 MG/1
200 TABLET, FILM COATED ORAL 2 TIMES DAILY
Qty: 20 TABLET | Refills: 0 | Status: SHIPPED | OUTPATIENT
Start: 2025-08-22 | End: 2025-08-25

## 2025-08-23 PROBLEM — B96.20 E COLI BACTEREMIA: Status: ACTIVE | Noted: 2025-08-23

## 2025-08-23 PROBLEM — R78.81 BACTEREMIA: Status: ACTIVE | Noted: 2025-08-23

## 2025-08-23 PROBLEM — A49.8 INFECTION DUE TO ESBL-PRODUCING ESCHERICHIA COLI: Status: ACTIVE | Noted: 2025-08-23

## 2025-08-23 PROBLEM — Z16.12 INFECTION DUE TO ESBL-PRODUCING ESCHERICHIA COLI: Status: ACTIVE | Noted: 2025-08-23

## 2025-08-23 PROBLEM — N10 ACUTE PYELONEPHRITIS: Status: ACTIVE | Noted: 2025-08-23

## 2025-08-23 PROBLEM — A41.9 SEPSIS (HCC): Status: ACTIVE | Noted: 2025-08-23

## 2025-08-24 PROBLEM — N39.0 RECURRENT UTI: Status: ACTIVE | Noted: 2025-08-24

## 2025-08-24 LAB
BACTERIA BLD CULT: ABNORMAL
BACTERIA BLD CULT: ABNORMAL
BLACTX-M ISLT/SPM QL: DETECTED
E COLI DNA BLD POS QL NAA+NON-PROBE: DETECTED
GRAM STN SPEC: ABNORMAL
GRAM STN SPEC: ABNORMAL

## 2025-08-25 PROBLEM — A41.9 SEPSIS (HCC): Status: RESOLVED | Noted: 2025-08-23 | Resolved: 2025-08-25

## 2025-08-25 PROBLEM — E87.1 HYPONATREMIA: Status: RESOLVED | Noted: 2018-07-05 | Resolved: 2025-08-25

## (undated) DEVICE — 3M™ TEGADERM™ TRANSPARENT FILM DRESSING FRAME STYLE, 1622W, 1-3/4 IN X 1-3/4 IN (4.4 CM X 4.4 CM), 100/CT 4CT/CASE: Brand: 3M™ TEGADERM™

## (undated) DEVICE — SPECIMEN CONTAINER STERILE PEEL PACK

## (undated) DEVICE — CATH URETERAL 5FR X 70 CM FLEX TIP POLYUR BARD

## (undated) DEVICE — REM POLYHESIVE ADULT PATIENT RETURN ELECTRODE: Brand: VALLEYLAB

## (undated) DEVICE — PACK TUR

## (undated) DEVICE — GUIDEWIRE STRGHT TIP 0.035 IN  SOLO PLUS

## (undated) DEVICE — SYRINGE 10ML LL

## (undated) DEVICE — GLOVE SRG BIOGEL ECLIPSE 7.5